# Patient Record
Sex: FEMALE | Race: WHITE | NOT HISPANIC OR LATINO | Employment: OTHER | URBAN - METROPOLITAN AREA
[De-identification: names, ages, dates, MRNs, and addresses within clinical notes are randomized per-mention and may not be internally consistent; named-entity substitution may affect disease eponyms.]

---

## 2017-01-23 DIAGNOSIS — F33.42 MAJOR DEPRESSIVE DISORDER, RECURRENT EPISODE, IN FULL REMISSION (H): Primary | ICD-10-CM

## 2017-01-23 DIAGNOSIS — F41.1 GAD (GENERALIZED ANXIETY DISORDER): ICD-10-CM

## 2017-01-23 RX ORDER — TRAZODONE HYDROCHLORIDE 50 MG/1
50 TABLET, FILM COATED ORAL AT BEDTIME
Qty: 90 TABLET | Refills: 1 | Status: SHIPPED | OUTPATIENT
Start: 2017-01-23 | End: 2017-08-08

## 2017-01-23 NOTE — TELEPHONE ENCOUNTER
Kemi Casper is requesting a refill of:    Pending Prescriptions:                       Disp   Refills    traZODone (DESYREL) 50 MG tablet          30 tab*0            Sig: Take 1 tablet (50 mg) by mouth At Bedtime    Last seen on 11/21/16 for Pre-op. Refilled again on 12/21/16. When is Pt due for OV? Can she receive more then 1 month at a time?  Please advise

## 2017-02-09 ENCOUNTER — TELEPHONE (OUTPATIENT)
Dept: FAMILY MEDICINE | Facility: CLINIC | Age: 62
End: 2017-02-09

## 2017-02-09 NOTE — Clinical Note
[facility]  [place] [facility] [address]  [place]  [address]            February 9, 2017      Kemi Casper  [address] [facility] [address]            Dear Kemi Casper,          Your doctor has requested to have you schedule a MAMMOGRAM at this time.       If you have already received your manual breast exam please feel free to schedule your mammogram at your convenience at [facility] (numbers provided below), or ANY institute provided in your insurance plan.    [facility]:   [phone]                                      Please disregard this notice if you have had this procedure repeated or have already made an appointment.        [facility]

## 2017-02-24 ENCOUNTER — OFFICE VISIT (OUTPATIENT)
Dept: FAMILY MEDICINE | Facility: CLINIC | Age: 62
End: 2017-02-24

## 2017-02-24 VITALS
WEIGHT: 179.6 LBS | DIASTOLIC BLOOD PRESSURE: 70 MMHG | BODY MASS INDEX: 33.91 KG/M2 | SYSTOLIC BLOOD PRESSURE: 120 MMHG | HEIGHT: 61 IN | TEMPERATURE: 98.4 F

## 2017-02-24 DIAGNOSIS — M79.7 FIBROMYALGIA: ICD-10-CM

## 2017-02-24 DIAGNOSIS — E66.811 OBESITY (BMI 30.0-34.9): ICD-10-CM

## 2017-02-24 DIAGNOSIS — J06.9 UPPER RESPIRATORY TRACT INFECTION, UNSPECIFIED TYPE: ICD-10-CM

## 2017-02-24 DIAGNOSIS — G89.29 CHRONIC PAIN OF LEFT KNEE: ICD-10-CM

## 2017-02-24 DIAGNOSIS — R19.4 CHANGE IN BOWEL HABIT: ICD-10-CM

## 2017-02-24 DIAGNOSIS — I10 ESSENTIAL HYPERTENSION, BENIGN: ICD-10-CM

## 2017-02-24 DIAGNOSIS — F33.42 MAJOR DEPRESSIVE DISORDER, RECURRENT EPISODE, IN FULL REMISSION (H): Primary | ICD-10-CM

## 2017-02-24 DIAGNOSIS — M25.562 CHRONIC PAIN OF LEFT KNEE: ICD-10-CM

## 2017-02-24 DIAGNOSIS — E11.49 TYPE 2 DIABETES MELLITUS WITH NEUROLOGICAL MANIFESTATIONS (H): ICD-10-CM

## 2017-02-24 PROCEDURE — 99214 OFFICE O/P EST MOD 30 MIN: CPT | Performed by: PHYSICIAN ASSISTANT

## 2017-02-24 PROCEDURE — 36415 COLL VENOUS BLD VENIPUNCTURE: CPT | Performed by: PHYSICIAN ASSISTANT

## 2017-02-24 PROCEDURE — 80048 BASIC METABOLIC PNL TOTAL CA: CPT | Mod: 90 | Performed by: PHYSICIAN ASSISTANT

## 2017-02-24 RX ORDER — IRBESARTAN AND HYDROCHLOROTHIAZIDE 150; 12.5 MG/1; MG/1
1 TABLET, FILM COATED ORAL DAILY
Qty: 90 TABLET | Refills: 1 | Status: SHIPPED | OUTPATIENT
Start: 2017-02-24 | End: 2017-08-08

## 2017-02-24 NOTE — NURSING NOTE
Kemi is here for depression screening    Pre-Visit Screening :  Immunizations : up to date  Colonoscopy : is up to date  Mammogram : is up to date  Asthma Action Test/Plan : NA  PHQ9/GAD7 :  NA  Pulse - regular  My Chart - declines    CLASSIFICATION OF OVERWEIGHT AND OBESITY BY BMI                         Obesity Class           BMI(kg/m2)  Underweight                                    < 18.5  Normal                                         18.5-24.9  Overweight                                     25.0-29.9  OBESITY                     I                  30.0-34.9                              II                 35.0-39.9  EXTREME OBESITY             III                >40                             Patient's  BMI There is no height or weight on file to calculate BMI.  http://hin.nhlbi.nih.gov/menuplanner/menu.cgi  Questioned patient about current smoking habits.  Pt. has never smoked.

## 2017-02-24 NOTE — MR AVS SNAPSHOT
After Visit Summary   2/24/2017    Kemi Casper    MRN: [id number]           Patient Information     Date Of Birth          1955        Visit Information        Provider Department      2/24/2017 1:00 PM Ann Finch PA [facility]        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)        Obesity (BMI 30.0-34.9)        Fibromyalgia        Upper respiratory tract infection, unspecified type        Chronic pain of left knee        Change in bowel habit           Follow-ups after your visit        Additional Services     MENTAL HEALTH REFERRAL       Your provider has referred you to:     [facility]  [address]  [facility] [id number] [phone] - appt line    [facility]  [address]  [facility] [id number] [phone] - appt line    [facility] [place]  [address] [place] [address]  Phone: [phone]  Fax: [phone]          All scheduling is subject to the client's specific insurance plan & benefits, provider/location availability, and provider clinical specialities.  Please arrive 15 minutes early for your first appointment and bring your completed paperwork.    Please be aware that coverage of these services is subject to the terms and limitations of your health insurance plan.  Call member services at your health plan with any benefit or coverage questions.                  Who to contact     If you have questions or need follow up information about today's clinic visit or your schedule please contact [facility] directly at [phone].  Normal or non-critical lab and imaging results will be communicated to you by MyChart, letter or phone within 4 business days after the clinic has received the results.  "If you do not hear from us within 7 days, please contact the clinic through [employer] or phone. If you have a critical or abnormal lab result, we will notify you by phone as soon as possible.  Submit refill requests through [employer] or call your pharmacy and they will forward the refill request to us. Please allow 3 business days for your refill to be completed.          Additional Information About Your Visit        Care EveryWhere ID     This is your Care EveryWhere ID. This could be used by other organizations to access your [place] medical records  [id number]        Your Vitals Were     Temperature Height Last Period BMI (Body Mass Index)          98.4  F (36.9  C) (Oral) 1.537 m (5' 0.5\") 12/04/2003 34.5 kg/m2         Blood Pressure from Last 3 Encounters:   02/24/17 120/70   11/21/16 (!) 152/92   09/26/16 132/70    Weight from Last 3 Encounters:   02/24/17 81.5 kg (179 lb 9.6 oz)   11/21/16 79.5 kg (175 lb 3.2 oz)   09/26/16 77.6 kg (171 lb)              We Performed the Following     BASIC METABOLIC PANEL (QUEST)     MENTAL HEALTH REFERRAL     VENOUS COLLECTION          Where to get your medicines      These medications were sent to [facility] [address] [place] [address] AT [facility] [address] [address]    Hours:  24-hours Phone:  [phone]     FLUoxetine 40 MG capsule    irbesartan-hydrochlorothiazide 150-12.5 MG per tablet          Primary Care Provider Office Phone # Fax #    Estefania Emery [phone]       [facility] [address] [facility]  [address]  [facility] [address]        Thank you!     Thank you for choosing [facility]  for your care. Our goal is always to provide you with excellent care. Hearing back from our patients is one way we can continue to improve our services. Please take a few minutes to complete the written survey that you may receive in the mail after your visit " with us. Thank you!             Your Updated Medication List - Protect others around you: Learn how to safely use, store and throw away your medicines at www.disposemymeds.org.          This list is accurate as of: 2/24/17 11:59 PM.  Always use your most recent med list.                   Brand Name Dispense Instructions for use    ACCU-CHEK INSTANT CONTROL Liqd     1 Bottle    Control for accu-check Pily       aluminum acetate Soln solution    BUROW'S    1 Bottle    Apply 3 mLs topically daily as needed       atorvastatin 20 MG tablet    LIPITOR         B-D U/F 31G X 8 MM   Generic drug:  insulin pen needle          blood glucose monitoring test strip    ACCU-CHEK PILY    1 Box    Use to test blood sugars 3 times daily or as directed.       FLUoxetine 40 MG capsule    PROzac    90 capsule    Take 1 capsule (40 mg) by mouth daily       irbesartan-hydrochlorothiazide 150-12.5 MG per tablet    AVALIDE    90 tablet    Take 1 tablet by mouth daily       LANTUS SOLOSTAR 100 UNIT/ML injection   Generic drug:  insulin glargine          latanoprost 0.005 % ophthalmic solution    XALATAN     INSTILL 1 DROP INTO THE LEFT EYE QHS       omeprazole 20 MG CR capsule    priLOSEC    90 capsule    TAKE 1 CAPSULE BY MOUTH DAILY WITH BREAKFAST.       ondansetron 4 MG ODT tab    ZOFRAN ODT    20 tablet    Take 1-2 tablets (4-8 mg) by mouth every 8 hours as needed for nausea       ONETOUCH DELICA LANCETS 33G Misc          PROVIGIL PO          traMADol 50 MG tablet    ULTRAM     TK 1 T PO Q 6 H PRN P       traZODone 50 MG tablet    DESYREL    90 tablet    Take 1 tablet (50 mg) by mouth At Bedtime       VICTOZA SC      Inject 1.2 mg Subcutaneous

## 2017-02-24 NOTE — PROGRESS NOTES
"Chief Complaint   Patient presents with     Depression     Hypertension     Knee Pain       SUBJECTIVE:  Kemi Casper is here for multiple concerns:    1.  Depression - worsening in last 6 months.  Was let go from her job, she thinks she was doing her job well, was let go unfairly according to pt.  Started another job, feels like she was not trained properly and was not set up to succeed.  Left that job shortly after starting.  Is in the process of looking for new jobs.  Denies self harm.  Refuses to see therapist - as she doesn't have enough \"time\", too many things going on (daughter is very medically fragile)  Was previously on Cymbalta.     2. Knee pain:   Had Left- meniscus repair in 7/26/16  Continues to have pain.  Got 2nd Opinion with Dr. Bravo at [facility]  Sent to PT - Cole Reid  Doing stretching exercises.   Still in therapy.       3. Mentions that her stools have been more small pieces rather than one long stool.  She rarely drinks water.   Is UTD on Colonscopy.    4. Wants ESR because she has joint pain all over.   Pt has Fibromyalgia    5. Has recently had URI  Wondering what to take.    6. Has DM, Over due for meeting with endocrinologist.  Sugars have been up to 300    7. HTN  Specifically denies chest pain, palpitations, dyspnea, or peripheral edema.     Not checking her BP     Is not exercising.           BP Readings from Last 6 Encounters:   02/24/17 120/70   11/21/16 (!) 152/92   09/26/16 132/70   09/17/16 135/71   07/22/16 124/88   04/06/16 120/80           Social History     Social History     Marital status:      Spouse name: Cole     Number of children: 3     Years of education: 15     Occupational History            [employer]     Social History Main Topics     Smoking status: Never Smoker     Smokeless tobacco: Never Used     Alcohol use No     Drug use: No     Sexual activity: Yes     Partners: Male     Birth control/ protection: Surgical      Comment: vas " "    Other Topics Concern      Service No     Blood Transfusions No     Caffeine Concern No     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  Eyes: Conjunctiva clear, no discharge  Ears: External ears and TMs normal BL.  Nose: Nasal mucosa pink and moist. No discharge.  Mouth / Throat: Mucous membranes moist. Normal dentition.  Pharynx non-erythematous, no exudates.   Neck: Supple, No thyromegaly or nodules. No lymphadenopathy.  Cv: regular rate and rhythm, normal s1 and s2 without murmur or click  Lungs: clear to auscultation, no wheezing or crackles  Abd: normal bowel sounds, soft, non-tender, no masses, no hepatomegaly or splenomegaly.        Mental Status Exam:   Appearance: calm  Grooming: adequately groomed  Demeanor: engaged, cooperative  Affect: normal  Speech: Normal.  Gait:Normal.  Movements: Normal  Form of thought: Logical, Linear and Goal directed  Thought content:  Normal  Insight:Good   Judgment: Normal  Cognition: Normal      ASSESSMENT/PLAN:    (F33.42) Major depressive disorder, recurrent episode, in full remission (H)  (primary encounter diagnosis)  I spoke with Dr. Emery who recommended evaluation by mental health specialist.  Cont. Prozac for now.      (I10) BENIGN HYPERTENSION  Plan: irbesartan-hydrochlorothiazide (AVALIDE)         150-12.5 MG per tablet, BASIC METABOLIC PANEL         (QUEST), VENOUS COLLECTION  FU 6 months    (E11.49) Type 2 diabetes mellitus with neurological manifestations " (HCC)  See Endo    (E66.9) Obesity (BMI 30.0-34.9)      (M79.7) Fibromyalgia  I do not advocate for an ESR.   Pt already has fibromyalgia dg  Should continue PT as prescribed.  See PCP if this continues    (J06.9) Upper respiratory tract infection, unspecified type  Symptomatic cares advised including Increase fluids, rest, acetominophen or ibuprofen prn if not contraindicated.     The patient is to RTC prn if these symptoms worsen or fail to improve as anticipated.       (M25.562,  G89.29) Chronic pain of left knee  Continue PT    (R19.4) Change in bowel habit  Increase water 6-8 glasses daily          Ann Finch

## 2017-02-24 NOTE — LETTER
[facility]  [place] [facility] [address]  [place]  [address]    February 28, 2017        Kemi Casper  [address]  [facility] [address]              Dear Kemi Casper      LAB RESULTS:   Labs Resulted Today:   Results for orders placed or performed in visit on 02/24/17   BASIC METABOLIC PANEL (QUEST)   Result Value Ref Range    Glucose 187 (H) 65 - 99 mg/dL    Urea Nitrogen 22 7 - 25 mg/dL    Creatinine 0.83 0.50 - 0.99 mg/dL    GFR Estimate 76 > OR = 60 mL/min/1.73m2    EGFR African American 88 > OR = 60 mL/min/1.73m2    BUN/Creatinine Ratio NOT APPLICABLE 6 - 22 (calc)    Sodium 140 135 - 146 mmol/L    Potassium 3.9 3.5 - 5.3 mmol/L    Chloride 99 98 - 110 mmol/L    Carbon Dioxide 31 20 - 31 mmol/L    Calcium 9.8 8.6 - 10.4 mg/dL     Your glucose was elevated - other labs were normal.  I spoke with Dr. Emery and we both agree that you should be evaluated by a mental health specialist and psychiatrist for further evaluation. For now continue your current medications. I have enclosed a list of local therapy clinics.       Ann Finch PA-C

## 2017-02-25 LAB
BUN SERPL-MCNC: 22 MG/DL (ref 7–25)
BUN/CREATININE RATIO: ABNORMAL (CALC) (ref 6–22)
CALCIUM SERPL-MCNC: 9.8 MG/DL (ref 8.6–10.4)
CHLORIDE SERPLBLD-SCNC: 99 MMOL/L (ref 98–110)
CO2 SERPL-SCNC: 31 MMOL/L (ref 20–31)
CREAT SERPL-MCNC: 0.83 MG/DL (ref 0.5–0.99)
EGFR AFRICAN AMERICAN - QUEST: 88 ML/MIN/1.73M2
GFR SERPL CREATININE-BSD FRML MDRD: 76 ML/MIN/1.73M2
GLUCOSE - QUEST: 187 MG/DL (ref 65–99)
POTASSIUM SERPL-SCNC: 3.9 MMOL/L (ref 3.5–5.3)
SODIUM SERPL-SCNC: 140 MMOL/L (ref 135–146)

## 2017-02-27 ENCOUNTER — TELEPHONE (OUTPATIENT)
Dept: FAMILY MEDICINE | Facility: CLINIC | Age: 62
End: 2017-02-27

## 2017-02-27 RX ORDER — FLUOXETINE 40 MG/1
40 CAPSULE ORAL DAILY
Qty: 90 CAPSULE | Refills: 1 | Status: SHIPPED | OUTPATIENT
Start: 2017-02-27 | End: 2017-08-08

## 2017-02-27 NOTE — TELEPHONE ENCOUNTER
Spoke to Aminata to let her know CER's message below.  She said that she doesn't think that's going to happen and that she will have to check with her ins first.

## 2017-02-27 NOTE — TELEPHONE ENCOUNTER
Please let pt know I spoke with Dr. Emery and we would like her to see a therapist and psychiatrist for evaluation.  For now continue Prozac.       I have referred her to:    [facility]  [address]  [facility] [address] [phone] - appt [facility]  [address] [place] [address]  Phone: [phone]  Fax: [phone]

## 2017-04-25 ENCOUNTER — TRANSFERRED RECORDS (OUTPATIENT)
Dept: FAMILY MEDICINE | Facility: CLINIC | Age: 62
End: 2017-04-25

## 2017-04-25 ENCOUNTER — OFFICE VISIT (OUTPATIENT)
Dept: FAMILY MEDICINE | Facility: CLINIC | Age: 62
End: 2017-04-25

## 2017-04-25 VITALS
TEMPERATURE: 98.2 F | HEIGHT: 61 IN | SYSTOLIC BLOOD PRESSURE: 138 MMHG | BODY MASS INDEX: 34.74 KG/M2 | HEART RATE: 76 BPM | OXYGEN SATURATION: 97 % | DIASTOLIC BLOOD PRESSURE: 88 MMHG | WEIGHT: 184 LBS | RESPIRATION RATE: 16 BRPM

## 2017-04-25 DIAGNOSIS — E11.49 TYPE 2 DIABETES MELLITUS WITH NEUROLOGICAL MANIFESTATIONS (H): ICD-10-CM

## 2017-04-25 DIAGNOSIS — B35.4 TINEA CORPORIS: ICD-10-CM

## 2017-04-25 DIAGNOSIS — N39.41 URGE INCONTINENCE OF URINE: Primary | ICD-10-CM

## 2017-04-25 LAB
ALBUMIN (URINE): ABNORMAL MG/DL
APPEARANCE UR: CLEAR
BACTERIA, UR: ABNORMAL
BILIRUB UR QL: ABNORMAL
CASTS/LPF: ABNORMAL
COLOR UR: YELLOW
EP/HPF: ABNORMAL
GLUCOSE URINE: 100 MG/DL
HGB UR QL: ABNORMAL
KETONES UR QL: ABNORMAL MG/DL
LEUKOCYTE ESTERASE - QUEST: ABNORMAL
MISC.: ABNORMAL
NITRITE UR QL STRIP: ABNORMAL
PH UR STRIP: 5.5 PH (ref 5–7)
RBC, UR MICRO: ABNORMAL (ref ?–2)
SP. GRAVITY: 1.02
UROBILINOGEN UR QL STRIP: 0.2 EU/DL (ref 0.2–1)
WBC, UR MICRO: ABNORMAL (ref ?–2)

## 2017-04-25 PROCEDURE — 87088 URINE BACTERIA CULTURE: CPT | Mod: 90 | Performed by: FAMILY MEDICINE

## 2017-04-25 PROCEDURE — 81001 URINALYSIS AUTO W/SCOPE: CPT | Performed by: FAMILY MEDICINE

## 2017-04-25 PROCEDURE — 99213 OFFICE O/P EST LOW 20 MIN: CPT | Performed by: FAMILY MEDICINE

## 2017-04-25 PROCEDURE — 87086 URINE CULTURE/COLONY COUNT: CPT | Mod: 90 | Performed by: FAMILY MEDICINE

## 2017-04-25 RX ORDER — FLUCONAZOLE 150 MG/1
150 TABLET ORAL ONCE
Qty: 1 TABLET | Refills: 0 | Status: SHIPPED | OUTPATIENT
Start: 2017-04-25 | End: 2017-04-25

## 2017-04-25 NOTE — PROGRESS NOTES
"SUBJECTIVE:   Kemi Casper is an 62 year old year old who presents with suspected urinary tract   infection. Her symptoms began last week and   include incontinence urge with urinary frequency/ diabetes out of control. Diabetes MD asked hr to come by for a urine test/culture.     Predisposing factors: diabetes blood sugars above 300  Hx of previous UTI s: rare   Sexually active: yes, single partner    Polyuria and polydipsia on ROS    Current Outpatient Prescriptions   Medication     insulin aspart (NOVOLOG) 100 UNITS/ML injection     FLUoxetine (PROZAC) 40 MG capsule     irbesartan-hydrochlorothiazide (AVALIDE) 150-12.5 MG per tablet     traZODone (DESYREL) 50 MG tablet     blood glucose monitoring (ACCU-CHEK KWASI) test strip     ondansetron (ZOFRAN ODT) 4 MG disintegrating tablet     omeprazole (PRILOSEC) 20 MG capsule     traMADol (ULTRAM) 50 MG tablet     latanoprost (XALATAN) 0.005 % ophthalmic solution     Modafinil (PROVIGIL PO)     LANTUS SOLOSTAR 100 UNIT/ML soln     atorvastatin (LIPITOR) 20 MG tablet     B-D U/F 31G X 8 MM insulin pen needle     ONETOUCH DELICA LANCETS 33G MISC     aluminum acetate (BUROW'S) SOLN     Blood Glucose Calibration (ACCU-CHEK INSTANT CONTROL) LIQD     No current facility-administered medications for this visit.           Allergies   Allergen Reactions     Lisinopril Cough       OBJECTIVE:   Vitals:    04/25/17 1455   BP: 138/88   BP Location: Right arm   Patient Position: Chair   Cuff Size: Adult Large   Pulse: 76   Resp: 16   Temp: 98.2  F (36.8  C)   TempSrc: Oral   SpO2: 97%   Weight: 83.5 kg (184 lb)   Height: 1.537 m (5' 0.5\")       General appearance: /Alert, oriented, well hydrated. Skin turgor normal.  Skin: Tinea lesions on the torso  Hydration: well hydrated   CVA tenderness to percussion: none  Abdomen: Obese. The abdomen is soft without tenderness, guarding, mass or organomegaly. Bowel sounds are normal. No CVA tenderness or inguinal adenopathy " noted.    Tenderness: none   Masses: none  Organomegaly: none    UA:Urine dipstick shows positive for glucose and positive for leukocytes.  Micro exam: 2-5 WBC's per HPF and rare bacteria.     ASSESSMENT/PLAN: (N39.41) Urge incontinence of urine  (primary encounter diagnosis)  Comment: await culture.  Plan: Urine Culture Aerobic Bacterial        Patient was instructed to drink plenty of fluids, urinate frequently and to contact the office promptly should she notice fever greater than 102, increase in discomfort, skin rash, lack of improvement after 2 days of treatment, or the appearance of new symptoms.      (E11.49) Type 2 diabetes mellitus with neurological manifestations (HCC)  Plan: Follow plan per endocrinology specialists    (B35.4) Tinea corporis  Comment: gentle soap  Plan: fluconazole (DIFLUCAN) 150 MG tablet,         DERMATOLOGY REFERRAL        Consult with derm requested.

## 2017-04-25 NOTE — MR AVS SNAPSHOT
After Visit Summary   4/25/2017    Kemi Casper    MRN: [id number]           Patient Information     Date Of Birth          1955        Visit Information        Provider Department      4/25/2017 2:45 PM Estefania Emery MD [facility]        Today's Diagnoses     Urge incontinence of urine    -  1    Type 2 diabetes mellitus with neurological manifestations (HCC)        Tinea corporis          Care Instructions    Await culture. Patient was instructed to drink plenty of fluids, urinate frequently and to contact the office promptly should she notice fever greater than 102, increase in discomfort, skin rash, lack of improvement after 2 days of treatment, or the appearance of new symptoms.    Back to Endocrinology  Schedule dermatology consult.        Follow-ups after your visit        Additional Services     DERMATOLOGY REFERRAL       Your provider has referred you to: N: [facility] Mirna Hutchison [phone]   [email or web address]    Please be aware that coverage of these services is subject to the terms and limitations of your health insurance plan.  Call member services at your health plan with any benefit or coverage questions.      Please bring the following with you to your appointment:    (1) Any X-Rays, CTs or MRIs which have been performed.  Contact the facility where they were done to arrange for  prior to your scheduled appointment.    (2) List of current medications  (3) This referral request   (4) Any documents/labs given to you for this referral                  Who to contact     If you have questions or need follow up information about today's clinic visit or your schedule please contact [facility] directly at [phone].  Normal or non-critical lab and imaging results will be communicated to you by MyChart, letter or phone within 4 business days after the clinic has received the results. If you  "do not hear from us within 7 days, please contact the clinic through [employer] or phone. If you have a critical or abnormal lab result, we will notify you by phone as soon as possible.  Submit refill requests through [employer] or call your pharmacy and they will forward the refill request to us. Please allow 3 business days for your refill to be completed.          Additional Information About Your Visit        Care EveryWhere ID     This is your Care EveryWhere ID. This could be used by other organizations to access your [place] medical records  [id number]        Your Vitals Were     Pulse Temperature Respirations Height Last Period Pulse Oximetry    76 98.2  F (36.8  C) (Oral) 16 1.537 m (5' 0.5\") 12/04/2003 97%    BMI (Body Mass Index)                   35.34 kg/m2            Blood Pressure from Last 3 Encounters:   04/25/17 138/88   02/24/17 120/70   11/21/16 (!) 152/92    Weight from Last 3 Encounters:   04/25/17 83.5 kg (184 lb)   02/24/17 81.5 kg (179 lb 9.6 oz)   11/21/16 79.5 kg (175 lb 3.2 oz)              We Performed the Following     DERMATOLOGY REFERRAL     HCL  Urinalysis, Routine (BFP)     Urine Culture Aerobic Bacterial          Today's Medication Changes          These changes are accurate as of: 4/25/17  3:54 PM.  If you have any questions, ask your nurse or doctor.               Start taking these medicines.        Dose/Directions    fluconazole 150 MG tablet   Commonly known as:  DIFLUCAN   Used for:  Tinea corporis   Started by:  Estefania Emery MD        Dose:  150 mg   Take 1 tablet (150 mg) by mouth once for 1 dose   Quantity:  1 tablet   Refills:  0            Where to get your medicines      These medications were sent to [facility] [address]  AT [facility] [address] [place] [address], [facility] [address]    Hours:  24-hours Phone:  [phone]     fluconazole 150 MG tablet                Primary Care Provider Office Phone # Fax #    " Estefania Emery -[phone]       [facility] [address]  [address]  [facility] [address]        Thank you!     Thank you for choosing [facility]  for your care. Our goal is always to provide you with excellent care. Hearing back from our patients is one way we can continue to improve our services. Please take a few minutes to complete the written survey that you may receive in the mail after your visit with us. Thank you!             Your Updated Medication List - Protect others around you: Learn how to safely use, store and throw away your medicines at www.disposemymeds.org.          This list is accurate as of: 4/25/17  3:54 PM.  Always use your most recent med list.                   Brand Name Dispense Instructions for use    ACCU-CHEK INSTANT CONTROL Liqd     1 Bottle    Control for accu-check Pily       aluminum acetate Soln solution    BUROW'S    1 Bottle    Apply 3 mLs topically daily as needed       atorvastatin 20 MG tablet    LIPITOR         B-D U/F 31G X 8 MM   Generic drug:  insulin pen needle          blood glucose monitoring test strip    ACCU-CHEK PILY    1 Box    Use to test blood sugars 3 times daily or as directed.       fluconazole 150 MG tablet    DIFLUCAN    1 tablet    Take 1 tablet (150 mg) by mouth once for 1 dose       FLUoxetine 40 MG capsule    PROzac    90 capsule    Take 1 capsule (40 mg) by mouth daily       insulin aspart 100 UNITS/ML injection    NovoLOG     Inject 5 Units Subcutaneous       irbesartan-hydrochlorothiazide 150-12.5 MG per tablet    AVALIDE    90 tablet    Take 1 tablet by mouth daily       LANTUS SOLOSTAR 100 UNIT/ML injection   Generic drug:  insulin glargine          latanoprost 0.005 % ophthalmic solution    XALATAN     INSTILL 1 DROP INTO THE LEFT EYE QHS       omeprazole 20 MG CR capsule    priLOSEC    90 capsule    TAKE 1 CAPSULE BY MOUTH DAILY WITH BREAKFAST.       ondansetron 4 MG ODT tab    ZOFRAN  ODT    20 tablet    Take 1-2 tablets (4-8 mg) by mouth every 8 hours as needed for nausea       ONETOUCH DELICA LANCETS 33G Misc          PROVIGIL PO          traMADol 50 MG tablet    ULTRAM     TK 1 T PO Q 6 H PRN P       traZODone 50 MG tablet    DESYREL    90 tablet    Take 1 tablet (50 mg) by mouth At Bedtime

## 2017-04-25 NOTE — NURSING NOTE
Patient is here for some bladder issues - saw the endocrinologist because her blood sugars have been high and she asked that she get her urine checked - having some incontinence issues. This has been an on going issue.     She is also having some bilateral knee issues and pain.  Pre-Visit Screening not done today.    Pulse - regular  My Chart - accepts    CLASSIFICATION OF OVERWEIGHT AND   BY BMI                         Obesity Class           BMI(kg/m2)  Underweight                                    < 18.5  Normal                                         18.5-24.9  Overweight                                     25.0-29.9  OBESITY                     I                  30.0-34.9                              II                 35.0-39.9  EXTREME OBESITY             III                >40                             Patient's  BMI Body mass index is 35.34 kg/(m^2).  http://hin.nhlbi.nih.gov/menuplanner/menu.cgi  Questioned patient about current smoking habits.  Pt. has never smoked.

## 2017-04-25 NOTE — PATIENT INSTRUCTIONS
Await culture. Patient was instructed to drink plenty of fluids, urinate frequently and to contact the office promptly should she notice fever greater than 102, increase in discomfort, skin rash, lack of improvement after 2 days of treatment, or the appearance of new symptoms.    Back to Endocrinology  Schedule dermatology consult.

## 2017-04-25 NOTE — LETTER
[facility]  [facility] [address]  [place]  [address]    April 27, 2017        Kemi Casper  [address] [facility] [id number]              Dear Kemi Casper      LAB RESULTS:     The results of your recent Urine Culture were negative for an infection.  If you have any further questions or problems, please contact our office at [phone].          Estefania Emery MD

## 2017-04-27 LAB — URINE VOIDED CULTURE: NORMAL

## 2017-05-04 ENCOUNTER — TRANSFERRED RECORDS (OUTPATIENT)
Dept: FAMILY MEDICINE | Facility: CLINIC | Age: 62
End: 2017-05-04

## 2017-05-29 ENCOUNTER — HOSPITAL ENCOUNTER (EMERGENCY)
Facility: CLINIC | Age: 62
Discharge: HOME | End: 2017-05-29
Attending: EMERGENCY MEDICINE | Admitting: EMERGENCY MEDICINE
Payer: COMMERCIAL

## 2017-05-29 ENCOUNTER — APPOINTMENT (OUTPATIENT)
Dept: MRI IMAGING | Facility: CLINIC | Age: 62
End: 2017-05-29
Attending: EMERGENCY MEDICINE
Payer: COMMERCIAL

## 2017-05-29 VITALS
TEMPERATURE: 98.4 F | RESPIRATION RATE: 24 BRPM | HEART RATE: 83 BPM | SYSTOLIC BLOOD PRESSURE: 172 MMHG | DIASTOLIC BLOOD PRESSURE: 88 MMHG | OXYGEN SATURATION: 95 %

## 2017-05-29 DIAGNOSIS — R27.0 ATAXIA: ICD-10-CM

## 2017-05-29 DIAGNOSIS — R20.2 PARESTHESIAS: ICD-10-CM

## 2017-05-29 DIAGNOSIS — R47.01 APHASIA: ICD-10-CM

## 2017-05-29 DIAGNOSIS — R53.1 WEAKNESS GENERALIZED: ICD-10-CM

## 2017-05-29 DIAGNOSIS — M54.5 ACUTE LOW BACK PAIN, UNSPECIFIED BACK PAIN LATERALITY, WITH SCIATICA PRESENCE UNSPECIFIED: ICD-10-CM

## 2017-05-29 LAB
ALBUMIN SERPL-MCNC: 3.4 G/DL (ref 3.4–5)
ALBUMIN UR-MCNC: ABNORMAL MG/DL
ALBUMIN UR-MCNC: NEGATIVE MG/DL
ALP SERPL-CCNC: 118 U/L (ref 40–150)
ALT SERPL W P-5'-P-CCNC: 28 U/L (ref 0–50)
ANION GAP SERPL CALCULATED.3IONS-SCNC: 4 MMOL/L (ref 3–14)
APPEARANCE UR: ABNORMAL
APPEARANCE UR: CLEAR
AST SERPL W P-5'-P-CCNC: 18 U/L (ref 0–45)
BASOPHILS # BLD AUTO: 0 10E9/L (ref 0–0.2)
BASOPHILS NFR BLD AUTO: 0.9 %
BILIRUB SERPL-MCNC: 0.2 MG/DL (ref 0.2–1.3)
BILIRUB UR QL STRIP: ABNORMAL
BILIRUB UR QL STRIP: NEGATIVE
BUN SERPL-MCNC: 13 MG/DL (ref 7–30)
CALCIUM SERPL-MCNC: 8.6 MG/DL (ref 8.5–10.1)
CHLORIDE SERPL-SCNC: 101 MMOL/L (ref 94–109)
CK SERPL-CCNC: 131 U/L (ref 30–225)
CO2 SERPL-SCNC: 32 MMOL/L (ref 20–32)
COLOR UR AUTO: ABNORMAL
COLOR UR AUTO: ABNORMAL
CREAT SERPL-MCNC: 0.6 MG/DL (ref 0.52–1.04)
D DIMER PPP FEU-MCNC: 0.3 UG/ML FEU (ref 0–0.5)
DIFFERENTIAL METHOD BLD: ABNORMAL
EOSINOPHIL # BLD AUTO: 0.2 10E9/L (ref 0–0.7)
EOSINOPHIL NFR BLD AUTO: 3.7 %
ERYTHROCYTE [DISTWIDTH] IN BLOOD BY AUTOMATED COUNT: 12.8 % (ref 10–15)
GFR SERPL CREATININE-BSD FRML MDRD: ABNORMAL ML/MIN/1.7M2
GLUCOSE SERPL-MCNC: 203 MG/DL (ref 70–99)
GLUCOSE UR STRIP-MCNC: 150 MG/DL
GLUCOSE UR STRIP-MCNC: ABNORMAL MG/DL
HCT VFR BLD AUTO: 32.8 % (ref 35–47)
HGB BLD-MCNC: 10.8 G/DL (ref 11.7–15.7)
HGB UR QL STRIP: ABNORMAL
HGB UR QL STRIP: NEGATIVE
IMM GRANULOCYTES # BLD: 0 10E9/L (ref 0–0.4)
IMM GRANULOCYTES NFR BLD: 0.4 %
KETONES UR STRIP-MCNC: ABNORMAL MG/DL
KETONES UR STRIP-MCNC: NEGATIVE MG/DL
LEUKOCYTE ESTERASE UR QL STRIP: ABNORMAL
LEUKOCYTE ESTERASE UR QL STRIP: NEGATIVE
LYMPHOCYTES # BLD AUTO: 1.6 10E9/L (ref 0.8–5.3)
LYMPHOCYTES NFR BLD AUTO: 34.8 %
MCH RBC QN AUTO: 29.9 PG (ref 26.5–33)
MCHC RBC AUTO-ENTMCNC: 32.9 G/DL (ref 31.5–36.5)
MCV RBC AUTO: 91 FL (ref 78–100)
MONOCYTES # BLD AUTO: 0.4 10E9/L (ref 0–1.3)
MONOCYTES NFR BLD AUTO: 8.6 %
MUCOUS THREADS #/AREA URNS LPF: PRESENT /LPF
NEUTROPHILS # BLD AUTO: 2.4 10E9/L (ref 1.6–8.3)
NEUTROPHILS NFR BLD AUTO: 51.6 %
NITRATE UR QL: ABNORMAL
NITRATE UR QL: NEGATIVE
NRBC # BLD AUTO: 0 10*3/UL
NRBC BLD AUTO-RTO: 0 /100
NT-PROBNP SERPL-MCNC: 166 PG/ML (ref 0–900)
PH UR STRIP: 8 PH (ref 5–7)
PH UR STRIP: ABNORMAL PH (ref 5–7)
PLATELET # BLD AUTO: 334 10E9/L (ref 150–450)
POTASSIUM SERPL-SCNC: 3.2 MMOL/L (ref 3.4–5.3)
PROT SERPL-MCNC: 6.7 G/DL (ref 6.8–8.8)
RBC # BLD AUTO: 3.61 10E12/L (ref 3.8–5.2)
RBC #/AREA URNS AUTO: 1 /HPF (ref 0–2)
RBC #/AREA URNS AUTO: ABNORMAL /HPF (ref 0–2)
SODIUM SERPL-SCNC: 137 MMOL/L (ref 133–144)
SP GR UR STRIP: 1.01 (ref 1–1.03)
SP GR UR STRIP: ABNORMAL (ref 1–1.03)
SQUAMOUS #/AREA URNS AUTO: <1 /HPF (ref 0–1)
TROPONIN I SERPL-MCNC: NORMAL UG/L (ref 0–0.04)
URN SPEC COLLECT METH UR: ABNORMAL
URN SPEC COLLECT METH UR: ABNORMAL
UROBILINOGEN UR STRIP-MCNC: 0 MG/DL (ref 0–2)
UROBILINOGEN UR STRIP-MCNC: ABNORMAL MG/DL (ref 0–2)
WBC # BLD AUTO: 4.7 10E9/L (ref 4–11)
WBC #/AREA URNS AUTO: 1 /HPF (ref 0–2)
WBC #/AREA URNS AUTO: ABNORMAL /HPF (ref 0–2)

## 2017-05-29 PROCEDURE — 99285 EMERGENCY DEPT VISIT HI MDM: CPT | Mod: 25

## 2017-05-29 PROCEDURE — 72148 MRI LUMBAR SPINE W/O DYE: CPT

## 2017-05-29 PROCEDURE — 80053 COMPREHEN METABOLIC PANEL: CPT | Performed by: EMERGENCY MEDICINE

## 2017-05-29 PROCEDURE — A9585 GADOBUTROL INJECTION: HCPCS | Performed by: EMERGENCY MEDICINE

## 2017-05-29 PROCEDURE — 85025 COMPLETE CBC W/AUTO DIFF WBC: CPT | Performed by: EMERGENCY MEDICINE

## 2017-05-29 PROCEDURE — 96360 HYDRATION IV INFUSION INIT: CPT | Mod: 59

## 2017-05-29 PROCEDURE — 93005 ELECTROCARDIOGRAM TRACING: CPT

## 2017-05-29 PROCEDURE — 70553 MRI BRAIN STEM W/O & W/DYE: CPT

## 2017-05-29 PROCEDURE — 25000128 H RX IP 250 OP 636: Performed by: EMERGENCY MEDICINE

## 2017-05-29 PROCEDURE — 83880 ASSAY OF NATRIURETIC PEPTIDE: CPT | Performed by: EMERGENCY MEDICINE

## 2017-05-29 PROCEDURE — 82550 ASSAY OF CK (CPK): CPT | Performed by: EMERGENCY MEDICINE

## 2017-05-29 PROCEDURE — 70544 MR ANGIOGRAPHY HEAD W/O DYE: CPT | Mod: XS

## 2017-05-29 PROCEDURE — 81001 URINALYSIS AUTO W/SCOPE: CPT | Performed by: EMERGENCY MEDICINE

## 2017-05-29 PROCEDURE — 70549 MR ANGIOGRAPH NECK W/O&W/DYE: CPT

## 2017-05-29 PROCEDURE — 85379 FIBRIN DEGRADATION QUANT: CPT | Performed by: EMERGENCY MEDICINE

## 2017-05-29 PROCEDURE — 84484 ASSAY OF TROPONIN QUANT: CPT | Performed by: EMERGENCY MEDICINE

## 2017-05-29 PROCEDURE — 96361 HYDRATE IV INFUSION ADD-ON: CPT

## 2017-05-29 RX ORDER — GADOBUTROL 604.72 MG/ML
10 INJECTION INTRAVENOUS ONCE
Status: COMPLETED | OUTPATIENT
Start: 2017-05-29 | End: 2017-05-29

## 2017-05-29 RX ORDER — CYCLOBENZAPRINE HCL 10 MG
10 TABLET ORAL 3 TIMES DAILY PRN
Qty: 20 TABLET | Refills: 0 | Status: SHIPPED | OUTPATIENT
Start: 2017-05-29 | End: 2017-05-29

## 2017-05-29 RX ORDER — METHOCARBAMOL 500 MG/1
1000 TABLET, FILM COATED ORAL 3 TIMES DAILY PRN
Qty: 30 TABLET | Refills: 0 | Status: SHIPPED | OUTPATIENT
Start: 2017-05-29 | End: 2017-08-08

## 2017-05-29 RX ADMIN — GADOBUTROL 10 ML: 604.72 INJECTION INTRAVENOUS at 18:25

## 2017-05-29 RX ADMIN — SODIUM CHLORIDE 500 ML: 9 INJECTION, SOLUTION INTRAVENOUS at 14:49

## 2017-05-29 ASSESSMENT — ENCOUNTER SYMPTOMS
MYALGIAS: 1
FLANK PAIN: 0
FEVER: 0
VOMITING: 0
BACK PAIN: 1
SPEECH DIFFICULTY: 1
HEADACHES: 1
DIARRHEA: 0
EYE REDNESS: 1
NAUSEA: 0
ABDOMINAL PAIN: 0

## 2017-05-29 NOTE — ED NOTES
Ambulated to bathroom. Pt states she needs to give a UA. Instructed regarding clean catch, midstream urine collection technique.

## 2017-05-29 NOTE — ED AVS SNAPSHOT
[facility]    [address]    [facility] [address]    Phone:  [phone]    Fax:  [phone]                                       Kemi Casper   MRN: [id number]    Department:  [facility]   Date of Visit:  5/29/2017           Patient Information     Date Of Birth          1955        Your diagnoses for this visit were:     Aphasia     Weakness generalized     Acute low back pain, unspecified back pain laterality, with sciatica presence unspecified     Paresthesias     Ataxia        You were seen by Coleman Larson MD.      Follow-up Information     Follow up with Estefania Emery MD. Call in 3 days.    Specialty:  Family Practice    Contact information:    [facility]  [address]  [facility] [id number] [phone]          Discharge Instructions         Discharge Instructions  Back Pain  You were seen today for back pain. Back pain can have many causes, but most will get better without surgery or other specific treatment. Sometimes there is a herniated ( slipped ) disc. We don t usually do MRI scans to look for these right away, since most herniated discs will get better on their own with time.  Today, we did not find any evidence that your back pain was caused by a serious condition, such as an infection, fracture, or tumor. However, sometimes symptoms develop over time and cannot be found during an emergency visit, so it is very important that you follow up with your primary doctor.  Return to the Emergency Department if:    You develop a fever with your back pain.     You have weakness or change in sensation in one or both legs.    You lose control of your bowels or bladder, or can t empty your bladder.    Your pain gets much worse.     Follow-up with your doctor:    Unless your pain has completely gone away, please make an appointment with your doctor within one week.  You may need  further management of your back pain, such as more pain medication, imaging such as an X-ray or MRI, or physical therapy.    What can I do to help myself?    Remain Active -- People are often afraid that they will hurt their back further or delay recovery by remaining active, but this is one of the best things you can do for your back. In fact, prolonged bed rest is not recommended. Studies have shown that people with low back pain recover faster when they remain active. Movement helps to bring blood flow to the muscles and relieve muscle spasms as well as preventing loss of muscle strength.    Heat -- Using a heating pad can help with low back pain during the first few weeks. Do not sleep with a heating pad, as you can be burned.     Pain medications - You may take a pain medication such as Tylenol  (acetaminophen), Advil , Nuprin  (ibuprofen) or Aleve  (naproxen).  If you have been given a narcotic such as Vicodin  (hydrocodone with acetaminophen), Percocet  (oxycodone with acetaminophen), codeine, or a muscle relaxant such as Flexeril  (cyclobenzaprine) or Soma  (carisoprodol), do not drive for four hours after you have taken it. If the narcotic contains Tylenol  (acetaminophen), do not take Tylenol  with it. All narcotics will cause constipation, so eat a high fiber diet.   If you were given a prescription for medicine here today, be sure to read all of the information (including the package insert) that comes with your prescription.  This will include important information about the medicine, its side effects, and any warnings that you need to know about.  The pharmacist who fills the prescription can provide more information and answer questions you may have about the medicine.  If you have questions or concerns that the pharmacist cannot address, please call or return to the Emergency Department.   Opioid Medication Information    Pain medications are among the most commonly prescribed medicines, so we are  including this information for all our patients. If you did not receive pain medication or get a prescription for pain medicine, you can ignore it.     You may have been given a prescription for an opioid (narcotic) pain medicine and/or have received a pain medicine while here in the Emergency Department. These medicines can make you drowsy or impaired. You must not drive, operate dangerous equipment, or engage in any other dangerous activities while taking these medications. If you drive while taking these medications, you could be arrested for DUI, or driving under the influence. Do not drink any alcohol while you are taking these medications.     Opioid pain medications can cause addiction. If you have a history of chemical dependency of any type, you are at a higher risk of becoming addicted to pain medications.  Only take these prescribed medications to treat your pain when all other options have been tried. Take it for as short a time and as few doses as possible. Store your pain pills in a secure place, as they are frequently stolen and provide a dangerous opportunity for children or visitors in your house to start abusing these powerful medications. We will not replace any lost or stolen medicine.  As soon as your pain is better, you should flush all your remaining medication.     Many prescription pain medications contain Tylenol  (acetaminophen), including Vicodin , Tylenol #3 , Norco , Lortab , and Percocet .  You should not take any extra pills of Tylenol  if you are using these prescription medications or you can get very sick.  Do not ever take more than 3000 mg of acetaminophen in any 24 hour period.    All opioids tend to cause constipation. Drink plenty of water and eat foods that have a lot of fiber, such as fruits, vegetables, prune juice, apple juice and high fiber cereal.  Take a laxative if you don t move your bowels at least every other day. Miralax , Milk of Magnesia, Colace , or Senna  can  be used to keep you regular.      Remember that you can always come back to the Emergency Department if you are not able to see your regular doctor in the amount of time listed above, if you get any new symptoms, or if there is anything that worries you.        What is Aphasia?  Aphasia is a loss of language skills. It may occur if the brain is damaged. This usually happens after a stroke. People with aphasia may not be able to express their thoughts (expressive aphasia) or understand others (receptive aphasia).     A speech therapist may help a person with aphasia regain his or her language skills.   Signs of aphasia  Signs of aphasia vary with each person. A person with aphasia may show some or all of the signs listed below.  A person with aphasia may not be able to do the following:    Understand words when others speak    Speak in complete sentences    Read or write    Understand that numbers have meaning  A person with aphasia may do the following:    Speak using only nouns and verbs    Mix up the order of words in a sentence    Use the wrong words or made-up words    Have trouble working with numbers, as when balancing a checkbook  Practical tips for aphasia  A person with aphasia can still think, even if responding is hard. Try to:    Ask questions that can be answered with a  yes  or a  no.     Speak slowly and clearly in simple sentences. Use simple words, but don t  talk down.     Give the person time to understand and to respond. Try not to speak for the person unless you have to.    6493-1495 [employer]. [address] [place] [address]2018. All rights reserved. This information is not intended as a substitute for professional medical care. Always follow your healthcare professional's instructions.          Discharge References/Attachments     WEAKNESS (UNCERTAIN CAUSE) (ENGLISH)      24 Hour Appointment Hotline       To make an appointment at any [facility], call [phone]  ([phone]). If you don't have a family doctor or clinic, we will help you find one. [facility] are conveniently located to serve the needs of you and your family.             Review of your medicines      START taking        Dose / Directions Last dose taken    cyclobenzaprine 10 MG tablet   Commonly known as:  FLEXERIL   Dose:  10 mg   Quantity:  20 tablet        Take 1 tablet (10 mg) by mouth 3 times daily as needed for muscle spasms   Refills:  0          Our records show that you are taking the medicines listed below. If these are incorrect, please call your family doctor or clinic.        Dose / Directions Last dose taken    ACCU-CHEK INSTANT CONTROL Liqd   Quantity:  1 Bottle        Control for accu-check Pily   Refills:  1        aluminum acetate Soln solution   Commonly known as:  BUROW'S   Dose:  3 mL   Quantity:  1 Bottle        Apply 3 mLs topically daily as needed   Refills:  1        atorvastatin 20 MG tablet   Commonly known as:  LIPITOR        Refills:  1        B-D U/F 31G X 8 MM   Generic drug:  insulin pen needle        Refills:  1        blood glucose monitoring test strip   Commonly known as:  ACCU-CHEK PILY   Quantity:  1 Box        Use to test blood sugars 3 times daily or as directed.   Refills:  3        FLUoxetine 40 MG capsule   Commonly known as:  PROzac   Dose:  40 mg   Quantity:  90 capsule        Take 1 capsule (40 mg) by mouth daily   Refills:  1        insulin aspart 100 UNITS/ML injection   Commonly known as:  NovoLOG   Dose:  5 Units        Inject 5 Units Subcutaneous   Refills:  0        irbesartan-hydrochlorothiazide 150-12.5 MG per tablet   Commonly known as:  AVALIDE   Dose:  1 tablet   Quantity:  90 tablet        Take 1 tablet by mouth daily   Refills:  1        LANTUS SOLOSTAR 100 UNIT/ML injection   Generic drug:  insulin glargine        Refills:  1        latanoprost 0.005 % ophthalmic solution   Commonly known as:  XALATAN        INSTILL 1 DROP INTO THE LEFT  EYE QHS   Refills:  3        omeprazole 20 MG CR capsule   Commonly known as:  priLOSEC   Quantity:  90 capsule        TAKE 1 CAPSULE BY MOUTH DAILY WITH BREAKFAST.   Refills:  3        ondansetron 4 MG ODT tab   Commonly known as:  ZOFRAN ODT   Dose:  4-8 mg   Quantity:  20 tablet        Take 1-2 tablets (4-8 mg) by mouth every 8 hours as needed for nausea   Refills:  0        ONETOUCH DELICA LANCETS 33G Misc        Refills:  0        PROVIGIL PO        Refills:  0        traMADol 50 MG tablet   Commonly known as:  ULTRAM        TK 1 T PO Q 6 H PRN P   Refills:  0        traZODone 50 MG tablet   Commonly known as:  DESYREL   Dose:  50 mg   Quantity:  90 tablet        Take 1 tablet (50 mg) by mouth At Bedtime   Refills:  1                Prescriptions were sent or printed at these locations (1 Prescription)                   Other Prescriptions                Printed at Department/Unit printer (1 of 1)         cyclobenzaprine (FLEXERIL) 10 MG tablet                Procedures and tests performed during your visit     BNP    CBC with platelets differential    CK total    Comprehensive metabolic panel    D dimer quantitative    EKG 12-lead, tracing only    Lumbar spine MRI w/o contrast    MR Brain w/o & w Contrast    MR Head w/o Contrast Angiogram    MR Neck w/o & w Contrast Angiogram    Troponin I    UA with Microscopic    UA with Microscopic reflex to Culture      Orders Needing Specimen Collection     None      Pending Results     No orders found from 5/27/2017 to 5/30/2017.            Pending Culture Results     No orders found from 5/27/2017 to 5/30/2017.            Pending Results Instructions     If you had any lab results that were not finalized at the time of your Discharge, you can call the ED Lab Result RN at [phone]. You will be contacted by this team for any positive Lab results or changes in treatment. The nurses are available 7 days a week from 10A to 6:30P.  You can leave a message 24 hours per day  and they will return your call.        Test Results From Your Hospital Stay        5/29/2017  3:19 PM      Component Results     Component Value Ref Range & Units Status    WBC 4.7 4.0 - 11.0 10e9/L Final    RBC Count 3.61 (L) 3.8 - 5.2 10e12/L Final    Hemoglobin 10.8 (L) 11.7 - 15.7 g/dL Final    Hematocrit 32.8 (L) 35.0 - 47.0 % Final    MCV 91 78 - 100 fl Final    MCH 29.9 26.5 - 33.0 pg Final    MCHC 32.9 31.5 - 36.5 g/dL Final    RDW 12.8 10.0 - 15.0 % Final    Platelet Count 334 150 - 450 10e9/L Final    Diff Method Automated Method  Final    % Neutrophils 51.6 % Final    % Lymphocytes 34.8 % Final    % Monocytes 8.6 % Final    % Eosinophils 3.7 % Final    % Basophils 0.9 % Final    % Immature Granulocytes 0.4 % Final    Nucleated RBCs 0 0 /100 Final    Absolute Neutrophil 2.4 1.6 - 8.3 10e9/L Final    Absolute Lymphocytes 1.6 0.8 - 5.3 10e9/L Final    Absolute Monocytes 0.4 0.0 - 1.3 10e9/L Final    Absolute Eosinophils 0.2 0.0 - 0.7 10e9/L Final    Absolute Basophils 0.0 0.0 - 0.2 10e9/L Final    Abs Immature Granulocytes 0.0 0 - 0.4 10e9/L Final    Absolute Nucleated RBC 0.0  Final         5/29/2017  3:42 PM      Component Results     Component Value Ref Range & Units Status    Sodium 137 133 - 144 mmol/L Final    Potassium 3.2 (L) 3.4 - 5.3 mmol/L Final    Chloride 101 94 - 109 mmol/L Final    Carbon Dioxide 32 20 - 32 mmol/L Final    Anion Gap 4 3 - 14 mmol/L Final    Glucose 203 (H) 70 - 99 mg/dL Final    Urea Nitrogen 13 7 - 30 mg/dL Final    Creatinine 0.60 0.52 - 1.04 mg/dL Final    GFR Estimate >90  Non  GFR Calc   >60 mL/min/1.7m2 Final    GFR Estimate If Black >90   GFR Calc   >60 mL/min/1.7m2 Final    Calcium 8.6 8.5 - 10.1 mg/dL Final    Bilirubin Total 0.2 0.2 - 1.3 mg/dL Final    Albumin 3.4 3.4 - 5.0 g/dL Final    Protein Total 6.7 (L) 6.8 - 8.8 g/dL Final    Alkaline Phosphatase 118 40 - 150 U/L Final    ALT 28 0 - 50 U/L Final    AST 18 0 - 45 U/L Final          5/29/2017  3:42 PM      Component Results     Component Value Ref Range & Units Status    Troponin I ES  0.000 - 0.045 ug/L Final    <0.015  The 99th percentile for upper reference range is 0.045 ug/L.  Troponin values in   the range of 0.045 - 0.120 ug/L may be associated with risks of adverse   clinical events.           5/29/2017  7:29 PM      Narrative     MR BRAIN W/O & W CONTRAST  5/29/2017 6:26 PM     HISTORY:  aphasia and gait trouble    TECHNIQUE: Multiplanar, multisequence MRI of the brain without and  with 10 mL Gadavist IV contrast material.    COMPARISON: None.    FINDINGS:  The brain parenchyma, ventricles and subarachnoid spaces  appear normal for age.  There is no evidence of hemorrhage, mass,  acute infarct, or anomaly. There are no gadolinium enhancing lesions.   The facial structures appear normal.  The arteries at the base of the  brain and the dural venous sinuses appear patent.        Impression     IMPRESSION: No acute pathology. No bleed, mass, or acute infarcts are  seen.     AMBERLY AVALOS MD         5/29/2017  7:31 PM      Narrative     MRA ANGIOGRAM HEAD W/O CONTRAST   5/29/2017 6:03 PM     HISTORY:  aphasia and gait trouble    TECHNIQUE:  3D time-of-flight MR angiogram of the head without  contrast.    COMPARISON: None.    FINDINGS: The visualized portions of the distal internal carotid and  vertebral arteries, the basilar artery, [place], and the  proximal anterior, middle and posterior cerebral arteries all appear  normal. There is no evidence of aneurysm or vascular stenosis or  occlusion.        Impression     IMPRESSION:  Negative MR angiogram of the head. No occluded  intracranial vessels are seen.    AMBERLY AVALOS MD         5/29/2017  7:31 PM      Narrative     MRA ANGIOGRAM NECK W/O & W CONTRAST 5/29/2017 6:26 PM     HISTORY:  Evaluate for dissection, vertebrobasilar flow, carotid  stenosis    TECHNIQUE:  Sequential axial images of the neck were obtained using  2-dimensional  time-of-flight before contrast and 3-dimensional  time-of-flight after the uneventful administration of 10 mL Gadavist  iv contrast.    COMPARISON:  None.    FINDINGS: Stenosis is relative to the distal internal carotid  diameter.    Right Carotid:  No significant stenosis is seen at the bifurcation  relative to the distal internal carotid diameter.    Left Carotid:  No significant stenosis is seen at the bifurcation  relative to the distal internal carotid diameter.    Vertebrals: Antegrade flow is seen in both vertebral arteries. The  origins of the vertebral arteries are not well seen on this study.    No arterial dissection is identified.        Impression     IMPRESSION:   1. No stenosis is seen at either carotid bifurcation.  2. No arterial dissection is seen.    AMBERLY AVALOS MD         5/29/2017  3:42 PM      Component Results     Component Value Ref Range & Units Status    N-Terminal Pro BNP Inpatient 166 0 - 900 pg/mL Final    Reference range shown and results flagged as abnormal are suggested inpatient   cut points for confirming diagnosis if CHF in an acute setting. Establishing   a   baseline value for each individual patient is useful for follow-up. An   inpatient or emergency department NT-proPBNP <300 pg/mL effectively rules out   acute CHF, with 99% negative predictive value.  The outpatient non-acute reference range for ruling out CHF is:   0-125 pg/mL (age 18 to less than 75)   0-450 pg/mL (age 75 yrs and older)           5/29/2017  3:43 PM      Component Results     Component Value Ref Range & Units Status    CK Total 131 30 - 225 U/L Final         5/29/2017  3:27 PM      Component Results     Component Value Ref Range & Units Status    D Dimer 0.3 0.0 - 0.50 ug/ml FEU Final    This D-dimer assay is intended for use in conjuntion with a clinical pretest   probability assessment model to exclude pulmonary embolism (PE) and as an aid   in the diagnosis of deep venous thrombosis (DVT) in outpatients  suspected of   PE   or DVT. The cut-off value is 0.5 g/mL FEU.           5/29/2017  7:31 PM      Narrative     MR LUMBAR SPINE W/O CONTRAST5/29/2017 5:47 PM      HISTORY: back pain, weakness and bladder incontinece    TECHNIQUE:  Multiplanar, multisequence MRI of the lumbar spine without  contrast.     COMPARISON: None.    FINDINGS:This report assumes five lumbar type vertebrae.     The conus and visualized portions of the thoracic spinal cord appear  normal. The paraspinal soft tissues appear normal as visualized. The  bone marrow has normal signal intensity.    L1-L2:   Normal disc, facet joints, spinal canal and neural foramina.    L2-L3:  Normal disc, facet joints, spinal canal and neural foramina.    L3-L4:  Normal disc, facet joints, spinal canal and neural foramina.    L4-L5:  Degeneration of the disc. Minimal symmetric annular disc  bulge. Central canal and neural foramen are normal. Mild degenerative  change in the facet joints.    L5-S1:  Degeneration of the disc. Loss of disc space height. Mild  annular disc bulge. Moderate degenerative change in the facet joints.  Central canal normal. Moderate-severe right and moderate left  foraminal stenosis due to loss of disc space height and bulging disc.        Impression     IMPRESSION:   1. Multilevel degenerative change.  2. The most significant degenerative changes at the L5-S1 level where  there is moderate-severe right and moderate left foraminal stenosis  due to loss of disc space height and degenerative change off the facet  joints.  3. The conus and cauda equina appear normal.    AMBERLY AVALOS MD         5/29/2017  7:21 PM      Component Results     Component Value Ref Range & Units Status    Color Urine   Final    Unsatisfactory specimen - too old for testing    Appearance Urine   Final    Unsatisfactory specimen - too old for testing    Glucose Urine  NEG mg/dL Final    Unsatisfactory specimen - too old for testing   Called to  RUTH (HANSA) ON 5.29.17 AT  1836 BY RS   (A)    Bilirubin Urine  NEG Final    Unsatisfactory specimen - too old for testing (A)    Ketones Urine  NEG mg/dL Final    Unsatisfactory specimen - too old for testing (A)    Specific Gravity Urine  1.003 - 1.035 Final    Unsatisfactory specimen - too old for testing    Blood Urine  NEG Final    Unsatisfactory specimen - too old for testing (A)    pH Urine  5.0 - 7.0 pH Final    Unsatisfactory specimen - too old for testing    Protein Albumin Urine  NEG mg/dL Final    Unsatisfactory specimen - too old for testing (A)    Urobilinogen mg/dL  0.0 - 2.0 mg/dL Final    Unsatisfactory specimen - too old for testing    Nitrite Urine  NEG Final    Unsatisfactory specimen - too old for testing (A)    Leukocyte Esterase Urine  NEG Final    Unsatisfactory specimen - too old for testing (A)    Source   Final    Midstream Urine   Unsatisfactory specimen - too old for testing      WBC Urine  0 - 2 /HPF Final    Unsatisfactory specimen - too old for testing    RBC Urine  0 - 2 /HPF Final    Unsatisfactory specimen - too old for testing         5/29/2017  8:06 PM      Component Results     Component Value Ref Range & Units Status    Color Urine Straw  Final    Appearance Urine Clear  Final    Glucose Urine 150 (A) NEG mg/dL Final    Bilirubin Urine Negative NEG Final    Ketones Urine Negative NEG mg/dL Final    Specific Gravity Urine 1.013 1.003 - 1.035 Final    Blood Urine Negative NEG Final    pH Urine 8.0 (H) 5.0 - 7.0 pH Final    Protein Albumin Urine Negative NEG mg/dL Final    Urobilinogen mg/dL 0.0 0.0 - 2.0 mg/dL Final    Nitrite Urine Negative NEG Final    Leukocyte Esterase Urine Negative NEG Final    Source Midstream Urine  Final    WBC Urine 1 0 - 2 /HPF Final    RBC Urine 1 0 - 2 /HPF Final    Squamous Epithelial /HPF Urine <1 0 - 1 /HPF Final    Mucous Urine Present (A) NEG /LPF Final                Clinical Quality Measure: Blood Pressure Screening     Your blood pressure was checked while you were in  "the emergency department today. The last reading we obtained was  BP: 172/88 . Please read the guidelines below about what these numbers mean and what you should do about them.  If your systolic blood pressure (the top number) is less than 120 and your diastolic blood pressure (the bottom number) is less than 80, then your blood pressure is normal. There is nothing more that you need to do about it.  If your systolic blood pressure (the top number) is 120-139 or your diastolic blood pressure (the bottom number) is 80-89, your blood pressure may be higher than it should be. You should have your blood pressure rechecked within a year by a primary care provider.  If your systolic blood pressure (the top number) is 140 or greater or your diastolic blood pressure (the bottom number) is 90 or greater, you may have high blood pressure. High blood pressure is treatable, but if left untreated over time it can put you at risk for heart attack, stroke, or kidney failure. You should have your blood pressure rechecked by a primary care provider within the next 4 weeks.  If your provider in the emergency department today gave you specific instructions to follow-up with your doctor or provider even sooner than that, you should follow that instruction and not wait for up to 4 weeks for your follow-up visit.        Thank you for choosing [facility]       Thank you for choosing [facility] for your care. Our goal is always to provide you with excellent care. Hearing back from our patients is one way we can continue to improve our services. Please take a few minutes to complete the written survey that you may receive in the mail after you visit with us. Thank you!        [employer] Information     [employer] lets you send messages to your doctor, view your test results, renew your prescriptions, schedule appointments and more. To sign up, go to [email or web address] . Click on \"Log in\" on the left side of the screen, which will take you to " "the Welcome page. Then click on \"Sign up Now\" on the right side of the page.     You will be asked to enter the access code listed below, as well as some personal information. Please follow the directions to create your username and password.     Your access code is: [email or web address]  Expires: 2017  8:25 PM     Your access code will  in 90 days. If you need help or a new code, please call your [facility] or [phone].        Care EveryWhere ID     This is your Care EveryWhere ID. This could be used by other organizations to access your [facility] medical records  [id number]        After Visit Summary       This is your record. Keep this with you and show to your community pharmacist(s) and doctor(s) at your next visit.                  "

## 2017-05-29 NOTE — ED AVS SNAPSHOT
[facility] Emergency Department    [address]    [facility] [address]    Phone:  [phone]    Fax:  [phone]                                       Kemi Casper   MRN: [id number]    Department:  [facility] Emergency Department   Date of Visit:  5/29/2017           After Visit Summary Signature Page     I have received my discharge instructions, and my questions have been answered. I have discussed any challenges I see with this plan with the nurse or doctor.    ..........................................................................................................................................  Patient/Patient Representative Signature      ..........................................................................................................................................  Patient Representative Print Name and Relationship to Patient    ..................................................               ................................................  Date                                            Time    ..........................................................................................................................................  Reviewed by Signature/Title    ...................................................              ..............................................  Date                                                            Time

## 2017-05-29 NOTE — ED NOTES
Patient complaining of bilateral leg pain for one year that is worsening and now spreading into hips and low back.  Has had urinary incontinence for 2-3 months, low back pain for >3 months, Shortness of breath for one month, leg swelling for 2 months, states she is losing her hair and breaking out on face, also states she is speaking slower now and has a head fullness.  Has seen two orthopedists who state there is nothing wrong and that she should come here if things continue.  None of the symptoms are new but worsening and she states she cannot live like this any longer.      ABCs intact.  Alert and oriented x 3.

## 2017-05-29 NOTE — ED PROVIDER NOTES
"  History     Chief Complaint:  Back Pain    HPI   Kemi Casper is a 62 year old female with a history of hypertension, fibromyalgia and type two diabetes who presents to the Emergency Department for evaluation of back pain.The patient complains of pain starting in her left knee which spread up to her left groin, over to her right leg. She states her pain is so bad at times she is unable to walk up the stairs or sit on the toilet, rating her pain \"over a 10/10\". The patient also notes 2-3 months of urinary incontinence, bilateral leg swelling and \"pressure\" in her head with difficulty finding words as well as ambulating. Patient notes being seen by two orthopedists who state there is nothing wrong and that she should come here if things continue.  None of the symptoms are new but worsening and she states she cannot live like this any longer.  Seen by orthopedic surgeon twice who recommended neurologist appointment and is scheduled to see one on 6/28/17. The patient denies any fever, nausea, vomiting, abdominal pain, flank pain or diarrhea.     Allergies:  Lisinopril     Medications:    insulin aspart (NOVOLOG) 100 UNITS/ML injection  Fluoxetine (PROZAC) 40 MG capsule  irbesartan-hydrochlorothiazide (AVALIDE) 150-12.5 MG per tablet  trazodone (DESYREL) 50 MG tablet  blood glucose monitoring (ACCU-CHEK KWASI) test strip  ondansetron (ZOFRAN ODT) 4 MG disintegrating tablet  omeprazole (PRILOSEC) 20 MG capsule  tramadol (ULTRAM) 50 MG tablet  latanoprost (XALATAN) 0.005 % ophthalmic solution  Modafinil (PROVIGIL PO)  LANTUS SOLOSTAR 100 UNIT/ML soln  atorvastatin (LIPITOR) 20 MG tablet  B-D U/F 31G X 8 MM insulin pen needle  ONETOUCH DELICA LANCETS 33G MISC  aluminum acetate (BUROW'S) SOLN  Blood Glucose Calibration (ACCU-CHEK INSTANT CONTROL) LIQD    Past Medical History:    Fibromyalgia   GERD (gastroesophageal reflux disease)   Hyperlipidemia   Major depressive disorder  Obesity  Type 2 " diabetes  Asthma   Sleep apnea   Periodic limb movement disorder   Essential hypertension, benign    Past Surgical History:    Tonsillectomy  Adenoidectomy  Hysterectomy total abdominal   Cholecystectomy  Appendectomy  Dilation/curettage  Orthopedic surgery    Family History:    Cancer: mother  Hypertension: mother  Diabetes: father  Heart disease: father  Genitourinary problems: father  Thyroid disease: sister    Social History:  Smoking Status: never smoker  Smokeless Tobacco: never used  Alcohol Use: no  Marital Status:   [2]     Review of Systems   Constitutional: Negative for fever.   Eyes: Positive for redness.   Cardiovascular: Positive for leg swelling.   Gastrointestinal: Negative for abdominal pain, diarrhea, nausea and vomiting.   Genitourinary: Negative for flank pain.        Positive for urinary incontinence    Musculoskeletal: Positive for back pain, gait problem and myalgias.        Positive for bilateral leg pain   Neurological: Positive for speech difficulty and headaches.   All other systems reviewed and are negative.    Physical Exam   First Vitals:  BP: (!) 207/111  Pulse: 83  Temp: 98.4  F (36.9  C)  Resp: 24  SpO2: 98 %      Physical Exam  Constitutional:  Oriented to person, place, and time. Anxious and well appearing.  HENT:   Head:    Normocephalic.   Mouth/Throat:   Oropharynx is clear and moist.   Eyes:    EOM are normal. Pupils are equal, round, and reactive to light.   Neck:    Neck supple.   Cardiovascular:  Normal rate, regular rhythm and normal heart sounds.      Exam reveals no gallop and no friction rub.       No murmur heard.  Pulmonary/Chest:  Effort normal and breath sounds normal.      No respiratory distress. No wheezes. No rales.      No reproducible chest wall pain.  Abdominal:   Soft. No distension. No tenderness. No rebound and no guarding.   Musculoskeletal:  Normal range of motion. No midline spinal tenderness, negative straight leg raise. 2+ distal equal  pulses  Neurological:   Alert and oriented to person, place, and time.           Moves all 4 extremities spontaneously  NIH score 0. Cranial nerves 2-12 grossly normal. No meningeal signs. No ataxia. 5/5 strength and sensation intact to light touch in all 4 extremities.     Skin:    No rash noted. No pallor.     Emergency Department Course     Imaging:  Radiographic findings were communicated with the patient who voiced understanding of the findings.    MR Head without contrast angiogram:    Negative MR angiogram of the head. No occluded  intracranial vessels are seen. As per radiology.     MR Neck with and without contrast angiogram:  1. No stenosis is seen at either carotid bifurcation.  2. No arterial dissection is seen. As per radiology.     Lumbar spine MRI without contrast:  1. Multilevel degenerative change.  2. The most significant degenerative changes at the L5-S1 level where  there is moderate-severe right and moderate left foraminal stenosis  due to loss of disc space height and degenerative change off the facet  joints.  3. The conus and cauda equina appear normal. As per radiology.     MRI karly with and without contrast:  No acute pathology. No bleed, mass, or acute infarcts are  seen.  As per radiology.     Laboratory:  CBC: WBC: 4.7, HGB: 10.8(L), PLT: 334  CMP: Glucose 203(H), Protein total 6.7(L), o/w WNL (Creat 0.60)  Troponin: <0.015  D dimer: 0.3  CK total: 131  BNP: 0.3    UA with microscopic: urine glc 150, pH 8.0(H), mucous present, o/w WNL.    Interventions:  1449 NS 1 L IV    Emergency Department Course:  Nursing notes and vitals reviewed. I performed an exam of the patient as documented above.     Blood drawn. This was sent to the lab for further testing, results above.    The patient was sent for a MRI head without contrast while here in the emergency department, findings above.    The patient was sent for a MRA neck with and without contrast while here in the emergency department,  findings above.    The patient was sent for a lumbar spine MRI without contrast while here in the emergency department, findings above.    The patient was sent for a MRI brain with and without contrast while here in the emergency department, findings above.    I reassessed the patient.     The patient provided a urine sample here in the emergency department. This was sent for laboratory testing, findings above.    I reassessed the patient.     Findings and plan explained to the Patient. Patient discharged home with instructions regarding supportive care, medications, and reasons to return. The importance of close follow-up was reviewed.     Impression & Plan      Medical Decision Making:  Kemi Casper is a 62 year old female who came in complaining of back pain, leg pain, difficulty walking, difficulty expressing her thoughts and words with numbness and urinary incontinence that has been ongoing for greater than a month now. Differential diagnosis include cauda equina, herniated disc, electrolyte abnormality, more chronic issues such as fibromyalgia, chronic limes disease, chronic fatigue syndrome or other causes. She did end up having work up as seen. Work up thus far was quite thorough. MRI/MRA of head and neck was otherwise negative. MRI of her lumbar spine did show foraminal stenosis and this may be attributed to her back pain and leg pain it would not be an acute surgical issue. Lab work was otherwise reassuring. No signs of urinary tract infection. At this time the patient is safe for discharge. I have recommended close outpatient follow up. I did discuss that she should return with any worsening symptoms or concerns.     Diagnosis:    ICD-10-CM    1. Aphasia R47.01    2. Weakness generalized R53.1    3. Acute low back pain, unspecified back pain laterality, with sciatica presence unspecified M54.5    4. Paresthesias R20.2    5. Ataxia R27.0      Disposition:  discharged to home  Discharge  Medications:  Discharge Medication List as of 5/29/2017  8:26 PM      START taking these medications    Details   cyclobenzaprine (FLEXERIL) 10 MG tablet Take 1 tablet (10 mg) by mouth 3 times daily as needed for muscle spasms, Disp-20 tablet, R-0, Local Print             I, Caro Song, am serving as a scribe on 5/29/2017 at 2:18 PM to personally document services performed by Coleman Larson MD based on my observations and the provider's statements to me.   Caro Song  5/29/2017   [facility] EMERGENCY DEPARTMENT       Coleman Larson MD  05/29/17 [id number]

## 2017-05-30 LAB — INTERPRETATION ECG - MUSE: NORMAL

## 2017-05-30 NOTE — ED NOTES
Pt expresses confusion as to her symptoms. She is puzzled that her MRIs are negative and states she was hoping she would find answers today.

## 2017-05-30 NOTE — DISCHARGE INSTRUCTIONS
Discharge Instructions  Back Pain  You were seen today for back pain. Back pain can have many causes, but most will get better without surgery or other specific treatment. Sometimes there is a herniated ( slipped ) disc. We don t usually do MRI scans to look for these right away, since most herniated discs will get better on their own with time.  Today, we did not find any evidence that your back pain was caused by a serious condition, such as an infection, fracture, or tumor. However, sometimes symptoms develop over time and cannot be found during an emergency visit, so it is very important that you follow up with your primary doctor.  Return to the Emergency Department if:    You develop a fever with your back pain.     You have weakness or change in sensation in one or both legs.    You lose control of your bowels or bladder, or can t empty your bladder.    Your pain gets much worse.     Follow-up with your doctor:    Unless your pain has completely gone away, please make an appointment with your doctor within one week.  You may need further management of your back pain, such as more pain medication, imaging such as an X-ray or MRI, or physical therapy.    What can I do to help myself?    Remain Active -- People are often afraid that they will hurt their back further or delay recovery by remaining active, but this is one of the best things you can do for your back. In fact, prolonged bed rest is not recommended. Studies have shown that people with low back pain recover faster when they remain active. Movement helps to bring blood flow to the muscles and relieve muscle spasms as well as preventing loss of muscle strength.    Heat -- Using a heating pad can help with low back pain during the first few weeks. Do not sleep with a heating pad, as you can be burned.     Pain medications - You may take a pain medication such as Tylenol  (acetaminophen), Advil , Nuprin  (ibuprofen) or Aleve  (naproxen).  If you have  been given a narcotic such as Vicodin  (hydrocodone with acetaminophen), Percocet  (oxycodone with acetaminophen), codeine, or a muscle relaxant such as Flexeril  (cyclobenzaprine) or Soma  (carisoprodol), do not drive for four hours after you have taken it. If the narcotic contains Tylenol  (acetaminophen), do not take Tylenol  with it. All narcotics will cause constipation, so eat a high fiber diet.   If you were given a prescription for medicine here today, be sure to read all of the information (including the package insert) that comes with your prescription.  This will include important information about the medicine, its side effects, and any warnings that you need to know about.  The pharmacist who fills the prescription can provide more information and answer questions you may have about the medicine.  If you have questions or concerns that the pharmacist cannot address, please call or return to the Emergency Department.   Opioid Medication Information    Pain medications are among the most commonly prescribed medicines, so we are including this information for all our patients. If you did not receive pain medication or get a prescription for pain medicine, you can ignore it.     You may have been given a prescription for an opioid (narcotic) pain medicine and/or have received a pain medicine while here in the Emergency Department. These medicines can make you drowsy or impaired. You must not drive, operate dangerous equipment, or engage in any other dangerous activities while taking these medications. If you drive while taking these medications, you could be arrested for DUI, or driving under the influence. Do not drink any alcohol while you are taking these medications.     Opioid pain medications can cause addiction. If you have a history of chemical dependency of any type, you are at a higher risk of becoming addicted to pain medications.  Only take these prescribed medications to treat your pain when  all other options have been tried. Take it for as short a time and as few doses as possible. Store your pain pills in a secure place, as they are frequently stolen and provide a dangerous opportunity for children or visitors in your house to start abusing these powerful medications. We will not replace any lost or stolen medicine.  As soon as your pain is better, you should flush all your remaining medication.     Many prescription pain medications contain Tylenol  (acetaminophen), including Vicodin , Tylenol #3 , Norco , Lortab , and Percocet .  You should not take any extra pills of Tylenol  if you are using these prescription medications or you can get very sick.  Do not ever take more than 3000 mg of acetaminophen in any 24 hour period.    All opioids tend to cause constipation. Drink plenty of water and eat foods that have a lot of fiber, such as fruits, vegetables, prune juice, apple juice and high fiber cereal.  Take a laxative if you don t move your bowels at least every other day. Miralax , Milk of Magnesia, Colace , or Senna  can be used to keep you regular.      Remember that you can always come back to the Emergency Department if you are not able to see your regular doctor in the amount of time listed above, if you get any new symptoms, or if there is anything that worries you.        What is Aphasia?  Aphasia is a loss of language skills. It may occur if the brain is damaged. This usually happens after a stroke. People with aphasia may not be able to express their thoughts (expressive aphasia) or understand others (receptive aphasia).     A speech therapist may help a person with aphasia regain his or her language skills.   Signs of aphasia  Signs of aphasia vary with each person. A person with aphasia may show some or all of the signs listed below.  A person with aphasia may not be able to do the following:    Understand words when others speak    Speak in complete sentences    Read or  write    Understand that numbers have meaning  A person with aphasia may do the following:    Speak using only nouns and verbs    Mix up the order of words in a sentence    Use the wrong words or made-up words    Have trouble working with numbers, as when balancing a checkbook  Practical tips for aphasia  A person with aphasia can still think, even if responding is hard. Try to:    Ask questions that can be answered with a  yes  or a  no.     Speak slowly and clearly in simple sentences. Use simple words, but don t  talk down.     Give the person time to understand and to respond. Try not to speak for the person unless you have to.    7132-3732 [employer]. [address] [place] [address], [place] [address]. All rights reserved. This information is not intended as a substitute for professional medical care. Always follow your healthcare professional's instructions.

## 2017-06-01 ENCOUNTER — OFFICE VISIT (OUTPATIENT)
Dept: FAMILY MEDICINE | Facility: CLINIC | Age: 62
End: 2017-06-01

## 2017-06-01 VITALS
RESPIRATION RATE: 12 BRPM | SYSTOLIC BLOOD PRESSURE: 138 MMHG | BODY MASS INDEX: 36.42 KG/M2 | DIASTOLIC BLOOD PRESSURE: 82 MMHG | OXYGEN SATURATION: 97 % | HEART RATE: 77 BPM | TEMPERATURE: 98 F | WEIGHT: 189.6 LBS

## 2017-06-01 DIAGNOSIS — E11.49 TYPE 2 DIABETES MELLITUS WITH NEUROLOGICAL MANIFESTATIONS (H): ICD-10-CM

## 2017-06-01 DIAGNOSIS — E66.811 OBESITY (BMI 30.0-34.9): ICD-10-CM

## 2017-06-01 DIAGNOSIS — G57.13 MERALGIA PARESTHETICA OF BOTH LOWER EXTREMITIES: Primary | ICD-10-CM

## 2017-06-01 LAB
% GRANULOCYTES: 53.8 %
ERYTHROCYTE [SEDIMENTATION RATE] IN BLOOD: 45 MM/HR (ref 0–20)
HCT VFR BLD AUTO: 36.5 % (ref 35–47)
HEMOGLOBIN: 11.9 G/DL (ref 11.7–15.7)
LYMPHOCYTES NFR BLD AUTO: 36.3 %
MCH RBC QN AUTO: 29.6 PG (ref 26–33)
MCHC RBC AUTO-ENTMCNC: 32.6 G/DL (ref 31–36)
MCV RBC AUTO: 90.7 FL (ref 78–100)
MONOCYTES NFR BLD AUTO: 9.9 %
PLATELET COUNT - QUEST: 353 10^9/L (ref 150–375)
RBC # BLD AUTO: 4.02 10*12/L (ref 3.8–5.2)
WBC # BLD AUTO: 5 10*9/L (ref 4–11)

## 2017-06-01 PROCEDURE — 84443 ASSAY THYROID STIM HORMONE: CPT | Mod: 90 | Performed by: FAMILY MEDICINE

## 2017-06-01 PROCEDURE — 99214 OFFICE O/P EST MOD 30 MIN: CPT | Performed by: FAMILY MEDICINE

## 2017-06-01 PROCEDURE — 85651 RBC SED RATE NONAUTOMATED: CPT | Performed by: FAMILY MEDICINE

## 2017-06-01 PROCEDURE — 36415 COLL VENOUS BLD VENIPUNCTURE: CPT | Performed by: FAMILY MEDICINE

## 2017-06-01 PROCEDURE — 85025 COMPLETE CBC W/AUTO DIFF WBC: CPT | Performed by: FAMILY MEDICINE

## 2017-06-01 RX ORDER — CARBAMAZEPINE 200 MG/1
200 TABLET ORAL 2 TIMES DAILY
Qty: 60 TABLET | Refills: 0 | Status: SHIPPED | OUTPATIENT
Start: 2017-06-01 | End: 2017-08-08

## 2017-06-01 NOTE — PATIENT INSTRUCTIONS
Meralgia paresthetica of both lower extremities  (primary encounter diagnosis)  Comment: anatomy and handout reviewed  Plan: ESR, WESTERGREN (P), CL AFF         HEMOGRAM/PLATE/DIFF ([facility]), TSH ([facility]), VENOUS         COLLECTION, NEUROLOGY ADULT REFERRAL,         carBAMazepine (TEGRETOL) 200 MG tablet        Trial of carbamazepine/ back to neurology

## 2017-06-01 NOTE — MR AVS SNAPSHOT
After Visit Summary   6/1/2017    Kemi Casper    MRN: [id number]           Patient Information     Date Of Birth          1955        Visit Information        Provider Department      6/1/2017 12:00 PM Estefania Emery MD [facility]        Today's Diagnoses     Meralgia paresthetica of both lower extremities    -  1    Obesity (BMI 30.0-34.9)        Type 2 diabetes mellitus with neurological manifestations ([facility])          Care Instructions    Meralgia paresthetica of both lower extremities  (primary encounter diagnosis)  Comment: anatomy and handout reviewed  Plan: ESR, WESTERGREN ([facility]), CL AFF         HEMOGRAM/PLATE/DIFF ([facility]), TSH ([facility]), VENOUS         COLLECTION, NEUROLOGY ADULT REFERRAL,         carBAMazepine (TEGRETOL) 200 MG tablet        Trial of carbamazepine/ back to neurology            Follow-ups after your visit        Additional Services     NEUROLOGY ADULT REFERRAL       Your provider has referred you to: [facility]: [facility] Syed [phone]   [email or web address]    Reason for Referral: Consult    Please be aware that coverage of these services is subject to the terms and limitations of your health insurance plan.  Call member services at your health plan with any benefit or coverage questions.      Please bring the following with you to your appointment:    (1) Any X-Rays, CTs or MRIs which have been performed.  Contact the facility where they were done to arrange for  prior to your scheduled appointment.    (2) List of current medications  (3) This referral request   (4) Any documents/labs given to you for this referral                  Follow-up notes from your care team     Return if symptoms worsen or fail to improve.      Who to contact     If you have questions or need follow up information about today's clinic visit or your schedule please contact [facility] directly at  "[phone].  Normal or non-critical lab and imaging results will be communicated to you by MyChart, letter or phone within 4 business days after the clinic has received the results. If you do not hear from us within 7 days, please contact the clinic through [employer] or phone. If you have a critical or abnormal lab result, we will notify you by phone as soon as possible.  Submit refill requests through [employer] or call your pharmacy and they will forward the refill request to us. Please allow 3 business days for your refill to be completed.          Additional Information About Your Visit        [employer] Information     [employer] lets you send messages to your doctor, view your test results, renew your prescriptions, schedule appointments and more. To sign up, go to [email or web address] . Click on \"Log in\" on the left side of the screen, which will take you to the Welcome page. Then click on \"Sign up Now\" on the right side of the page.     You will be asked to enter the access code listed below, as well as some personal information. Please follow the directions to create your username and password.     Your access code is: [email or web address]  Expires: 2017  8:25 PM     Your access code will  in 90 days. If you need help or a new code, please call your [facility] clinic or [phone].        Care EveryWhere ID     This is your Care EveryWhere ID. This could be used by other organizations to access your [facility] medical records  [id number]        Your Vitals Were     Pulse Temperature Respirations Last Period Pulse Oximetry Breastfeeding?    77 98  F (36.7  C) (Oral) 12 2003 97% No    BMI (Body Mass Index)                   36.42 kg/m2            Blood Pressure from Last 3 Encounters:   17 138/82   17 172/88   17 138/88    Weight from Last 3 Encounters:   17 86 kg (189 lb 9.6 oz)   17 83.5 kg (184 lb)   17 81.5 kg (179 lb 9.6 oz)              We Performed the Following  "    CL AFF HEMOGRAM/PLATE/DIFF (B[facility])     ESR, WESTERGREN (B[facility])     NEUROLOGY ADULT REFERRAL     TSH ([facility])     VENOUS COLLECTION          Today's Medication Changes          These changes are accurate as of: 6/1/17  3:15 PM.  If you have any questions, ask your nurse or doctor.               Start taking these medicines.        Dose/Directions    carBAMazepine 200 MG tablet   Commonly known as:  TEGRETOL   Used for:  Meralgia paresthetica of both lower extremities   Started by:  Estefania Emery MD        Dose:  200 mg   Take 1 tablet (200 mg) by mouth 2 times daily   Quantity:  60 tablet   Refills:  0            Where to get your medicines      These medications were sent to [facility] [id number] [place] [address] AT [facility] [address] [place] [address], [facility] [address]    Hours:  24-hours Phone:  [phone]     carBAMazepine 200 MG tablet                Primary Care Provider Office Phone # Fax #    Estefania Emery [phone]       [place] [address] [address] [address]        Thank you!     Thank you for choosing [facility]  for your care. Our goal is always to provide you with excellent care. Hearing back from our patients is one way we can continue to improve our services. Please take a few minutes to complete the written survey that you may receive in the mail after your visit with us. Thank you!             Your Updated Medication List - Protect others around you: Learn how to safely use, store and throw away your medicines at www.disposemymeds.org.          This list is accurate as of: 6/1/17  3:15 PM.  Always use your most recent med list.                   Brand Name Dispense Instructions for use    ACCU-CHEK INSTANT CONTROL Liqd     1 Bottle    Control for accu-check Pily       aluminum acetate Soln solution    BUROW'S    1 Bottle    Apply 3 mLs topically daily as needed        atorvastatin 20 MG tablet    LIPITOR         B-D U/F 31G X 8 MM   Generic drug:  insulin pen needle          blood glucose monitoring test strip    ACCU-CHEK KWASI    1 Box    Use to test blood sugars 3 times daily or as directed.       carBAMazepine 200 MG tablet    TEGRETOL    60 tablet    Take 1 tablet (200 mg) by mouth 2 times daily       FLUoxetine 40 MG capsule    PROzac    90 capsule    Take 1 capsule (40 mg) by mouth daily       insulin aspart 100 UNITS/ML injection    NovoLOG     Inject 5 Units Subcutaneous       irbesartan-hydrochlorothiazide 150-12.5 MG per tablet    AVALIDE    90 tablet    Take 1 tablet by mouth daily       LANTUS SOLOSTAR 100 UNIT/ML injection   Generic drug:  insulin glargine          latanoprost 0.005 % ophthalmic solution    XALATAN     INSTILL 1 DROP INTO THE LEFT EYE QHS       methocarbamol 500 MG tablet    ROBAXIN    30 tablet    Take 2 tablets (1,000 mg) by mouth 3 times daily as needed for muscle spasms       omeprazole 20 MG CR capsule    priLOSEC    90 capsule    TAKE 1 CAPSULE BY MOUTH DAILY WITH BREAKFAST.       ONETOUCH DELICA LANCETS 33G Misc          PROVIGIL PO          traMADol 50 MG tablet    ULTRAM     TK 1 T PO Q 6 H PRN P       traZODone 50 MG tablet    DESYREL    90 tablet    Take 1 tablet (50 mg) by mouth At Bedtime

## 2017-06-01 NOTE — NURSING NOTE
Kemi is here for leg pain and SOB concerned there is something internal    Pre-Visit Screening :  Immunizations : up to date  Colonoscopy : is up to date 2011  Mammogram : is up to date  Asthma Action Test/Plan : XIOMARA  PHQ9/GAD7 :  NA  Pulse - regular  My Chart - accepts    CLASSIFICATION OF OVERWEIGHT AND OBESITY BY BMI                         Obesity Class           BMI(kg/m2)  Underweight                                    < 18.5  Normal                                         18.5-24.9  Overweight                                     25.0-29.9  OBESITY                     I                  30.0-34.9                              II                 35.0-39.9  EXTREME OBESITY             III                >40                             Patient's  BMI Body mass index is 36.42 kg/(m^2).  http://hin.nhlbi.nih.gov/menuplanner/menu.cgi  Questioned patient about current smoking habits.  Pt. has never smoked.

## 2017-06-01 NOTE — LETTER
[facility]  [facility] [address]  [place]  [address]    June 5, 2017        Kemi Casper  [address] [facility] [address]              Dear Kemi Casper      LAB RESULTS:     The results of your recent Thyroid Function were NORMAL.  Your sed rate was elevated indicating an inflammatory or infection process. Let's monitor your symptoms as we discussed at your visit.     If you have any further questions or problems, please contact our office at [phone].          Estefania Emery MD

## 2017-06-01 NOTE — PROGRESS NOTES
"SUBJECTIVE: 62 year old female complaining of left greater than right anterior thigh and knee pain for 6 month(s).  Many other aches and pains including ankle swelling not present today and SOB gone for 2-3 days/ see last ER visit.  \" I think I may be on disability soon!\"  The patient describes orthopedic and ER visit with imaging all WNL. Her diabetes is slowly getting better in control.   The patient denies a history of leg skin changes or weakness.   Smoking history: No.   Relevant past medical history: positive for depression, type 2 diabetes on insulin,hypertension and elevated cholesterol.    OBJECTIVE: The patient appears healthy, alert, no distress, cooperative, over weight and fatigued.   EARS: negative  NOSE/SINUS: Nares normal. Septum midline. Mucosa normal. No drainage or sinus tenderness.   THROAT: normal   NECK:Neck supple. No adenopathy. Thyroid symmetric, normal size,, Carotids without bruits.   CHEST: Clear  ABD: The abdomen is soft without tenderness, guarding, mass or organomegaly. Bowel sounds are normal. No CVA tenderness or inguinal adenopathy noted.  EXT: The lower extremities are normal and reveal no sign of DVT. Calves and thighs are soft and non tender, color is normal, no swelling or redness. Mirella's sign is negative.  exquisitely tender to the touch on the anterior and lateral aspect of both legs. Good hip ROM and Knee is normal to inspection and palpation without evidence of erythema, warmth, or discoloration. ROM is full. No ligamentous instability. Patellar apprehension negative.  5/5 motor strength in dorsiflexion, plantar flexion, inversion and eversion. Normal vascular exam.  BMI : Body mass index is 36.42 kg/(m^2).    ASSESSMENT: (G57.13) Meralgia paresthetica of both lower extremities  (primary encounter diagnosis)  Comment: anatomy and handout reviewed  Plan: ESR, WESTERGREN (BFP), CL AFF         HEMOGRAM/PLATE/DIFF ([facility]), TSH ([facility]), VENOUS         COLLECTION, NEUROLOGY ADULT " REFERRAL,         carBAMazepine (TEGRETOL) 200 MG tablet        Trial of carbamazepine/ back to neurology    (E66.9) Obesity (BMI 30.0-34.9)  Plan: TSH ([facility]), VENOUS COLLECTION        A low fat diet, regular aerobic exercise like walking 30 minutes daily and weight control is the treatment recommendations at this time      (E11.49) Type 2 diabetes mellitus with neurological manifestations (HCC)  Comment: Encouraged careful follow up with Dr Ayala  Plan: NEUROLOGY ADULT REFERRAL        A low fat diet, regular aerobic exercise like walking 30 minutes daily and weight control is the treatment recommendations at this time

## 2017-06-02 LAB — TSH SERPL-ACNC: 1.01 MIU/L (ref 0.4–4.5)

## 2017-06-20 LAB — HBA1C MFR BLD: 9.1 % (ref 4–7)

## 2017-06-23 ENCOUNTER — TRANSFERRED RECORDS (OUTPATIENT)
Dept: FAMILY MEDICINE | Facility: CLINIC | Age: 62
End: 2017-06-23

## 2017-07-05 ENCOUNTER — TELEPHONE (OUTPATIENT)
Dept: FAMILY MEDICINE | Facility: CLINIC | Age: 62
End: 2017-07-05

## 2017-07-05 DIAGNOSIS — G57.13 MERALGIA PARESTHETICA, BILATERAL LOWER LIMBS: Primary | ICD-10-CM

## 2017-07-05 NOTE — TELEPHONE ENCOUNTER
CER can you refer to PT for LES - Patient went to Neurologist and they want her referred to PT.  She doesn't want to wait for LES to return as she is unable to walk and would like the referral ASAP.  The neurologist is sending a letter with the recommendation.    Patient's phone #[phone] - ok to MAURICE

## 2017-07-06 NOTE — TELEPHONE ENCOUNTER
Referral to RADHA placed.   Have her call to schedule appt    For an appointment, call [phone].

## 2017-07-08 ENCOUNTER — TRANSFERRED RECORDS (OUTPATIENT)
Dept: FAMILY MEDICINE | Facility: CLINIC | Age: 62
End: 2017-07-08

## 2017-07-11 ENCOUNTER — THERAPY VISIT (OUTPATIENT)
Dept: PHYSICAL THERAPY | Facility: CLINIC | Age: 62
End: 2017-07-11
Payer: COMMERCIAL

## 2017-07-11 DIAGNOSIS — M79.605 BILATERAL LOWER EXTREMITY PAIN: Primary | ICD-10-CM

## 2017-07-11 DIAGNOSIS — M79.604 BILATERAL LOWER EXTREMITY PAIN: Primary | ICD-10-CM

## 2017-07-11 DIAGNOSIS — M54.50 BILATERAL LOW BACK PAIN WITHOUT SCIATICA: ICD-10-CM

## 2017-07-11 PROCEDURE — 97163 PT EVAL HIGH COMPLEX 45 MIN: CPT | Mod: GP | Performed by: PHYSICAL THERAPIST

## 2017-07-11 PROCEDURE — 97110 THERAPEUTIC EXERCISES: CPT | Mod: GP | Performed by: PHYSICAL THERAPIST

## 2017-07-11 NOTE — PROGRESS NOTES
Subjective:    HPI                    Objective:    System    Physical Exam    General     [facility]     Physical Therapy Initial Evaluation:   Jul 11, 2017  Subjective:   Chief Complaint:    Pain: bilateral knee and quad. Can go down to the toes.    Numbness/Tingling: None   Weakness: Back and legs   Stiffness: Back and Knees   Other: clicking in the knees, balance difficulties. Often feels very fatigued.   New/Recurrent/Chronic: Chronic  Patient's Goal(s): Be able to do ADL, walk her dog, get through work, be able to bike/treadmill  DOI/onset: April 2016   Referral Date: 7/6/2017  Mechanism of onset: Started suddenly with incidient  PMH/surgical history/trauma: Diabetes (not well controlled), overweight, HTN, depression, fibromyalgia, sleep apnea, meniscal repair (July 2016), microdiscectomy (L5/S1 in 2000)  General health as reported by patient: Poor    Medications: sleep, anti-inflammatory, pain, anti-depressants, HTN, insulin, fish oil  Previous Treatment (Effect): Cortizone injections (not helpful),   Imaging: Lumbar MRI: IMPRESSION:   1. Multilevel degenerative change.  2. The most significant degenerative changes at the L5-S1 level where there is moderate-severe right and moderate left foraminal stenosis due to loss of disc space height and degenerative change off the facet joints.  3. The conus and cauda equina appear normal.  AM/PM: Worse in the morning  Quality of Pain: burning, pulling, dull, jabs if she moves wrong,   Pain: 0/10 at present, 0/10 at best, 10/10 at worst  Better: sitting, laying down,   Worse: walking, stairs, standing, turning (as getting in to a car)  Progression of Symptoms since onset: getting worse   Hx of Falls: yes, had a couple of bad falls  Occupation: RN Job duties: prolonged standing, lifting/carrying, repetitive tasks, computer work  (work shifts are 4 x10 hours)  Sleeping: wakes her up a couple times per week   Other current functional challenges: walking, stairs, standing  up  Current Functional Status: walking - 5-10 minutes before needing to stop, HR/RR goes up ; stairs - has to walk sideways when going down, can go up with a normal step pattern and heavy use of handrail ; standing up - pain with standing up from a chair up to 9/10, better with use of arms   Previous Functional Status: no restrictions with walking, stairs, standing up  Current HEP/exercise regimen: none presently, wants to get on a program  Red Flags:   - Patient denies the following: Locking, Catching (knee); Night Pain ; Fever ; Numbness/Tingling ; Saddle Anesthesia ; Chest Pain ; Unexplained Weight Loss ;   - Patient reports the following: Pain with Cough / Sneeze / Laughing (recalls feeling apprehensive) ; Weakness ; Change in Bowel or Bladder (some urinary incontinence) ; Edema of the Feet (worse at the end of a work day) ;       Objective:    Posture: Increased lumbar lordosis    Gait: Antalgic gait with decreased stance time on the left lower extremity.     AROM: (Major, Moderate, Minimal or Nil loss)  Movement Loss Luis Mod Min Nil Pain   Flexion  x      Extension  x      Side Gliding L   x     Side Gliding R  x          Neurological:    Motor Deficit:  Myotomes R L   L1-2 (hip flexion) 5 4*   L3 (knee extension) 5 3+* (pain in back of knee)   L4 (ankle DF) 5 3+* (pain in back of knee)   L5 (g. toe ext) 5 5   S1 (ankle PF or knee flex) 5 5* (pain in front of knee)     Reflexes:    R L   Patellar 0+ 0+   Achilles 0+ 0+   Babinski (-) (-)       Dural Signs:   R L   Slump (-) (-)   SLR (-) (-)     Palpation: No tenderness to palpation    Other Tests:   - Repeated prone press-ups improved ability to stand up from the table afterwards.       Assessment/Plan:      Patient is a 62 year old female with lumbar, both sides knee and lower extremity complaints.    Patient has the following significant findings with corresponding treatment plan.                Referring Diagnosis 1:  Meralgia Paresthetica, bilateral lower  limbs  Pain -  hot/cold therapy, US, electric stimulation, manual therapy, splint/taping/bracing/orthotics, self management, education, directional preference exercise and home program  Decreased ROM/flexibility - manual therapy, therapeutic exercise, therapeutic activity and home program  Decreased strength - therapeutic exercise, therapeutic activities and home program  Impaired gait - gait training and home program  Decreased function - therapeutic activities and home program  Impaired posture - neuro re-education, therapeutic activities and home program    Therapy Evaluation Codes:   1) History comprised of:   Personal factors that impact the plan of care:      Past/current experiences and Profession.    Comorbidity factors that impact the plan of care are:      Diabetes, Depression, Fibromyalgia, High blood pressure, Overweight and Sleep disorder/apnea.     Medications impacting care: Anti-depressant, Anti-inflammatory, High blood pressure, Pain, Sleep and Diabetes.  2) Examination of Body Systems comprised of:   Body structures and functions that impact the plan of care:      Knee, Lumbar spine and Lower Extremity.   Activity limitations that impact the plan of care are:      Stairs, Standing and Walking.  3) Clinical presentation characteristics are:   Unstable/Unpredictable.  4) Decision-Making    High complexity using standardized patient assessment instrument and/or measureable assessment of functional outcome.  Cumulative Therapy Evaluation is: High complexity.    Previous and current functional limitations:  (See Goal Flow Sheet for this information)    Short term and Long term goals: (See Goal Flow Sheet for this information)     Communication ability:  Patient appears to be able to clearly communicate and understand verbal and written communication and follow directions correctly.  Treatment Explanation - The following has been discussed with the patient:   RX ordered/plan of care  Anticipated  outcomes  Possible risks and side effects  This patient would benefit from PT intervention to resume normal activities.   Rehab potential is fair.    Frequency:  2 X week, once daily  Duration:  for 3 weeks tapering to 2 X a month over 9 weeks   (12 visits total)  Discharge Plan:  Achieve all LTG.  Independent in home treatment program.  Reach maximal therapeutic benefit.    Please refer to the daily flowsheet for treatment today, total treatment time and time spent performing 1:1 timed codes.

## 2017-07-13 ENCOUNTER — THERAPY VISIT (OUTPATIENT)
Dept: PHYSICAL THERAPY | Facility: CLINIC | Age: 62
End: 2017-07-13
Payer: COMMERCIAL

## 2017-07-13 DIAGNOSIS — M79.605 BILATERAL LOWER EXTREMITY PAIN: ICD-10-CM

## 2017-07-13 DIAGNOSIS — M79.604 BILATERAL LOWER EXTREMITY PAIN: ICD-10-CM

## 2017-07-13 DIAGNOSIS — M54.50 BILATERAL LOW BACK PAIN WITHOUT SCIATICA: ICD-10-CM

## 2017-07-13 PROCEDURE — 97110 THERAPEUTIC EXERCISES: CPT | Mod: GP | Performed by: PHYSICAL THERAPIST

## 2017-07-13 PROCEDURE — 97530 THERAPEUTIC ACTIVITIES: CPT | Mod: GP | Performed by: PHYSICAL THERAPIST

## 2017-07-13 PROCEDURE — 97112 NEUROMUSCULAR REEDUCATION: CPT | Mod: GP | Performed by: PHYSICAL THERAPIST

## 2017-07-18 ENCOUNTER — THERAPY VISIT (OUTPATIENT)
Dept: PHYSICAL THERAPY | Facility: CLINIC | Age: 62
End: 2017-07-18
Payer: COMMERCIAL

## 2017-07-18 DIAGNOSIS — M79.605 BILATERAL LOWER EXTREMITY PAIN: ICD-10-CM

## 2017-07-18 DIAGNOSIS — M54.50 BILATERAL LOW BACK PAIN WITHOUT SCIATICA: ICD-10-CM

## 2017-07-18 DIAGNOSIS — M79.604 BILATERAL LOWER EXTREMITY PAIN: ICD-10-CM

## 2017-07-18 PROCEDURE — 97110 THERAPEUTIC EXERCISES: CPT | Mod: GP | Performed by: PHYSICAL THERAPIST

## 2017-07-18 PROCEDURE — 97140 MANUAL THERAPY 1/> REGIONS: CPT | Mod: GP | Performed by: PHYSICAL THERAPIST

## 2017-07-18 NOTE — PROGRESS NOTES
MMT: (* indicates patient's pain)  HIP MMT R MMT L   Flexion 5 4   IR 5 4+   ER 4- 4-   abduction 3- 3-   Extension 3- 3-   KNEE     Ext 5 3*   Flx 4+ 4*

## 2017-07-20 ENCOUNTER — THERAPY VISIT (OUTPATIENT)
Dept: PHYSICAL THERAPY | Facility: CLINIC | Age: 62
End: 2017-07-20
Payer: COMMERCIAL

## 2017-07-20 DIAGNOSIS — M54.50 BILATERAL LOW BACK PAIN WITHOUT SCIATICA: ICD-10-CM

## 2017-07-20 DIAGNOSIS — M79.605 BILATERAL LOWER EXTREMITY PAIN: ICD-10-CM

## 2017-07-20 DIAGNOSIS — M79.604 BILATERAL LOWER EXTREMITY PAIN: ICD-10-CM

## 2017-07-20 PROCEDURE — 97112 NEUROMUSCULAR REEDUCATION: CPT | Mod: GP | Performed by: PHYSICAL THERAPIST

## 2017-07-20 PROCEDURE — 97110 THERAPEUTIC EXERCISES: CPT | Mod: GP | Performed by: PHYSICAL THERAPIST

## 2017-07-25 ENCOUNTER — THERAPY VISIT (OUTPATIENT)
Dept: PHYSICAL THERAPY | Facility: CLINIC | Age: 62
End: 2017-07-25
Payer: COMMERCIAL

## 2017-07-25 DIAGNOSIS — M54.50 BILATERAL LOW BACK PAIN WITHOUT SCIATICA: ICD-10-CM

## 2017-07-25 DIAGNOSIS — M79.604 BILATERAL LOWER EXTREMITY PAIN: ICD-10-CM

## 2017-07-25 DIAGNOSIS — M79.605 BILATERAL LOWER EXTREMITY PAIN: ICD-10-CM

## 2017-07-25 PROCEDURE — 97530 THERAPEUTIC ACTIVITIES: CPT | Mod: GP | Performed by: PHYSICAL THERAPIST

## 2017-07-25 PROCEDURE — 97110 THERAPEUTIC EXERCISES: CPT | Mod: GP | Performed by: PHYSICAL THERAPIST

## 2017-07-27 DIAGNOSIS — F41.1 GAD (GENERALIZED ANXIETY DISORDER): ICD-10-CM

## 2017-07-27 DIAGNOSIS — F33.42 MAJOR DEPRESSIVE DISORDER, RECURRENT EPISODE, IN FULL REMISSION (H): ICD-10-CM

## 2017-07-27 RX ORDER — TRAZODONE HYDROCHLORIDE 50 MG/1
50 TABLET, FILM COATED ORAL AT BEDTIME
Qty: 90 TABLET | Refills: 1 | OUTPATIENT
Start: 2017-07-27

## 2017-07-27 RX ORDER — TIMOLOL MALEATE 2.5 MG/ML
SOLUTION/ DROPS OPHTHALMIC
Refills: 1 | COMMUNITY
Start: 2017-06-13 | End: 2017-12-16

## 2017-07-27 RX ORDER — INSULIN LISPRO 100 [IU]/ML
INJECTION, SOLUTION INTRAVENOUS; SUBCUTANEOUS
Refills: 3 | COMMUNITY
Start: 2017-06-27 | End: 2017-12-11

## 2017-07-27 NOTE — TELEPHONE ENCOUNTER
Kemi Casper is requesting a refill of:    Pending Prescriptions:                       Disp   Refills    traZODone (DESYREL) 50 MG tablet          90 tab*1            Sig: Take 1 tablet (50 mg) by mouth At Bedtime    Please close encounter if RX was sent. Thanks, Kell

## 2017-07-27 NOTE — TELEPHONE ENCOUNTER
Due for depression medication review. Last visit Ann gave a psychiatry referral? Review with patient.

## 2017-08-01 ENCOUNTER — THERAPY VISIT (OUTPATIENT)
Dept: PHYSICAL THERAPY | Facility: CLINIC | Age: 62
End: 2017-08-01
Payer: COMMERCIAL

## 2017-08-01 DIAGNOSIS — M79.604 BILATERAL LOWER EXTREMITY PAIN: ICD-10-CM

## 2017-08-01 DIAGNOSIS — M54.50 BILATERAL LOW BACK PAIN WITHOUT SCIATICA: ICD-10-CM

## 2017-08-01 DIAGNOSIS — M79.605 BILATERAL LOWER EXTREMITY PAIN: ICD-10-CM

## 2017-08-01 PROCEDURE — 97110 THERAPEUTIC EXERCISES: CPT | Mod: GP | Performed by: PHYSICAL THERAPIST

## 2017-08-01 PROCEDURE — 97116 GAIT TRAINING THERAPY: CPT | Mod: GP | Performed by: PHYSICAL THERAPIST

## 2017-08-01 PROCEDURE — 97112 NEUROMUSCULAR REEDUCATION: CPT | Mod: GP | Performed by: PHYSICAL THERAPIST

## 2017-08-01 NOTE — TELEPHONE ENCOUNTER
I have attempted to contact this patient by phone with the following results: left message to return my call on answering machine.

## 2017-08-07 NOTE — TELEPHONE ENCOUNTER
Kemi called the CS line today stating she was returning Kell's phone call.   She says this was about results and was hoping that the refill for the Trazodone was filled.   I have attempted to reach the Pt and I got her VM.  Left a message to have her call back the CS line and to leave a time that she would be available for us to contact her.     Fani.ALF Hassan ([facility])

## 2017-08-08 ENCOUNTER — OFFICE VISIT (OUTPATIENT)
Dept: FAMILY MEDICINE | Facility: CLINIC | Age: 62
End: 2017-08-08

## 2017-08-08 ENCOUNTER — THERAPY VISIT (OUTPATIENT)
Dept: PHYSICAL THERAPY | Facility: CLINIC | Age: 62
End: 2017-08-08
Payer: COMMERCIAL

## 2017-08-08 VITALS
DIASTOLIC BLOOD PRESSURE: 90 MMHG | BODY MASS INDEX: 37.15 KG/M2 | HEART RATE: 92 BPM | OXYGEN SATURATION: 97 % | RESPIRATION RATE: 16 BRPM | HEIGHT: 60 IN | WEIGHT: 189.2 LBS | SYSTOLIC BLOOD PRESSURE: 144 MMHG | TEMPERATURE: 98.5 F

## 2017-08-08 DIAGNOSIS — M79.604 BILATERAL LOWER EXTREMITY PAIN: ICD-10-CM

## 2017-08-08 DIAGNOSIS — F33.42 MAJOR DEPRESSIVE DISORDER, RECURRENT EPISODE, IN FULL REMISSION (H): Primary | ICD-10-CM

## 2017-08-08 DIAGNOSIS — F41.1 GAD (GENERALIZED ANXIETY DISORDER): ICD-10-CM

## 2017-08-08 DIAGNOSIS — M79.605 BILATERAL LOWER EXTREMITY PAIN: ICD-10-CM

## 2017-08-08 DIAGNOSIS — M54.50 BILATERAL LOW BACK PAIN WITHOUT SCIATICA: ICD-10-CM

## 2017-08-08 DIAGNOSIS — K21.9 GASTROESOPHAGEAL REFLUX DISEASE WITHOUT ESOPHAGITIS: ICD-10-CM

## 2017-08-08 DIAGNOSIS — I10 ESSENTIAL HYPERTENSION, BENIGN: ICD-10-CM

## 2017-08-08 DIAGNOSIS — Z11.59 NEED FOR HEPATITIS C SCREENING TEST: ICD-10-CM

## 2017-08-08 DIAGNOSIS — E66.811 OBESITY (BMI 30.0-34.9): ICD-10-CM

## 2017-08-08 LAB
% GRANULOCYTES: 60.7 %
HCT VFR BLD AUTO: 40 % (ref 35–47)
HEMOGLOBIN: 12.7 G/DL (ref 11.7–15.7)
LYMPHOCYTES NFR BLD AUTO: 30.7 %
MCH RBC QN AUTO: 29.8 PG (ref 26–33)
MCHC RBC AUTO-ENTMCNC: 31.8 G/DL (ref 31–36)
MCV RBC AUTO: 94 FL (ref 78–100)
MONOCYTES NFR BLD AUTO: 8.6 %
PLATELET COUNT - QUEST: 425 10^9/L (ref 150–375)
RBC # BLD AUTO: 4.26 10*12/L (ref 3.8–5.2)
WBC # BLD AUTO: 7.6 10*9/L (ref 4–11)

## 2017-08-08 PROCEDURE — 86803 HEPATITIS C AB TEST: CPT | Mod: 90 | Performed by: FAMILY MEDICINE

## 2017-08-08 PROCEDURE — 80053 COMPREHEN METABOLIC PANEL: CPT | Mod: 90 | Performed by: FAMILY MEDICINE

## 2017-08-08 PROCEDURE — 97110 THERAPEUTIC EXERCISES: CPT | Mod: GP | Performed by: PHYSICAL THERAPIST

## 2017-08-08 PROCEDURE — 99214 OFFICE O/P EST MOD 30 MIN: CPT | Performed by: FAMILY MEDICINE

## 2017-08-08 PROCEDURE — 82728 ASSAY OF FERRITIN: CPT | Mod: 90 | Performed by: FAMILY MEDICINE

## 2017-08-08 PROCEDURE — 85025 COMPLETE CBC W/AUTO DIFF WBC: CPT | Performed by: FAMILY MEDICINE

## 2017-08-08 PROCEDURE — 36415 COLL VENOUS BLD VENIPUNCTURE: CPT | Performed by: FAMILY MEDICINE

## 2017-08-08 PROCEDURE — 97530 THERAPEUTIC ACTIVITIES: CPT | Mod: GP | Performed by: PHYSICAL THERAPIST

## 2017-08-08 RX ORDER — FLUOXETINE 40 MG/1
40 CAPSULE ORAL DAILY
Qty: 90 CAPSULE | Refills: 3 | Status: SHIPPED | OUTPATIENT
Start: 2017-08-08 | End: 2020-09-23 | Stop reason: ALTCHOICE

## 2017-08-08 RX ORDER — TRAZODONE HYDROCHLORIDE 50 MG/1
50 TABLET, FILM COATED ORAL AT BEDTIME
Qty: 90 TABLET | Refills: 3 | Status: SHIPPED | OUTPATIENT
Start: 2017-08-08 | End: 2021-10-21

## 2017-08-08 RX ORDER — IRBESARTAN AND HYDROCHLOROTHIAZIDE 150; 12.5 MG/1; MG/1
1 TABLET, FILM COATED ORAL DAILY
Qty: 90 TABLET | Refills: 1 | Status: SHIPPED | OUTPATIENT
Start: 2017-08-08 | End: 2018-03-26

## 2017-08-08 RX ORDER — PRENATAL VIT/IRON FUM/FOLIC AC 27MG-0.8MG
1 TABLET ORAL DAILY
Qty: 100 TABLET | Refills: 1 | Status: SHIPPED | OUTPATIENT
Start: 2017-08-08 | End: 2018-03-20

## 2017-08-08 ASSESSMENT — PATIENT HEALTH QUESTIONNAIRE - PHQ9
SUM OF ALL RESPONSES TO PHQ QUESTIONS 1-9: 7
5. POOR APPETITE OR OVEREATING: NOT AT ALL

## 2017-08-08 ASSESSMENT — ANXIETY QUESTIONNAIRES
7. FEELING AFRAID AS IF SOMETHING AWFUL MIGHT HAPPEN: NOT AT ALL
6. BECOMING EASILY ANNOYED OR IRRITABLE: SEVERAL DAYS
1. FEELING NERVOUS, ANXIOUS, OR ON EDGE: NOT AT ALL
GAD7 TOTAL SCORE: 1
3. WORRYING TOO MUCH ABOUT DIFFERENT THINGS: NOT AT ALL
5. BEING SO RESTLESS THAT IT IS HARD TO SIT STILL: NOT AT ALL
IF YOU CHECKED OFF ANY PROBLEMS ON THIS QUESTIONNAIRE, HOW DIFFICULT HAVE THESE PROBLEMS MADE IT FOR YOU TO DO YOUR WORK, TAKE CARE OF THINGS AT HOME, OR GET ALONG WITH OTHER PEOPLE: NOT DIFFICULT AT ALL
2. NOT BEING ABLE TO STOP OR CONTROL WORRYING: NOT AT ALL

## 2017-08-08 NOTE — TELEPHONE ENCOUNTER
Patient called back and was not happy that her Trazodone was not refilled. Said that she has been seen and that this is a waste of money to come back in. She asked that we call back and leave a vm at [phone].    I tried to call that # but was not able to leave a msg.    Pt called back and we now have an appointment set for today.

## 2017-08-08 NOTE — LETTER
Kemi Bobdawson Pennie  [address] [place] [id number]        August 9, 2017          Dear Ms.Kulak Casper,    We are writing to inform you of your test results.    Normal hepatitis C screen  Low normal range ferritin(iron). I encourage you to see the results of a daily iron vitamin.        Resulted Orders   CL AFF HEMOGRAM/PLATE/DIFF ([facility])   Result Value Ref Range    WBC 7.6 4.0 - 11 10*9/L    RBC Count 4.26 3.8 - 5.2 10*12/L    Hemoglobin 12.7 11.7 - 15.7 g/dL    Hematocrit 40.0 35.0 - 47.0 %    MCV 94.0 78 - 100 fL    MCH 29.8 26 - 33 pg    MCHC 31.8 31 - 36 g/dL    Platelet Count 425 (A) 150 - 375 10^9/L      Comment:      ran twice ea    % Granulocytes 60.7 %    % Lymphocytes 30.7 %    % Monocytes 8.6 %   COMPREHENSIVE METABOLIC PANEL ([facility]) XCMP   Result Value Ref Range    Glucose 85 65 - 99 mg/dL      Comment:                    Fasting reference interval         Urea Nitrogen 25 7 - 25 mg/dL    Creatinine 0.73 0.50 - 0.99 mg/dL      Comment:      For patients >49 years of age, the reference limit  for Creatinine is approximately 13% higher for people  identified as -American.         GFR Estimate 88 > OR = 60 mL/min/1.73m2    EGFR African American 102 > OR = 60 mL/min/1.73m2    BUN/Creatinine Ratio NOT APPLICABLE 6 - 22 (calc)    Sodium 139 135 - 146 mmol/L    Potassium 4.1 3.5 - 5.3 mmol/L    Chloride 99 98 - 110 mmol/L    Carbon Dioxide 29 20 - 31 mmol/L    Calcium 9.8 8.6 - 10.4 mg/dL    Protein Total 7.3 6.1 - 8.1 g/dL    Albumin 4.4 3.6 - 5.1 g/dL    Globulin Calculated 2.9 1.9 - 3.7 g/dL (calc)    A/G Ratio 1.5 1.0 - 2.5 (calc)    Bilirubin Total 0.3 0.2 - 1.2 mg/dL    Alkaline Phosphatase 134 (H) 33 - 130 U/L    AST 15 10 - 35 U/L    ALT 14 6 - 29 U/L   Hepatits C antibody ([facility])   Result Value Ref Range    HCV Antibody NON-REACTIVE NON-REACTIVE    SIGNAL TO CUT OFF - [facility] 0.02 <1.00   FERRITIN ([facility])   Result Value Ref Range    Ferritin 31 20 - 288 ng/mL       If you  have any questions or concerns, please call the clinic at the number listed above.       Sincerely,        Estefania Emery MD

## 2017-08-08 NOTE — MR AVS SNAPSHOT
After Visit Summary   8/8/2017    Kemi Casper    MRN: [id number]           Patient Information     Date Of Birth          1955        Visit Information        Provider Department      8/8/2017 1:50 PM Estefania Emery MD [facility]        Today's Diagnoses     Major depressive disorder, recurrent episode, in full remission (H)    -  1    LUIS (generalized anxiety disorder)        Obesity (BMI 30.0-34.9)        Essential hypertension, benign        Gastroesophageal reflux disease without esophagitis        Need for hepatitis C screening test          Care Instructions    Start multivitamin with iron    Await labs    1)  Medication: continue current medication regimen unchanged  2)  Dietary sodium restriction  3)  Regular aerobic exercise and weight loss  4)  Recheck in 6 months, sooner should new symptoms or   problems arise.    Patient Education: Reviewed risks of hypertension and principles of   treatment.              Follow-ups after your visit        Your next 10 appointments already scheduled     Aug 15, 2017 10:20 AM CDT   RADHA Extremity with Eitan SCHMITTM [facility] [place] [facility] ([facility]  )    [id number] [place] [address]  [address] [place] [address]   [phone]            Aug 22, 2017 10:20 AM CDT   RADHA Extremity with Eitan HUGO Ho   [facility] [place] [facility] ([facility]  )    [address] [place] [address]  [address]  [place] [address]   [phone]            Aug 29, 2017  9:00 AM CDT   RADHA Extremity with Eitan Perales   [facility] [place] [facility] ([facility]  )    [address] [place] [address]  [address] [place] [address]   [phone]              Who to contact     If you have questions or need follow up information about today's clinic visit or your schedule please contact [facility] directly at [phone].  Normal or non-critical lab and imaging results will be communicated to you by MyChart, letter or phone within 4 business days after the  "clinic has received the results. If you do not hear from us within 7 days, please contact the clinic through [employer] or phone. If you have a critical or abnormal lab result, we will notify you by phone as soon as possible.  Submit refill requests through [employer] or call your pharmacy and they will forward the refill request to us. Please allow 3 business days for your refill to be completed.          Additional Information About Your Visit        [employer] Information     [employer] lets you send messages to your doctor, view your test results, renew your prescriptions, schedule appointments and more. To sign up, go to [email or web address] . Click on \"Log in\" on the left side of the screen, which will take you to the Welcome page. Then click on \"Sign up Now\" on the right side of the page.     You will be asked to enter the access code listed below, as well as some personal information. Please follow the directions to create your username and password.     Your access code is: [email or web address]  Expires: 2017  8:25 PM     Your access code will  in 90 days. If you need help or a new code, please call your [facility] clinic or [phone].        Care EveryWhere ID     This is your Care EveryWhere ID. This could be used by other organizations to access your [facility] medical records  [id number]        Your Vitals Were     Pulse Temperature Height Last Period Pulse Oximetry Breastfeeding?    92 98.5  F (36.9  C) (Oral) 1.524 m (5') 2003 97% No    BMI (Body Mass Index)                   36.95 kg/m2            Blood Pressure from Last 3 Encounters:   17 144/90   17 138/82   17 172/88    Weight from Last 3 Encounters:   17 85.8 kg (189 lb 3.2 oz)   17 86 kg (189 lb 9.6 oz)   17 83.5 kg (184 lb)              We Performed the Following     CL AFF HEMOGRAM/PLATE/DIFF (BFP)     COMPREHENSIVE METABOLIC PANEL (QUEST) XCMP     FERRITIN (QUEST)     Hepatits C antibody (QUEST)     " VENOUS COLLECTION          Today's Medication Changes          These changes are accurate as of: 8/8/17  3:02 PM.  If you have any questions, ask your nurse or doctor.               Start taking these medicines.        Dose/Directions    prenatal multivitamin plus iron 27-0.8 MG Tabs per tablet   Used for:  Gastroesophageal reflux disease without esophagitis   Started by:  Estefania Emery MD        Dose:  1 tablet   Take 1 tablet by mouth daily   Quantity:  100 tablet   Refills:  1         These medicines have changed or have updated prescriptions.        Dose/Directions    omeprazole 20 MG CR capsule   Commonly known as:  priLOSEC   This may have changed:  See the new instructions.   Used for:  Gastroesophageal reflux disease without esophagitis   Changed by:  Estefania Emery MD        Dose:  20 mg   Take 1 capsule (20 mg) by mouth daily   Quantity:  90 capsule   Refills:  3            Where to get your medicines      These medications were sent to [email or web address] [address] [place] [address] AT [facility] [address] [place] [address], [facility] [address]    Hours:  24-hours Phone:  [phone]     FLUoxetine 40 MG capsule    irbesartan-hydrochlorothiazide 150-12.5 MG per tablet    omeprazole 20 MG CR capsule    prenatal multivitamin plus iron 27-0.8 MG Tabs per tablet    traZODone 50 MG tablet                Primary Care Provider Office Phone # Fax #    Estefania Emery [phone]       [facility] [address]  [facility] [address]        Equal Access to Services     NIRMAL HAOR AH: Tom olmoso Sobhaskar, waaxda luqadaha, qaybta kaalmada eleni, june hernandez. So [facility] [phone].    ATENCIÓN: Si habla español, tiene a head disposición servicios gratuitos de asistencia lingüística. Llgia al [phone].    We comply with applicable federal civil rights laws and [place] laws. We do not  discriminate on the basis of race, color, national origin, age, disability sex, sexual orientation or gender identity.            Thank you!     Thank you for choosing [facility]  for your care. Our goal is always to provide you with excellent care. Hearing back from our patients is one way we can continue to improve our services. Please take a few minutes to complete the written survey that you may receive in the mail after your visit with us. Thank you!             Your Updated Medication List - Protect others around you: Learn how to safely use, store and throw away your medicines at www.disposemymeds.org.          This list is accurate as of: 8/8/17  3:02 PM.  Always use your most recent med list.                   Brand Name Dispense Instructions for use Diagnosis    ACCU-CHEK INSTANT CONTROL Liqd     1 Bottle    Control for accu-check Kwasi    Type II or unspecified type diabetes mellitus without mention of complication, not stated as uncontrolled       aluminum acetate Soln solution    BUROW'S    1 Bottle    Apply 3 mLs topically daily as needed    Perianal dermatitis       atorvastatin 20 MG tablet    LIPITOR          B-D U/F 31G X 8 MM   Generic drug:  insulin pen needle           blood glucose monitoring test strip    ACCU-CHEK KWASI    1 Box    Use to test blood sugars 3 times daily or as directed.    Type 2 diabetes mellitus with diabetic neuropathy, with long-term current use of insulin (H)       FLUoxetine 40 MG capsule    PROzac    90 capsule    Take 1 capsule (40 mg) by mouth daily    Major depressive disorder, recurrent episode, in full remission (H), LUIS (generalized anxiety disorder)       HumaLOG KWIKpen 100 UNIT/ML injection   Generic drug:  insulin lispro           insulin aspart 100 UNITS/ML injection    NovoLOG     Inject 5 Units Subcutaneous    Type 2 diabetes mellitus with neurological manifestations (H)       irbesartan-hydrochlorothiazide 150-12.5 MG per tablet     AVALIDE    90 tablet    Take 1 tablet by mouth daily    Essential hypertension, benign       LANTUS SOLOSTAR 100 UNIT/ML injection   Generic drug:  insulin glargine           latanoprost 0.005 % ophthalmic solution    XALATAN     INSTILL 1 DROP INTO THE LEFT EYE QHS        omeprazole 20 MG CR capsule    priLOSEC    90 capsule    Take 1 capsule (20 mg) by mouth daily    Gastroesophageal reflux disease without esophagitis       ONETOUCH DELICA LANCETS 33G Misc           prenatal multivitamin plus iron 27-0.8 MG Tabs per tablet     100 tablet    Take 1 tablet by mouth daily    Gastroesophageal reflux disease without esophagitis       PROVIGIL PO       Organic hypersomnia       timolol 0.25 % ophthalmic solution    TIMOPTIC     INT 1 GTT INTO THE OS QAM        traZODone 50 MG tablet    DESYREL    90 tablet    Take 1 tablet (50 mg) by mouth At Bedtime    Major depressive disorder, recurrent episode, in full remission (H), LUIS (generalized anxiety disorder)

## 2017-08-08 NOTE — PATIENT INSTRUCTIONS
Start multivitamin with iron    Await labs    1)  Medication: continue current medication regimen unchanged  2)  Dietary sodium restriction  3)  Regular aerobic exercise and weight loss  4)  Recheck in 6 months, sooner should new symptoms or   problems arise.    Patient Education: Reviewed risks of hypertension and principles of   treatment.

## 2017-08-09 LAB
ALBUMIN SERPL-MCNC: 4.4 G/DL (ref 3.6–5.1)
ALBUMIN/GLOB SERPL: 1.5 (CALC) (ref 1–2.5)
ALP SERPL-CCNC: 134 U/L (ref 33–130)
ALT SERPL-CCNC: 14 U/L (ref 6–29)
AST SERPL-CCNC: 15 U/L (ref 10–35)
BILIRUB SERPL-MCNC: 0.3 MG/DL (ref 0.2–1.2)
BUN SERPL-MCNC: 25 MG/DL (ref 7–25)
BUN/CREATININE RATIO: ABNORMAL (CALC) (ref 6–22)
CALCIUM SERPL-MCNC: 9.8 MG/DL (ref 8.6–10.4)
CHLORIDE SERPLBLD-SCNC: 99 MMOL/L (ref 98–110)
CO2 SERPL-SCNC: 29 MMOL/L (ref 20–31)
CREAT SERPL-MCNC: 0.73 MG/DL (ref 0.5–0.99)
EGFR AFRICAN AMERICAN - QUEST: 102 ML/MIN/1.73M2
FERRITIN SERPL-MCNC: 31 NG/ML (ref 20–288)
GFR SERPL CREATININE-BSD FRML MDRD: 88 ML/MIN/1.73M2
GLOBULIN, CALCULATED - QUEST: 2.9 G/DL (CALC) (ref 1.9–3.7)
GLUCOSE - QUEST: 85 MG/DL (ref 65–99)
HCV AB - QUEST: NORMAL
POTASSIUM SERPL-SCNC: 4.1 MMOL/L (ref 3.5–5.3)
PROT SERPL-MCNC: 7.3 G/DL (ref 6.1–8.1)
SIGNAL TO CUT OFF - QUEST: 0.02
SODIUM SERPL-SCNC: 139 MMOL/L (ref 135–146)

## 2017-08-09 ASSESSMENT — ANXIETY QUESTIONNAIRES: GAD7 TOTAL SCORE: 1

## 2017-08-15 ENCOUNTER — THERAPY VISIT (OUTPATIENT)
Dept: PHYSICAL THERAPY | Facility: CLINIC | Age: 62
End: 2017-08-15
Payer: COMMERCIAL

## 2017-08-15 DIAGNOSIS — M54.50 BILATERAL LOW BACK PAIN WITHOUT SCIATICA: ICD-10-CM

## 2017-08-15 DIAGNOSIS — M79.604 BILATERAL LOWER EXTREMITY PAIN: ICD-10-CM

## 2017-08-15 DIAGNOSIS — M79.605 BILATERAL LOWER EXTREMITY PAIN: ICD-10-CM

## 2017-08-15 PROCEDURE — 97140 MANUAL THERAPY 1/> REGIONS: CPT | Mod: GP | Performed by: PHYSICAL THERAPIST

## 2017-08-15 PROCEDURE — 97110 THERAPEUTIC EXERCISES: CPT | Mod: GP | Performed by: PHYSICAL THERAPIST

## 2017-08-22 ENCOUNTER — THERAPY VISIT (OUTPATIENT)
Dept: PHYSICAL THERAPY | Facility: CLINIC | Age: 62
End: 2017-08-22
Payer: COMMERCIAL

## 2017-08-22 DIAGNOSIS — M79.605 BILATERAL LOWER EXTREMITY PAIN: ICD-10-CM

## 2017-08-22 DIAGNOSIS — M54.50 BILATERAL LOW BACK PAIN WITHOUT SCIATICA: ICD-10-CM

## 2017-08-22 DIAGNOSIS — M79.604 BILATERAL LOWER EXTREMITY PAIN: ICD-10-CM

## 2017-08-22 PROCEDURE — 97110 THERAPEUTIC EXERCISES: CPT | Mod: GP | Performed by: PHYSICAL THERAPIST

## 2017-08-22 PROCEDURE — 97112 NEUROMUSCULAR REEDUCATION: CPT | Mod: GP | Performed by: PHYSICAL THERAPIST

## 2017-09-12 ENCOUNTER — THERAPY VISIT (OUTPATIENT)
Dept: PHYSICAL THERAPY | Facility: CLINIC | Age: 62
End: 2017-09-12
Payer: COMMERCIAL

## 2017-09-12 DIAGNOSIS — M79.604 BILATERAL LOWER EXTREMITY PAIN: ICD-10-CM

## 2017-09-12 DIAGNOSIS — M54.50 BILATERAL LOW BACK PAIN WITHOUT SCIATICA: ICD-10-CM

## 2017-09-12 DIAGNOSIS — M79.605 BILATERAL LOWER EXTREMITY PAIN: ICD-10-CM

## 2017-09-12 PROCEDURE — 97530 THERAPEUTIC ACTIVITIES: CPT | Mod: GP | Performed by: PHYSICAL THERAPIST

## 2017-09-12 PROCEDURE — 97110 THERAPEUTIC EXERCISES: CPT | Mod: GP | Performed by: PHYSICAL THERAPIST

## 2017-09-12 PROCEDURE — 97112 NEUROMUSCULAR REEDUCATION: CPT | Mod: GP | Performed by: PHYSICAL THERAPIST

## 2017-10-12 ENCOUNTER — THERAPY VISIT (OUTPATIENT)
Dept: PHYSICAL THERAPY | Facility: CLINIC | Age: 62
End: 2017-10-12
Payer: COMMERCIAL

## 2017-10-12 DIAGNOSIS — M79.605 BILATERAL LOWER EXTREMITY PAIN: ICD-10-CM

## 2017-10-12 DIAGNOSIS — M79.604 BILATERAL LOWER EXTREMITY PAIN: ICD-10-CM

## 2017-10-12 DIAGNOSIS — M54.50 BILATERAL LOW BACK PAIN WITHOUT SCIATICA: ICD-10-CM

## 2017-10-12 PROCEDURE — 97110 THERAPEUTIC EXERCISES: CPT | Mod: GP | Performed by: PHYSICAL THERAPIST

## 2017-10-12 PROCEDURE — 97530 THERAPEUTIC ACTIVITIES: CPT | Mod: GP | Performed by: PHYSICAL THERAPIST

## 2017-10-12 ASSESSMENT — ACTIVITIES OF DAILY LIVING (ADL)
HOW_WOULD_YOU_RATE_THE_OVERALL_FUNCTION_OF_YOUR_KNEE_DURING_YOUR_USUAL_DAILY_ACTIVITIES?: SEVERELY ABNORMAL
SIT WITH YOUR KNEE BENT: ACTIVITY IS NOT DIFFICULT
LIMPING: THE SYMPTOM AFFECTS MY ACTIVITY SEVERELY
WALK: ACTIVITY IS VERY DIFFICULT
GO DOWN STAIRS: ACTIVITY IS VERY DIFFICULT
WEAKNESS: THE SYMPTOM AFFECTS MY ACTIVITY SEVERELY
GIVING WAY, BUCKLING OR SHIFTING OF KNEE: THE SYMPTOM AFFECTS MY ACTIVITY SLIGHTLY
KNEE_ACTIVITY_OF_DAILY_LIVING_SUM: 32
KNEEL ON THE FRONT OF YOUR KNEE: ACTIVITY IS VERY DIFFICULT
RAW_SCORE: 32
SWELLING: I DO NOT HAVE THE SYMPTOM
GO UP STAIRS: ACTIVITY IS FAIRLY DIFFICULT
STIFFNESS: THE SYMPTOM AFFECTS MY ACTIVITY MODERATELY
SQUAT: I AM UNABLE TO DO THE ACTIVITY
AS_A_RESULT_OF_YOUR_KNEE_INJURY,_HOW_WOULD_YOU_RATE_YOUR_CURRENT_LEVEL_OF_DAILY_ACTIVITY?: SEVERELY ABNORMAL
PAIN: THE SYMPTOM AFFECTS MY ACTIVITY SEVERELY
KNEE_ACTIVITY_OF_DAILY_LIVING_SCORE: 45.71
RISE FROM A CHAIR: ACTIVITY IS NOT DIFFICULT
STAND: ACTIVITY IS MINIMALLY DIFFICULT
HOW_WOULD_YOU_RATE_THE_CURRENT_FUNCTION_OF_YOUR_KNEE_DURING_YOUR_USUAL_DAILY_ACTIVITIES_ON_A_SCALE_FROM_0_TO_100_WITH_100_BEING_YOUR_LEVEL_OF_KNEE_FUNCTION_PRIOR_TO_YOUR_INJURY_AND_0_BEING_THE_INABILITY_TO_PERFORM_ANY_OF_YOUR_USUAL_DAILY_ACTIVITIES?: 25

## 2017-10-12 NOTE — PROGRESS NOTES
"Subjective:    HPI       Knee Activity of Daily Living Score: 45.71            Objective:    System    Physical Exam    General     ROS    Assessment/Plan:      DISCHARGE REPORT    Progress reporting period is from 7/11/2017 to 10/12/2017.       SUBJECTIVE  Subjective changes noted by patient: Patient reports that she is not seeing improvement and that she \"cannot go on like this\".     Initial Pain level: 10/10.   Changes in function:  Yes (See Goal flowsheet attached for changes in current functional level)  Adverse reaction to treatment or activity: None    OBJECTIVE  Changes noted in objective findings:  Yes, patient demonstrates inconsistent improvements with function. Overall, she shows improvements in strength.     HIP: (* indicates patient's pain)   MMT R MMT L   Flexion 4+ 4   abduction 4- 3   Extension 4- 4-   KNEE     Ext 5 4-*   Flx 4+ 4-       ASSESSMENT/PLAN  Updated problem list and treatment plan:   Referring Diagnosis 1:  Meralgia Paresthetica, bilateral lower limbs.     Primary Complaint: Bilateral knee pain, left worse than right  STG/LTGs have been met or progress has been made towards goals:  Yes (See Goal flow sheet completed today.)  Assessment of Progress: Patient has improved objectively. Subjectively, the patient reports no improvement.   Self Management Plans:  Patient has been instructed in a home treatment program.    Recommendations:  Patient is electing to self-discharge from therapy at this time to pursue other treatment options. She does not feel that she is progressing with symptoms.     Please refer to the daily flowsheet for treatment today, total treatment time and time spent performing 1:1 timed codes.                "

## 2017-11-15 ENCOUNTER — HOSPITAL LABORATORY (OUTPATIENT)
Dept: OTHER | Facility: CLINIC | Age: 62
End: 2017-11-15

## 2017-11-15 ENCOUNTER — HOSPITAL ENCOUNTER (OUTPATIENT)
Dept: LAB | Facility: CLINIC | Age: 62
End: 2017-11-15
Attending: PATHOLOGY
Payer: COMMERCIAL

## 2017-11-15 LAB
CREAT SERPL-MCNC: 0.72 MG/DL (ref 0.52–1.04)
ERYTHROCYTE [DISTWIDTH] IN BLOOD BY AUTOMATED COUNT: 12.7 % (ref 10–15)
FERRITIN SERPL-MCNC: 35 NG/ML (ref 8–252)
GFR SERPL CREATININE-BSD FRML MDRD: 81 ML/MIN/1.7M2
HCT VFR BLD AUTO: 35.4 % (ref 35–47)
HGB BLD-MCNC: 11.8 G/DL (ref 11.7–15.7)
HGB BLD-MCNC: NORMAL G/DL (ref 11.7–15.7)
IRON SATN MFR SERPL: 16 % (ref 15–46)
IRON SERPL-MCNC: 52 UG/DL (ref 35–180)
MCH RBC QN AUTO: 31.1 PG (ref 26.5–33)
MCHC RBC AUTO-ENTMCNC: 33.1 G/DL (ref 31.5–36.5)
MCV RBC AUTO: 94 FL (ref 78–100)
PLATELET # BLD AUTO: 319 10E9/L (ref 150–450)
RBC # BLD AUTO: 3.76 10E12/L (ref 3.8–5.2)
RETICS # AUTO: 66.6 10E9/L (ref 25–95)
RETICS/RBC NFR AUTO: 1.8 % (ref 0.5–2)
TIBC SERPL-MCNC: 332 UG/DL (ref 240–430)
WBC # BLD AUTO: 5.1 10E9/L (ref 4–11)

## 2017-11-22 ENCOUNTER — TRANSFERRED RECORDS (OUTPATIENT)
Dept: FAMILY MEDICINE | Facility: CLINIC | Age: 62
End: 2017-11-22

## 2017-11-27 ENCOUNTER — TRANSFERRED RECORDS (OUTPATIENT)
Dept: HEALTH INFORMATION MANAGEMENT | Facility: CLINIC | Age: 62
End: 2017-11-27

## 2017-11-30 DIAGNOSIS — D64.9 ANEMIA: Primary | ICD-10-CM

## 2017-11-30 PROBLEM — K90.9 INTESTINAL MALABSORPTION, UNSPECIFIED TYPE: Status: ACTIVE | Noted: 2017-11-30

## 2017-11-30 PROBLEM — T45.4X5A IRON ADVERSE REACTION: Status: ACTIVE | Noted: 2017-11-30

## 2017-11-30 PROBLEM — Z41.8 ENCOUNTER FOR OTHER PROCEDURES FOR PURPOSES OTHER THAN REMEDYING HEALTH STATE (CODE): Status: ACTIVE | Noted: 2017-11-30

## 2017-12-11 ENCOUNTER — HOSPITAL ENCOUNTER (OUTPATIENT)
Dept: LAB | Facility: CLINIC | Age: 62
Discharge: HOME | End: 2017-12-11
Attending: ORTHOPAEDIC SURGERY | Admitting: ORTHOPAEDIC SURGERY

## 2017-12-11 ENCOUNTER — OFFICE VISIT (OUTPATIENT)
Dept: FAMILY MEDICINE | Facility: CLINIC | Age: 62
End: 2017-12-11

## 2017-12-11 VITALS
HEIGHT: 61 IN | HEART RATE: 76 BPM | WEIGHT: 184 LBS | DIASTOLIC BLOOD PRESSURE: 82 MMHG | TEMPERATURE: 98.3 F | BODY MASS INDEX: 34.74 KG/M2 | OXYGEN SATURATION: 98 % | SYSTOLIC BLOOD PRESSURE: 132 MMHG | RESPIRATION RATE: 14 BRPM

## 2017-12-11 DIAGNOSIS — D50.8 OTHER IRON DEFICIENCY ANEMIA: ICD-10-CM

## 2017-12-11 DIAGNOSIS — Z01.812 PRE-OPERATIVE LABORATORY EXAMINATION: ICD-10-CM

## 2017-12-11 DIAGNOSIS — G47.33 OBSTRUCTIVE SLEEP APNEA SYNDROME: ICD-10-CM

## 2017-12-11 DIAGNOSIS — Z01.818 PRE-OP EXAM: ICD-10-CM

## 2017-12-11 DIAGNOSIS — M17.12 PRIMARY OSTEOARTHRITIS OF LEFT KNEE: Primary | ICD-10-CM

## 2017-12-11 DIAGNOSIS — I10 ESSENTIAL HYPERTENSION, BENIGN: ICD-10-CM

## 2017-12-11 DIAGNOSIS — E11.49 TYPE 2 DIABETES MELLITUS WITH NEUROLOGICAL MANIFESTATIONS (H): ICD-10-CM

## 2017-12-11 LAB
HBA1C MFR BLD: 7.8 % (ref 4–7)
MRSA DNA SPEC QL NAA+PROBE: NEGATIVE
SPECIMEN SOURCE: NORMAL

## 2017-12-11 PROCEDURE — 84132 ASSAY OF SERUM POTASSIUM: CPT | Mod: 90 | Performed by: FAMILY MEDICINE

## 2017-12-11 PROCEDURE — 36415 COLL VENOUS BLD VENIPUNCTURE: CPT | Performed by: FAMILY MEDICINE

## 2017-12-11 PROCEDURE — 40000829 ZZHCL STATISTIC STAPH AUREUS SUSCEPT SCREEN PCR: Performed by: ORTHOPAEDIC SURGERY

## 2017-12-11 PROCEDURE — 83036 HEMOGLOBIN GLYCOSYLATED A1C: CPT | Performed by: FAMILY MEDICINE

## 2017-12-11 PROCEDURE — 87640 STAPH A DNA AMP PROBE: CPT | Performed by: ORTHOPAEDIC SURGERY

## 2017-12-11 PROCEDURE — 87641 MR-STAPH DNA AMP PROBE: CPT | Performed by: ORTHOPAEDIC SURGERY

## 2017-12-11 PROCEDURE — 40000830 ZZHCL STATISTIC STAPH AUREUS METH RESIST SCREEN PCR: Performed by: ORTHOPAEDIC SURGERY

## 2017-12-11 PROCEDURE — 99214 OFFICE O/P EST MOD 30 MIN: CPT | Performed by: FAMILY MEDICINE

## 2017-12-11 NOTE — LETTER
[facility]              [facility]  [facility] [address]  [place]  [address]        For Emergencies:  Call 911      For Clinic Appointments:   [phone]                     [facility] RUSSELL WILEY  [address]  [address]  [facility] [address]  [phone]  Dept: [phone]    PRE-OP EVALUATION:  Today's date: 2017    Kemi Casper (: 1955) presents for pre-operative evaluation assessment as requested by Dr. Shetty.  She requires evaluation and anesthesia risk assessment prior to undergoing surgery/procedure for treatment of L knee .  Proposed procedure: L knee replacement     Date of Surgery/ Procedure: 17  Time of Surgery/ Procedure:   Hospital/Surgical Facility:   Fax number for surgical facility: [facility]  Primary Physician: Estefania Emery  Type of Anesthesia Anticipated: General    Patient has a Health Care Directive or Living Will:  NO    1. NO - Do you have a history of heart attack, stroke, stent, bypass or surgery on an artery in the head, neck, heart or legs?  2. NO - Do you ever have any pain or discomfort in your chest?  3. NO - Do you have a history of  Heart Failure?  4. NO - Are you troubled by shortness of breath when: walking on the level, up a slight hill or at night?  5. YES - DO YOU CURRENTLY HAVE A COLD, BRONCHITIS OR OTHER RESPIRATORY INFECTION? Cold symptoms  6. NO - Do you have a cough, shortness of breath or wheezing?  7. NO - Do you sometimes get pains in the calves of your legs when you walk?  8. NO - Do you or anyone in your family have previous history of blood clots?  9. NO - Do you or does anyone in your family have a serious bleeding problem such as prolonged bleeding following surgeries or cuts?  10. YES - HAVE YOU EVER HAD PROBLEMS WITH ANEMIA OR BEEN TOLD TO TAKE IRON PILLS? Having an iron  transfusion before surgery  11. NO - Have you had any abnormal blood loss such as black, tarry or bloody stools, or abnormal vaginal bleeding?  12. NO - Have you ever had a blood transfusion?  13. NO - Have you or any of your relatives ever had problems with anesthesia?  14. YES - DO YOU HAVE SLEEP APNEA, EXCESSIVE SNORING OR DAYTIME DROWSINESS? Uses CPAP  15. NO - Do you have any prosthetic heart valves?  16. NO - Do you have prosthetic joints?  17. NO - Is there any chance that you may be pregnant?    Poorly controlled diabetes/ DR Ayala regulates    HPI:                                                      Brief HPI related to upcoming procedure: left knee replacement      See problem list for active medical problems.  Problems all longstanding and stable, except as noted/documented.  See ROS for pertinent symptoms related to these conditions.                                                                                                  .    MEDICAL HISTORY:                                                    Patient Active Problem List    Diagnosis Date Noted     Anemia 11/30/2017     Priority: Medium     Intestinal malabsorption, unspecified type 11/30/2017     Priority: Medium     Iron adverse reaction 11/30/2017     Priority: Medium     Encounter for other procedures for purposes other than remedying health state (CODE) 11/30/2017     Priority: Medium     Mixed hyperlipidemia 07/22/2016     Priority: Medium     Obesity (BMI 30.0-34.9) 07/22/2016     Priority: Medium     Fibromyalgia 07/22/2016     Priority: Medium     Type 2 diabetes mellitus with neurological manifestations (HCC) 05/06/2015     Priority: Medium     Major depressive disorder, recurrent episode, in full remission (H) 04/20/2011     Priority: Medium     Sleep apnea 02/09/2011     Priority: Medium     Periodic limb movement disorder 02/09/2011     Priority: Medium     Essential hypertension, benign 07/02/2002     Priority: Medium     Health Care  Home 02/20/2013     Priority: Low     State Tier Level:  Tier 2  Status:  na  Care Coordinator:      See Letters for [facility] Care Plan           ACP (advance care planning) 05/08/2012     Priority: Low     Advance Care Planning 9/1/2015: ACP Review and Resources Provided:  Reviewed chart for advance care plan.  Kemi Casper has no plan or code status on file. Discussed available resources and provided with information. Confirmed code status reflects current choices pending further ACP discussions.  Confirmed/documented legally designated decision maker(s). Added by Jeniffer Nelson                Past Medical History:   Diagnosis Date     Depression      Diabetes mellitus      Essential hypertension, benign 7/2/2002     Fibromyalgia      GERD (gastroesophageal reflux disease)      Hyperlipidemia      Major depressive disorder, recurrent episode, in full remission (H) 4/20/2011     Mixed hyperlipidemia 7/22/2016     Need for prophylactic hormone replacement therapy (postmenopausal) 12/14/2005     Obesity (BMI 30.0-34.9) 7/22/2016     Other abnormal glucose 2007     Sleep apnea 2/9/2011     Type 2 diabetes mellitus with neurological manifestations (HCC) 5/6/2015     Past Surgical History:   Procedure Laterality Date     C APPENDECTOMY  2002    done with hysterectomy     C EYE EXAM ESTABLISHED PT  2003     C GOETZ W/O FACETEC FORAMOT/DSKC 1/2 VRT SEG, LUMBAR  10/02/2000    Laminectomy, Lumbar     CHOLECYSTECTOMY  2002     HC DILATION/CURETTAGE DIAG/THER NON OB  1977    D & C     HYSTERECTOMY TOTAL ABDOMINAL, BILATERAL SALPINGO-OOPHORECTOMY, COMBINED  2002     REMV PILONIDAL LESION SIMPLE  age 17     TONSILLECTOMY, ADENOIDECTOMY, COMBINED  age 5     Current Outpatient Prescriptions   Medication Sig Dispense Refill     Prenatal Vit-Fe Fumarate-FA (PRENATAL MULTIVITAMIN PLUS IRON) 27-0.8 MG TABS per tablet Take 1 tablet by mouth daily 100 tablet 1     traZODone (DESYREL) 50 MG tablet Take 1 tablet (50 mg) by mouth At  "Bedtime 90 tablet 3     irbesartan-hydrochlorothiazide (AVALIDE) 150-12.5 MG per tablet Take 1 tablet by mouth daily 90 tablet 1     FLUoxetine (PROZAC) 40 MG capsule Take 1 capsule (40 mg) by mouth daily 90 capsule 3     omeprazole (PRILOSEC) 20 MG CR capsule Take 1 capsule (20 mg) by mouth daily 90 capsule 3     insulin aspart (NOVOLOG) 100 UNITS/ML injection Inject 5 Units Subcutaneous       blood glucose monitoring (ACCU-CHEK KWASI) test strip Use to test blood sugars 3 times daily or as directed. 1 Box 3     latanoprost (XALATAN) 0.005 % ophthalmic solution INSTILL 1 DROP INTO THE LEFT EYE QHS  3     Modafinil (PROVIGIL PO)        LANTUS SOLOSTAR 100 UNIT/ML soln   1     atorvastatin (LIPITOR) 20 MG tablet   1     B-D U/F 31G X 8 MM insulin pen needle   1     ONETOUCH DELICA LANCETS 33G MISC   0     aluminum acetate (BUROW'S) SOLN Apply 3 mLs topically daily as needed 1 Bottle 1     Blood Glucose Calibration (ACCU-CHEK INSTANT CONTROL) LIQD Control for accu-check Kwasi 1 Bottle 1     timolol (TIMOPTIC) 0.25 % ophthalmic solution INT 1 GTT INTO THE OS QAM  1     OTC products: None, except as noted above    Allergies   Allergen Reactions     Lisinopril Cough      Latex Allergy: NO    Social History   Substance Use Topics     Smoking status: Never Smoker     Smokeless tobacco: Never Used     Alcohol use No     History   Drug Use No       REVIEW OF SYSTEMS:                                                    Constitutional, neuro, ENT, endocrine, pulmonary, cardiac, gastrointestinal, genitourinary, musculoskeletal, integument and psychiatric systems are negative, except as otherwise noted.      EXAM:                                                    /82 (BP Location: Right arm, Cuff Size: Adult Large)  Pulse 76  Temp 98.3  F (36.8  C) (Oral)  Resp 14  Ht 1.537 m (5' 0.5\")  Wt 83.5 kg (184 lb)  LMP 12/04/2003  SpO2 98%  BMI 35.34 kg/m2    GENERAL APPEARANCE: healthy, alert and no distress     EYES: " EOMI, PERRL     HENT: ear canals and TM's normal and nose and mouth without ulcers or lesions     NECK: no adenopathy, no asymmetry, masses, or scars and thyroid normal to palpation     RESP: lungs clear to auscultation - no rales, rhonchi or wheezes     CV: regular rates and rhythm, normal S1 S2, no S3 or S4 and no murmur, click or rub     ABDOMEN:  soft, nontender, no HSM or masses and bowel sounds normal     MS: extremities normal- no gross deformities noted, no evidence of inflammation in joints, FROM in all extremities.     SKIN: no suspicious lesions or rashes     NEURO: Normal strength and tone, sensory exam grossly normal, mentation intact and speech normal     PSYCH: mentation appears normal. and affect normal/bright     LYMPHATICS: No axillary, cervical, or supraclavicular nodes     BMI : Body mass index is 35.34 kg/(m^2).      DIAGNOSTICS:                                                    Hemoglobin A1C: 7.8  Potassium pending    See labs from hospital pathology    Recent Labs   Lab Test  11/15/17   1303  08/08/17   1455  08/08/17   1443 06/20/17 05/29/17   1450  10/26/16   11/15/11   1125   HGB  11.8  Canceled, Test credited   --   12.7   --    < >  10.8*   < >   --    < >  11.4*   PLT  319   --   425*   --    < >  334   < >   --    < >  331   INR   --    --    --    --    --    --    --    --    --   0.96   NA   --   139   --    --    --   137   < >  138   < >  139   POTASSIUM   --   4.1   --    --    --   3.2*   < >  3.9   < >  3.2*   CR  0.72  0.73   --    --    --   0.60   < >  0.79   < >  0.68   A1C   --    --    --   9.1*   --    --    --   7.4*   < >   --     < > = values in this interval not displayed.        IMPRESSION:                                                    Diagnosis/reason for consult: (M17.12) Primary osteoarthritis of left knee  (primary encounter diagnosis)    (Z01.818) Pre-op exam      (D50.8) Other iron deficiency anemia    (I10) Essential hypertension,  benign      (E11.49) Type 2 diabetes mellitus with neurological manifestations (HCC)  Plan: Hemoglobin A1c (BFP), VENOUS COLLECTION            Sleep apnea on CPAP        The proposed surgical procedure is considered INTERMEDIATE risk.    REVISED CARDIAC RISK INDEX  The patient has the following serious cardiovascular risks for perioperative complications such as (MI, PE, VFib and 3  AV Block):  Diabetes Mellitus (on Insulin)  INTERPRETATION: 2 risks: Class III (moderate risk - 6.6% complication rate)    The patient has the following additional risks for perioperative complications:  No identified additional risks        RECOMMENDATIONS:                                                      --Consult hospital rounder / IM to assist post-op medical management    --Patient is to take all scheduled medications on the day of surgery EXCEPT for modifications listed below.    APPROVAL GIVEN to proceed with proposed procedure, without further diagnostic evaluation       Signed Electronically by: Estefania Emery MD    Copy of this evaluation report is provided to requesting physician.

## 2017-12-11 NOTE — NURSING NOTE
Patient is here for pre-op.  Pre-Visit Screening :  Immunizations : up to date    Colonoscopy : is up to date  Mammogram : is due and to be scheduled by patient for later completion  Asthma Action Plan/Test : na  PHQ9/GAD7 : na  Pulse - regular  Medication Reconciliation: complete    The patient has verbalized that it is ok to leave a detailed voice message on the patient's cell phone    Telephone Information:   Mobile [phone]      with results/recommendations from this visit.

## 2017-12-11 NOTE — PROGRESS NOTES
[facility]  [address]  [address]  [facility] [address]  [phone]  Dept: [phone]    PRE-OP EVALUATION:  Today's date: 2017    Kemi Casper (: 1955) presents for pre-operative evaluation assessment as requested by Dr. Shetty.  She requires evaluation and anesthesia risk assessment prior to undergoing surgery/procedure for treatment of L knee .  Proposed procedure: L knee replacement     Date of Surgery/ Procedure: 17  Time of Surgery/ Procedure: AM  Hospital/Surgical Facility:   Fax number for surgical facility: [facility]  Primary Physician: Estefania Emery  Type of Anesthesia Anticipated: General    Patient has a Health Care Directive or Living Will:  NO    1. NO - Do you have a history of heart attack, stroke, stent, bypass or surgery on an artery in the head, neck, heart or legs?  2. NO - Do you ever have any pain or discomfort in your chest?  3. NO - Do you have a history of  Heart Failure?  4. NO - Are you troubled by shortness of breath when: walking on the level, up a slight hill or at night?  5. YES - DO YOU CURRENTLY HAVE A COLD, BRONCHITIS OR OTHER RESPIRATORY INFECTION? Cold symptoms  6. NO - Do you have a cough, shortness of breath or wheezing?  7. NO - Do you sometimes get pains in the calves of your legs when you walk?  8. NO - Do you or anyone in your family have previous history of blood clots?  9. NO - Do you or does anyone in your family have a serious bleeding problem such as prolonged bleeding following surgeries or cuts?  10. YES - HAVE YOU EVER HAD PROBLEMS WITH ANEMIA OR BEEN TOLD TO TAKE IRON PILLS? Having an iron transfusion before surgery  11. NO - Have you had any abnormal blood loss such as black, tarry or bloody stools, or abnormal vaginal bleeding?  12. NO - Have you ever had a blood transfusion?  13. NO - Have you or any of your relatives ever had problems with anesthesia?  14. YES - DO YOU HAVE SLEEP APNEA,  EXCESSIVE SNORING OR DAYTIME DROWSINESS? Uses CPAP  15. NO - Do you have any prosthetic heart valves?  16. NO - Do you have prosthetic joints?  17. NO - Is there any chance that you may be pregnant?    Poorly controlled diabetes/ DR Ayala regulates    HPI:                                                      Brief HPI related to upcoming procedure: left knee replacement      See problem list for active medical problems.  Problems all longstanding and stable, except as noted/documented.  See ROS for pertinent symptoms related to these conditions.                                                                                                  .    MEDICAL HISTORY:                                                    Patient Active Problem List    Diagnosis Date Noted     Anemia 11/30/2017     Priority: Medium     Intestinal malabsorption, unspecified type 11/30/2017     Priority: Medium     Iron adverse reaction 11/30/2017     Priority: Medium     Encounter for other procedures for purposes other than remedying health state (CODE) 11/30/2017     Priority: Medium     Mixed hyperlipidemia 07/22/2016     Priority: Medium     Obesity (BMI 30.0-34.9) 07/22/2016     Priority: Medium     Fibromyalgia 07/22/2016     Priority: Medium     Type 2 diabetes mellitus with neurological manifestations (HCC) 05/06/2015     Priority: Medium     Major depressive disorder, recurrent episode, in full remission (H) 04/20/2011     Priority: Medium     Sleep apnea 02/09/2011     Priority: Medium     Periodic limb movement disorder 02/09/2011     Priority: Medium     Essential hypertension, benign 07/02/2002     Priority: Medium     Health Care Home 02/20/2013     Priority: Low     State Tier Level:  Tier 2  Status:  na  Care Coordinator:      See Letters for [facility] Care Plan           ACP (advance care planning) 05/08/2012     Priority: Low     Advance Care Planning 9/1/2015: ACP Review and Resources Provided:  Reviewed chart for advance care  plan.  Kemi Casper has no plan or code status on file. Discussed available resources and provided with information. Confirmed code status reflects current choices pending further ACP discussions.  Confirmed/documented legally designated decision maker(s). Added by Jeniffer Nelson                Past Medical History:   Diagnosis Date     Depression      Diabetes mellitus      Essential hypertension, benign 7/2/2002     Fibromyalgia      GERD (gastroesophageal reflux disease)      Hyperlipidemia      Major depressive disorder, recurrent episode, in full remission (H) 4/20/2011     Mixed hyperlipidemia 7/22/2016     Need for prophylactic hormone replacement therapy (postmenopausal) 12/14/2005     Obesity (BMI 30.0-34.9) 7/22/2016     Other abnormal glucose 2007     Sleep apnea 2/9/2011     Type 2 diabetes mellitus with neurological manifestations (HCC) 5/6/2015     Past Surgical History:   Procedure Laterality Date     C APPENDECTOMY  2002    done with hysterectomy     C EYE EXAM ESTABLISHED PT  2003     C GOETZ W/O FACETEC FORAMOT/DSKC 1/2 VRT SEG, LUMBAR  10/02/2000    Laminectomy, Lumbar     CHOLECYSTECTOMY  2002     HC DILATION/CURETTAGE DIAG/THER NON OB  1977    D & C     HYSTERECTOMY TOTAL ABDOMINAL, BILATERAL SALPINGO-OOPHORECTOMY, COMBINED  2002     REMV PILONIDAL LESION SIMPLE  age 17     TONSILLECTOMY, ADENOIDECTOMY, COMBINED  age 5     Current Outpatient Prescriptions   Medication Sig Dispense Refill     Prenatal Vit-Fe Fumarate-FA (PRENATAL MULTIVITAMIN PLUS IRON) 27-0.8 MG TABS per tablet Take 1 tablet by mouth daily 100 tablet 1     traZODone (DESYREL) 50 MG tablet Take 1 tablet (50 mg) by mouth At Bedtime 90 tablet 3     irbesartan-hydrochlorothiazide (AVALIDE) 150-12.5 MG per tablet Take 1 tablet by mouth daily 90 tablet 1     FLUoxetine (PROZAC) 40 MG capsule Take 1 capsule (40 mg) by mouth daily 90 capsule 3     omeprazole (PRILOSEC) 20 MG CR capsule Take 1 capsule (20 mg) by mouth daily 90  "capsule 3     insulin aspart (NOVOLOG) 100 UNITS/ML injection Inject 5 Units Subcutaneous       blood glucose monitoring (ACCU-CHEK KWASI) test strip Use to test blood sugars 3 times daily or as directed. 1 Box 3     latanoprost (XALATAN) 0.005 % ophthalmic solution INSTILL 1 DROP INTO THE LEFT EYE QHS  3     Modafinil (PROVIGIL PO)        LANTUS SOLOSTAR 100 UNIT/ML soln   1     atorvastatin (LIPITOR) 20 MG tablet   1     B-D U/F 31G X 8 MM insulin pen needle   1     ONETOUCH DELICA LANCETS 33G MISC   0     aluminum acetate (BUROW'S) SOLN Apply 3 mLs topically daily as needed 1 Bottle 1     Blood Glucose Calibration (ACCU-CHEK INSTANT CONTROL) LIQD Control for accu-check Kwasi 1 Bottle 1     timolol (TIMOPTIC) 0.25 % ophthalmic solution INT 1 GTT INTO THE OS QAM  1     OTC products: None, except as noted above    Allergies   Allergen Reactions     Lisinopril Cough      Latex Allergy: NO    Social History   Substance Use Topics     Smoking status: Never Smoker     Smokeless tobacco: Never Used     Alcohol use No     History   Drug Use No       REVIEW OF SYSTEMS:                                                    Constitutional, neuro, ENT, endocrine, pulmonary, cardiac, gastrointestinal, genitourinary, musculoskeletal, integument and psychiatric systems are negative, except as otherwise noted.      EXAM:                                                    /82 (BP Location: Right arm, Cuff Size: Adult Large)  Pulse 76  Temp 98.3  F (36.8  C) (Oral)  Resp 14  Ht 1.537 m (5' 0.5\")  Wt 83.5 kg (184 lb)  LMP 12/04/2003  SpO2 98%  BMI 35.34 kg/m2    GENERAL APPEARANCE: healthy, alert and no distress     EYES: EOMI, PERRL     HENT: ear canals and TM's normal and nose and mouth without ulcers or lesions     NECK: no adenopathy, no asymmetry, masses, or scars and thyroid normal to palpation     RESP: lungs clear to auscultation - no rales, rhonchi or wheezes     CV: regular rates and rhythm, normal S1 S2, no S3 " or S4 and no murmur, click or rub     ABDOMEN:  soft, nontender, no HSM or masses and bowel sounds normal     MS: extremities normal- no gross deformities noted, no evidence of inflammation in joints, FROM in all extremities.     SKIN: no suspicious lesions or rashes     NEURO: Normal strength and tone, sensory exam grossly normal, mentation intact and speech normal     PSYCH: mentation appears normal. and affect normal/bright     LYMPHATICS: No axillary, cervical, or supraclavicular nodes     BMI : Body mass index is 35.34 kg/(m^2).      DIAGNOSTICS:                                                    Hemoglobin A1C: 7.8  Potassium pending    See labs from [facility] pathology    Recent Labs   Lab Test  11/15/17   1303  08/08/17   1455  08/08/17   1443 06/20/17 05/29/17   1450  10/26/16   11/15/11   1125   HGB  11.8  Canceled, Test credited   --   12.7   --    < >  10.8*   < >   --    < >  11.4*   PLT  319   --   425*   --    < >  334   < >   --    < >  331   INR   --    --    --    --    --    --    --    --    --   0.96   NA   --   139   --    --    --   137   < >  138   < >  139   POTASSIUM   --   4.1   --    --    --   3.2*   < >  3.9   < >  3.2*   CR  0.72  0.73   --    --    --   0.60   < >  0.79   < >  0.68   A1C   --    --    --   9.1*   --    --    --   7.4*   < >   --     < > = values in this interval not displayed.        IMPRESSION:                                                    Diagnosis/reason for consult: (M17.12) Primary osteoarthritis of left knee  (primary encounter diagnosis)    (Z01.818) Pre-op exam      (D50.8) Other iron deficiency anemia    (I10) Essential hypertension, benign      (E11.49) Type 2 diabetes mellitus with neurological manifestations (HCC)  Plan: Hemoglobin A1c (BFP), VENOUS COLLECTION            Sleep apnea on CPAP        The proposed surgical procedure is considered INTERMEDIATE risk.    REVISED CARDIAC RISK INDEX  The patient has the following serious cardiovascular risks  for perioperative complications such as (MI, PE, VFib and 3  AV Block):  Diabetes Mellitus (on Insulin)  INTERPRETATION: 2 risks: Class III (moderate risk - 6.6% complication rate)    The patient has the following additional risks for perioperative complications:  No identified additional risks        RECOMMENDATIONS:                                                      --Consult hospital rounder / IM to assist post-op medical management    --Patient is to take all scheduled medications on the day of surgery EXCEPT for modifications listed below.    APPROVAL GIVEN to proceed with proposed procedure, without further diagnostic evaluation       Signed Electronically by: Estefania Emery MD    Copy of this evaluation report is provided to requesting physician.

## 2017-12-12 LAB — POTASSIUM SERPL-SCNC: 4.1 MMOL/L (ref 3.5–5.3)

## 2017-12-12 NOTE — PLAN OF CARE
Pt states her BS funs 300-400, in the afternoon. Pt will call her Endocrinologist to see if he will adjust her medication.

## 2017-12-13 ENCOUNTER — INFUSION THERAPY VISIT (OUTPATIENT)
Dept: INFUSION THERAPY | Facility: CLINIC | Age: 62
End: 2017-12-13
Attending: PATHOLOGY
Payer: COMMERCIAL

## 2017-12-13 VITALS
SYSTOLIC BLOOD PRESSURE: 151 MMHG | HEART RATE: 80 BPM | TEMPERATURE: 98.5 F | DIASTOLIC BLOOD PRESSURE: 76 MMHG | RESPIRATION RATE: 16 BRPM | OXYGEN SATURATION: 98 %

## 2017-12-13 DIAGNOSIS — D64.9 ANEMIA: Primary | ICD-10-CM

## 2017-12-13 DIAGNOSIS — T45.4X5A IRON ADVERSE REACTION: ICD-10-CM

## 2017-12-13 DIAGNOSIS — K90.9 INTESTINAL MALABSORPTION, UNSPECIFIED TYPE: ICD-10-CM

## 2017-12-13 PROCEDURE — 25000128 H RX IP 250 OP 636: Performed by: PATHOLOGY

## 2017-12-13 PROCEDURE — 96374 THER/PROPH/DIAG INJ IV PUSH: CPT

## 2017-12-13 RX ADMIN — FERRIC CARBOXYMALTOSE INJECTION 750 MG: 50 INJECTION, SOLUTION INTRAVENOUS at 13:00

## 2017-12-13 NOTE — MR AVS SNAPSHOT
"              After Visit Summary   12/13/2017    Kemi Casper    MRN: [id number]           Patient Information     Date Of Birth          1955        Visit Information        Provider Department      12/13/2017 12:30 PM RH INFUSION CHAIR 4 [facility]        Today's Diagnoses     Anemia    -  1    Intestinal malabsorption, unspecified type        Iron adverse reaction           Follow-ups after your visit        Your next 10 appointments already scheduled     Dec 15, 2017 12:30 PM CST   Level 1 with RH INFUSION CHAIR 3   [facility] ([facility])    [facility]  [address]  [address]  [facility] [id number] [phone]            Dec 18, 2017   Procedure with Tin Shetty MD   [facility] (--)    [address]  [facility] [id number] [phone]              Who to contact     If you have questions or need follow up information about today's clinic visit or your schedule please contact [facility] directly at [phone].  Normal or non-critical lab and imaging results will be communicated to you by Dextryshart, letter or phone within 4 business days after the clinic has received the results. If you do not hear from us within 7 days, please contact the clinic through [place] or phone. If you have a critical or abnormal lab result, we will notify you by phone as soon as possible.  Submit refill requests through [email or web address] or call your pharmacy and they will forward the refill request to us. Please allow 3 business days for your refill to be completed.          Additional Information About Your Visit        MyChart Information     [email or web address] lets you send messages to your doctor, view your test results, renew your prescriptions, schedule appointments and more. To sign up, go to [email or web address] . Click on \"Log in\" on " "the left side of the screen, which will take you to the Welcome page. Then click on \"Sign up Now\" on the right side of the page.     You will be asked to enter the access code listed below, as well as some personal information. Please follow the directions to create your username and password.     Your access code is: [email or web address]  Expires: 3/11/2018 11:37 AM     Your access code will  in 90 days. If you need help or a new code, please call your [facility] or [phone].        Care EveryWhere ID     This is your Care EveryWhere ID. This could be used by other organizations to access your [place] medical records  [id number]        Your Vitals Were     Pulse Temperature Respirations Last Period Pulse Oximetry       80 98.5  F (36.9  C) (Tympanic) 16 2003 98%        Blood Pressure from Last 3 Encounters:   17 151/76   17 132/82   17 144/90    Weight from Last 3 Encounters:   17 83.5 kg (184 lb)   17 85.8 kg (189 lb 3.2 oz)   17 86 kg (189 lb 9.6 oz)              Today, you had the following     No orders found for display       Primary Care Provider Office Phone # Fax #    Estefania Emery [phone] [address] [address]        Equal Access to Services     [facility] AH: Hadii jessica olmoso Sobhaskar, waaxda luvikaadaha, qaybta kaalmada eleni, june hernandez. So [facility] [phone].    ATENCIÓN: Si habla español, tiene a head disposición servicios gratuitos de asistencia lingüística. Llgia al [phone].    We comply with applicable federal civil rights laws and [place] laws. We do not discriminate on the basis of race, color, national origin, age, disability, sex, sexual orientation, or gender identity.            Thank you!     Thank you for choosing [facility]  for your care. Our goal is always to provide you with excellent care. Hearing back " from our patients is one way we can continue to improve our services. Please take a few minutes to complete the written survey that you may receive in the mail after your visit with us. Thank you!             Your Updated Medication List - Protect others around you: Learn how to safely use, store and throw away your medicines at www.disposemymeds.org.          This list is accurate as of: 12/13/17  2:31 PM.  Always use your most recent med list.                   Brand Name Dispense Instructions for use Diagnosis    ACCU-CHEK INSTANT CONTROL Liqd     1 Bottle    Control for accu-check Pily    Type II or unspecified type diabetes mellitus without mention of complication, not stated as uncontrolled       aluminum acetate Soln solution    BUROW'S    1 Bottle    Apply 3 mLs topically daily as needed    Perianal dermatitis       atorvastatin 20 MG tablet    LIPITOR          B-D U/F 31G X 8 MM   Generic drug:  insulin pen needle           blood glucose monitoring test strip    ACCU-CHEK PILY    1 Box    Use to test blood sugars 3 times daily or as directed.    Type 2 diabetes mellitus with diabetic neuropathy, with long-term current use of insulin (H)       FLUoxetine 40 MG capsule    PROzac    90 capsule    Take 1 capsule (40 mg) by mouth daily    Major depressive disorder, recurrent episode, in full remission (H), LUIS (generalized anxiety disorder)       insulin aspart 100 UNITS/ML injection    NovoLOG     Inject 5 Units Subcutaneous daily (with dinner)    Type 2 diabetes mellitus with neurological manifestations (H)       irbesartan-hydrochlorothiazide 150-12.5 MG per tablet    AVALIDE    90 tablet    Take 1 tablet by mouth daily    Essential hypertension, benign       LANTUS SOLOSTAR 100 UNIT/ML injection   Generic drug:  insulin glargine           latanoprost 0.005 % ophthalmic solution    XALATAN     INSTILL 1 DROP INTO THE LEFT EYE QHS        mupirocin 2 % nasal ointment    BACTROBAN     Apply 1 g into each nare  2 times daily Apply small amount to each nostril twice daily for seven days prior to surgery.        omeprazole 20 MG CR capsule    priLOSEC    90 capsule    Take 1 capsule (20 mg) by mouth daily    Gastroesophageal reflux disease without esophagitis       ONETOUCH DELICA LANCETS 33G Misc           prenatal multivitamin plus iron 27-0.8 MG Tabs per tablet     100 tablet    Take 1 tablet by mouth daily    Gastroesophageal reflux disease without esophagitis       PROVIGIL PO       Organic hypersomnia       timolol 0.25 % ophthalmic solution    TIMOPTIC     INT 1 GTT INTO THE OS QAM        traZODone 50 MG tablet    DESYREL    90 tablet    Take 1 tablet (50 mg) by mouth At Bedtime    Major depressive disorder, recurrent episode, in full remission (H), LUIS (generalized anxiety disorder)

## 2017-12-13 NOTE — PROGRESS NOTES
Infusion Nursing Note:  Kemi Casper presents today for Injectafer.    Patient seen by provider today: No   present during visit today: Not Applicable.    Note: Patient having knee surgery mid December.    Intravenous Access:  Peripheral IV placed.    Treatment Conditions:  Consent for Injectafer and consent for Procrit (patient receiving on Friday) both signed today and sent for scanning.      Post Infusion Assessment:  Patient tolerated infusion without incident.  Blood return noted pre and post infusion.  Site patent and intact, free from redness, edema or discomfort.  No evidence of extravasations.  Access discontinued per protocol.    Discharge Plan:   Discharge instructions reviewed with: Patient.  Patient and/or family verbalized understanding of discharge instructions and all questions answered.  Copy of AVS reviewed with patient and/or family.  Patient will return 12/15/17 for Procrit.  Patient discharged in stable condition accompanied by: self.  Departure Mode: Ambulatory.    Caro Randle RN

## 2017-12-14 NOTE — H&P (VIEW-ONLY)
[facility]  [address]  [address]  [facility] [address]  [phone]  Dept: [phone]    PRE-OP EVALUATION:  Today's date: 2017    Kemi Casper (: 1955) presents for pre-operative evaluation assessment as requested by Dr. Shetty.  She requires evaluation and anesthesia risk assessment prior to undergoing surgery/procedure for treatment of L knee .  Proposed procedure: L knee replacement     Date of Surgery/ Procedure: 17  Time of Surgery/ Procedure: AM  Hospital/Surgical Facility:   [facility]ax number for surgical [facility]  Primary Physician: Estefania Emery  Type of Anesthesia Anticipated: General    Patient has a Health Care Directive or Living Will:  NO    1. NO - Do you have a history of heart attack, stroke, stent, bypass or surgery on an artery in the head, neck, heart or legs?  2. NO - Do you ever have any pain or discomfort in your chest?  3. NO - Do you have a history of  Heart Failure?  4. NO - Are you troubled by shortness of breath when: walking on the level, up a slight hill or at night?  5. YES - DO YOU CURRENTLY HAVE A COLD, BRONCHITIS OR OTHER RESPIRATORY INFECTION? Cold symptoms  6. NO - Do you have a cough, shortness of breath or wheezing?  7. NO - Do you sometimes get pains in the calves of your legs when you walk?  8. NO - Do you or anyone in your family have previous history of blood clots?  9. NO - Do you or does anyone in your family have a serious bleeding problem such as prolonged bleeding following surgeries or cuts?  10. YES - HAVE YOU EVER HAD PROBLEMS WITH ANEMIA OR BEEN TOLD TO TAKE IRON PILLS? Having an iron transfusion before surgery  11. NO - Have you had any abnormal blood loss such as black, tarry or bloody stools, or abnormal vaginal bleeding?  12. NO - Have you ever had a blood transfusion?  13. NO - Have you or any of your relatives ever had problems with anesthesia?  14. YES - DO YOU HAVE SLEEP APNEA,  EXCESSIVE SNORING OR DAYTIME DROWSINESS? Uses CPAP  15. NO - Do you have any prosthetic heart valves?  16. NO - Do you have prosthetic joints?  17. NO - Is there any chance that you may be pregnant?    Poorly controlled diabetes/ DR Ayala regulates    HPI:                                                      Brief HPI related to upcoming procedure: left knee replacement      See problem list for active medical problems.  Problems all longstanding and stable, except as noted/documented.  See ROS for pertinent symptoms related to these conditions.                                                                                                  .    MEDICAL HISTORY:                                                    Patient Active Problem List    Diagnosis Date Noted     Anemia 11/30/2017     Priority: Medium     Intestinal malabsorption, unspecified type 11/30/2017     Priority: Medium     Iron adverse reaction 11/30/2017     Priority: Medium     Encounter for other procedures for purposes other than remedying health state (CODE) 11/30/2017     Priority: Medium     Mixed hyperlipidemia 07/22/2016     Priority: Medium     Obesity (BMI 30.0-34.9) 07/22/2016     Priority: Medium     Fibromyalgia 07/22/2016     Priority: Medium     Type 2 diabetes mellitus with neurological manifestations (HCC) 05/06/2015     Priority: Medium     Major depressive disorder, recurrent episode, in full remission (H) 04/20/2011     Priority: Medium     Sleep apnea 02/09/2011     Priority: Medium     Periodic limb movement disorder 02/09/2011     Priority: Medium     Essential hypertension, benign 07/02/2002     Priority: Medium     Health Care Home 02/20/2013     Priority: Low     State Tier Level:  Tier 2  Status:  na  Care Coordinator:      See Letters for [facility] Care Plan           ACP (advance care planning) 05/08/2012     Priority: Low     Advance Care Planning 9/1/2015: ACP Review and Resources Provided:  Reviewed chart for advance care  plan.  Kemi Casper has no plan or code status on file. Discussed available resources and provided with information. Confirmed code status reflects current choices pending further ACP discussions.  Confirmed/documented legally designated decision maker(s). Added by Jeniffer Nelson                Past Medical History:   Diagnosis Date     Depression      Diabetes mellitus      Essential hypertension, benign 7/2/2002     Fibromyalgia      GERD (gastroesophageal reflux disease)      Hyperlipidemia      Major depressive disorder, recurrent episode, in full remission (H) 4/20/2011     Mixed hyperlipidemia 7/22/2016     Need for prophylactic hormone replacement therapy (postmenopausal) 12/14/2005     Obesity (BMI 30.0-34.9) 7/22/2016     Other abnormal glucose 2007     Sleep apnea 2/9/2011     Type 2 diabetes mellitus with neurological manifestations (HCC) 5/6/2015     Past Surgical History:   Procedure Laterality Date     C APPENDECTOMY  2002    done with hysterectomy     C EYE EXAM ESTABLISHED PT  2003     C GOETZ W/O FACETEC FORAMOT/DSKC 1/2 VRT SEG, LUMBAR  10/02/2000    Laminectomy, Lumbar     CHOLECYSTECTOMY  2002     HC DILATION/CURETTAGE DIAG/THER NON OB  1977    D & C     HYSTERECTOMY TOTAL ABDOMINAL, BILATERAL SALPINGO-OOPHORECTOMY, COMBINED  2002     REMV PILONIDAL LESION SIMPLE  age 17     TONSILLECTOMY, ADENOIDECTOMY, COMBINED  age 5     Current Outpatient Prescriptions   Medication Sig Dispense Refill     Prenatal Vit-Fe Fumarate-FA (PRENATAL MULTIVITAMIN PLUS IRON) 27-0.8 MG TABS per tablet Take 1 tablet by mouth daily 100 tablet 1     traZODone (DESYREL) 50 MG tablet Take 1 tablet (50 mg) by mouth At Bedtime 90 tablet 3     irbesartan-hydrochlorothiazide (AVALIDE) 150-12.5 MG per tablet Take 1 tablet by mouth daily 90 tablet 1     FLUoxetine (PROZAC) 40 MG capsule Take 1 capsule (40 mg) by mouth daily 90 capsule 3     omeprazole (PRILOSEC) 20 MG CR capsule Take 1 capsule (20 mg) by mouth daily 90  "capsule 3     insulin aspart (NOVOLOG) 100 UNITS/ML injection Inject 5 Units Subcutaneous       blood glucose monitoring (ACCU-CHEK KWASI) test strip Use to test blood sugars 3 times daily or as directed. 1 Box 3     latanoprost (XALATAN) 0.005 % ophthalmic solution INSTILL 1 DROP INTO THE LEFT EYE QHS  3     Modafinil (PROVIGIL PO)        LANTUS SOLOSTAR 100 UNIT/ML soln   1     atorvastatin (LIPITOR) 20 MG tablet   1     B-D U/F 31G X 8 MM insulin pen needle   1     ONETOUCH DELICA LANCETS 33G MISC   0     aluminum acetate (BUROW'S) SOLN Apply 3 mLs topically daily as needed 1 Bottle 1     Blood Glucose Calibration (ACCU-CHEK INSTANT CONTROL) LIQD Control for accu-check Kwasi 1 Bottle 1     timolol (TIMOPTIC) 0.25 % ophthalmic solution INT 1 GTT INTO THE OS QAM  1     OTC products: None, except as noted above    Allergies   Allergen Reactions     Lisinopril Cough      Latex Allergy: NO    Social History   Substance Use Topics     Smoking status: Never Smoker     Smokeless tobacco: Never Used     Alcohol use No     History   Drug Use No       REVIEW OF SYSTEMS:                                                    Constitutional, neuro, ENT, endocrine, pulmonary, cardiac, gastrointestinal, genitourinary, musculoskeletal, integument and psychiatric systems are negative, except as otherwise noted.      EXAM:                                                    /82 (BP Location: Right arm, Cuff Size: Adult Large)  Pulse 76  Temp 98.3  F (36.8  C) (Oral)  Resp 14  Ht 1.537 m (5' 0.5\")  Wt 83.5 kg (184 lb)  LMP 12/04/2003  SpO2 98%  BMI 35.34 kg/m2    GENERAL APPEARANCE: healthy, alert and no distress     EYES: EOMI, PERRL     HENT: ear canals and TM's normal and nose and mouth without ulcers or lesions     NECK: no adenopathy, no asymmetry, masses, or scars and thyroid normal to palpation     RESP: lungs clear to auscultation - no rales, rhonchi or wheezes     CV: regular rates and rhythm, normal S1 S2, no S3 " or S4 and no murmur, click or rub     ABDOMEN:  soft, nontender, no HSM or masses and bowel sounds normal     MS: extremities normal- no gross deformities noted, no evidence of inflammation in joints, FROM in all extremities.     SKIN: no suspicious lesions or rashes     NEURO: Normal strength and tone, sensory exam grossly normal, mentation intact and speech normal     PSYCH: mentation appears normal. and affect normal/bright     LYMPHATICS: No axillary, cervical, or supraclavicular nodes     BMI : Body mass index is 35.34 kg/(m^2).      DIAGNOSTICS:                                                    Hemoglobin A1C: 7.8  Potassium pending    See labs from [facility] pathology    Recent Labs   Lab Test  11/15/17   1303  08/08/17   1455  08/08/17   1443 06/20/17 05/29/17   1450  10/26/16   11/15/11   1125   HGB  11.8  Canceled, Test credited   --   12.7   --    < >  10.8*   < >   --    < >  11.4*   PLT  319   --   425*   --    < >  334   < >   --    < >  331   INR   --    --    --    --    --    --    --    --    --   0.96   NA   --   139   --    --    --   137   < >  138   < >  139   POTASSIUM   --   4.1   --    --    --   3.2*   < >  3.9   < >  3.2*   CR  0.72  0.73   --    --    --   0.60   < >  0.79   < >  0.68   A1C   --    --    --   9.1*   --    --    --   7.4*   < >   --     < > = values in this interval not displayed.        IMPRESSION:                                                    Diagnosis/reason for consult: (M17.12) Primary osteoarthritis of left knee  (primary encounter diagnosis)    (Z01.818) Pre-op exam      (D50.8) Other iron deficiency anemia    (I10) Essential hypertension, benign      (E11.49) Type 2 diabetes mellitus with neurological manifestations (HCC)  Plan: Hemoglobin A1c (BFP), VENOUS COLLECTION            Sleep apnea on CPAP        The proposed surgical procedure is considered INTERMEDIATE risk.    REVISED CARDIAC RISK INDEX  The patient has the following serious cardiovascular risks  for perioperative complications such as (MI, PE, VFib and 3  AV Block):  Diabetes Mellitus (on Insulin)  INTERPRETATION: 2 risks: Class III (moderate risk - 6.6% complication rate)    The patient has the following additional risks for perioperative complications:  No identified additional risks        RECOMMENDATIONS:                                                      --Consult hospital rounder / IM to assist post-op medical management    --Patient is to take all scheduled medications on the day of surgery EXCEPT for modifications listed below.    APPROVAL GIVEN to proceed with proposed procedure, without further diagnostic evaluation       Signed Electronically by: Estefania Emery MD    Copy of this evaluation report is provided to requesting physician.

## 2017-12-15 ENCOUNTER — TRANSFERRED RECORDS (OUTPATIENT)
Dept: HEALTH INFORMATION MANAGEMENT | Facility: CLINIC | Age: 62
End: 2017-12-15

## 2017-12-15 ENCOUNTER — INFUSION THERAPY VISIT (OUTPATIENT)
Dept: INFUSION THERAPY | Facility: CLINIC | Age: 62
End: 2017-12-15
Attending: PATHOLOGY
Payer: COMMERCIAL

## 2017-12-15 VITALS
RESPIRATION RATE: 16 BRPM | TEMPERATURE: 98.6 F | HEART RATE: 99 BPM | SYSTOLIC BLOOD PRESSURE: 161 MMHG | DIASTOLIC BLOOD PRESSURE: 87 MMHG

## 2017-12-15 DIAGNOSIS — Z41.8 ENCOUNTER FOR OTHER PROCEDURES FOR PURPOSES OTHER THAN REMEDYING HEALTH STATE (CODE): ICD-10-CM

## 2017-12-15 DIAGNOSIS — D64.9 ANEMIA: Primary | ICD-10-CM

## 2017-12-15 PROCEDURE — 25000128 H RX IP 250 OP 636: Performed by: PATHOLOGY

## 2017-12-15 PROCEDURE — 96374 THER/PROPH/DIAG INJ IV PUSH: CPT

## 2017-12-15 RX ADMIN — ERYTHROPOIETIN 40000 UNITS: 40000 INJECTION, SOLUTION INTRAVENOUS; SUBCUTANEOUS at 12:44

## 2017-12-15 NOTE — MR AVS SNAPSHOT
"              After Visit Summary   12/15/2017    Kemi Casper    MRN: [id number]           Patient Information     Date Of Birth          1955        Visit Information        Provider Department      12/15/2017 12:30 PM [facility] INFUSION CHAIR 3 [facility]        Today's Diagnoses     Anemia    -  1    Encounter for other procedures for purposes other than remedying health state (CODE)           Follow-ups after your visit        Your next 10 appointments already scheduled     Dec 18, 2017   Procedure with Tin Shetty MD   [facility] PeriOp Services (--)    [address]  [facility] [id number] [phone]              Who to contact     If you have questions or need follow up information about today's clinic visit or your schedule please contact [facility] INFUSION [facility] directly at [phone].  Normal or non-critical lab and imaging results will be communicated to you by DeliveryEdgehart, letter or phone within 4 business days after the clinic has received the results. If you do not hear from us within 7 days, please contact the clinic through MyChart or phone. If you have a critical or abnormal lab result, we will notify you by phone as soon as possible.  Submit refill requests through Filtrbox or call your pharmacy and they will forward the refill request to us. Please allow 3 business days for your refill to be completed.          Additional Information About Your Visit        MyChart Information     Filtrbox lets you send messages to your doctor, view your test results, renew your prescriptions, schedule appointments and more. To sign up, go to [email or web address] . Click on \"Log in\" on the left side of the screen, which will take you to the Welcome page. Then click on \"Sign up Now\" on the right side of the page.     You will be asked to enter the access code listed below, as well as some personal information. Please " follow the directions to create your username and password.     Your access code is: [email or web address]  Expires: 3/11/2018 11:37 AM     Your access code will  in 90 days. If you need help or a new code, please call your [facility] clinic or [phone].        Care EveryWhere ID     This is your Care EveryWhere ID. This could be used by other organizations to access your [facility] medical records  [id number]        Your Vitals Were     Pulse Temperature Respirations Last Period          99 98.6  F (37  C) (Tympanic) 16 2003         Blood Pressure from Last 3 Encounters:   12/15/17 161/87   17 151/76   17 132/82    Weight from Last 3 Encounters:   17 83.5 kg (184 lb)   17 85.8 kg (189 lb 3.2 oz)   17 86 kg (189 lb 9.6 oz)              Today, you had the following     No orders found for display       Primary Care Provider Office Phone # Fax #    Estefania Emery [phone]       [address] [address]        Equal Access to Services     [facility]IO [facility] : Hadii jessica ratliff hadyneso Sojohnnyali, waaxda luqadaha, qaybta kaalmada adeegyada, june sauceda . So [facility] [phone].    ATENCIÓN: Si habla español, tiene a head disposición servicios gratuitos de asistencia lingüística. Izabelaame al [phone].    We comply with applicable federal civil rights laws and [place] laws. We do not discriminate on the basis of race, color, national origin, age, disability, sex, sexual orientation, or gender identity.            Thank you!     Thank you for choosing [facility] INF[facility]ION [facility]  for your care. Our goal is always to provide you with excellent care. Hearing back from our patients is one way we can continue to improve our services. Please take a few minutes to complete the written survey that you may receive in the mail after your visit with us. Thank you!             Your Updated Medication List - Protect  others around you: Learn how to safely use, store and throw away your medicines at www.disposemymeds.org.          This list is accurate as of: 12/15/17  2:35 PM.  Always use your most recent med list.                   Brand Name Dispense Instructions for use Diagnosis    ACCU-CHEK INSTANT CONTROL Liqd     1 Bottle    Control for accu-check Pily    Type II or unspecified type diabetes mellitus without mention of complication, not stated as uncontrolled       aluminum acetate Soln solution    BUROW'S    1 Bottle    Apply 3 mLs topically daily as needed    Perianal dermatitis       atorvastatin 20 MG tablet    LIPITOR          B-D U/F 31G X 8 MM   Generic drug:  insulin pen needle           blood glucose monitoring test strip    ACCU-CHEK PILY    1 Box    Use to test blood sugars 3 times daily or as directed.    Type 2 diabetes mellitus with diabetic neuropathy, with long-term current use of insulin (H)       FLUoxetine 40 MG capsule    PROzac    90 capsule    Take 1 capsule (40 mg) by mouth daily    Major depressive disorder, recurrent episode, in full remission (H), LUIS (generalized anxiety disorder)       insulin aspart 100 UNITS/ML injection    NovoLOG     Inject 5 Units Subcutaneous daily (with dinner)    Type 2 diabetes mellitus with neurological manifestations (H)       irbesartan-hydrochlorothiazide 150-12.5 MG per tablet    AVALIDE    90 tablet    Take 1 tablet by mouth daily    Essential hypertension, benign       LANTUS SOLOSTAR 100 UNIT/ML injection   Generic drug:  insulin glargine           latanoprost 0.005 % ophthalmic solution    XALATAN     INSTILL 1 DROP INTO THE LEFT EYE QHS        mupirocin 2 % nasal ointment    BACTROBAN     Apply 1 g into each nare 2 times daily Apply small amount to each nostril twice daily for seven days prior to surgery.        omeprazole 20 MG CR capsule    priLOSEC    90 capsule    Take 1 capsule (20 mg) by mouth daily    Gastroesophageal reflux disease without  esophagitis       ONETOUCH DELICA LANCETS 33G Misc           prenatal multivitamin plus iron 27-0.8 MG Tabs per tablet     100 tablet    Take 1 tablet by mouth daily    Gastroesophageal reflux disease without esophagitis       PROVIGIL PO       Organic hypersomnia       timolol 0.25 % ophthalmic solution    TIMOPTIC     INT 1 GTT INTO THE OS QAM        traZODone 50 MG tablet    DESYREL    90 tablet    Take 1 tablet (50 mg) by mouth At Bedtime    Major depressive disorder, recurrent episode, in full remission (H), LUIS (generalized anxiety disorder)

## 2017-12-15 NOTE — PROGRESS NOTES
Infusion Nursing Note:  Kemi Casper presents today for Procrit.    Patient seen by provider today: No   present during visit today: Not Applicable.    Note: Patient understands why she is getting procrit and signed consent the same day as her iron infusion this week.    Intravenous Access:  No Intravenous access/labs at this visit.    Treatment Conditions:  Not Applicable.      Post Infusion Assessment:  Patient tolerated injection without incident.    Discharge Plan:   Patient discharged in stable condition accompanied by: self.  Departure Mode: Ambulatory.    Tasneem Staley RN

## 2017-12-16 RX ORDER — ACETAMINOPHEN 500 MG
1000 TABLET ORAL EVERY 6 HOURS PRN
Status: ON HOLD | COMMUNITY
End: 2018-04-11

## 2017-12-16 RX ORDER — LATANOPROST 50 UG/ML
1 SOLUTION/ DROPS OPHTHALMIC AT BEDTIME
COMMUNITY

## 2017-12-16 RX ORDER — TIMOLOL MALEATE 2.5 MG/ML
1 SOLUTION/ DROPS OPHTHALMIC EVERY MORNING
COMMUNITY
End: 2020-12-17

## 2017-12-17 NOTE — PROGRESS NOTES
Pre-Surgery Review for Left Total Knee Arthroplasty on 12/18/2017:   Patient flagged with the following risk factors:   - Current Upper Respiratory Infection: No cough, fevers, chills, wheezing or SOB. Cleared by PCP for surgery but should return if symptoms do not improve or worsen in the next few days.    - Type 2 Diabetes Mellitus with Neurological Manifestations on Insulin: A1C 7.8 on 12/11/17 but BG s reportedly out of control. (PCP notes poorly controlled diabetes- [facility] with [facility]. [facility]ala [facility]")- per pre-admitting nurse call 12/11/17: pt reports BG s in range of 300-400 in afternoon. Has Endocrinology visit on 12/15/17. Have asked them to fax us their most recent visit note when available. On Novolog and Lantus insulin.  Per MDA: ok to proceed with surgery but will need blood glucose check here on AM of surgery.   - Anemia: History of Iron Deficiency Anemia: Hgb 11.8 on 11/15/17- Patient being treated with IV iron and EPO injections on 12/13 and 12/15/17 by Patient [facility] [place].   - Sleep Apnea and Periodic Limb Movement Disorder: Uses CPAP. Reminded to bring to hospital. On modafinil (Provigil).   - Depression: on fluoxetine.   Assessment/Plan:  Risk Factors identified above. Cleared by PCP and MDA for surgery but will need blood glucose checked AM of surgery. Recommend Hospitalist consult to assist with medical management. Recommend close monitoring of respiratory status and encouraging IS use given recent upper respiratory infection. Recommend more frequent blood glucose checks and avoiding IV fluids containing dextrose in arely-op period given diabetes with reportedly uncontrolled BG s in the afternoons (300-400 per patient report). Will need Insulin resumed day of surgery. Goal BG <200 to decrease risk for infection/wounding healing complications. RN should notify Hospitalist if BG >200. Monitor post-op Hgb. Encourage use of CPAP. Continue fluoxetine for depression.

## 2017-12-17 NOTE — PHARMACY-ADMISSION MEDICATION HISTORY
Admission medication history interview status for this patient is complete. See [facility] admission navigator for allergy information, prior to admission medications and immunization status.     Medication history interview source(s):Patient  Medication history resources (including written lists, pill bottles, clinic record):-  Primary pharmacy:-    Changes made to PTA medication list:  Added: Sliding scale novolog, turmeric  Deleted: None  Changed: Added doses to atorvastatin, modafanil    Actions taken by pharmacist (provider contacted, etc):Called patient for medication history    Additional medication history information:None    Medication reconciliation/reorder completed by provider prior to medication history? N/A    Do you take OTC medications (eg tylenol, ibuprofen, fish oil, eye/ear drops, etc)? Y    Prior to Admission medications    Medication Sig Last Dose Taking? Auth Provider   insulin aspart (NOVOLOG FLEXPEN) 100 UNIT/ML injection Inject Subcutaneous 3 times daily (with meals) Per sliding scale:   150-199 = 1 unit  200-250 = 2 units  251-300 = 3 units  301-350 = 4 units  351-400 = 5 units    > 400 = 6 units  Yes Unknown, Entered By History   insulin aspart (NOVOLOG FLEXPEN) 100 UNIT/ML injection Inject Subcutaneous At Bedtime Per sliding scale:  200-250 = 1 unit  251-300 = 2 unit  300-350 = 3 unit  351-400 = 4 units   > 400 = 5 units  Yes Unknown, Entered By History   Misc Natural Products (TURMERIC CURCUMIN) CAPS Take 1 capsule by mouth 2 times daily  Yes Unknown, Entered By History   acetaminophen (TYLENOL) 500 MG tablet Take 1,000 mg by mouth every 6 hours as needed for mild pain every four to six hours  Yes Unknown, Entered By History   latanoprost (XALATAN) 0.005 % ophthalmic solution Place 1 drop Into the left eye At Bedtime  Yes Unknown, Entered By History   timolol (TIMOPTIC) 0.25 % ophthalmic solution Place 1 drop Into the left eye every morning  Yes Unknown, Entered By History   mupirocin  (BACTROBAN) 2 % nasal ointment Apply 1 g into each nare 2 times daily Apply small amount to each nostril twice daily for seven days prior to surgery.  Yes Reported, Patient   Prenatal Vit-Fe Fumarate-FA (PRENATAL MULTIVITAMIN PLUS IRON) 27-0.8 MG TABS per tablet Take 1 tablet by mouth daily  Yes Estefania Emery MD   traZODone (DESYREL) 50 MG tablet Take 1 tablet (50 mg) by mouth At Bedtime  Yes Estefania Emery MD   irbesartan-hydrochlorothiazide (AVALIDE) 150-12.5 MG per tablet Take 1 tablet by mouth daily  Yes Estefania Emery MD   FLUoxetine (PROZAC) 40 MG capsule Take 1 capsule (40 mg) by mouth daily  Yes Estefania Emery MD   omeprazole (PRILOSEC) 20 MG CR capsule Take 1 capsule (20 mg) by mouth daily  Yes Estefania Emery MD   Modafinil (PROVIGIL PO) Take 200 mg by mouth every morning   Yes Reported, Patient   atorvastatin (LIPITOR) 20 MG tablet Take 20 mg by mouth daily   Yes Reported, Patient   aluminum acetate (BUROW'S) SOLN Apply 3 mLs topically daily as needed  Yes Estefania Emery MD   insulin aspart (NOVOLOG) 100 UNITS/ML injection Inject 3 Units Subcutaneous 3 times daily (with meals)    Reported, Patient   blood glucose monitoring (ACCU-CHEK PILY) test strip Use to test blood sugars 3 times daily or as directed.   Cass Cummings PA-C   LANTUS SOLOSTAR 100 UNIT/ML soln Inject 26 Units Subcutaneous At Bedtime    Reported, Patient   B-D U/F 31G X 8 MM insulin pen needle    Reported, Patient   ONETOUCH DELICA LANCETS 33G MISC    Reported, Patient   Blood Glucose Calibration (ACCU-CHEK INSTANT CONTROL) LIQD Control for accu-check Pily   Estefania Emery MD

## 2017-12-18 ENCOUNTER — SURGERY (OUTPATIENT)
Age: 62
End: 2017-12-18

## 2017-12-18 ENCOUNTER — HOSPITAL ENCOUNTER (OUTPATIENT)
Facility: CLINIC | Age: 62
Discharge: HOME | End: 2017-12-18
Attending: ORTHOPAEDIC SURGERY | Admitting: ORTHOPAEDIC SURGERY
Payer: COMMERCIAL

## 2017-12-18 VITALS
RESPIRATION RATE: 16 BRPM | DIASTOLIC BLOOD PRESSURE: 89 MMHG | HEIGHT: 61 IN | OXYGEN SATURATION: 94 % | BODY MASS INDEX: 34.55 KG/M2 | WEIGHT: 183 LBS | TEMPERATURE: 99.4 F | SYSTOLIC BLOOD PRESSURE: 160 MMHG

## 2017-12-18 PROCEDURE — 86900 BLOOD TYPING SEROLOGIC ABO: CPT | Performed by: ANESTHESIOLOGY

## 2017-12-18 PROCEDURE — 27210794 ZZH OR GENERAL SUPPLY STERILE: Performed by: ORTHOPAEDIC SURGERY

## 2017-12-18 PROCEDURE — 40000306 ZZH STATISTIC PRE PROC ASSESS II: Performed by: ORTHOPAEDIC SURGERY

## 2017-12-18 PROCEDURE — 40000879 ZZH CANCELLED SURGERY UP TO 16-30 MINS: Performed by: ORTHOPAEDIC SURGERY

## 2017-12-18 PROCEDURE — 25000132 ZZH RX MED GY IP 250 OP 250 PS 637: Performed by: PHYSICIAN ASSISTANT

## 2017-12-18 RX ORDER — DIPHENHYDRAMINE HYDROCHLORIDE 50 MG/ML
25 INJECTION INTRAMUSCULAR; INTRAVENOUS EVERY 6 HOURS PRN
Status: CANCELLED | OUTPATIENT
Start: 2017-12-18

## 2017-12-18 RX ORDER — NALOXONE HYDROCHLORIDE 0.4 MG/ML
.1-.4 INJECTION, SOLUTION INTRAMUSCULAR; INTRAVENOUS; SUBCUTANEOUS
Status: CANCELLED | OUTPATIENT
Start: 2017-12-18

## 2017-12-18 RX ORDER — DIPHENHYDRAMINE HCL 25 MG
25 CAPSULE ORAL EVERY 6 HOURS PRN
Status: CANCELLED | OUTPATIENT
Start: 2017-12-18

## 2017-12-18 RX ORDER — SODIUM CHLORIDE, SODIUM LACTATE, POTASSIUM CHLORIDE, CALCIUM CHLORIDE 600; 310; 30; 20 MG/100ML; MG/100ML; MG/100ML; MG/100ML
INJECTION, SOLUTION INTRAVENOUS CONTINUOUS
Status: DISCONTINUED | OUTPATIENT
Start: 2017-12-18 | End: 2017-12-18 | Stop reason: HOSPADM

## 2017-12-18 RX ORDER — GABAPENTIN 300 MG/1
300 CAPSULE ORAL
Status: DISCONTINUED | OUTPATIENT
Start: 2017-12-18 | End: 2017-12-18 | Stop reason: HOSPADM

## 2017-12-18 RX ORDER — CEFAZOLIN SODIUM 2 G/100ML
2 INJECTION, SOLUTION INTRAVENOUS EVERY 8 HOURS
Status: CANCELLED | OUTPATIENT
Start: 2017-12-18 | End: 2017-12-18

## 2017-12-18 RX ORDER — CELECOXIB 200 MG/1
400 CAPSULE ORAL ONCE
Status: COMPLETED | OUTPATIENT
Start: 2017-12-18 | End: 2017-12-18

## 2017-12-18 RX ORDER — ACETAMINOPHEN 325 MG/1
975 TABLET ORAL EVERY 8 HOURS
Status: CANCELLED | OUTPATIENT
Start: 2017-12-18 | End: 2017-12-21

## 2017-12-18 RX ORDER — ONDANSETRON 4 MG/1
4 TABLET, ORALLY DISINTEGRATING ORAL EVERY 6 HOURS PRN
Status: CANCELLED | OUTPATIENT
Start: 2017-12-18

## 2017-12-18 RX ORDER — AMOXICILLIN 250 MG
1-2 CAPSULE ORAL 2 TIMES DAILY
Status: CANCELLED | OUTPATIENT
Start: 2017-12-18

## 2017-12-18 RX ORDER — CEFAZOLIN SODIUM 2 G/100ML
2 INJECTION, SOLUTION INTRAVENOUS
Status: DISCONTINUED | OUTPATIENT
Start: 2017-12-18 | End: 2017-12-18 | Stop reason: HOSPADM

## 2017-12-18 RX ORDER — FERROUS GLUCONATE 324(38)MG
324 TABLET ORAL DAILY
Status: CANCELLED | OUTPATIENT
Start: 2017-12-18

## 2017-12-18 RX ORDER — CEFAZOLIN SODIUM 1 G/3ML
1 INJECTION, POWDER, FOR SOLUTION INTRAMUSCULAR; INTRAVENOUS SEE ADMIN INSTRUCTIONS
Status: DISCONTINUED | OUTPATIENT
Start: 2017-12-18 | End: 2017-12-18 | Stop reason: HOSPADM

## 2017-12-18 RX ORDER — OXYCODONE HYDROCHLORIDE 5 MG/1
5-10 TABLET ORAL
Status: CANCELLED | OUTPATIENT
Start: 2017-12-18

## 2017-12-18 RX ORDER — LIDOCAINE 40 MG/G
CREAM TOPICAL
Status: DISCONTINUED | OUTPATIENT
Start: 2017-12-18 | End: 2017-12-18 | Stop reason: HOSPADM

## 2017-12-18 RX ORDER — ACETAMINOPHEN 325 MG/1
650 TABLET ORAL EVERY 4 HOURS PRN
Status: CANCELLED | OUTPATIENT
Start: 2017-12-21

## 2017-12-18 RX ORDER — CELECOXIB 200 MG/1
200 CAPSULE ORAL 2 TIMES DAILY
Status: CANCELLED | OUTPATIENT
Start: 2017-12-18 | End: 2017-12-20

## 2017-12-18 RX ORDER — PROCHLORPERAZINE MALEATE 10 MG
10 TABLET ORAL EVERY 6 HOURS PRN
Status: CANCELLED | OUTPATIENT
Start: 2017-12-18

## 2017-12-18 RX ORDER — OXYCODONE HCL 10 MG/1
10 TABLET, FILM COATED, EXTENDED RELEASE ORAL EVERY 12 HOURS
Status: CANCELLED | OUTPATIENT
Start: 2017-12-18 | End: 2017-12-20

## 2017-12-18 RX ORDER — OXYCODONE HCL 10 MG/1
10 TABLET, FILM COATED, EXTENDED RELEASE ORAL ONCE
Status: COMPLETED | OUTPATIENT
Start: 2017-12-18 | End: 2017-12-18

## 2017-12-18 RX ORDER — ONDANSETRON 2 MG/ML
4 INJECTION INTRAMUSCULAR; INTRAVENOUS EVERY 6 HOURS PRN
Status: CANCELLED | OUTPATIENT
Start: 2017-12-18

## 2017-12-18 RX ORDER — LIDOCAINE 40 MG/G
CREAM TOPICAL
Status: CANCELLED | OUTPATIENT
Start: 2017-12-18

## 2017-12-18 RX ADMIN — OXYCODONE HYDROCHLORIDE 10 MG: 10 TABLET, FILM COATED, EXTENDED RELEASE ORAL at 08:16

## 2017-12-18 RX ADMIN — CELECOXIB 400 MG: 200 CAPSULE ORAL at 08:15

## 2017-12-18 NOTE — BRIEF OP NOTE
[facility] Brief Operative Note    Pre-operative diagnosis: left knee degenerative joint disease   Post-operative diagnosis same   Procedure: Procedure(s):   left total knee arthroplasty    surgeon request adductor canal block - Wound Class: I-Clean   Surgeon(s): Surgeon(s) and Role:     * Tin Shetty MD - Primary   Estimated blood loss: * No values recorded between 12/18/2017 12:00 AM and 12/18/2017  7:04 AM *    Specimens: * No specimens in log *   Findings: No anticipated complications

## 2017-12-18 NOTE — OR NURSING
"Patient stating \"I feel just awful, I have had this cold going on 3 weeks, I have a headache and am so tired\".  Temp initially 100.4 temporal then rechecked at 100.7, approx. 20 min later took again to recomfirm and it was 99.4.  MDA and surgeon notified about patient status.  Surgery cancelled due to infection risk and to give pt a little more time to recover from this cold.  OR notified about cancellation.   "

## 2017-12-21 ENCOUNTER — TELEPHONE (OUTPATIENT)
Dept: FAMILY MEDICINE | Facility: CLINIC | Age: 62
End: 2017-12-21

## 2017-12-21 NOTE — TELEPHONE ENCOUNTER
I am not sure what she is to do. Probably first reschedule her surgery with the orthopedic specialists. Second ask the facility how long her current pre-op will satisfy their requirements.  If needed , reschedule a pre-op?

## 2017-12-21 NOTE — TELEPHONE ENCOUNTER
Aminata called to say her surgery was cancelled as she had a temperature and would like to discuss what to do now per message    Please call [phone]

## 2017-12-21 NOTE — TELEPHONE ENCOUNTER
Spoke to Aminata - she has already re-scheduled the surgery for the 29th and the pre-op will be good as they stated within 30 days.  Her main reason for wanting to talk with you was to let you know that she was sick when she came in for the pre-op and that she told you she was sick, but you still signed off on her pre-op so she was upset about that.  I explained that she did not have a fever and her lungs were clear so she was ok for surgery, and then when she went to surgery, she had a fever so they cancelled it.  She says that she has a runny nose, HA and terrible cough with wheezing which she says is coming from her trachea which she said she told you but you didn't listen in her neck, you only listened to her lungs.  She would like you to call a prescription in to the [facility]'s pharmacy for her cough.  I explained that she would probably need to be seen again but she wanted me to let you know first.

## 2017-12-22 ENCOUNTER — OFFICE VISIT (OUTPATIENT)
Dept: FAMILY MEDICINE | Facility: CLINIC | Age: 62
End: 2017-12-22

## 2017-12-22 VITALS
HEIGHT: 61 IN | SYSTOLIC BLOOD PRESSURE: 164 MMHG | WEIGHT: 183 LBS | OXYGEN SATURATION: 95 % | DIASTOLIC BLOOD PRESSURE: 92 MMHG | BODY MASS INDEX: 34.55 KG/M2 | HEART RATE: 96 BPM | RESPIRATION RATE: 22 BRPM | TEMPERATURE: 98.3 F

## 2017-12-22 DIAGNOSIS — R50.9 FEVER, UNSPECIFIED FEVER CAUSE: ICD-10-CM

## 2017-12-22 DIAGNOSIS — R05.9 COUGH: Primary | ICD-10-CM

## 2017-12-22 LAB
ERYTHROCYTE [DISTWIDTH] IN BLOOD BY AUTOMATED COUNT: 12.4 %
HCT VFR BLD AUTO: 41.5 % (ref 35–47)
HEMOGLOBIN: 13.4 G/DL (ref 11.7–15.7)
MCH RBC QN AUTO: 31.1 PG (ref 26–33)
MCHC RBC AUTO-ENTMCNC: 32.3 G/DL (ref 31–36)
MCV RBC AUTO: 96.3 FL (ref 78–100)
PLATELET COUNT - QUEST: 418 10^9/L (ref 150–375)
RBC # BLD AUTO: 4.31 10*12/L (ref 3.8–5.2)
WBC # BLD AUTO: 8 10*9/L (ref 4–11)

## 2017-12-22 PROCEDURE — 85027 COMPLETE CBC AUTOMATED: CPT | Performed by: FAMILY MEDICINE

## 2017-12-22 PROCEDURE — 71020 XR CHEST 2 VW: CPT | Performed by: FAMILY MEDICINE

## 2017-12-22 PROCEDURE — 36415 COLL VENOUS BLD VENIPUNCTURE: CPT | Performed by: FAMILY MEDICINE

## 2017-12-22 PROCEDURE — 99214 OFFICE O/P EST MOD 30 MIN: CPT | Performed by: FAMILY MEDICINE

## 2017-12-22 RX ORDER — AZITHROMYCIN 250 MG/1
TABLET, FILM COATED ORAL
Qty: 6 TABLET | Refills: 0 | Status: SHIPPED | OUTPATIENT
Start: 2017-12-22 | End: 2018-03-20

## 2017-12-22 RX ORDER — CODEINE PHOSPHATE AND GUAIFENESIN 10; 100 MG/5ML; MG/5ML
1 SOLUTION ORAL EVERY 4 HOURS PRN
Qty: 240 ML | Refills: 0 | Status: SHIPPED | OUTPATIENT
Start: 2017-12-22 | End: 2018-03-20

## 2017-12-22 NOTE — PROGRESS NOTES
SUBJECTIVE:  62 year old female who was scheduled for total knee replacement,  presents to the clinic for evaluation of low grade fever and cough- surgery cancelled.    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    Patient Active Problem List   Diagnosis     Essential hypertension, benign     Sleep apnea     Periodic limb movement disorder     Major depressive disorder, recurrent episode, in full remission (H)     ACP (advance care planning)     Health Care Home     Type 2 diabetes mellitus with neurological manifestations (HCC)     Mixed hyperlipidemia     Obesity (BMI 30.0-34.9)     Fibromyalgia     Anemia     Intestinal malabsorption, unspecified type     Iron adverse reaction     Encounter for other procedures for purposes other than remedying health state (CODE)     Past Surgical History:   Procedure Laterality Date     ARTHROSCOPY KNEE Left 07/26/2016     C APPENDECTOMY  2002    done with hysterectomy     C EYE EXAM ESTABLISHED PT  2003     C GOETZ W/O FACETEC FORAMOT/DSKC 1/2 VRT SEG, LUMBAR  10/02/2000    Laminectomy, Lumbar     CHOLECYSTECTOMY  2002     HC DILATION/CURETTAGE DIAG/THER NON OB  1977    D & C     HYSTERECTOMY TOTAL ABDOMINAL, BILATERAL SALPINGO-OOPHORECTOMY, COMBINED  2002     REMV PILONIDAL LESION SIMPLE  age 17     TONSILLECTOMY, ADENOIDECTOMY, COMBINED  age 5     Current Outpatient Prescriptions   Medication     azithromycin (ZITHROMAX) 250 MG tablet     guaiFENesin-codeine (ROBITUSSIN AC) 100-10 MG/5ML SOLN solution     insulin aspart (NOVOLOG FLEXPEN) 100 UNIT/ML injection     insulin aspart (NOVOLOG FLEXPEN) 100 UNIT/ML injection      "Misc Natural Products (TURMERIC CURCUMIN) CAPS     acetaminophen (TYLENOL) 500 MG tablet     latanoprost (XALATAN) 0.005 % ophthalmic solution     timolol (TIMOPTIC) 0.25 % ophthalmic solution     mupirocin (BACTROBAN) 2 % nasal ointment     Prenatal Vit-Fe Fumarate-FA (PRENATAL MULTIVITAMIN PLUS IRON) 27-0.8 MG TABS per tablet     traZODone (DESYREL) 50 MG tablet     irbesartan-hydrochlorothiazide (AVALIDE) 150-12.5 MG per tablet     FLUoxetine (PROZAC) 40 MG capsule     omeprazole (PRILOSEC) 20 MG CR capsule     insulin aspart (NOVOLOG) 100 UNITS/ML injection     blood glucose monitoring (ACCU-CHEK KWASI) test strip     Modafinil (PROVIGIL PO)     LANTUS SOLOSTAR 100 UNIT/ML soln     atorvastatin (LIPITOR) 20 MG tablet     B-D U/F 31G X 8 MM insulin pen needle     ONETOUCH DELICA LANCETS 33G MISC     aluminum acetate (BUROW'S) SOLN     Blood Glucose Calibration (ACCU-CHEK INSTANT CONTROL) LIQD     No current facility-administered medications for this visit.       ROS: 7 point ROS neg other than the symptoms noted above in the HPI.  She denies rash, sore throat, urinary symptoms, nausea or diarrhea.    OBJECTIVE:  BP (!) 164/92 (BP Location: Left arm, Patient Position: Chair, Cuff Size: Adult Large)  Pulse 96  Temp 98.3  F (36.8  C) (Oral)  Resp 22  Ht 1.537 m (5' 0.5\")  Wt 83 kg (183 lb)  LMP 12/04/2003  SpO2 95%  BMI 35.15 kg/m2  External ears  and canals clear bilaterally. TM's normal bilaterally. Nose normal without lesions or discharge. Oropharynx normal. Neck supple without palpable adenopathy.  Regular rate and  rhythm. no murmurs.  No edema or JVD. Chest is clear; no wheezes or rales.  The abdomen is soft without tenderness, guarding, mass or organomegaly. Bowel sounds are normal. No CVA tenderness.    UA declined.  CBC : normal WBC  Chest x-ray is Normal    Assessment   Several week history of cough with associated fever    PLAN:  Empiric treatment with antibiotic  If symptoms persist- recheck " advised with her primary care physician.

## 2017-12-22 NOTE — NURSING NOTE
Kemi OCTAVIO Bobdawson Pennie is here for a cough for the past few weeks. Had surgery but was sent home due to fever.    Questioned patient about current smoking habits.  Pt. has never smoked.  PULSE regular  My Chart:   CLASSIFICATION OF OVERWEIGHT AND OBESITY BY BMI                        Obesity Class           BMI(kg/m2)  Underweight                                    < 18.5  Normal                                         18.5-24.9  Overweight                                     25.0-29.9  OBESITY                     I                  30.0-34.9                             II                 35.0-39.9  EXTREME OBESITY             III                >40                            Patient's  BMI Body mass index is 35.15 kg/(m^2).  http://hin.nhlbi.nih.gov/menuplanner/menu.cgi  Pre-visit planning  Immunizations - up to date  Colonoscopy - is up to date  Mammogram - is up to date  Asthma -   PHQ9 -    LUIS-7 -

## 2017-12-22 NOTE — LETTER
"2017      Michael Bobdawson Gonsalezluz  [address]  [facility] [address]        Dear Ms.Kulak Casper,    We are writing to inform you of your test results.    The final result of your recent chest x-ray is Normal.    Resulted Orders   XR Chest 2 Views    Narrative    [facility]  Phone: [phone] * Fax: [phone] [address], [place] [address]  Age: 62 Y Work Phone:  Dept No.: [phone]  MICHAEL RAIN : 1955 Home Phone:  Chart #  Gender: F  Req. Phys: Tyra Ordonez MD Clinic MRN:  Clinic: [facility] Acc#: [id number]  Exam: CHEST 2 VIEWS PA AND LATERAL Exam Date: 2017  CHEST 2 VIEWS PA AND LATERAL 2017 1:00 PM  HISTORY: Cough.  COMPARISON: None.  FINDINGS: Heart size normal. Lungs clear.  IMPRESSION: Negative chest.  Performed by: ZACKERY  Transcribed By: RACHELL on: 2017 1:21 PM CST  Finalized By: Nestor Leyva M.D. on: 2017 1:21 PM CST  Dictated By: Nestor Leyva M.D. Signed by: YAMIL  \"Thank You for choosing [facility]\" Page 1 of 1       If you have any questions or concerns, please call the clinic at the number listed above.       Sincerely,        Tyra Ordonez MD                "

## 2017-12-22 NOTE — MR AVS SNAPSHOT
"              After Visit Summary   12/22/2017    Kemi Casper    MRN: [id number]           Patient Information     Date Of Birth          1955        Visit Information        Provider Department      12/22/2017 11:15 AM Tyra Ordonez MD [facility]        Today's Diagnoses     Cough    -  1    Fever, unspecified fever cause           Follow-ups after your visit        Your next 10 appointments already scheduled     Dec 29, 2017   Procedure with Tin Shetty MD   [facility] (--)    [address] [facility] [id number] [phone]              Who to contact     If you have questions or need follow up information about today's clinic visit or your schedule please contact [facility] directly at [phone].  Normal or non-critical lab and imaging results will be communicated to you by InMyShowhart, letter or phone within 4 business days after the clinic has received the results. If you do not hear from us within 7 days, please contact the clinic through MyChart or phone. If you have a critical or abnormal lab result, we will notify you by phone as soon as possible.  Submit refill requests through [employer] or call your pharmacy and they will forward the refill request to us. Please allow 3 business days for your refill to be completed.          Additional Information About Your Visit        MyChart Information     [employer] lets you send messages to your doctor, view your test results, renew your prescriptions, schedule appointments and more. To sign up, go to [email or web address] . Click on \"Log in\" on the left side of the screen, which will take you to the Welcome page. Then click on \"Sign up Now\" on the right side of the page.     You will be asked to enter the access code listed below, as well as some personal information. Please follow the directions to create your username and password.     Your access code " "is: [email or web address]  Expires: 3/11/2018 11:37 AM     Your access code will  in 90 days. If you need help or a new code, please call your [place] clinic or [phone].        Care EveryWhere ID     This is your Care EveryWhere ID. This could be used by other organizations to access your [place] medical records  [id number]        Your Vitals Were     Pulse Temperature Respirations Height Last Period Pulse Oximetry    96 98.3  F (36.8  C) (Oral) 22 1.537 m (5' 0.5\") 2003 95%    BMI (Body Mass Index)                   35.15 kg/m2            Blood Pressure from Last 3 Encounters:   17 (!) 164/92   17 160/89   12/15/17 161/87    Weight from Last 3 Encounters:   17 83 kg (183 lb)   17 83 kg (183 lb)   17 83.5 kg (184 lb)              We Performed the Following     HEMOGRAM/PLATELET (BFP)     VENOUS COLLECTION     XR Chest 2 Views          Today's Medication Changes          These changes are accurate as of: 17 11:59 PM.  If you have any questions, ask your nurse or doctor.               Start taking these medicines.        Dose/Directions    azithromycin 250 MG tablet   Commonly known as:  ZITHROMAX   Used for:  Cough   Started by:  Tyra Ordonez MD        Two tablets first day, then one tablet daily for four days.   Quantity:  6 tablet   Refills:  0       guaiFENesin-codeine 100-10 MG/5ML Soln solution   Commonly known as:  ROBITUSSIN AC   Used for:  Cough   Started by:  Tyra Ordonez MD        Dose:  1 tsp.   Take 5 mLs by mouth every 4 hours as needed for cough   Quantity:  240 mL   Refills:  0            Where to get your medicines      These medications were sent to [facility] [address] [address] AT [facility] [address] [address], [facility] [address]    Hours:  24-hours Phone:  [phone]     azithromycin 250 MG tablet         Some of these will need a paper prescription and others can be bought over " the counter.  Ask your nurse if you have questions.     Bring a paper prescription for each of these medications     guaiFENesin-codeine 100-10 MG/5ML Soln solution                Primary Care Provider Office Phone # Fax #    Estefania Emery [phone] [address]  [facility] [address]        Equal Access to Services     [facility]: Hadii aad ku hadasho Soomaali, waaxda luqadaha, qaybta kaalmada adeegyada, waxay idiin hayaan adeeg kharash la'aan ah. So [facility] [phone].    ATENCIÓN: Si habla español, tiene a head disposición servicios gratuitos de asistencia lingüística. Llame al [phone].    We comply with applicable federal civil rights laws and [place] laws. We do not discriminate on the basis of race, color, national origin, age, disability, sex, sexual orientation, or gender identity.            Thank you!     Thank you for choosing [facility]  for your care. Our goal is always to provide you with excellent care. Hearing back from our patients is one way we can continue to improve our services. Please take a few minutes to complete the written survey that you may receive in the mail after your visit with us. Thank you!             Your Updated Medication List - Protect others around you: Learn how to safely use, store and throw away your medicines at www.disposemymeds.org.          This list is accurate as of: 12/22/17 11:59 PM.  Always use your most recent med list.                   Brand Name Dispense Instructions for use Diagnosis    ACCU-CHEK INSTANT CONTROL Liqd     1 Bottle    Control for accu-check Pily    Type II or unspecified type diabetes mellitus without mention of complication, not stated as uncontrolled       aluminum acetate Soln solution    BUROW'S    1 Bottle    Apply 3 mLs topically daily as needed    Perianal dermatitis       atorvastatin 20 MG tablet    LIPITOR     Take 20 mg by mouth daily        azithromycin 250 MG  tablet    ZITHROMAX    6 tablet    Two tablets first day, then one tablet daily for four days.    Cough       B-D U/F 31G X 8 MM   Generic drug:  insulin pen needle           blood glucose monitoring test strip    ACCU-CHEK KWASI    1 Box    Use to test blood sugars 3 times daily or as directed.    Type 2 diabetes mellitus with diabetic neuropathy, with long-term current use of insulin (H)       FLUoxetine 40 MG capsule    PROzac    90 capsule    Take 1 capsule (40 mg) by mouth daily    Major depressive disorder, recurrent episode, in full remission (H), LUIS (generalized anxiety disorder)       guaiFENesin-codeine 100-10 MG/5ML Soln solution    ROBITUSSIN AC    240 mL    Take 5 mLs by mouth every 4 hours as needed for cough    Cough       * insulin aspart 100 UNITS/ML injection    NovoLOG     Inject 3 Units Subcutaneous 3 times daily (with meals)    Type 2 diabetes mellitus with neurological manifestations (H)       * NovoLOG FLEXPEN 100 UNIT/ML injection   Generic drug:  insulin aspart      Inject Subcutaneous 3 times daily (with meals) Per sliding scale:  150-199 = 1 unit 200-250 = 2 units 251-300 = 3 units 301-350 = 4 units 351-400 = 5 units   > 400 = 6 units        * NovoLOG FLEXPEN 100 UNIT/ML injection   Generic drug:  insulin aspart      Inject Subcutaneous At Bedtime Per sliding scale: 200-250 = 1 unit 251-300 = 2 unit 300-350 = 3 unit 351-400 = 4 units  > 400 = 5 units        irbesartan-hydrochlorothiazide 150-12.5 MG per tablet    AVALIDE    90 tablet    Take 1 tablet by mouth daily    Essential hypertension, benign       LANTUS SOLOSTAR 100 UNIT/ML injection   Generic drug:  insulin glargine      Inject 26 Units Subcutaneous At Bedtime        latanoprost 0.005 % ophthalmic solution    XALATAN     Place 1 drop Into the left eye At Bedtime        mupirocin 2 % nasal ointment    BACTROBAN     Apply 1 g into each nare 2 times daily Apply small amount to each nostril twice daily for seven days prior to  surgery.        omeprazole 20 MG CR capsule    priLOSEC    90 capsule    Take 1 capsule (20 mg) by mouth daily    Gastroesophageal reflux disease without esophagitis       ONETOUCH DELICA LANCETS 33G Misc           prenatal multivitamin plus iron 27-0.8 MG Tabs per tablet     100 tablet    Take 1 tablet by mouth daily    Gastroesophageal reflux disease without esophagitis       PROVIGIL PO      Take 200 mg by mouth every morning    Organic hypersomnia       timolol 0.25 % ophthalmic solution    TIMOPTIC     Place 1 drop Into the left eye every morning        traZODone 50 MG tablet    DESYREL    90 tablet    Take 1 tablet (50 mg) by mouth At Bedtime    Major depressive disorder, recurrent episode, in full remission (H), LUIS (generalized anxiety disorder)       Turmeric Curcumin Caps      Take 1 capsule by mouth 2 times daily        TYLENOL 500 MG tablet   Generic drug:  acetaminophen      Take 1,000 mg by mouth every 6 hours as needed for mild pain every four to six hours        * Notice:  This list has 3 medication(s) that are the same as other medications prescribed for you. Read the directions carefully, and ask your doctor or other care provider to review them with you.

## 2018-02-14 ENCOUNTER — TRANSFERRED RECORDS (OUTPATIENT)
Dept: FAMILY MEDICINE | Facility: CLINIC | Age: 63
End: 2018-02-14

## 2018-02-26 LAB
CHOL/HDLC RATIO - QUEST: 5.7
CHOLEST SERPL-MCNC: 234 MG/DL
GLUCOSE SERPL-MCNC: 115 MG/DL (ref 70–99)
HBA1C MFR BLD: 7.9 % (ref 4–6)
HDLC SERPL-MCNC: 41 MG/DL
LDLC SERPL CALC-MCNC: 171 MG/DL
NONHDLC SERPL-MCNC: 193 MG/DL
T4 FREE SERPL-MCNC: 1.2 NG/DL (ref 0.71–1.85)
TRIGL SERPL-MCNC: 100 MG/DL
TSH SERPL-ACNC: 2.03 MCU/ML (ref 0.3–5)

## 2018-02-27 ENCOUNTER — TRANSFERRED RECORDS (OUTPATIENT)
Dept: FAMILY MEDICINE | Facility: CLINIC | Age: 63
End: 2018-02-27

## 2018-03-15 ENCOUNTER — HOSPITAL ENCOUNTER (OUTPATIENT)
Dept: LAB | Facility: CLINIC | Age: 63
End: 2018-03-15
Attending: ORTHOPAEDIC SURGERY
Payer: MEDICAID

## 2018-03-18 ENCOUNTER — TRANSFERRED RECORDS (OUTPATIENT)
Dept: HEALTH INFORMATION MANAGEMENT | Facility: CLINIC | Age: 63
End: 2018-03-18

## 2018-03-20 ENCOUNTER — HOSPITAL ENCOUNTER (OUTPATIENT)
Dept: LAB | Facility: CLINIC | Age: 63
Discharge: HOME | End: 2018-03-20
Attending: ORTHOPAEDIC SURGERY | Admitting: ORTHOPAEDIC SURGERY
Payer: MEDICAID

## 2018-03-20 DIAGNOSIS — Z01.812 PRE-OPERATIVE LABORATORY EXAMINATION: ICD-10-CM

## 2018-03-20 LAB
MRSA DNA SPEC QL NAA+PROBE: NEGATIVE
SPECIMEN SOURCE: NORMAL

## 2018-03-20 PROCEDURE — 87641 MR-STAPH DNA AMP PROBE: CPT | Performed by: ORTHOPAEDIC SURGERY

## 2018-03-20 PROCEDURE — 87640 STAPH A DNA AMP PROBE: CPT | Performed by: ORTHOPAEDIC SURGERY

## 2018-03-20 PROCEDURE — 40000829 ZZHCL STATISTIC STAPH AUREUS SUSCEPT SCREEN PCR: Performed by: ORTHOPAEDIC SURGERY

## 2018-03-20 PROCEDURE — 40000830 ZZHCL STATISTIC STAPH AUREUS METH RESIST SCREEN PCR: Performed by: ORTHOPAEDIC SURGERY

## 2018-03-26 ENCOUNTER — TELEPHONE (OUTPATIENT)
Dept: FAMILY MEDICINE | Facility: CLINIC | Age: 63
End: 2018-03-26

## 2018-03-26 DIAGNOSIS — I10 ESSENTIAL HYPERTENSION, BENIGN: ICD-10-CM

## 2018-03-26 RX ORDER — IRBESARTAN AND HYDROCHLOROTHIAZIDE 150; 12.5 MG/1; MG/1
1 TABLET, FILM COATED ORAL DAILY
Qty: 30 TABLET | Refills: 0 | COMMUNITY
Start: 2018-03-26 | End: 2018-05-02

## 2018-03-26 RX ORDER — IRBESARTAN AND HYDROCHLOROTHIAZIDE 150; 12.5 MG/1; MG/1
TABLET, FILM COATED ORAL
Qty: 90 TABLET | Refills: 0 | OUTPATIENT
Start: 2018-03-26

## 2018-03-26 NOTE — TELEPHONE ENCOUNTER
[facility] in [place]  irbesartan-hydrochlorothiazide (AVALIDE) 150-12.5 MG  Pt is due for a non fasting ov  [facility]  [phone] (home) NONE (work)

## 2018-03-27 ENCOUNTER — TELEPHONE (OUTPATIENT)
Dept: FAMILY MEDICINE | Facility: CLINIC | Age: 63
End: 2018-03-27

## 2018-04-03 NOTE — PHARMACY-ADMISSION MEDICATION HISTORY
Admission medication history interview status for this patient is complete. See [facility] admission navigator for allergy information, prior to admission medications and immunization status.           For patients on insulin therapy: Y     SEE PTA MED LIST  Lantus/levemir/NPH/Mix 70/30 dose:   (Y/N) (see Med list for doses)   Sliding scale Novolog Y/N  If Yes, do you have a baseline novolog pre-meal dose:  units with meals  Patients eat three meals a day:   Y/N    How many episodes of hypoglycemia do you have per week: _______  How many missed doses do you have per week: ______  How many times do you check your blood glucose per day: _______   Any Barriers to therapy - Be specific :  cost of medications, comfortable with giving injections (if applicable), comfortable and confident with current diabetes regimen: Y/N ______________      Prior to Admission medications    Medication Sig Last Dose Taking? Auth Provider   TraMADol HCl (ULTRAM PO) Take 50 mg by mouth every 6 hours as needed for moderate to severe pain  Yes Reported, Patient   insulin aspart (NOVOLOG FLEXPEN) 100 UNIT/ML injection Inject 5 Units Subcutaneous daily (with lunch)  Yes Reported, Patient   insulin aspart (NOVOLOG FLEXPEN) 100 UNIT/ML injection Inject 8 Units Subcutaneous daily (with dinner)  Yes Reported, Patient   insulin aspart (NOVOLOG FLEXPEN) 100 UNIT/ML injection Inject Subcutaneous 3 times daily (with meals) Per sliding scale:   150-199 = 1 unit  200-250 = 2 units  251-300 = 3 units  301-350 = 4 units  351-400 = 5 units    > 400 = 6 units  Yes Unknown, Entered By History   insulin aspart (NOVOLOG FLEXPEN) 100 UNIT/ML injection Inject Subcutaneous At Bedtime Per sliding scale:  200-250 = 1 unit  251-300 = 2 unit  300-350 = 3 unit  351-400 = 4 units   > 400 = 5 units  Yes Unknown, Entered By History   acetaminophen (TYLENOL) 500 MG tablet Take 1,000 mg by mouth every 6 hours as needed for mild pain every four to six hours  Yes Unknown, Entered  By History   latanoprost (XALATAN) 0.005 % ophthalmic solution Place 1 drop Into the left eye At Bedtime  Yes Unknown, Entered By History   timolol (TIMOPTIC) 0.25 % ophthalmic solution Place 1 drop Into the left eye every morning  Yes Unknown, Entered By History   traZODone (DESYREL) 50 MG tablet Take 1 tablet (50 mg) by mouth At Bedtime  Yes Estefania Emery MD   FLUoxetine (PROZAC) 40 MG capsule Take 1 capsule (40 mg) by mouth daily  Yes Estefania Emery MD   omeprazole (PRILOSEC) 20 MG CR capsule Take 1 capsule (20 mg) by mouth daily  Yes Estefania Emery MD   insulin aspart (NOVOLOG) 100 UNITS/ML injection Inject 3 Units Subcutaneous daily (with breakfast)   Yes Reported, Patient   Modafinil (PROVIGIL PO) Take 200 mg by mouth every morning   Yes Reported, Patient   LANTUS SOLOSTAR 100 UNIT/ML soln Inject 24 Units Subcutaneous At Bedtime   Yes Reported, Patient   atorvastatin (LIPITOR) 20 MG tablet Take 20 mg by mouth daily   Yes Reported, Patient   irbesartan-hydrochlorothiazide (AVALIDE) 150-12.5 MG per tablet Take 1 tablet by mouth daily   Estefania Emery MD   Misc Natural Products (TURMERIC CURCUMIN) CAPS Take 1 capsule by mouth 2 times daily   Unknown, Entered By History   mupirocin (BACTROBAN) 2 % nasal ointment Apply 1 g into each nare 2 times daily Apply small amount to each nostril twice daily for seven days prior to surgery.   Reported, Patient   blood glucose monitoring (ACCU-CHEK PILY) test strip Use to test blood sugars 3 times daily or as directed.   Cass Cummings PA-C   B-D U/F 31G X 8 MM insulin pen needle    Reported, Patient   ONETOUCH DELICA LANCETS 33G MISC    Reported, Patient   Blood Glucose Calibration (ACCU-CHEK INSTANT CONTROL) LIQD Control for accu-check Pily   Estefania Emery MD

## 2018-04-09 ENCOUNTER — ANESTHESIA (OUTPATIENT)
Dept: SURGERY | Facility: CLINIC | Age: 63
DRG: 470 | End: 2018-04-09
Payer: COMMERCIAL

## 2018-04-09 ENCOUNTER — HOSPITAL ENCOUNTER (INPATIENT)
Facility: CLINIC | Age: 63
LOS: 3 days | Discharge: HOME | DRG: 470 | End: 2018-04-12
Attending: ORTHOPAEDIC SURGERY | Admitting: ORTHOPAEDIC SURGERY
Payer: COMMERCIAL

## 2018-04-09 ENCOUNTER — APPOINTMENT (OUTPATIENT)
Dept: GENERAL RADIOLOGY | Facility: CLINIC | Age: 63
DRG: 470 | End: 2018-04-09
Attending: ORTHOPAEDIC SURGERY
Payer: COMMERCIAL

## 2018-04-09 ENCOUNTER — ANESTHESIA EVENT (OUTPATIENT)
Dept: SURGERY | Facility: CLINIC | Age: 63
DRG: 470 | End: 2018-04-09
Payer: COMMERCIAL

## 2018-04-09 DIAGNOSIS — Z96.652 STATUS POST TOTAL LEFT KNEE REPLACEMENT: Primary | ICD-10-CM

## 2018-04-09 PROBLEM — Z96.659 S/P TOTAL KNEE ARTHROPLASTY: Status: ACTIVE | Noted: 2018-04-09

## 2018-04-09 LAB
CREAT SERPL-MCNC: 0.73 MG/DL (ref 0.52–1.04)
GFR SERPL CREATININE-BSD FRML MDRD: 81 ML/MIN/1.7M2
GLUCOSE BLDC GLUCOMTR-MCNC: 161 MG/DL (ref 70–99)
GLUCOSE BLDC GLUCOMTR-MCNC: 162 MG/DL (ref 70–99)
GLUCOSE BLDC GLUCOMTR-MCNC: 171 MG/DL (ref 70–99)
GLUCOSE BLDC GLUCOMTR-MCNC: 246 MG/DL (ref 70–99)
GLUCOSE BLDC GLUCOMTR-MCNC: 272 MG/DL (ref 70–99)
HGB BLD-MCNC: 12.2 G/DL (ref 11.7–15.7)
POTASSIUM SERPL-SCNC: 3.3 MMOL/L (ref 3.4–5.3)

## 2018-04-09 PROCEDURE — 25800025 ZZH RX 258: Performed by: ORTHOPAEDIC SURGERY

## 2018-04-09 PROCEDURE — 25000125 ZZHC RX 250: Performed by: ANESTHESIOLOGY

## 2018-04-09 PROCEDURE — 36415 COLL VENOUS BLD VENIPUNCTURE: CPT | Performed by: ANESTHESIOLOGY

## 2018-04-09 PROCEDURE — 37000009 ZZH ANESTHESIA TECHNICAL FEE, EACH ADDTL 15 MIN: Performed by: ORTHOPAEDIC SURGERY

## 2018-04-09 PROCEDURE — 27810169 ZZH OR IMPLANT GENERAL: Performed by: ORTHOPAEDIC SURGERY

## 2018-04-09 PROCEDURE — 40000985 XR KNEE PORT LT 1/2 VW: Mod: LT

## 2018-04-09 PROCEDURE — 25000125 ZZHC RX 250: Performed by: NURSE ANESTHETIST, CERTIFIED REGISTERED

## 2018-04-09 PROCEDURE — 82565 ASSAY OF CREATININE: CPT | Performed by: ANESTHESIOLOGY

## 2018-04-09 PROCEDURE — 25000128 H RX IP 250 OP 636: Performed by: PHYSICIAN ASSISTANT

## 2018-04-09 PROCEDURE — 25000128 H RX IP 250 OP 636: Performed by: NURSE ANESTHETIST, CERTIFIED REGISTERED

## 2018-04-09 PROCEDURE — 25000132 ZZH RX MED GY IP 250 OP 250 PS 637: Performed by: PHYSICIAN ASSISTANT

## 2018-04-09 PROCEDURE — 84132 ASSAY OF SERUM POTASSIUM: CPT | Performed by: ANESTHESIOLOGY

## 2018-04-09 PROCEDURE — 25000128 H RX IP 250 OP 636: Performed by: ANESTHESIOLOGY

## 2018-04-09 PROCEDURE — 12000007 ZZH R&B INTERMEDIATE

## 2018-04-09 PROCEDURE — C1713 ANCHOR/SCREW BN/BN,TIS/BN: HCPCS | Performed by: ORTHOPAEDIC SURGERY

## 2018-04-09 PROCEDURE — 36000063 ZZH SURGERY LEVEL 4 EA 15 ADDTL MIN: Performed by: ORTHOPAEDIC SURGERY

## 2018-04-09 PROCEDURE — 85018 HEMOGLOBIN: CPT | Performed by: ANESTHESIOLOGY

## 2018-04-09 PROCEDURE — 71000012 ZZH RECOVERY PHASE 1 LEVEL 1 FIRST HR: Performed by: ORTHOPAEDIC SURGERY

## 2018-04-09 PROCEDURE — 0SRD0J9 REPLACEMENT OF LEFT KNEE JOINT WITH SYNTHETIC SUBSTITUTE, CEMENTED, OPEN APPROACH: ICD-10-PCS | Performed by: ORTHOPAEDIC SURGERY

## 2018-04-09 PROCEDURE — 37000008 ZZH ANESTHESIA TECHNICAL FEE, 1ST 30 MIN: Performed by: ORTHOPAEDIC SURGERY

## 2018-04-09 PROCEDURE — 25000125 ZZHC RX 250: Performed by: ORTHOPAEDIC SURGERY

## 2018-04-09 PROCEDURE — 27210794 ZZH OR GENERAL SUPPLY STERILE: Performed by: ORTHOPAEDIC SURGERY

## 2018-04-09 PROCEDURE — 71000013 ZZH RECOVERY PHASE 1 LEVEL 1 EA ADDTL HR: Performed by: ORTHOPAEDIC SURGERY

## 2018-04-09 PROCEDURE — 40000306 ZZH STATISTIC PRE PROC ASSESS II: Performed by: ORTHOPAEDIC SURGERY

## 2018-04-09 PROCEDURE — 25000131 ZZH RX MED GY IP 250 OP 636 PS 637: Performed by: PHYSICIAN ASSISTANT

## 2018-04-09 PROCEDURE — 36000093 ZZH SURGERY LEVEL 4 1ST 30 MIN: Performed by: ORTHOPAEDIC SURGERY

## 2018-04-09 PROCEDURE — 25000125 ZZHC RX 250: Performed by: PHYSICIAN ASSISTANT

## 2018-04-09 PROCEDURE — 00000146 ZZHCL STATISTIC GLUCOSE BY METER IP

## 2018-04-09 PROCEDURE — 3E0T3BZ INTRODUCTION OF ANESTHETIC AGENT INTO PERIPHERAL NERVES AND PLEXI, PERCUTANEOUS APPROACH: ICD-10-PCS | Performed by: ORTHOPAEDIC SURGERY

## 2018-04-09 PROCEDURE — 27210995 ZZH RX 272: Performed by: ORTHOPAEDIC SURGERY

## 2018-04-09 PROCEDURE — 25000566 ZZH SEVOFLURANE, EA 15 MIN: Performed by: ORTHOPAEDIC SURGERY

## 2018-04-09 PROCEDURE — 25000132 ZZH RX MED GY IP 250 OP 250 PS 637: Performed by: ORTHOPAEDIC SURGERY

## 2018-04-09 PROCEDURE — 25000128 H RX IP 250 OP 636: Performed by: ORTHOPAEDIC SURGERY

## 2018-04-09 PROCEDURE — C1776 JOINT DEVICE (IMPLANTABLE): HCPCS | Performed by: ORTHOPAEDIC SURGERY

## 2018-04-09 DEVICE — IMPLANTABLE DEVICE: Type: IMPLANTABLE DEVICE | Site: KNEE | Status: FUNCTIONAL

## 2018-04-09 DEVICE — BONE CEMENT SIMPLEX W/TOBRAMYCIN 6197-9-001: Type: IMPLANTABLE DEVICE | Site: KNEE | Status: FUNCTIONAL

## 2018-04-09 DEVICE — IMP TIBIAL ZIM PSN NP STM 5DEG SZ DL 42-5320-067-01: Type: IMPLANTABLE DEVICE | Site: KNEE | Status: FUNCTIONAL

## 2018-04-09 RX ORDER — BUPIVACAINE HYDROCHLORIDE 5 MG/ML
INJECTION, SOLUTION EPIDURAL; INTRACAUDAL PRN
Status: DISCONTINUED | OUTPATIENT
Start: 2018-04-09 | End: 2018-04-09

## 2018-04-09 RX ORDER — CEFAZOLIN SODIUM 2 G/100ML
2 INJECTION, SOLUTION INTRAVENOUS EVERY 8 HOURS
Status: COMPLETED | OUTPATIENT
Start: 2018-04-09 | End: 2018-04-10

## 2018-04-09 RX ORDER — FERROUS GLUCONATE 324(38)MG
324 TABLET ORAL DAILY
Status: DISCONTINUED | OUTPATIENT
Start: 2018-04-09 | End: 2018-04-12 | Stop reason: HOSPADM

## 2018-04-09 RX ORDER — PROCHLORPERAZINE MALEATE 5 MG
10 TABLET ORAL EVERY 6 HOURS PRN
Status: DISCONTINUED | OUTPATIENT
Start: 2018-04-09 | End: 2018-04-12 | Stop reason: HOSPADM

## 2018-04-09 RX ORDER — LIDOCAINE HYDROCHLORIDE 10 MG/ML
INJECTION, SOLUTION INFILTRATION; PERINEURAL PRN
Status: DISCONTINUED | OUTPATIENT
Start: 2018-04-09 | End: 2018-04-09

## 2018-04-09 RX ORDER — BUPIVACAINE HYDROCHLORIDE AND EPINEPHRINE 2.5; 5 MG/ML; UG/ML
30 INJECTION, SOLUTION EPIDURAL; INFILTRATION; INTRACAUDAL; PERINEURAL ONCE
Status: DISCONTINUED | OUTPATIENT
Start: 2018-04-09 | End: 2018-04-09 | Stop reason: HOSPADM

## 2018-04-09 RX ORDER — NALOXONE HYDROCHLORIDE 0.4 MG/ML
.1-.4 INJECTION, SOLUTION INTRAMUSCULAR; INTRAVENOUS; SUBCUTANEOUS
Status: DISCONTINUED | OUTPATIENT
Start: 2018-04-09 | End: 2018-04-12 | Stop reason: HOSPADM

## 2018-04-09 RX ORDER — LIDOCAINE 40 MG/G
CREAM TOPICAL
Status: DISCONTINUED | OUTPATIENT
Start: 2018-04-09 | End: 2018-04-12 | Stop reason: HOSPADM

## 2018-04-09 RX ORDER — ONDANSETRON 4 MG/1
4 TABLET, ORALLY DISINTEGRATING ORAL EVERY 30 MIN PRN
Status: DISCONTINUED | OUTPATIENT
Start: 2018-04-09 | End: 2018-04-09 | Stop reason: HOSPADM

## 2018-04-09 RX ORDER — AMOXICILLIN 250 MG
1 CAPSULE ORAL 2 TIMES DAILY
Status: DISCONTINUED | OUTPATIENT
Start: 2018-04-09 | End: 2018-04-12 | Stop reason: HOSPADM

## 2018-04-09 RX ORDER — AMOXICILLIN 250 MG
2 CAPSULE ORAL 2 TIMES DAILY
Status: DISCONTINUED | OUTPATIENT
Start: 2018-04-09 | End: 2018-04-12 | Stop reason: HOSPADM

## 2018-04-09 RX ORDER — SODIUM CHLORIDE, SODIUM LACTATE, POTASSIUM CHLORIDE, CALCIUM CHLORIDE 600; 310; 30; 20 MG/100ML; MG/100ML; MG/100ML; MG/100ML
INJECTION, SOLUTION INTRAVENOUS CONTINUOUS
Status: DISCONTINUED | OUTPATIENT
Start: 2018-04-09 | End: 2018-04-09 | Stop reason: HOSPADM

## 2018-04-09 RX ORDER — CEFAZOLIN SODIUM 1 G/3ML
1 INJECTION, POWDER, FOR SOLUTION INTRAMUSCULAR; INTRAVENOUS SEE ADMIN INSTRUCTIONS
Status: DISCONTINUED | OUTPATIENT
Start: 2018-04-09 | End: 2018-04-09 | Stop reason: HOSPADM

## 2018-04-09 RX ORDER — HYDROMORPHONE HYDROCHLORIDE 1 MG/ML
.3-.5 INJECTION, SOLUTION INTRAMUSCULAR; INTRAVENOUS; SUBCUTANEOUS EVERY 5 MIN PRN
Status: DISCONTINUED | OUTPATIENT
Start: 2018-04-09 | End: 2018-04-09 | Stop reason: HOSPADM

## 2018-04-09 RX ORDER — ACETAMINOPHEN 325 MG/1
975 TABLET ORAL EVERY 8 HOURS
Status: DISCONTINUED | OUTPATIENT
Start: 2018-04-09 | End: 2018-04-12 | Stop reason: HOSPADM

## 2018-04-09 RX ORDER — HYDROMORPHONE HYDROCHLORIDE 1 MG/ML
.3-.5 INJECTION, SOLUTION INTRAMUSCULAR; INTRAVENOUS; SUBCUTANEOUS
Status: DISCONTINUED | OUTPATIENT
Start: 2018-04-09 | End: 2018-04-12 | Stop reason: HOSPADM

## 2018-04-09 RX ORDER — FENTANYL CITRATE 50 UG/ML
INJECTION, SOLUTION INTRAMUSCULAR; INTRAVENOUS PRN
Status: DISCONTINUED | OUTPATIENT
Start: 2018-04-09 | End: 2018-04-09

## 2018-04-09 RX ORDER — HYDRALAZINE HYDROCHLORIDE 20 MG/ML
INJECTION INTRAMUSCULAR; INTRAVENOUS PRN
Status: DISCONTINUED | OUTPATIENT
Start: 2018-04-09 | End: 2018-04-09

## 2018-04-09 RX ORDER — NALOXONE HYDROCHLORIDE 0.4 MG/ML
.1-.4 INJECTION, SOLUTION INTRAMUSCULAR; INTRAVENOUS; SUBCUTANEOUS
Status: DISCONTINUED | OUTPATIENT
Start: 2018-04-09 | End: 2018-04-09

## 2018-04-09 RX ORDER — FENTANYL CITRATE 50 UG/ML
25-50 INJECTION, SOLUTION INTRAMUSCULAR; INTRAVENOUS
Status: DISCONTINUED | OUTPATIENT
Start: 2018-04-09 | End: 2018-04-09 | Stop reason: HOSPADM

## 2018-04-09 RX ORDER — DEXTROSE MONOHYDRATE 25 G/50ML
25-50 INJECTION, SOLUTION INTRAVENOUS
Status: DISCONTINUED | OUTPATIENT
Start: 2018-04-09 | End: 2018-04-12 | Stop reason: HOSPADM

## 2018-04-09 RX ORDER — LIDOCAINE HCL/EPINEPHRINE/PF 2%-1:200K
VIAL (ML) INJECTION PRN
Status: DISCONTINUED | OUTPATIENT
Start: 2018-04-09 | End: 2018-04-09

## 2018-04-09 RX ORDER — OXYCODONE HCL 10 MG/1
10 TABLET, FILM COATED, EXTENDED RELEASE ORAL EVERY 12 HOURS
Status: DISCONTINUED | OUTPATIENT
Start: 2018-04-09 | End: 2018-04-11

## 2018-04-09 RX ORDER — CEFAZOLIN SODIUM 2 G/100ML
2 INJECTION, SOLUTION INTRAVENOUS
Status: COMPLETED | OUTPATIENT
Start: 2018-04-09 | End: 2018-04-09

## 2018-04-09 RX ORDER — ONDANSETRON 2 MG/ML
4 INJECTION INTRAMUSCULAR; INTRAVENOUS EVERY 6 HOURS PRN
Status: DISCONTINUED | OUTPATIENT
Start: 2018-04-09 | End: 2018-04-12 | Stop reason: HOSPADM

## 2018-04-09 RX ORDER — ONDANSETRON 4 MG/1
4 TABLET, ORALLY DISINTEGRATING ORAL EVERY 6 HOURS PRN
Status: DISCONTINUED | OUTPATIENT
Start: 2018-04-09 | End: 2018-04-12 | Stop reason: HOSPADM

## 2018-04-09 RX ORDER — ACETAMINOPHEN 325 MG/1
650 TABLET ORAL EVERY 4 HOURS PRN
Status: DISCONTINUED | OUTPATIENT
Start: 2018-04-12 | End: 2018-04-12 | Stop reason: HOSPADM

## 2018-04-09 RX ORDER — OXYCODONE HYDROCHLORIDE 5 MG/1
5-10 TABLET ORAL
Status: DISCONTINUED | OUTPATIENT
Start: 2018-04-09 | End: 2018-04-12

## 2018-04-09 RX ORDER — LANOLIN ALCOHOL/MO/W.PET/CERES
3-6 CREAM (GRAM) TOPICAL
Status: DISCONTINUED | OUTPATIENT
Start: 2018-04-10 | End: 2018-04-12 | Stop reason: HOSPADM

## 2018-04-09 RX ORDER — SODIUM CHLORIDE, SODIUM LACTATE, POTASSIUM CHLORIDE, CALCIUM CHLORIDE 600; 310; 30; 20 MG/100ML; MG/100ML; MG/100ML; MG/100ML
INJECTION, SOLUTION INTRAVENOUS CONTINUOUS
Status: DISCONTINUED | OUTPATIENT
Start: 2018-04-09 | End: 2018-04-10

## 2018-04-09 RX ORDER — CELECOXIB 200 MG/1
400 CAPSULE ORAL DAILY
Status: COMPLETED | OUTPATIENT
Start: 2018-04-10 | End: 2018-04-12

## 2018-04-09 RX ORDER — LABETALOL HYDROCHLORIDE 5 MG/ML
10 INJECTION, SOLUTION INTRAVENOUS
Status: DISCONTINUED | OUTPATIENT
Start: 2018-04-09 | End: 2018-04-09 | Stop reason: HOSPADM

## 2018-04-09 RX ORDER — NICOTINE POLACRILEX 4 MG
15-30 LOZENGE BUCCAL
Status: DISCONTINUED | OUTPATIENT
Start: 2018-04-09 | End: 2018-04-12 | Stop reason: HOSPADM

## 2018-04-09 RX ORDER — CELECOXIB 200 MG/1
400 CAPSULE ORAL ONCE
Status: COMPLETED | OUTPATIENT
Start: 2018-04-09 | End: 2018-04-09

## 2018-04-09 RX ORDER — LIDOCAINE 40 MG/G
CREAM TOPICAL
Status: DISCONTINUED | OUTPATIENT
Start: 2018-04-09 | End: 2018-04-09 | Stop reason: HOSPADM

## 2018-04-09 RX ORDER — ONDANSETRON 2 MG/ML
INJECTION INTRAMUSCULAR; INTRAVENOUS PRN
Status: DISCONTINUED | OUTPATIENT
Start: 2018-04-09 | End: 2018-04-09

## 2018-04-09 RX ORDER — DIPHENHYDRAMINE HYDROCHLORIDE 50 MG/ML
25 INJECTION INTRAMUSCULAR; INTRAVENOUS EVERY 6 HOURS PRN
Status: DISCONTINUED | OUTPATIENT
Start: 2018-04-09 | End: 2018-04-12 | Stop reason: HOSPADM

## 2018-04-09 RX ORDER — DIPHENHYDRAMINE HCL 25 MG
25 CAPSULE ORAL EVERY 6 HOURS PRN
Status: DISCONTINUED | OUTPATIENT
Start: 2018-04-09 | End: 2018-04-12 | Stop reason: HOSPADM

## 2018-04-09 RX ORDER — OXYCODONE HCL 10 MG/1
10 TABLET, FILM COATED, EXTENDED RELEASE ORAL ONCE
Status: COMPLETED | OUTPATIENT
Start: 2018-04-09 | End: 2018-04-09

## 2018-04-09 RX ORDER — PROPOFOL 10 MG/ML
INJECTION, EMULSION INTRAVENOUS PRN
Status: DISCONTINUED | OUTPATIENT
Start: 2018-04-09 | End: 2018-04-09

## 2018-04-09 RX ORDER — EPHEDRINE SULFATE 50 MG/ML
INJECTION, SOLUTION INTRAMUSCULAR; INTRAVENOUS; SUBCUTANEOUS PRN
Status: DISCONTINUED | OUTPATIENT
Start: 2018-04-09 | End: 2018-04-09

## 2018-04-09 RX ORDER — ONDANSETRON 2 MG/ML
4 INJECTION INTRAMUSCULAR; INTRAVENOUS EVERY 30 MIN PRN
Status: DISCONTINUED | OUTPATIENT
Start: 2018-04-09 | End: 2018-04-09 | Stop reason: HOSPADM

## 2018-04-09 RX ADMIN — BUPIVACAINE HYDROCHLORIDE 20 ML: 5 INJECTION, SOLUTION EPIDURAL; INTRACAUDAL at 08:10

## 2018-04-09 RX ADMIN — FENTANYL CITRATE 100 MCG: 50 INJECTION, SOLUTION INTRAMUSCULAR; INTRAVENOUS at 08:53

## 2018-04-09 RX ADMIN — Medication 5 MG: at 09:56

## 2018-04-09 RX ADMIN — HYDRALAZINE HYDROCHLORIDE 10 MG: 20 INJECTION INTRAMUSCULAR; INTRAVENOUS at 09:17

## 2018-04-09 RX ADMIN — MIDAZOLAM 2 MG: 1 INJECTION INTRAMUSCULAR; INTRAVENOUS at 08:05

## 2018-04-09 RX ADMIN — SODIUM CHLORIDE, POTASSIUM CHLORIDE, SODIUM LACTATE AND CALCIUM CHLORIDE: 600; 310; 30; 20 INJECTION, SOLUTION INTRAVENOUS at 08:59

## 2018-04-09 RX ADMIN — ONDANSETRON 4 MG: 2 INJECTION INTRAMUSCULAR; INTRAVENOUS at 09:56

## 2018-04-09 RX ADMIN — Medication 1 G: at 10:07

## 2018-04-09 RX ADMIN — OXYCODONE HYDROCHLORIDE 10 MG: 10 TABLET, FILM COATED, EXTENDED RELEASE ORAL at 07:30

## 2018-04-09 RX ADMIN — HYDROMORPHONE HYDROCHLORIDE 1 MG: 1 INJECTION, SOLUTION INTRAMUSCULAR; INTRAVENOUS; SUBCUTANEOUS at 09:03

## 2018-04-09 RX ADMIN — LIDOCAINE HYDROCHLORIDE,EPINEPHRINE BITARTRATE 10 ML: 20; .005 INJECTION, SOLUTION EPIDURAL; INFILTRATION; INTRACAUDAL; PERINEURAL at 08:10

## 2018-04-09 RX ADMIN — LIDOCAINE HYDROCHLORIDE 30 MG: 10 INJECTION, SOLUTION INFILTRATION; PERINEURAL at 08:38

## 2018-04-09 RX ADMIN — FERROUS GLUCONATE 324 MG: 324 TABLET ORAL at 18:04

## 2018-04-09 RX ADMIN — ROCURONIUM BROMIDE 35 MG: 10 INJECTION INTRAVENOUS at 08:38

## 2018-04-09 RX ADMIN — FENTANYL CITRATE 100 MCG: 50 INJECTION, SOLUTION INTRAMUSCULAR; INTRAVENOUS at 08:05

## 2018-04-09 RX ADMIN — OXYCODONE HYDROCHLORIDE 10 MG: 10 TABLET, FILM COATED, EXTENDED RELEASE ORAL at 21:19

## 2018-04-09 RX ADMIN — ASPIRIN 325 MG: 325 TABLET, DELAYED RELEASE ORAL at 21:20

## 2018-04-09 RX ADMIN — CEFAZOLIN SODIUM 2 G: 2 INJECTION, SOLUTION INTRAVENOUS at 08:32

## 2018-04-09 RX ADMIN — CEFAZOLIN SODIUM 2 G: 2 INJECTION, SOLUTION INTRAVENOUS at 18:33

## 2018-04-09 RX ADMIN — HYDRALAZINE HYDROCHLORIDE 10 MG: 20 INJECTION INTRAMUSCULAR; INTRAVENOUS at 09:08

## 2018-04-09 RX ADMIN — CELECOXIB 400 MG: 200 CAPSULE ORAL at 07:30

## 2018-04-09 RX ADMIN — SENNOSIDES AND DOCUSATE SODIUM 1 TABLET: 8.6; 5 TABLET ORAL at 21:20

## 2018-04-09 RX ADMIN — SODIUM CHLORIDE, POTASSIUM CHLORIDE, SODIUM LACTATE AND CALCIUM CHLORIDE: 600; 310; 30; 20 INJECTION, SOLUTION INTRAVENOUS at 11:10

## 2018-04-09 RX ADMIN — OXYCODONE HYDROCHLORIDE 5 MG: 5 TABLET ORAL at 21:20

## 2018-04-09 RX ADMIN — PROPOFOL 200 MG: 10 INJECTION, EMULSION INTRAVENOUS at 08:38

## 2018-04-09 RX ADMIN — INSULIN GLARGINE 24 UNITS: 100 INJECTION, SOLUTION SUBCUTANEOUS at 22:04

## 2018-04-09 RX ADMIN — SODIUM CHLORIDE, POTASSIUM CHLORIDE, SODIUM LACTATE AND CALCIUM CHLORIDE: 600; 310; 30; 20 INJECTION, SOLUTION INTRAVENOUS at 18:04

## 2018-04-09 RX ADMIN — ACETAMINOPHEN 975 MG: 325 TABLET, FILM COATED ORAL at 18:04

## 2018-04-09 RX ADMIN — FENTANYL CITRATE 150 MCG: 50 INJECTION, SOLUTION INTRAMUSCULAR; INTRAVENOUS at 08:38

## 2018-04-09 RX ADMIN — Medication 1 G: at 08:44

## 2018-04-09 RX ADMIN — SODIUM CHLORIDE, POTASSIUM CHLORIDE, SODIUM LACTATE AND CALCIUM CHLORIDE: 600; 310; 30; 20 INJECTION, SOLUTION INTRAVENOUS at 08:32

## 2018-04-09 ASSESSMENT — ENCOUNTER SYMPTOMS
DYSRHYTHMIAS: 0
SEIZURES: 0

## 2018-04-09 ASSESSMENT — LIFESTYLE VARIABLES: TOBACCO_USE: 0

## 2018-04-09 NOTE — ANESTHESIA POSTPROCEDURE EVALUATION
Patient: Kemi Casper    Procedure(s):  Left Total knee Arthroplasty    - Wound Class: I-Clean    Diagnosis:djd  Diagnosis Additional Information: OPERATIVE REPORT:       PREOPERATIVE DIAGNOSIS:  Osteoarthritis, left knee.       POSTOPERATIVE DIAGNOSIS:  Osteoarthritis, left knee.       PROCEDURE PERFORMED:  Left total knee arthroplasty.     Anesthesia Type:  General, ETT, Periph. Nerve Block for postop pain    Note:  Anesthesia Post Evaluation    Patient location during evaluation: PACU  Patient participation: Able to fully participate in evaluation  Level of consciousness: awake  Pain management: adequate  Airway patency: patent  Cardiovascular status: acceptable  Respiratory status: acceptable  Hydration status: euvolemic  PONV: controlled     Anesthetic complications: None          Last vitals:  Vitals:    04/09/18 1115 04/09/18 1130 04/09/18 1145   BP: 118/63 97/55 103/60   Resp: 10 12 12   Temp:      SpO2: 97% 96% 96%         Electronically Signed By: Franklin Monique MD  April 9, 2018  11:59 AM

## 2018-04-09 NOTE — ANESTHESIA PROCEDURE NOTES
Peripheral nerve/Neuraxial procedure note : Femoral (Adductor Approach)  Pre-Procedure  Performed by TRA SEGURA  Referred by ROCIO  Location: pre-op      Pre-Anesthestic Checklist: patient identified, IV checked, site marked, risks and benefits discussed, informed consent, monitors and equipment checked, pre-op evaluation, at physician/surgeon's request and post-op pain management    Timeout  Correct Patient: Yes   Correct Procedure: Yes   Correct Site: Yes   Correct Laterality: Yes   Correct Position: Yes   Site Marked: Yes   .   Procedure Documentation    .    Procedure:  left  Femoral (Adductor Approach).     Ultrasound used to identify targeted nerve, plexus, or vascular marker and placed a needle adjacent to it., Ultrasound was used to visualize the spread of the anesthetic in close proximity to the above stated nerve.   Patient Prep;mask, povidone-iodine 7.5% surgical scrub.  .  Needle: insulated, short bevel Needle Gauge: 20.    Needle Length (Inches) 2  Insertion Method: Single Shot.       Assessment/Narrative  Paresthesias: No.  Injection made incrementally with aspirations every 5 mL..  The placement was negative for: blood aspirated, painful injection and site bleeding.  Bolus given via needle..   Secured via.   Complications: none. Test dose of mL at. Test dose negative for signs of intravascular, subdural or intrathecal injection. Comments:  The surgeon has given a verbal order transferring care of this patient to me for the performance of a regional analgesia block for post-op pain control. It is requested of me because I am uniquely trained and qualified to perform this block and the surgeon is neither trained nor qualified to perform this procedure.

## 2018-04-09 NOTE — ANESTHESIA CARE TRANSFER NOTE
Patient: Kemi Casper    Procedure(s):  Left Total knee Arthroplasty    - Wound Class: I-Clean    Diagnosis: djd  Diagnosis Additional Information: No value filed.    Anesthesia Type:   General, ETT, Periph. Nerve Block for postop pain     Note:  Airway :Face Mask  Patient transferred to:PACU  Comments: Spont Resps. Follows commands. Extubated. Maintains Resps. Tx to pacu. Report to RNHandoff Report: Identifed the Patient, Identified the Reponsible Provider, Reviewed the pertinent medical history, Discussed the surgical course, Reviewed Intra-OP anesthesia mangement and issues during anesthesia, Set expectations for post-procedure period and Allowed opportunity for questions and acknowledgement of understanding      Vitals: (Last set prior to Anesthesia Care Transfer)    CRNA VITALS  4/9/2018 0947 - 4/9/2018 1021      4/9/2018             Pulse: 125    SpO2: 97 %    Resp Rate (observed): 16                Electronically Signed By: YEE Coates CRNA  April 9, 2018  10:21 AM

## 2018-04-09 NOTE — BRIEF OP NOTE
[facility] Brief Operative Note    Pre-operative diagnosis: djd   Post-operative diagnosis same   Procedure: Procedure(s):  Left Total knee Arthroplasty   Surgeon Request Adductor Canal Block  - Wound Class: I-Clean   Surgeon(s): Surgeon(s) and Role:     * Tin Shetty MD - Primary   Estimated blood loss: * No values recorded between 4/9/2018 12:00 AM and 4/9/2018  7:05 AM *    Specimens: * No specimens in log *   Findings: No anticipated complications

## 2018-04-09 NOTE — PLAN OF CARE
Problem: Patient Care Overview  Goal: Plan of Care/Patient Progress Review  PT: Pt not up to room at time of scheduled PT session. Will reschedule pt for PT evaluation with full recommendation to come at that time.

## 2018-04-09 NOTE — IP AVS SNAPSHOT
[facility]    [address]    [facility] [address]    Phone:  [phone]    Fax:  [phone]                                       After Visit Summary   4/9/2018    Kemi Casper    MRN: [id number]           After Visit Summary Signature Page     I have received my discharge instructions, and my questions have been answered. I have discussed any challenges I see with this plan with the nurse or doctor.    ..........................................................................................................................................  Patient/Patient Representative Signature      ..........................................................................................................................................  Patient Representative Print Name and Relationship to Patient    ..................................................               ................................................  Date                                            Time    ..........................................................................................................................................  Reviewed by Signature/Title    ...................................................              ..............................................  Date                                                            Time

## 2018-04-09 NOTE — ANESTHESIA PREPROCEDURE EVALUATION
Anesthesia Evaluation     .             ROS/MED HX    ENT/Pulmonary:     (+)sleep apnea, , . .   (-) tobacco use and Other pulmonary disease   Neurologic:     (+)neuropathy    (-) seizures, Other neuro hx, Dementia and migraines   Cardiovascular:     (+) Dyslipidemia, hypertension----. : . . . :. .      (-) CAD, CHF, arrhythmias and pulmonary hypertension   METS/Exercise Tolerance:     Hematologic:        (-) anemia   Musculoskeletal:   (+) arthritis, , , other musculoskeletal- fibromyalgia, chronic back and leg pain      GI/Hepatic:     (+) GERD      (-) hepatitis   Renal/Genitourinary:      (-) renal disease   Endo:     (+) type II DM Using insulin Obesity, .   (-) thyroid disease, chronic steroid usage and other endocrine disorder   Psychiatric: Comment: Fear of waking up during surgery    (+) psychiatric history depression and anxiety      Infectious Disease:         Malignancy:         Other:    - neg other ROS                 Physical Exam      Airway   Mallampati: II  TM distance: >3 FB  Neck ROM: full    Dental     Cardiovascular   Rhythm and rate: regular and normal  (-) no murmur    Pulmonary    breath sounds clear to auscultation    Other findings: Lab Test        04/09/18     12/22/17     11/15/17     08/08/17      --          11/15/11                       0717          1159          1303          1443           --           1125          WBC           --          8.0          5.1          7.6            < >        6.5           HGB          12.2         13.4         11.8  Canc* 12.7           < >        11.4*         MCV           --          96.3         94           94.0           < >        88            PLT           --          418*         319          425*           < >        331           INR           --           --           --           --           --          0.96           < > = values in this interval not displayed.                  Lab Test        02/26/18     12/11/17     11/15/17      08/08/17 05/29/17 02/24/17      --          09/17/16      --          01/04/12                                         1155          1303          1455          1450          1352           --           0718           --           0355          NA            --           --           --          139          137          140            < >        137            < >        140           POTASSIUM     --          4.1           --          4.1          3.2*         3.9            < >        3.0*           < >        3.4           CHLORIDE      --           --           --          99           101          99             < >        99             < >        98            CO2           --           --           --          29           32           31             < >        28             < >        32            BUN           --           --           --          25  NOT AP* 13           22  NOT AP*   < >        29             < >        15            CR            --           --          0.72         0.73         0.60         0.83           < >        1.06*          < >        0.68          ANIONGAP      --           --           --           --          4             --           --          10            --          10            DEEDEE           --           --           --          9.8          8.6          9.8            < >        9.0            < >        9.2           GLC          115*          --           --          85           203*         187*           < >        180*           < >        215*           < > = values in this interval not displayed.                               Anesthesia Plan      History & Physical Review  History and physical reviewed and following examination; no interval change.    ASA Status:  3 .    NPO Status:  > 8 hours    Plan for General, ETT and Periph. Nerve Block for postop pain with Propofol induction. Maintenance will be Balanced.    PONV prophylaxis:   Ondansetron (or other 5HT-3)       Postoperative Care  Postoperative pain management:  IV analgesics, Peripheral nerve block (Single Shot) and Oral pain medications.      Consents  Anesthetic plan, risks, benefits and alternatives discussed with:  Patient.  Use of blood products discussed: Yes.   Use of blood products discussed with Patient.  Consented to blood products.  .                          .

## 2018-04-09 NOTE — IP AVS SNAPSHOT
MRN:[id number]                      After Visit Summary   4/9/2018    Kemi Casper    MRN: [id number]           Thank you!     Thank you for choosing [facility] for your care. Our goal is always to provide you with excellent care. Hearing back from our patients is one way we can continue to improve our services. Please take a few minutes to complete the written survey that you may receive in the mail after you visit. If you would like to speak to someone directly about your visit please contact Patient Relations at [phone]. Thank you!          Patient Information     Date Of Birth          1955        About your hospital stay     You were admitted on:  April 9, 2018 You last received care in the:  [facility]    You were discharged on:  April 12, 2018        Reason for your hospital stay       Diagnosis: left DJD knee  Procedure: left Cemented total knee replacement  Surgeon: Tin Shetty MD  Patient underwent total knee replacement without complication. Patient had typical transient post-op anemia. Patient's hospital stay included physical therapy and DVT prophylaxis. After discussion with patient regarding no history of DVT and current high mobility, patient is discharged with ASA for home DVT pphx. Patient will follow -up in the office 10-14days post-op for wound check. Please refer to chart for any other specifics of this hospital stay.  Nadia Elias PA-C                  Who to Call     For medical emergencies, please call 911.  For non-urgent questions about your medical care, please call your primary care provider or clinic, None  For questions related to your surgery, please call your surgery clinic        Attending Provider     Provider Specialty    Tin Shetty MD Orthopedics       Primary Care Provider Fax #    [facility] [phone]      After Care Instructions     Activity       Your activity upon  "discharge: activity as tolerated            Diet       Follow this diet upon discharge: Orders Placed This Encounter      Advance Diet as Tolerated: Regular Diet Adult            Supplies       List the supplies the pt needs to go home: Donnie stockings measured and applied for home use. May remove QHS but should be word during day.            Wound care and dressings       Instructions to care for your wound at home: Keep waterproof dressing in place until post-op visit with Dr Shetty. (10-14 days from surgery)                  Follow-up Appointments     Follow-up and recommended labs and tests        Follow-up with Dr Shetty for wound check in 10-14 days. Call for appointment [phone].                  Additional Services     Physical Therapy Referral       *This therapy referral will be filtered to a centralized scheduling office at [facility] and the patient will receive a call to schedule an appointment at a [place] location most convenient for them. *     For [facility] scheduling, Call [phone]  Treatment: Evaluation & Treatment  Special Instructions/Modalities: Physical therapy to be done at [facility]  Call to set up appointment if not already scheduled; [phone]  Special Programs TKR    Please be aware that coverage of these services is subject to the terms and limitations of your health insurance plan.  Call member services at your health plan with any benefit or coverage questions.      **Note to Provider:  If you are referring outside of [place] for the therapy appointment, please list the name of the location in the \"special instructions\" above, print the referral and give to the patient to schedule the appointment.                  Further instructions from your care team         Call ortho surgeon before or after your follow up appointment if you experience any of these issues or have concerns:  1. Increased/persistent temperature chills and/or " sweats. Persistent low grade >99 or temp >100  2.increased/uncontrolled pain despite pain medication, sedated   3.increased/persistent bleeding, redness, swelling, bruising, drainage (yellow/odorous) or incision would pull apart  4. Calf pain, swollen/hard/warm area, chest pain or shortness of breath  5.weakness in operative leg, knee buckling, numbness and/or tingling. Or if you Fall  6. Nausea, vomiting, constipation and/or diarrhea  7. Too sleepy  8. Trouble voiding or signs and symptoms of urinary tract infection.  9. Constipation unrelieved by stool softeners (see Other instructions below)  10. General feeling illness  11. uncontrolled bleeding. Cut, incision, nose    Activity instructions:  1. Do exercises at home as instructed by therapist twice a day. Continue outpatient therapy as ordered by your doctor.  2. Ice knee after exercises and therapy or 20 minutes 3-4 times a day to reduce pain and swelling  3.  4. Slowly increase activity back to baseline    Diet Information  Drink plenty of fluids  Eat a regular balanced diet    Dressing information:   May shower (leave dressing on ),dressing is water proof. examine around incision daily  for any signs and symptoms of infection.     Wash hands before touching skin surrounding incision   DO NOT change dressing daily leave on until follow up apt.   Inspect surrounding skin daily for s/s of infections.   Do not soak in tub or pool.   If dressing loosens wash hands, tape down edge and call surgeon.  If dressing is 1/2 or more full of drainage or leaking call your suregon  If the dressing comes off completely then place sterile gauze over incision and tape around all edges and call surgeon for further direction.    Other instructions:  1. Notify your dentist of your implant so you can get antibiotics before any dental work or before any invasive procedure  2.   3. Take an over the counter stool softener (mirilax or senna) as directed while on narcotics to ensure  bowel movements are regular.  Take a laxative (milk of magnesia and/or a suppository )as directed if no bm in 2 days.  4. Keep track of when you take all medication, especially pain medication. See bottles for instructions and side effects  5. Use incentive spirometry hourly until you are back to normal activity (helps prevent pneumonia)  6. See medication handouts for medication information and side effects  7. DO NOT DRINK or DRIVE on narcotic pain medication.  8. It is important to take your anti-blood clot medication,see medication handout    *Friends, family and or care givers please read and review all this discharge information and medication sheet      Use cpap when napping and sleeping for the night.  Check blood sugars daily before meals and bedtime. Call primary care md if blood sugars are consistently above 140    If unable to wake call 911-due to pain medication  Please have all family and care givers review this information.      Follow up information  Call Md to make a follow up apt. @ [facility] [phone] 10-14 days from the day of surgery    Thank you for choosing [facility]         Pending Results     No orders found from 4/7/2018 to 4/10/2018.            Statement of Approval     Ordered          04/12/18 0656  I have reviewed and agree with all the recommendations and orders detailed in this document.  EFFECTIVE NOW     Approved and electronically signed by:  Nadia Elias PA-C           04/11/18 0709  I have reviewed and agree with all the recommendations and orders detailed in this document.  EFFECTIVE NOW     Approved and electronically signed by:  Nadia Elias PA-C             Admission Information     Date & Time Provider Department Dept. Phone    4/9/2018 Tin Shetty MD [facility] [phone]      Your Vitals Were     Blood Pressure Pulse Temperature Respirations Height Weight    150/63 90 98  F (36.7  C) 16 1.549  "m (5' 1\") 84.4 kg (186 lb)    Last Period Pulse Oximetry BMI (Body Mass Index)             2003 91% 35.14 kg/m2         LetMeGo Information     LetMeGo lets you send messages to your doctor, view your test results, renew your prescriptions, schedule appointments and more. To sign up, go to [email or web address] . Click on \"Log in\" on the left side of the screen, which will take you to the Welcome page. Then click on \"Sign up Now\" on the right side of the page.     You will be asked to enter the access code listed below, as well as some personal information. Please follow the directions to create your username and password.     Your access code is: [email or web address]  Expires: 2018  9:37 AM     Your access code will  in 90 days. If you need help or a new code, please call your [place] clinic or [phone].        Care EveryWhere ID     This is your Care EveryWhere ID. This could be used by other organizations to access your [facility] medical records  [id number]        Equal Access to Services     ANTONIO HARO AH: Hadcarlos Davis, jabari mendoza, eliecer knowles, june sauceda . So [facility] [phone].    ATENCIÓN: Si habla español, tiene a head disposición servicios gratuitos de asistencia lingüística. Micah al [phone].    We comply with applicable federal civil rights laws and [place] laws. We do not discriminate on the basis of race, color, national origin, age, disability, sex, sexual orientation, or gender identity.               Review of your medicines      START taking        Dose / Directions    aspirin 325 MG EC tablet        Dose:  325 mg   Take 1 tablet (325 mg) by mouth 2 times daily   Quantity:  60 tablet   Refills:  0       hydrOXYzine 25 MG tablet   Commonly known as:  ATARAX   Notes to Patient:  Take if ultram is not enough for pain. This medication is like benadryl          Dose:  25 mg   Take 1 tablet (25 mg) by mouth every 6 " hours as needed for other (adjuvant pain)   Quantity:  65 tablet   Refills:  0       naproxen 500 MG tablet   Commonly known as:  NAPROSYN        Dose:  500 mg   Take 1 tablet (500 mg) by mouth 2 times daily (with meals) Aspirin is to be taken a minimum of 2 hours prior to taking Naproxen.   Quantity:  60 tablet   Refills:  1       order for DME        Equipment being ordered: Walker Wheels () and Walker () Treatment Diagnosis: impaired gait   Quantity:  1 each   Refills:  0       senna-docusate 8.6-50 MG per tablet   Commonly known as:  SENOKOT-S;PERICOLACE        Dose:  1-2 tablet   Take 1-2 tablets by mouth 2 times daily as needed for constipation   Quantity:  60 tablet   Refills:  0         CONTINUE these medicines which may have CHANGED, or have new prescriptions. If we are uncertain of the size of tablets/capsules you have at home, strength may be listed as something that might have changed.        Dose / Directions    acetaminophen 325 MG tablet   Commonly known as:  TYLENOL   This may have changed:    - medication strength  - how much to take  - when to take this  - reasons to take this  - additional instructions        Dose:  975 mg   Take 3 tablets (975 mg) by mouth every 8 hours   Quantity:  200 tablet   Refills:  1       traMADol 50 MG tablet   Commonly known as:  ULTRAM   This may have changed:    - how much to take  - how to take this  - when to take this  - reasons to take this  - additional instructions        1 tab po q 6 hrs prn pain scale 3-6,  2 tabs po q 6hrs prn pain scale 7-10   Quantity:  65 tablet   Refills:  0         CONTINUE these medicines which have NOT CHANGED        Dose / Directions    ACCU-CHEK INSTANT CONTROL Liqd   Used for:  Type II or unspecified type diabetes mellitus without mention of complication, not stated as uncontrolled        Control for accu-check Pily   Quantity:  1 Bottle   Refills:  1       atorvastatin 20 MG tablet   Commonly known as:  LIPITOR         Dose:  20 mg   Take 20 mg by mouth daily   Refills:  1       B-D U/F 31G X 8 MM   Generic drug:  insulin pen needle        Refills:  1       blood glucose monitoring test strip   Commonly known as:  ACCU-CHEK KWASI   Used for:  Type 2 diabetes mellitus with diabetic neuropathy, with long-term current use of insulin (H)        Use to test blood sugars 3 times daily or as directed.   Quantity:  1 Box   Refills:  3       FLUoxetine 40 MG capsule   Commonly known as:  PROzac   Used for:  Major depressive disorder, recurrent episode, in full remission (H), LUIS (generalized anxiety disorder)        Dose:  40 mg   Take 1 capsule (40 mg) by mouth daily   Quantity:  90 capsule   Refills:  3       * insulin aspart 100 UNITS/ML injection   Commonly known as:  NovoLOG   Indication:  Type 2 Diabetes   Used for:  Type 2 diabetes mellitus with neurological manifestations (H)        Dose:  3 Units   Inject 3 Units Subcutaneous daily (with breakfast)   Refills:  0       * NovoLOG FLEXPEN 100 UNIT/ML injection   Generic drug:  insulin aspart        Inject Subcutaneous 3 times daily (with meals) Per sliding scale:  150-199 = 1 unit 200-250 = 2 units 251-300 = 3 units 301-350 = 4 units 351-400 = 5 units   > 400 = 6 units   Refills:  0       * NovoLOG FLEXPEN 100 UNIT/ML injection   Generic drug:  insulin aspart        Inject Subcutaneous At Bedtime Per sliding scale: 200-250 = 1 unit 251-300 = 2 unit 300-350 = 3 unit 351-400 = 4 units  > 400 = 5 units   Refills:  0       * NovoLOG FLEXPEN 100 UNIT/ML injection   Generic drug:  insulin aspart        Dose:  5 Units   Inject 5 Units Subcutaneous daily (with lunch)   Refills:  0       * NovoLOG FLEXPEN 100 UNIT/ML injection   Indication:  Type 2 Diabetes   Generic drug:  insulin aspart        Dose:  8 Units   Inject 8 Units Subcutaneous daily (with dinner)   Refills:  0       irbesartan-hydrochlorothiazide 150-12.5 MG per tablet   Commonly known as:  AVALIDE   Used for:  Essential  hypertension, benign        Dose:  1 tablet   Take 1 tablet by mouth daily   Quantity:  30 tablet   Refills:  0       LANTUS SOLOSTAR 100 UNIT/ML injection   Generic drug:  insulin glargine        Dose:  24 Units   Inject 24 Units Subcutaneous At Bedtime   Refills:  1       latanoprost 0.005 % ophthalmic solution   Commonly known as:  XALATAN        Dose:  1 drop   Place 1 drop Into the left eye At Bedtime   Refills:  0       mupirocin 2 % nasal ointment   Commonly known as:  BACTROBAN        Dose:  1 g   Apply 1 g into each nare 2 times daily Apply small amount to each nostril twice daily for seven days prior to surgery.   Refills:  0       omeprazole 20 MG CR capsule   Commonly known as:  priLOSEC   Used for:  Gastroesophageal reflux disease without esophagitis        Dose:  20 mg   Take 1 capsule (20 mg) by mouth daily   Quantity:  90 capsule   Refills:  3       ONETOUCH DELICA LANCETS 33G Misc        Refills:  0       PROVIGIL PO   Used for:  Organic hypersomnia        Dose:  200 mg   Take 200 mg by mouth every morning   Refills:  0       timolol 0.25 % ophthalmic solution   Commonly known as:  TIMOPTIC        Dose:  1 drop   Place 1 drop Into the left eye every morning   Refills:  0       traZODone 50 MG tablet   Commonly known as:  DESYREL   Used for:  Major depressive disorder, recurrent episode, in full remission (H), LUIS (generalized anxiety disorder)        Dose:  50 mg   Take 1 tablet (50 mg) by mouth At Bedtime   Quantity:  90 tablet   Refills:  3       Turmeric Curcumin Caps        Dose:  1 capsule   Take 1 capsule by mouth 2 times daily   Refills:  0       * Notice:  This list has 5 medication(s) that are the same as other medications prescribed for you. Read the directions carefully, and ask your doctor or other care provider to review them with you.         Where to get your medicines      These medications were sent to [facility] [address] [facility]  [address]  [facility] [address]     Phone:  [phone]     acetaminophen 325 MG tablet    aspirin 325 MG EC tablet    hydrOXYzine 25 MG tablet    naproxen 500 MG tablet    senna-docusate 8.6-50 MG per tablet         Some of these will need a paper prescription and others can be bought over the counter. Ask your nurse if you have questions.     Bring a paper prescription for each of these medications     order for DME    traMADol 50 MG tablet                Protect others around you: Learn how to safely use, store and throw away your medicines at www.disposemymeds.org.        Information about OPIOIDS     PRESCRIPTION OPIOIDS: WHAT YOU NEED TO KNOW    Prescription opioids can be used to help relieve moderate to severe pain and are often prescribed following a surgery or injury, or for certain health conditions. These medications can be an important part of treatment but also come with serious risks. It is important to work with your health care provider to make sure you are getting the safest, most effective care.    WHAT ARE THE RISKS AND SIDE EFFECTS OF OPIOID USE?  Prescription opioids carry serious risks of addiction and overdose, especially with prolonged use. An opioid overdose, often marked by slowed breathing can cause sudden death. The use of prescription opioids can have a number of side effects as well, even when taken as directed:      Tolerance - meaning you might need to take more of a medication for the same pain relief    Physical dependence - meaning you have symptoms of withdrawal when a medication is stopped    Increased sensitivity to pain    Constipation    Nausea, vomiting, and dry mouth    Sleepiness and dizziness    Confusion    Depression    Low levels of testosterone that can result in lower sex drive, energy, and strength    Itching and sweating    RISKS ARE GREATER WITH:    History of drug misuse, substance use disorder, or overdose    Mental health conditions (such as depression or  anxiety)    Sleep apnea    Older age (65 years or older)    Pregnancy    Avoid alcohol while taking prescription opioids.   Also, unless specifically advised by your health care provider, medications to avoid include:    Benzodiazepines (such as Xanax or Valium)    Muscle relaxants (such as Soma or Flexeril)    Hypnotics (such as Ambien or Lunesta)    Other prescription opioids    KNOW YOUR OPTIONS:  Talk to your health care provider about ways to manage your pain that do not involve prescription opioids. Some of these options may actually work better and have fewer risks and side effects:    Pain relievers such as acetaminophen, ibuprofen, and naproxen    Some medications that are also used for depression or seizures    Physical therapy and exercise    Cognitive behavioral therapy, a psychological, goal-directed approach, in which patients learn how to modify physical, behavioral, and emotional triggers of pain and stress    IF YOU ARE PRESCRIBED OPIOIDS FOR PAIN:    Never take opioids in greater amounts or more often than prescribed    Follow up with your primary health care provider and work together to create a plan on how to manage your pain.    Talk about ways to help manage your pain that do not involve prescription opioids    Talk about all concerns and side effects    Help prevent misuse and abuse    Never sell or share prescription opioids    Never use another person's prescription opioids    Store prescription opioids in a secure place and out of reach of others (this may include visitors, children, friends, and family)    Visit www.cdc.gov/drugoverdose to learn about risks of opioid abuse and overdose    If you believe you may be struggling with addiction, tell your health care provider and ask for guidance or call [facility] National Helpline at 9-978-609-HELP    LEARN MORE / www.cdc.gov/drugoverdose/prescribing/guideline.html    Safely dispose of unused prescription opioids: Find your local drug  take-back programs and more information about the importance of safe disposal at www.doseofreality.mn.gov             Medication List: This is a list of all your medications and when to take them. Check marks below indicate your daily home schedule. Keep this list as a reference.      Medications           Morning Afternoon Evening Bedtime As Needed    ACCU-CHEK INSTANT CONTROL Liqd   Control for accu-check Pily                                acetaminophen 325 MG tablet   Commonly known as:  TYLENOL   Take 3 tablets (975 mg) by mouth every 8 hours   Last time this was given:  975 mg on 4/12/2018  6:41 AM   Next Dose Due:  Can have after 2pm                                     aspirin 325 MG EC tablet   Take 1 tablet (325 mg) by mouth 2 times daily   Last time this was given:  325 mg on 4/12/2018  8:30 AM   Next Dose Due:  Thursday evening                                        atorvastatin 20 MG tablet   Commonly known as:  LIPITOR   Take 20 mg by mouth daily   Last time this was given:  20 mg on 4/12/2018  8:30 AM                                   B-D U/F 31G X 8 MM   Generic drug:  insulin pen needle                                blood glucose monitoring test strip   Commonly known as:  ACCU-CHEK PILY   Use to test blood sugars 3 times daily or as directed.                                FLUoxetine 40 MG capsule   Commonly known as:  PROzac   Take 1 capsule (40 mg) by mouth daily   Last time this was given:  40 mg on 4/12/2018  8:30 AM                                   hydrOXYzine 25 MG tablet   Commonly known as:  ATARAX   Take 1 tablet (25 mg) by mouth every 6 hours as needed for other (adjuvant pain)   Notes to Patient:  Take if ultram is not enough for pain. This medication is like benadryl                                     * insulin aspart 100 UNITS/ML injection   Commonly known as:  NovoLOG   Inject 3 Units Subcutaneous daily (with breakfast)                                * NovoLOG FLEXPEN 100 UNIT/ML  injection   Inject Subcutaneous 3 times daily (with meals) Per sliding scale:  150-199 = 1 unit 200-250 = 2 units 251-300 = 3 units 301-350 = 4 units 351-400 = 5 units   > 400 = 6 units   Last time this was given:  3 Units on 4/12/2018  8:34 AM   Generic drug:  insulin aspart                                            * NovoLOG FLEXPEN 100 UNIT/ML injection   Inject Subcutaneous At Bedtime Per sliding scale: 200-250 = 1 unit 251-300 = 2 unit 300-350 = 3 unit 351-400 = 4 units  > 400 = 5 units   Last time this was given:  3 Units on 4/12/2018  8:34 AM   Generic drug:  insulin aspart                                      * NovoLOG FLEXPEN 100 UNIT/ML injection   Inject 5 Units Subcutaneous daily (with lunch)   Last time this was given:  3 Units on 4/12/2018  8:34 AM   Generic drug:  insulin aspart                                   * NovoLOG FLEXPEN 100 UNIT/ML injection   Inject 8 Units Subcutaneous daily (with dinner)   Last time this was given:  3 Units on 4/12/2018  8:34 AM   Generic drug:  insulin aspart                                   irbesartan-hydrochlorothiazide 150-12.5 MG per tablet   Commonly known as:  AVALIDE   Take 1 tablet by mouth daily                                   LANTUS SOLOSTAR 100 UNIT/ML injection   Inject 24 Units Subcutaneous At Bedtime   Generic drug:  insulin glargine                                   latanoprost 0.005 % ophthalmic solution   Commonly known as:  XALATAN   Place 1 drop Into the left eye At Bedtime   Last time this was given:  1 drop on 4/11/2018  9:31 PM                                   mupirocin 2 % nasal ointment   Commonly known as:  BACTROBAN   Apply 1 g into each nare 2 times daily Apply small amount to each nostril twice daily for seven days prior to surgery.                                naproxen 500 MG tablet   Commonly known as:  NAPROSYN   Take 1 tablet (500 mg) by mouth 2 times daily (with meals) Aspirin is to be taken a minimum of 2 hours prior to taking  Naproxen.                                   omeprazole 20 MG CR capsule   Commonly known as:  priLOSEC   Take 1 capsule (20 mg) by mouth daily   Last time this was given:  20 mg on 4/12/2018  8:30 AM                                   ONETOUCH DELICA LANCETS 33G Misc                                order for DME   Equipment being ordered: Walker Wheels () and Walker () Treatment Diagnosis: impaired gait                                PROVIGIL PO   Take 200 mg by mouth every morning   Last time this was given:  200 mg on 4/12/2018  8:30 AM                                   senna-docusate 8.6-50 MG per tablet   Commonly known as:  SENOKOT-S;PERICOLACE   Take 1-2 tablets by mouth 2 times daily as needed for constipation   Last time this was given:  2 tablets on 4/12/2018  8:30 AM                                         timolol 0.25 % ophthalmic solution   Commonly known as:  TIMOPTIC   Place 1 drop Into the left eye every morning   Last time this was given:  1 drop on 4/12/2018  8:34 AM                                   traMADol 50 MG tablet   Commonly known as:  ULTRAM   1 tab po q 6 hrs prn pain scale 3-6,  2 tabs po q 6hrs prn pain scale 7-10   Last time this was given:  50 mg on 4/11/2018  2:01 PM                                   traZODone 50 MG tablet   Commonly known as:  DESYREL   Take 1 tablet (50 mg) by mouth At Bedtime   Last time this was given:  50 mg on 4/11/2018  9:27 PM                                   Turmeric Curcumin Caps   Take 1 capsule by mouth 2 times daily                                      * Notice:  This list has 5 medication(s) that are the same as other medications prescribed for you. Read the directions carefully, and ask your doctor or other care provider to review them with you.

## 2018-04-09 NOTE — OP NOTE
Procedure Date: 04/09/2018      OPERATIVE REPORT:      PREOPERATIVE DIAGNOSIS:  Osteoarthritis, left knee.      POSTOPERATIVE DIAGNOSIS:  Osteoarthritis, left knee.      PROCEDURE PERFORMED:  Left total knee arthroplasty.      SURGEON:  Tin Shetty MD      ASSISTANT:  Nadia Elias PA-C      ANESTHESIA:  General with adductor canal block.      ESTIMATED BLOOD LOSS:  25 mL      TOURNIQUET TIME:  Approximately 75 minutes.      COMPLICATIONS:  None.      DESCRIPTION OF PROCEDURE:  The patient was taken to the operating room where after successful administration of general anesthetic, antibiotic prophylaxis, tranexamic acid, adductor canal block and sterile prep and drape, the leg was exsanguinated and an anterior incision was made followed by ultimately moderate/near complete medial release required for ligamentous balancing.  An intramedullary 5-degree guide was used with anterior referencing at 3-degrees of external rotation which did appear to match the epicondylar axis and a box cut was made for PCL substitution.  Retractors were carefully placed about the proximal tibia and a cut was made perpendicular to the mechanical axis of the tibia, removing approximately 2-3 mm of bone from the more deficient medial side.  Broad grade IV changes were present.  Tibial rotation was matched to the femur and at the junction of the medial and middle thirds of the tubercle.  Small posterior osteophytes were removed under direct vision and a capsular injection was performed carefully protecting the neurovascular structures; 9 mm was resected from the undersurface of a 22 mm patella and sized appropriately.  Of note, the patient's general soft tissue envelope was stiff and fibrous.  There was poor flexion noted during prepping and draping and also difficulty everting the patella.      After copious lavage and drying, antibiotic-impregnated cement was used due to the patient's insulin-dependent diabetes.  A Sam Persona  size 3 femur, size D tibia, 14 mm polyethylene insert, and a 29 mm patellar button were used.  Range of motion, balance and tracking were acceptable.  Due to the medial release, there was grade II medial and lateral laxity at 30-degrees with grade I+ medial and lateral laxity in full extension.  There was no evidence of flexion instability and there was slight recurvatum.  After Betadine and antibiotic irrigation, layered closure was accomplished over a deep drain.        She will receive 24 hours of antibiotic prophylaxis and 2-4 weeks of DVT prophylaxis.         WENDY CHAN MD             D: 2018   T: 2018   MT: SUSU      Name:     MICHAEL RAIN   MRN:      [id number]        Account:        [id number]   :      1955           Procedure Date: 2018      Document: [id number]

## 2018-04-10 ENCOUNTER — APPOINTMENT (OUTPATIENT)
Dept: PHYSICAL THERAPY | Facility: CLINIC | Age: 63
DRG: 470 | End: 2018-04-10
Attending: ORTHOPAEDIC SURGERY
Payer: COMMERCIAL

## 2018-04-10 ENCOUNTER — APPOINTMENT (OUTPATIENT)
Dept: OCCUPATIONAL THERAPY | Facility: CLINIC | Age: 63
DRG: 470 | End: 2018-04-10
Attending: ORTHOPAEDIC SURGERY
Payer: COMMERCIAL

## 2018-04-10 LAB
ANION GAP SERPL CALCULATED.3IONS-SCNC: 4 MMOL/L (ref 3–14)
BUN SERPL-MCNC: 18 MG/DL (ref 7–30)
CALCIUM SERPL-MCNC: 8.5 MG/DL (ref 8.5–10.1)
CHLORIDE SERPL-SCNC: 102 MMOL/L (ref 94–109)
CO2 SERPL-SCNC: 33 MMOL/L (ref 20–32)
CREAT SERPL-MCNC: 0.86 MG/DL (ref 0.52–1.04)
GFR SERPL CREATININE-BSD FRML MDRD: 67 ML/MIN/1.7M2
GLUCOSE BLDC GLUCOMTR-MCNC: 178 MG/DL (ref 70–99)
GLUCOSE BLDC GLUCOMTR-MCNC: 180 MG/DL (ref 70–99)
GLUCOSE BLDC GLUCOMTR-MCNC: 205 MG/DL (ref 70–99)
GLUCOSE BLDC GLUCOMTR-MCNC: 225 MG/DL (ref 70–99)
GLUCOSE BLDC GLUCOMTR-MCNC: 291 MG/DL (ref 70–99)
GLUCOSE SERPL-MCNC: 160 MG/DL (ref 70–99)
HGB BLD-MCNC: 10 G/DL (ref 11.7–15.7)
POTASSIUM SERPL-SCNC: 3.6 MMOL/L (ref 3.4–5.3)
SODIUM SERPL-SCNC: 139 MMOL/L (ref 133–144)

## 2018-04-10 PROCEDURE — 36415 COLL VENOUS BLD VENIPUNCTURE: CPT | Performed by: ORTHOPAEDIC SURGERY

## 2018-04-10 PROCEDURE — 00000146 ZZHCL STATISTIC GLUCOSE BY METER IP

## 2018-04-10 PROCEDURE — 12000000 ZZH R&B MED SURG/OB

## 2018-04-10 PROCEDURE — 97110 THERAPEUTIC EXERCISES: CPT | Mod: GP

## 2018-04-10 PROCEDURE — 85018 HEMOGLOBIN: CPT | Performed by: ORTHOPAEDIC SURGERY

## 2018-04-10 PROCEDURE — 36415 COLL VENOUS BLD VENIPUNCTURE: CPT | Performed by: PHYSICIAN ASSISTANT

## 2018-04-10 PROCEDURE — 99222 1ST HOSP IP/OBS MODERATE 55: CPT | Performed by: INTERNAL MEDICINE

## 2018-04-10 PROCEDURE — 97530 THERAPEUTIC ACTIVITIES: CPT | Mod: GP

## 2018-04-10 PROCEDURE — 25000125 ZZHC RX 250: Performed by: ORTHOPAEDIC SURGERY

## 2018-04-10 PROCEDURE — 97530 THERAPEUTIC ACTIVITIES: CPT | Mod: GO

## 2018-04-10 PROCEDURE — 80048 BASIC METABOLIC PNL TOTAL CA: CPT | Performed by: PHYSICIAN ASSISTANT

## 2018-04-10 PROCEDURE — 25000131 ZZH RX MED GY IP 250 OP 636 PS 637: Performed by: PHYSICIAN ASSISTANT

## 2018-04-10 PROCEDURE — 25000132 ZZH RX MED GY IP 250 OP 250 PS 637: Performed by: ORTHOPAEDIC SURGERY

## 2018-04-10 PROCEDURE — 97161 PT EVAL LOW COMPLEX 20 MIN: CPT | Mod: GP

## 2018-04-10 PROCEDURE — 97535 SELF CARE MNGMENT TRAINING: CPT | Mod: GO

## 2018-04-10 PROCEDURE — 25000128 H RX IP 250 OP 636: Performed by: ORTHOPAEDIC SURGERY

## 2018-04-10 PROCEDURE — 97116 GAIT TRAINING THERAPY: CPT | Mod: GP

## 2018-04-10 PROCEDURE — 25000132 ZZH RX MED GY IP 250 OP 250 PS 637: Performed by: PHYSICIAN ASSISTANT

## 2018-04-10 PROCEDURE — 25000125 ZZHC RX 250: Performed by: PHYSICIAN ASSISTANT

## 2018-04-10 PROCEDURE — 40000193 ZZH STATISTIC PT WARD VISIT

## 2018-04-10 PROCEDURE — 40000133 ZZH STATISTIC OT WARD VISIT

## 2018-04-10 PROCEDURE — 99207 ZZC CONSULT E&M CHANGED TO INITIAL LEVEL: CPT | Performed by: INTERNAL MEDICINE

## 2018-04-10 RX ORDER — ATORVASTATIN CALCIUM 20 MG/1
20 TABLET, FILM COATED ORAL DAILY
Status: DISCONTINUED | OUTPATIENT
Start: 2018-04-10 | End: 2018-04-12 | Stop reason: HOSPADM

## 2018-04-10 RX ORDER — TRAZODONE HYDROCHLORIDE 50 MG/1
50 TABLET, FILM COATED ORAL AT BEDTIME
Status: DISCONTINUED | OUTPATIENT
Start: 2018-04-10 | End: 2018-04-12 | Stop reason: HOSPADM

## 2018-04-10 RX ORDER — TIMOLOL MALEATE 2.5 MG/ML
1 SOLUTION/ DROPS OPHTHALMIC EVERY MORNING
Status: DISCONTINUED | OUTPATIENT
Start: 2018-04-10 | End: 2018-04-12 | Stop reason: HOSPADM

## 2018-04-10 RX ORDER — IRBESARTAN AND HYDROCHLOROTHIAZIDE 150; 12.5 MG/1; MG/1
1 TABLET, FILM COATED ORAL DAILY
Status: DISCONTINUED | OUTPATIENT
Start: 2018-04-10 | End: 2018-04-10

## 2018-04-10 RX ORDER — MODAFINIL 200 MG/1
200 TABLET ORAL EVERY MORNING
Status: DISCONTINUED | OUTPATIENT
Start: 2018-04-10 | End: 2018-04-12 | Stop reason: HOSPADM

## 2018-04-10 RX ORDER — LATANOPROST 50 UG/ML
1 SOLUTION/ DROPS OPHTHALMIC AT BEDTIME
Status: DISCONTINUED | OUTPATIENT
Start: 2018-04-10 | End: 2018-04-12 | Stop reason: HOSPADM

## 2018-04-10 RX ORDER — HYDROCHLOROTHIAZIDE 12.5 MG/1
12.5 CAPSULE ORAL DAILY
Status: DISCONTINUED | OUTPATIENT
Start: 2018-04-10 | End: 2018-04-12 | Stop reason: HOSPADM

## 2018-04-10 RX ORDER — IRBESARTAN 150 MG/1
150 TABLET ORAL DAILY
Status: DISCONTINUED | OUTPATIENT
Start: 2018-04-10 | End: 2018-04-12 | Stop reason: HOSPADM

## 2018-04-10 RX ADMIN — ACETAMINOPHEN 975 MG: 325 TABLET, FILM COATED ORAL at 01:05

## 2018-04-10 RX ADMIN — ASPIRIN 325 MG: 325 TABLET, DELAYED RELEASE ORAL at 20:25

## 2018-04-10 RX ADMIN — ACETAMINOPHEN 975 MG: 325 TABLET, FILM COATED ORAL at 18:03

## 2018-04-10 RX ADMIN — LATANOPROST 1 DROP: 50 SOLUTION/ DROPS OPHTHALMIC at 21:33

## 2018-04-10 RX ADMIN — INSULIN ASPART 5 UNITS: 100 INJECTION, SOLUTION INTRAVENOUS; SUBCUTANEOUS at 13:27

## 2018-04-10 RX ADMIN — SENNOSIDES AND DOCUSATE SODIUM 1 TABLET: 8.6; 5 TABLET ORAL at 20:24

## 2018-04-10 RX ADMIN — ACETAMINOPHEN 975 MG: 325 TABLET, FILM COATED ORAL at 08:38

## 2018-04-10 RX ADMIN — SENNOSIDES AND DOCUSATE SODIUM 1 TABLET: 8.6; 5 TABLET ORAL at 08:39

## 2018-04-10 RX ADMIN — ONDANSETRON 4 MG: 4 TABLET, ORALLY DISINTEGRATING ORAL at 11:45

## 2018-04-10 RX ADMIN — INSULIN ASPART 8 UNITS: 100 INJECTION, SOLUTION INTRAVENOUS; SUBCUTANEOUS at 17:56

## 2018-04-10 RX ADMIN — FERROUS GLUCONATE 324 MG: 324 TABLET ORAL at 08:39

## 2018-04-10 RX ADMIN — ASPIRIN 325 MG: 325 TABLET, DELAYED RELEASE ORAL at 08:39

## 2018-04-10 RX ADMIN — ATORVASTATIN CALCIUM 20 MG: 20 TABLET, FILM COATED ORAL at 09:56

## 2018-04-10 RX ADMIN — TIMOLOL MALEATE 1 DROP: 2.5 SOLUTION/ DROPS OPHTHALMIC at 09:57

## 2018-04-10 RX ADMIN — CEFAZOLIN SODIUM 2 G: 2 INJECTION, SOLUTION INTRAVENOUS at 01:06

## 2018-04-10 RX ADMIN — INSULIN GLARGINE 24 UNITS: 100 INJECTION, SOLUTION SUBCUTANEOUS at 21:33

## 2018-04-10 RX ADMIN — OXYCODONE HYDROCHLORIDE 10 MG: 10 TABLET, FILM COATED, EXTENDED RELEASE ORAL at 08:38

## 2018-04-10 RX ADMIN — INSULIN ASPART 3 UNITS: 100 INJECTION, SOLUTION INTRAVENOUS; SUBCUTANEOUS at 08:38

## 2018-04-10 RX ADMIN — OMEPRAZOLE 20 MG: 20 CAPSULE, DELAYED RELEASE ORAL at 09:56

## 2018-04-10 RX ADMIN — CELECOXIB 400 MG: 200 CAPSULE ORAL at 08:39

## 2018-04-10 RX ADMIN — OXYCODONE HYDROCHLORIDE 5 MG: 5 TABLET ORAL at 08:38

## 2018-04-10 RX ADMIN — OXYCODONE HYDROCHLORIDE 5 MG: 5 TABLET ORAL at 11:45

## 2018-04-10 RX ADMIN — FLUOXETINE 40 MG: 20 CAPSULE ORAL at 09:56

## 2018-04-10 NOTE — PLAN OF CARE
"Problem: Patient Care Overview  Goal: Plan of Care/Patient Progress Review  Outcome: Improving  Vitals:/55  Pulse 90  Temp 96.5  F (35.8  C) (Oral)  Resp 12  Ht 1.549 m (5' 1\")  Wt 84.4 kg (186 lb)  LMP 12/04/2003  SpO2 96%  BMI 35.14 kg/m2  Pain: Pt denies pain overnight  LOC: Alert and oriented x4  Mobility: Up with an assist of 2  Lungs: Lung sounds clear.  : Anna  GI: Last BM 4/8/18. Bowel sounds active.   IV: Lactated ringer 100ml/hr            "

## 2018-04-10 NOTE — PLAN OF CARE
"Discharge Planner PT     PT: Orders received and PT eval completed. Pt is 64 yo female POD #1 following L TKA. Pt reports living in a rambler style home with 2 steps to enter from the garage and full flight to her basement sewing room. She reports being independent at baseline but states she feels she has been very unsteady at baseline and reporting \"bumping\" into walls. She also reports 2 falls over the past year.     Patient plan for discharge: Home with assist of her   Current status: Pt performs supine to sit transfer with extra time and CGA with use of bed rail and cues for improved technique. Pt transfers sit to/from stand with CGA and cues for safety hand placement. Pt ambualtes with fww and CGA 15ft with step-to gait pattern and short shuffling steps with difficulty with weight acceptance on her L LE. SLR is independent, no KI. Vitals stable throughout mobility.   Barriers to return to prior living situation: Stairs to access home, will trial with PT  Recommendations for discharge: Home with assist of  as needed and OP PT  Rationale for recommendations: Pt is below their functional baseline and would benefit from continued skilled PT intervention to address their functional limitations and restrictions prior to returning home to maximize their functional potential.       Entered by: Cass Ortiz 04/10/2018 10:05 AM       "

## 2018-04-10 NOTE — PROGRESS NOTES
" 04/10/18 0947   Quick Adds   Type of Visit Initial PT Evaluation   Living Environment   Lives With spouse   Living Arrangements house   Home Accessibility stairs to enter home;stairs within home   Number of Stairs to Enter Home 2   Number of Stairs Within Home 12  (to basement sewing room)   Stair Railings at Home outside, present on right side;inside, present on right side   Transportation Available family or friend will provide   Living Environment Comment Pt reports living in a rambler style home with 2 steps to enter from the garage and full flight to her basement sewing room. She reports being independent at baseline but states she feels she has been very unsteady at baseline and reporting \"bumping\"into walls. She also reports 2 falls over the past year.    Self-Care   Usual Activity Tolerance good   Current Activity Tolerance fair   Regular Exercise yes   Activity/Exercise Type biking;walking  (pt enjoys camping in the summer)   Equipment Currently Used at Home cane, straight   Functional Level Prior   Ambulation 1-->assistive equipment  (Uses a Cane)   Transferring 1-->assistive equipment   Toileting 0-->independent   Bathing 0-->independent   Dressing 0-->independent   Eating 0-->independent   Communication 0-->understands/communicates without difficulty   Swallowing 0-->swallows foods/liquids without difficulty   Cognition 0 - no cognition issues reported   Fall history within last six months yes   Number of times patient has fallen within last six months 2   Which of the above functional risks had a recent onset or change? transferring;ambulation   Prior Functional Level Comment Able to take care of self, but household chores are limited.   General Information   Onset of Illness/Injury or Date of Surgery - Date 04/09/18   Referring Physician Dr. Tin Shetty   Patient/Family Goals Statement to return home tomorrow   Pertinent History of Current Problem (include personal factors and/or comorbidities that " impact the POC) Pt is 64 yo female POD #1 following L TKA.    Precautions/Limitations fall precautions   Weight-Bearing Status - LLE weight-bearing as tolerated   Weight-Bearing Status - RLE full weight-bearing   Cognitive Status Examination   Orientation orientation to person, place and time   Level of Consciousness alert   Follows Commands and Answers Questions 100% of the time;able to follow multistep instructions   Personal Safety and Judgment intact   Memory intact   Pain Assessment   Patient Currently in Pain Yes, see Vital Sign flowsheet   Integumentary/Edema   Integumentary/Edema Comments unable to visualize incision due to dressing   Posture    Posture Forward head position;Protracted shoulders   Range of Motion (ROM)   ROM Comment Generally WFL, L knee approx 10-65 degrees   Strength   Strength Comments Generally WFL, L knee full AROM against gravity   Bed Mobility   Bed Mobility Comments Pt performs bed mobility with CGA   Transfer Skills   Transfer Comments Pt transfers sit to/from stand with CGA   Gait   Gait Comments Pt ambulates with fww and CGA with slow unsteady gait   Balance   Balance Comments good seated, good/fair standing   Coordination   Coordination no deficits were identified   Muscle Tone   Muscle Tone no deficits were identified   Modality Interventions   Planned Modality Interventions Cryotherapy   General Therapy Interventions   Planned Therapy Interventions bed mobility training;gait training;neuromuscular re-education;strengthening;ROM;stretching;transfer training;risk factor education;home program guidelines   Clinical Impression   Criteria for Skilled Therapeutic Intervention yes, treatment indicated   PT Diagnosis Difficulty with gait   Influenced by the following impairments Pt presents with increased pain and weakness, impaired balance, and decreased functional capacity   Functional limitations due to impairments Pt demonstrates increased difficulty with bed mobility, transfers,  "and ambulation    Clinical Presentation Stable/Uncomplicated   Clinical Presentation Rationale clinical observation   Clinical Decision Making (Complexity) Low complexity   Therapy Frequency` 2 times/day   Predicted Duration of Therapy Intervention (days/wks) 3 days   Anticipated Equipment Needs at Discharge front wheeled walker   Anticipated Discharge Disposition Home with Assist;Home with Outpatient Therapy   Risk & Benefits of therapy have been explained Yes   Patient, Family & other staff in agreement with plan of care Yes   [facility] TM \"6 Clicks\"   2016, Trustees of [employer], under license to [employer]  All rights reserved.   6 Clicks Short Forms Basic Mobility Inpatient Short Form   [facility]  \"6 Clicks\" V.2 Basic Mobility Inpatient Short Form   1. Turning from your back to your side while in a flat bed without using bedrails? 3 - A Little   2. Moving from lying on your back to sitting on the side of a flat bed without using bedrails? 3 - A Little   3. Moving to and from a bed to a chair (including a wheelchair)? 3 - A Little   4. Standing up from a chair using your arms (e.g., wheelchair, or bedside chair)? 3 - A Little   5. To walk in hospital room? 3 - A Little   6. Climbing 3-5 steps with a railing? 2 - A Lot   Basic Mobility Raw Score (Score out of 24.Lower scores equate to lower levels of function) 17   Total Evaluation Time   Total Evaluation Time (Minutes) 10     "

## 2018-04-10 NOTE — PROGRESS NOTES
Consult for was received.  Chart review was performed and diabetic medications were restarted.  I confirmed with nursing that she was eating a full diet and given this information her pre-meal insulin was reordered as well as her nightly Lantus 24 units.  A medium resistance sliding scale was also ordered.  Full consult will be performed tomorrow

## 2018-04-10 NOTE — PLAN OF CARE
Problem: Knee Arthroplasty (Total, Partial) (Adult)  Goal: Signs and Symptoms of Listed Potential Problems Will be Absent, Minimized or Managed (Knee Arthroplasty)  Signs and symptoms of listed potential problems will be absent, minimized or managed by discharge/transition of care (reference Knee Arthroplasty (Total, Partial) (Adult) CPG).   Outcome: No Change  AOx4, assist x1 with transfers, WBAT. Left knee dressing is CDI, CMS intact. PRN oxycodone effective for pain control. Pt is tolerating a regular diet without N/V. Anna patent and draining clear yellow urine. VSS.

## 2018-04-10 NOTE — PROGRESS NOTES
"Orthopedic Surgery  4/10/2018  POD 1    S: Patient voices no unexpected ortho complaints today. Denies chest pain or shortness of breath. Mild pain; did not sleep well due to interruptions.    O: Blood pressure 111/55, pulse 90, temperature 96.5  F (35.8  C), temperature source Oral, resp. rate 12, height 1.549 m (5' 1\"), weight 84.4 kg (186 lb), last menstrual period 12/04/2003, SpO2 96 %, not currently breastfeeding.  Lab Results   Component Value Date    HGB 10.0 04/10/2018     Lab Results   Component Value Date    INR 0.96 11/15/2011     I/O last 3 completed shifts:  In: 3926 [P.O.:520; I.V.:3406]  Out: 1925 [Urine:1525; Drains:375; Blood:25]  Distal extremity CMSI bilaterally.  Calves are negative bilaterally, both soft and nontender.  The dressing is C/D/I.      A: Ms. Stanley Casper is doing well status post Procedure(s):  ARTHROPLASTY KNEE.    P: Continue physical therapy. Continue DVT pphx with ASA due to high mobility and low risk factors for DVT. Anticipate discharge to home likely on POD 2 sissy Elias PA-C  [phone]  "

## 2018-04-10 NOTE — PLAN OF CARE
Problem: Patient Care Overview  Goal: Plan of Care/Patient Progress Review  Outcome: Improving  pts CMS intact . pts pain is controlled with po meds.  Pt is voiding and tolerating diet.  Pt is up with assist and walker.

## 2018-04-10 NOTE — CONSULTS
Hospitalist Consultation      Kemi Casper MRN# [id number]   YOB: 1955 Age: 63 year old   Date of Admission: 4/9/2018     Requesting Physician: Dr. Shetty   Reason for consult:  Post operative medical management            Assessment and Plan:   This patient is a 63 year old female with a PMH significant for IDDM, HTN, CKD, HLD, GERD, SHAREE compliant with CPAP, OA, depression, and chronic anemia who is POD 1 s/p left TKA.     1. S/p left TKA: doing well, cont PT/OT and pain control as per primary    2. IDDM2: most recent HgbA1C of 7.9 on 2/26/18. Home regimen includes Lantus 24 units qhs, Novolog TID with meals (3 units with breakfast, 5 units with lunch, and 8 units with dinner), and SSI   - Continue PTA Lantus 24 units qhs and Novolog TID with meals   - SSI ordered     3. HTN: stable, continue Irbesartan-HCTZ with parameters     4. CKD: stable with Cr today of 0.86.     5. HLD: continue PTA Atorvastatin     6. GERD: resume Omeprazole     7. Acute on chronic anemia: unclear baseline, but appears to be near 11.0-12.0. Mild drop from pre-op Hgb of 12.2 to 10.0 today, likely due to acute blood loss from surgery. Continue to monitor.     8. SHAREE: continue nightly CPAP use     DVT Prophylaxis: on ASA and PCDs as per primary  D/C planning: To home with assistance of her               History of Present Illness:   This patient is a 63 year old female who is POD 1 s/p left TKA. Intra-op report reviewed and showed no intra-op complications. I/o's reviewed, currently net +2.0 L with good UOP since OR. Hgb stable this am at 10.0.    Overnight did pretty well, no complaints, VSS. Currently tolerating a full diet, pain tolerable but starting to increase with nerve block wearing off, but patient denies fever, chills, nausea, vomiting, diarrhea, chest pain, shortness of breath, or abdominal pain. She is passing flatus. Just had her barr removed this morning and has yet to void. O/w other medical  problems have been stable, with no recent c/o illness.               Past Medical History:     Past Medical History:   Diagnosis Date     Complication of anesthesia     psychological fear of waking up during surgery     Depression      Diabetes mellitus      Essential hypertension, benign 7/2/2002     Fibromyalgia      GERD (gastroesophageal reflux disease)      Hyperlipidemia      Incontinence of urine      Major depressive disorder, recurrent episode, in full remission (H) 4/20/2011     Mixed hyperlipidemia 7/22/2016     Need for prophylactic hormone replacement therapy (postmenopausal) 12/14/2005     Obesity (BMI 30.0-34.9) 7/22/2016     Osteoarthritis      Other abnormal glucose 2007     Sleep apnea 2/9/2011     Type 2 diabetes mellitus with neurological manifestations (HCC) 5/6/2015          Past Surgical History:     Past Surgical History:   Procedure Laterality Date     ARTHROSCOPY KNEE Left 07/26/2016     C APPENDECTOMY  2002    done with hysterectomy     C EYE EXAM ESTABLISHED PT  2003     C GOETZ W/O FACETEC FORAMOT/DSKC 1/2 VRT SEG, LUMBAR  10/02/2000    Laminectomy, Lumbar     CHOLECYSTECTOMY  2002     HC DILATION/CURETTAGE DIAG/THER NON OB  1977    D & C     HYSTERECTOMY TOTAL ABDOMINAL, BILATERAL SALPINGO-OOPHORECTOMY, COMBINED  2002     REMV PILONIDAL LESION SIMPLE  age 17     TONSILLECTOMY, ADENOIDECTOMY, COMBINED  age 5          Social History:     Social History   Substance Use Topics     Smoking status: Never Smoker     Smokeless tobacco: Never Used     Alcohol use No          Family History:   Family history fully reviewed with patient and noncontributory.           Allergies:     Allergies   Allergen Reactions     Lisinopril Cough     Adhesive Tape Rash          Medications:     Prior to Admission medications    Medication Sig Last Dose Taking? Auth Provider   irbesartan-hydrochlorothiazide (AVALIDE) 150-12.5 MG per tablet Take 1 tablet by mouth daily 4/9/2018 at 0600 Yes Estefania Emery MD    TraMADol HCl (ULTRAM PO) Take 50 mg by mouth every 6 hours as needed for moderate to severe pain Past Month at Unknown time Yes Reported, Patient   insulin aspart (NOVOLOG FLEXPEN) 100 UNIT/ML injection Inject Subcutaneous At Bedtime Per sliding scale:  200-250 = 1 unit  251-300 = 2 unit  300-350 = 3 unit  351-400 = 4 units   > 400 = 5 units 4/8/2018 at 2300 Yes Unknown, Entered By History   acetaminophen (TYLENOL) 500 MG tablet Take 1,000 mg by mouth every 6 hours as needed for mild pain every four to six hours 4/8/2018 at 0800 Yes Unknown, Entered By History   latanoprost (XALATAN) 0.005 % ophthalmic solution Place 1 drop Into the left eye At Bedtime 4/8/2018 at Unknown time Yes Unknown, Entered By History   timolol (TIMOPTIC) 0.25 % ophthalmic solution Place 1 drop Into the left eye every morning 4/8/2018 at Unknown time Yes Unknown, Entered By History   traZODone (DESYREL) 50 MG tablet Take 1 tablet (50 mg) by mouth At Bedtime 4/8/2018 at 2300 Yes Estefania Emery MD   FLUoxetine (PROZAC) 40 MG capsule Take 1 capsule (40 mg) by mouth daily 4/8/2018 at 0800 Yes Estefania Emery MD   omeprazole (PRILOSEC) 20 MG CR capsule Take 1 capsule (20 mg) by mouth daily 4/9/2018 at 0600 Yes Estefania Emery MD   Modafinil (PROVIGIL PO) Take 200 mg by mouth every morning  4/8/2018 at 0800 Yes Reported, Patient   LANTUS SOLOSTAR 100 UNIT/ML soln Inject 24 Units Subcutaneous At Bedtime  4/8/2018 at 2300 Yes Reported, Patient   atorvastatin (LIPITOR) 20 MG tablet Take 20 mg by mouth daily  4/8/2018 at 0800 Yes Reported, Patient   insulin aspart (NOVOLOG FLEXPEN) 100 UNIT/ML injection Inject 5 Units Subcutaneous daily (with lunch) Unknown at Unknown time  Reported, Patient   insulin aspart (NOVOLOG FLEXPEN) 100 UNIT/ML injection Inject 8 Units Subcutaneous daily (with dinner) Unknown at Unknown time  Reported, Patient   insulin aspart (NOVOLOG FLEXPEN) 100 UNIT/ML injection Inject Subcutaneous 3 times daily (with meals) Per  "sliding scale:   150-199 = 1 unit  200-250 = 2 units  251-300 = 3 units  301-350 = 4 units  351-400 = 5 units    > 400 = 6 units Unknown at Unknown time  Unknown, Entered By History   Misc Natural Products (TURMERIC CURCUMIN) CAPS Take 1 capsule by mouth 2 times daily Unknown at Unknown time  Unknown, Entered By History   mupirocin (BACTROBAN) 2 % nasal ointment Apply 1 g into each nare 2 times daily Apply small amount to each nostril twice daily for seven days prior to surgery. Unknown at Unknown time  Reported, Patient   insulin aspart (NOVOLOG) 100 UNITS/ML injection Inject 3 Units Subcutaneous daily (with breakfast)  Unknown at Unknown time  Reported, Patient   blood glucose monitoring (ACCU-CHEK PILY) test strip Use to test blood sugars 3 times daily or as directed.   Cass Cummings PA-C B-D U/F 31G X 8 MM insulin pen needle    Reported, Patient   ONETOUCH DELICA LANCETS 33G MISC    Reported, Patient   Blood Glucose Calibration (ACCU-CHEK INSTANT CONTROL) LIQD Control for accu-check Pily   Estefania Emery MD          Review of Systems:   A comprehensive greater than 10 system review of systems was carried out.  Pertinent positives and negatives are noted above.  Otherwise negative for contributory info.            Physical Exam:   Vitals were reviewed  Blood pressure 111/55, pulse 90, temperature 96.5  F (35.8  C), temperature source Oral, resp. rate 12, height 1.549 m (5' 1\"), weight 84.4 kg (186 lb), last menstrual period 12/04/2003, SpO2 96 %, not currently breastfeeding.  Exam:    GENERAL:  Comfortable.  PSYCH: pleasant, oriented, No acute distress.  HEENT:  PERRLA. Normal conjunctiva, normal hearing, nasal mucosa and oropharynx are normal.  NECK:  Supple, no neck vein distention, adenopathy or bruits, normal thyroid.  HEART:  Normal S1, S2 with no murmur, no pericardial rub, S3 or S4.  LUNGS:  Clear to auscultation, normal respiratory effort.  ABDOMEN:  Soft, no hepatosplenomegaly, normal " bowel sounds.  EXTREMITIES:  No pedal edema, +2 pulses bilateral and equal. Left knee dressing c/d/i with drain in place   SKIN:  Dry to touch, No rash, wound or ulcerations.  NEUROLOGIC:  Nonfocal with normal cranial nerve and motor power and sensation.          Data:   Past 24 hours labs, studies, and imaging were reviewed.    Results for orders placed or performed during the hospital encounter of 04/09/18   XR Knee Port Left 1/2 Views    Narrative    XR KNEE PORT LT 1/2 VW 4/9/2018 11:07 AM    HISTORY: Knee replacement.    COMPARISON: None.      Impression    IMPRESSION: Status post knee arthroplasty.  Hardware is intact.  Alignment is anatomic. There is a surgical drain within the knee  joint.    AFRICA GRANT MD   Hemoglobin   Result Value Ref Range    Hemoglobin 12.2 11.7 - 15.7 g/dL   Creatinine   Result Value Ref Range    Creatinine 0.73 0.52 - 1.04 mg/dL    GFR Estimate 81 >60 mL/min/1.7m2    GFR Estimate If Black >90 >60 mL/min/1.7m2   Potassium   Result Value Ref Range    Potassium 3.3 (L) 3.4 - 5.3 mmol/L   Glucose by meter   Result Value Ref Range    Glucose 162 (H) 70 - 99 mg/dL   Glucose by meter   Result Value Ref Range    Glucose 171 (H) 70 - 99 mg/dL   Glucose by meter   Result Value Ref Range    Glucose 161 (H) 70 - 99 mg/dL   Glucose by meter   Result Value Ref Range    Glucose 246 (H) 70 - 99 mg/dL   Glucose by meter   Result Value Ref Range    Glucose 272 (H) 70 - 99 mg/dL   Hemoglobin   Result Value Ref Range    Hemoglobin 10.0 (L) 11.7 - 15.7 g/dL   Glucose by meter   Result Value Ref Range    Glucose 291 (H) 70 - 99 mg/dL       Kavita Arthur PA-C    Pt discussed with Dr. Salguero who agrees with the care as discussed above.

## 2018-04-10 NOTE — PROGRESS NOTES
04/10/18 1500   Quick Adds   Type of Visit Initial Occupational Therapy Evaluation   Living Environment   Lives With spouse   Living Arrangements house   Home Accessibility stairs to enter home;stairs within home   Number of Stairs to Enter Home 2   Number of Stairs Within Home 12  (to sewing room in basement)   Stair Railings at Home outside, present on right side;inside, present on right side   Transportation Available family or friend will provide;car   Living Environment Comment Pt resdies in a rambler, she does use the basement for sewing. 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 transferring;ambulation   Prior Functional Level Comment Pt reports she uses a reacher in the kitchen to access higher cabinets. Pt otherwise ind-mod I with ADL's/IADLs. Spouse did assist with cooking   General Information   Onset of Illness/Injury or Date of Surgery - Date 04/09/18   Referring Physician Tin Shetty MD   Patient/Family Goals Statement to return home and to work   Additional  "Occupational Profile Info/Pertinent History of Current Problem Pt is POD #1 L TKA, previous hx of spinal surgery and hysterectomy   Precautions/Limitations fall precautions   Weight-Bearing Status - LLE weight-bearing as tolerated   Heart Disease Risk Factors Overweight;Age   General Observations When OT arrived, had been incontinent and urine was on the floor, up with LPN.  Reported feeling \"loopy\" and \"unsteady\"   Cognitive Status Examination   Orientation person;place;time   Level of Consciousness confused   Able to Follow Commands WNL/WFL;success, 3-step commands  (reported having a hard time following directions)   Personal Safety (Cognitive) WNL/WFL   Memory intact   Attention Reports problems attending   Organization/Problem Solving Reports problems with organization   Executive Function Planning ability impaired   Cognitive Comment Pt noted that she felt \"foggy\" from the medications   Visual Perception   Visual Perception Wears glasses   Sensory Examination   Sensory Comments denies   Pain Assessment   Patient Currently in Pain No   Integumentary/Edema   Integumentary/Edema Comments unable to assess LLE due to wrap   Posture   Posture forward head position   Range of Motion (ROM)   ROM Comment BUE WFL   Strength   Strength Comments BUE WFL   Mobility   Bed Mobility Comments sit>sup mod I use of overhead trapeze   Transfer Skill: Bed to Chair/Chair to Bed   Level of [place]: Bed to Chair contact guard   Physical Assist/Nonphysical Assist: Bed to Chair supervision;verbal cues   Weight-Bearing Restrictions weight-bearing as tolerated   Assistive Device - Transfer Skill Bed to Chair Chair to Bed Rehab Eval standard walker   Transfer Skill: Sit to Stand   Level of [place]: Sit/Stand contact guard   Physical Assist/Nonphysical Assist: Sit/Stand supervision;verbal cues   Transfer Skill: Sit to Stand weight-bearing as tolerated   Transfer Skill: Toilet Transfer   Level of [place]: Toilet contact guard "   Physical Assist/Nonphysical Assist: Toilet supervision;verbal cues   Weight-Bearing Restrictions: Toilet weight-bearing as tolerated   Assistive Device standard walker;grab bars   Upper Body Dressing   Level of [place]: Dress Upper Body stand-by assist   Physical Assist/Nonphysical Assist: Dress Upper Body set-up required   Lower Body Dressing   Level of [place]: Dress Lower Body minimum assist (75% patients effort)   Physical Assist/Nonphysical Assist: Dress Lower Body 1 person assist   Toileting   Level of [place]: Toilet minimum assist (75% patients effort)   Physical Assist/Nonphysical Assist: Toilet set-up required   Activities of Daily Living Analysis   Impairments Contributing to Impaired Activities of Daily Living balance impaired;cognition impaired;ROM decreased;strength decreased   General Therapy Interventions   Planned Therapy Interventions ADL retraining;transfer training;progressive activity/exercise;home program guidelines   Clinical Impression   Criteria for Skilled Therapeutic Interventions Met yes, treatment indicated   OT Diagnosis decreased indepnedence in ADL's following TKA   Influenced by the following impairments decreased balance, imapired cognition, decreased strength   Assessment of Occupational Performance 3-5 Performance Deficits   Identified Performance Deficits bathing, dressing, transfering, social   Clinical Decision Making (Complexity) Low complexity   Therapy Frequency daily   Predicted Duration of Therapy Intervention (days/wks) 2 days   Anticipated Equipment Needs at Discharge raised toilet seat;shower chair   Anticipated Discharge Disposition Home with Assist   Risks and Benefits of Treatment have been explained. Yes   Patient, Family & other staff in agreement with plan of care Yes   Clinical Impression Comments Pt is 63-year-old female POD #1 LTKA who presents with decreased balance, strength and function below her baseline level of activity and would benefit  "from skilled IP OT in order to improve safety and independence in ADL's/IADL's   [facility] AM-PAC  \"6 Clicks\" Daily Activity Inpatient Short Form   1. Putting on and taking off regular lower body clothing? 3 - A Little   2. Bathing (including washing, rinsing, drying)? 3 - A Little   3. Toileting, which includes using toilet, bedpan or urinal? 3 - A Little   4. Putting on and taking off regular upper body clothing? 4 - None   5. Taking care of personal grooming such as brushing teeth? 4 - None   6. Eating meals? 4 - None   Daily Activity Raw Score (Score out of 24.Lower scores equate to lower levels of function) 21     "

## 2018-04-11 ENCOUNTER — APPOINTMENT (OUTPATIENT)
Dept: ULTRASOUND IMAGING | Facility: CLINIC | Age: 63
DRG: 470 | End: 2018-04-11
Attending: PHYSICIAN ASSISTANT
Payer: COMMERCIAL

## 2018-04-11 ENCOUNTER — APPOINTMENT (OUTPATIENT)
Dept: PHYSICAL THERAPY | Facility: CLINIC | Age: 63
DRG: 470 | End: 2018-04-11
Attending: ORTHOPAEDIC SURGERY
Payer: COMMERCIAL

## 2018-04-11 LAB
GLUCOSE BLDC GLUCOMTR-MCNC: 162 MG/DL (ref 70–99)
GLUCOSE BLDC GLUCOMTR-MCNC: 186 MG/DL (ref 70–99)
GLUCOSE BLDC GLUCOMTR-MCNC: 191 MG/DL (ref 70–99)
GLUCOSE BLDC GLUCOMTR-MCNC: 232 MG/DL (ref 70–99)
GLUCOSE BLDC GLUCOMTR-MCNC: 248 MG/DL (ref 70–99)
HGB BLD-MCNC: 10 G/DL (ref 11.7–15.7)

## 2018-04-11 PROCEDURE — 25000131 ZZH RX MED GY IP 250 OP 636 PS 637: Performed by: PHYSICIAN ASSISTANT

## 2018-04-11 PROCEDURE — 25000125 ZZHC RX 250: Performed by: ORTHOPAEDIC SURGERY

## 2018-04-11 PROCEDURE — 25000132 ZZH RX MED GY IP 250 OP 250 PS 637: Performed by: ORTHOPAEDIC SURGERY

## 2018-04-11 PROCEDURE — 99232 SBSQ HOSP IP/OBS MODERATE 35: CPT | Performed by: INTERNAL MEDICINE

## 2018-04-11 PROCEDURE — 25000132 ZZH RX MED GY IP 250 OP 250 PS 637: Performed by: PHYSICIAN ASSISTANT

## 2018-04-11 PROCEDURE — 12000000 ZZH R&B MED SURG/OB

## 2018-04-11 PROCEDURE — 00000146 ZZHCL STATISTIC GLUCOSE BY METER IP

## 2018-04-11 PROCEDURE — 93971 EXTREMITY STUDY: CPT | Mod: LT

## 2018-04-11 PROCEDURE — 97530 THERAPEUTIC ACTIVITIES: CPT | Mod: GP | Performed by: PHYSICAL THERAPY ASSISTANT

## 2018-04-11 PROCEDURE — 97116 GAIT TRAINING THERAPY: CPT | Mod: GP | Performed by: PHYSICAL THERAPY ASSISTANT

## 2018-04-11 PROCEDURE — 97110 THERAPEUTIC EXERCISES: CPT | Mod: GP | Performed by: PHYSICAL THERAPY ASSISTANT

## 2018-04-11 PROCEDURE — 36415 COLL VENOUS BLD VENIPUNCTURE: CPT | Performed by: ORTHOPAEDIC SURGERY

## 2018-04-11 PROCEDURE — 85018 HEMOGLOBIN: CPT | Performed by: ORTHOPAEDIC SURGERY

## 2018-04-11 PROCEDURE — 40000193 ZZH STATISTIC PT WARD VISIT: Performed by: PHYSICAL THERAPY ASSISTANT

## 2018-04-11 PROCEDURE — 25000128 H RX IP 250 OP 636: Performed by: ORTHOPAEDIC SURGERY

## 2018-04-11 RX ORDER — AMOXICILLIN 250 MG
1-2 CAPSULE ORAL 2 TIMES DAILY PRN
Qty: 60 TABLET | Refills: 0 | Status: SHIPPED | OUTPATIENT
Start: 2018-04-11 | End: 2019-05-06

## 2018-04-11 RX ORDER — NAPROXEN 500 MG/1
500 TABLET ORAL 2 TIMES DAILY WITH MEALS
Qty: 60 TABLET | Refills: 1 | Status: SHIPPED | OUTPATIENT
Start: 2018-04-11 | End: 2019-05-06

## 2018-04-11 RX ORDER — ASPIRIN 325 MG
325 TABLET, DELAYED RELEASE (ENTERIC COATED) ORAL 2 TIMES DAILY
Qty: 60 TABLET | Refills: 0 | Status: SHIPPED | OUTPATIENT
Start: 2018-04-11 | End: 2018-05-11

## 2018-04-11 RX ORDER — ACETAMINOPHEN 325 MG/1
975 TABLET ORAL EVERY 8 HOURS
Qty: 200 TABLET | Refills: 1 | Status: SHIPPED | OUTPATIENT
Start: 2018-04-11 | End: 2019-05-06 | Stop reason: ALTCHOICE

## 2018-04-11 RX ORDER — TRAMADOL HYDROCHLORIDE 50 MG/1
50 TABLET ORAL EVERY 6 HOURS PRN
Status: DISCONTINUED | OUTPATIENT
Start: 2018-04-11 | End: 2018-04-12 | Stop reason: HOSPADM

## 2018-04-11 RX ORDER — HYDRALAZINE HYDROCHLORIDE 20 MG/ML
10 INJECTION INTRAMUSCULAR; INTRAVENOUS EVERY 4 HOURS PRN
Status: DISCONTINUED | OUTPATIENT
Start: 2018-04-11 | End: 2018-04-12 | Stop reason: HOSPADM

## 2018-04-11 RX ORDER — OXYCODONE HYDROCHLORIDE 5 MG/1
TABLET ORAL
Qty: 65 TABLET | Refills: 0 | Status: SHIPPED | OUTPATIENT
Start: 2018-04-11 | End: 2018-04-12

## 2018-04-11 RX ADMIN — ACETAMINOPHEN 975 MG: 325 TABLET, FILM COATED ORAL at 14:00

## 2018-04-11 RX ADMIN — IRBESARTAN 150 MG: 150 TABLET ORAL at 14:01

## 2018-04-11 RX ADMIN — FLUOXETINE 40 MG: 20 CAPSULE ORAL at 14:01

## 2018-04-11 RX ADMIN — ACETAMINOPHEN 975 MG: 325 TABLET, FILM COATED ORAL at 01:22

## 2018-04-11 RX ADMIN — SENNOSIDES AND DOCUSATE SODIUM 1 TABLET: 8.6; 5 TABLET ORAL at 21:27

## 2018-04-11 RX ADMIN — ONDANSETRON 4 MG: 4 TABLET, ORALLY DISINTEGRATING ORAL at 07:54

## 2018-04-11 RX ADMIN — TIMOLOL MALEATE 1 DROP: 2.5 SOLUTION/ DROPS OPHTHALMIC at 09:22

## 2018-04-11 RX ADMIN — TRAMADOL HYDROCHLORIDE 50 MG: 50 TABLET, COATED ORAL at 14:01

## 2018-04-11 RX ADMIN — ONDANSETRON 4 MG: 4 TABLET, ORALLY DISINTEGRATING ORAL at 14:01

## 2018-04-11 RX ADMIN — INSULIN GLARGINE 24 UNITS: 100 INJECTION, SOLUTION SUBCUTANEOUS at 21:31

## 2018-04-11 RX ADMIN — ONDANSETRON 4 MG: 4 TABLET, ORALLY DISINTEGRATING ORAL at 19:52

## 2018-04-11 RX ADMIN — PROCHLORPERAZINE EDISYLATE 5 MG: 5 INJECTION INTRAMUSCULAR; INTRAVENOUS at 11:17

## 2018-04-11 RX ADMIN — TRAZODONE HYDROCHLORIDE 50 MG: 50 TABLET ORAL at 21:27

## 2018-04-11 RX ADMIN — CELECOXIB 400 MG: 200 CAPSULE ORAL at 14:01

## 2018-04-11 RX ADMIN — ACETAMINOPHEN 975 MG: 325 TABLET, FILM COATED ORAL at 21:27

## 2018-04-11 RX ADMIN — OXYCODONE HYDROCHLORIDE 10 MG: 5 TABLET ORAL at 00:00

## 2018-04-11 RX ADMIN — LATANOPROST 1 DROP: 50 SOLUTION/ DROPS OPHTHALMIC at 21:31

## 2018-04-11 RX ADMIN — HYDROCHLOROTHIAZIDE 12.5 MG: 12.5 CAPSULE ORAL at 14:01

## 2018-04-11 RX ADMIN — HYDROMORPHONE HYDROCHLORIDE 0.3 MG: 1 INJECTION, SOLUTION INTRAMUSCULAR; INTRAVENOUS; SUBCUTANEOUS at 09:30

## 2018-04-11 RX ADMIN — OMEPRAZOLE 20 MG: 20 CAPSULE, DELAYED RELEASE ORAL at 14:01

## 2018-04-11 ASSESSMENT — PAIN DESCRIPTION - DESCRIPTORS: DESCRIPTORS: ACHING;SORE

## 2018-04-11 NOTE — PLAN OF CARE
Problem: Patient Care Overview  Goal: Plan of Care/Patient Progress Review  PT - PT cx'd d/t pt being extremely nauseated @ this time. Will see pt for PT @ P.M. Session.

## 2018-04-11 NOTE — PLAN OF CARE
"Problem: Patient Care Overview  Goal: Plan of Care/Patient Progress Review  Outcome: Improving  Vitals: /58 (BP Location: Left arm)  Pulse 86  Temp 98.6  F (37  C) (Oral)  Resp 16  Ht 1.549 m (5' 1\")  Wt 84.4 kg (186 lb)  LMP 12/04/2003  SpO2 92%  BMI 35.14 kg/m2  Pain: Pt complained of pain 9/10. Irritable and frustrated with staff. Scheduled Tylenol, PRN Oxycodone and ice applied.  LOC: Alert and oriented x4   Mobility: Up with an assist of 1 walker and gait belt.  : Voiding without difficulty  GI:  Last BM 4/8/18 Bowel sounds active.  IV: Saline locked.  Dressing is CDI, CMI intact.   Other: Pt complained that staff didn't check on her for an hour. She was in pain and unable to get help. Pt was confused and trying to use the phone as her call light. Claimed the call light didn't work and no one was responding to her needs. I checked call light and it was in fact working,. Reassured that nurses were giving report and there was someone available, but the call light wasn't put on so therefore no one knew she needed assistance. Pt was able to calm down and get some rest.             "

## 2018-04-11 NOTE — PLAN OF CARE
Problem: Patient Care Overview  Goal: Plan of Care/Patient Progress Review  Outcome: No Change  DAY RN  BP in the 150's to 160's 02 stable while awake 02 needed @2L while sleeping or cpap. nausea and emesis in am didn't eat or drink. zofran and compazine given, going to try lunch tolerated sips of liquids. Incontinent x2 of large amounts (baseline since neck surgery up with A1 belt and walker moves ok. Declined am pt due to nausea did tolerated pm therapy.  No am pt due to nausea and not feeling well through out shift.  Took  am meds after eating lunch still kind of nauseated more zofran give at 2pm when available.  CMS+ +2 swelling in l knee and leg, pcd's in place all shift. Mid shift complained of behind the knee pain, US ordered per Sara  Dc to home Thursday

## 2018-04-11 NOTE — DISCHARGE SUMMARY
Diagnosis: left DJD knee  Procedure: left Cemented total knee replacement  Surgeon: Tin Shetty MD  Patient underwent total knee replacement without complication. Patient had typical transient post-op anemia. Patient's hospital stay included physical therapy and DVT prophylaxis. After discussion with patient regarding no history of DVT and current high mobility, patient is discharged with ASA for home DVT pphx. Patient will follow -up in the office 10-14days post-op for wound check. Please refer to chart for any other specifics of this hospital stay.  Nadia Elias PA-C

## 2018-04-11 NOTE — PLAN OF CARE
Problem: Patient Care Overview  Goal: Plan of Care/Patient Progress Review  OT: Attempted session, pt limited by nausea, pain and fatigue, pt in agreement for OT session tomorrow morning to address toilet transfer with RTS;  present and reports they have borrowed a RTS for use at home

## 2018-04-11 NOTE — PROGRESS NOTES
"Orthopedic Surgery  4/11/2018  POD 2    S: Patient voices no unexpected ortho complaints today. Denies chest pain or shortness of breath. Wants to go home tonight.    O: Blood pressure 144/58, pulse 86, temperature 98.6  F (37  C), temperature source Oral, resp. rate 16, height 1.549 m (5' 1\"), weight 84.4 kg (186 lb), last menstrual period 12/04/2003, SpO2 92 %, not currently breastfeeding.  Lab Results   Component Value Date    HGB 10.0 04/10/2018     Lab Results   Component Value Date    INR 0.96 11/15/2011     I/O last 3 completed shifts:  In: 1020 [P.O.:1020]  Out: 630 [Urine:400; Drains:230]  Distal extremity CMSI bilaterally.  Calves are negative bilaterally, both soft and nontender.  The dressing is C/D/I.      A: Ms. Stanley Casper is doing well status post Procedure(s):  ARTHROPLASTY KNEE.    P: Continue physical therapy. Continue DVT pphx with ASA due to high mobility and low risk factors for DVT. Anticipate discharge to home this steve if passes PT and does steps.    Nadia Elias PA-C  [phone]  "

## 2018-04-11 NOTE — PROVIDER NOTIFICATION
Called and talked with estella (PA) that patietn is complaining now of behind the knee pain, US LLE ordered .

## 2018-04-11 NOTE — PROGRESS NOTES
[facility]  Hospitalist Progress Note  Donato Salguero MD, MD 04/11/2018  (Text Page)  Reason for Stay (Diagnosis): post operative state         Assessment and Plan:      Summary of Stay: Kemi Casper is a 63 year old female admitted on 4/9/2018 with elective LTKA     Problem List:   1. Postoperative state left TKA  2. Diabetes mellitus insulin requiring  3. Postoperative nausea/vomiting  4. Acute on chronic anemia with stable hemoglobin at 10 g  5. SHAREE on appliance  6. Chronic kidney disease with stable creatinine  7. History of hypertension  8. GERD    Check UA with persistent nausea or vomiting even after changing her narcotics.  Currently afebrile but reportedly has some baseline incontinence.  No episodes of hypoglycemia.  Remains on insulin basal and prandial.  Please hold prandial coverage if only with minimal oral intake.  We will defer pain management and postoperative care with primary orthopedic service.    DVT Prophylaxis: Defer to primary service  Code Status: Full Code  Discharge Dispo: defer to orthopedic service          Interval History (Subjective):      Continuing care today.  at bedside. No current nausea.  Reported about earlier nausea and vomiting.  Afebrile, minimal oral intake. No hypoglycemia, no hypoxia.   Earlier had some disorientation.  No abdominal pain, now with therapy.     # Pain Assessment:  Current Pain Score 4/11/2018   Patient currently in pain? yes   Pain score (0-10) 6   Pain location Knee   Pain descriptors -   - Kemi is experiencing pain due to post operative state. Pain management was discussed and the plan was created in a collaborative fashion.  Kemi's response to the current recommendations: engaged  - defer with ortho as narcotics changed to ultram due to earlier suspicion of opioid related nausea/emesis              Physical Exam:      Last Vital Signs:  /71  Pulse 90  Temp 97.5  F (36.4  C) (Oral)  Resp 18  Ht 1.549 m  "(5' 1\")  Wt 84.4 kg (186 lb)  LMP 12/04/2003  SpO2 94%  BMI 35.14 kg/m2    I/O last 3 completed shifts:  In: 1020 [P.O.:1020]  Out: 630 [Urine:400; Drains:230]  Wt Readings from Last 1 Encounters:   04/09/18 84.4 kg (186 lb)     Vitals:    03/20/18 1027 04/09/18 0657   Weight: 82.6 kg (182 lb) 84.4 kg (186 lb)       Constitutional: Awake, alert, cooperative, no apparent distress   Respiratory: Clear to auscultation bilaterally, no crackles or wheezing   Cardiovascular: Regular rate and rhythm, normal S1 and S2, and no murmur noted   Abdomen: Normal bowel sounds, soft, non-distended, non-tender   Skin: No rashes, no cyanosis, dry to touch   Neuro: Alert and oriented x3, no weakness, spontaneous and coherent speech   Extremities: No edema, normal range of motion   Other(s): Euthymic mood, not agitated       All other systems: Negative          Medications:      All current medications were reviewed with changes reflected in problem list.         Data:      All new lab and imaging data was reviewed.   Labs:  No results for input(s): CULT in the last 168 hours.  No results for input(s): NTBNPI, NTBNP in the last 168 hours.    Recent Labs  Lab 04/11/18  0646 04/10/18  0627 04/09/18  0717   HGB 10.0* 10.0* 12.2     No results for input(s): SED, CRP in the last 168 hours.    Recent Labs  Lab 04/11/18  1221 04/11/18  0754 04/11/18  0120 04/10/18  2043 04/10/18  1654  04/10/18  0828   GLC  --   --   --   --   --   --  160*   * 162* 248* 225* 205*  < >  --    < > = values in this interval not displayed.  No results for input(s): COLOR, APPEARANCE, URINEGLC, URINEBILI, URINEKETONE, SG, UBLD, URINEPH, PROTEIN, UROBILINOGEN, NITRITE, LEUKEST, RBCU, WBCU in the last 168 hours.   Imaging:   Results for orders placed or performed during the hospital encounter of 04/09/18   XR Knee Port Left 1/2 Views    Narrative    XR KNEE PORT LT 1/2 VW 4/9/2018 11:07 AM    HISTORY: Knee replacement.    COMPARISON: None.      " Impression    IMPRESSION: Status post knee arthroplasty.  Hardware is intact.  Alignment is anatomic. There is a surgical drain within the knee  joint.    AFRICA GRANT MD

## 2018-04-11 NOTE — PROGRESS NOTES
Ortho:    Discussed with RN. Will try tramadol today and plan D/c home tomorrow.    Nadia Elias PA-C  [phone]

## 2018-04-11 NOTE — PLAN OF CARE
"Problem: Knee Arthroplasty (Total, Partial) (Adult)  Goal: Signs and Symptoms of Listed Potential Problems Will be Absent, Minimized or Managed (Knee Arthroplasty)  Signs and symptoms of listed potential problems will be absent, minimized or managed by discharge/transition of care (reference Knee Arthroplasty (Total, Partial) (Adult) CPG).   Outcome: Improving  AOx4, sleepy and states, \"I feel groggy\", scheduled oxycontin held as patient states she doesn't tolerate narcotics well. Pain controlled with scheduled tylenol and ice to area. Left leg dressing is CDI. Pt is voiding sufficient amounts of urine. VSS.       "

## 2018-04-11 NOTE — PLAN OF CARE
"Problem: Patient Care Overview  Goal: Plan of Care/Patient Progress Review  OT orders received, eval completed and treatment initiated.Pt is POD #1 TKA, pt resides with her spouse in a rambler stlye home with her sewing room in the basement. Pt has a walk-in shower, walk-in bathtub and grab bars present. Pt has a reacher at home she uses primarily in the kitchen. Pt's spouse will be available to A as needed for the first few days, he does work part-time otherwise.   Discharge Planner OT   Patient plan for discharge: Home with A  Current status:  Pt experienced incontinence episode as OT arrived, due to urgency pt has at baseline. CGA with FWW and w/c follow to BR as pt reported feeling \"weak\" and \"not trusting herself\".  Pt completed toilet transfer with CGA and vc's for handplacement on walker, use of grab bars. Sit>stand pt needing cues for bring feet hip distance apart, as pt had wide stance. Set-up for [facility], pt able to complete independently.  Min A to [facility] go. Min A for LE dressing, educated on using reacher for LE dressing, but pt stating she will likely have spouse A, still needing min A with reacher. Needed A to thread drain through pant leg. Drain came open, and A to clean up area.   Pt completed UE dressing with A for set-up.  Pt declining further activity reporting fatigue. W/c from BR>bed and CGA for sit>stand and transfer to bed. Use of overhead trapeze to move toward HOB. Pt noting throughout session that she felt \"loopy\", \"weak\" and having difficult time following directions. No cognitive concerns at baseline, pt feels due to meds.   Barriers to return to prior living situation: fatigue, weakness, balance, decreased strength cognition  Recommendations for discharge: Home with A from spouse for LE dressing, supervision for transfers  Rationale for recommendations: Pt likely to improve in the next night, has A available for needs.        Entered by: Acacia Alvarez 04/10/2018 9:10 PM      "

## 2018-04-11 NOTE — PLAN OF CARE
Discharge Planner PT     Patient plan for discharge: Home with assist of her   Current status: Pt transfers supine to/ from sit with CGA with L LE. Pt transfers sit to/from stand with supervision. Pt transfers bed to/from chair with ww with min assist - CGA. Pt amb 15' x 2 with ww with min assist.   Barriers to return to prior living situation: Stairs to access home, will trial with PT  Recommendations for discharge: Home with assist of  as needed and OP PT  Rationale for recommendations: Pt is below their functional baseline and would benefit from continued skilled PT intervention to address their functional limitations and restrictions prior to returning home to maximize their functional potential.       Entered by: Kemi Acosta 04/11/2018 2:46 PM

## 2018-04-12 ENCOUNTER — APPOINTMENT (OUTPATIENT)
Dept: OCCUPATIONAL THERAPY | Facility: CLINIC | Age: 63
DRG: 470 | End: 2018-04-12
Attending: ORTHOPAEDIC SURGERY
Payer: COMMERCIAL

## 2018-04-12 ENCOUNTER — APPOINTMENT (OUTPATIENT)
Dept: PHYSICAL THERAPY | Facility: CLINIC | Age: 63
DRG: 470 | End: 2018-04-12
Attending: ORTHOPAEDIC SURGERY
Payer: COMMERCIAL

## 2018-04-12 VITALS
RESPIRATION RATE: 16 BRPM | BODY MASS INDEX: 35.12 KG/M2 | WEIGHT: 186 LBS | DIASTOLIC BLOOD PRESSURE: 63 MMHG | HEIGHT: 61 IN | SYSTOLIC BLOOD PRESSURE: 150 MMHG | HEART RATE: 90 BPM | OXYGEN SATURATION: 91 % | TEMPERATURE: 98 F

## 2018-04-12 LAB
GLUCOSE BLDC GLUCOMTR-MCNC: 135 MG/DL (ref 70–99)
GLUCOSE BLDC GLUCOMTR-MCNC: 196 MG/DL (ref 70–99)

## 2018-04-12 PROCEDURE — 40000193 ZZH STATISTIC PT WARD VISIT: Performed by: PHYSICAL THERAPY ASSISTANT

## 2018-04-12 PROCEDURE — 97110 THERAPEUTIC EXERCISES: CPT | Mod: GP | Performed by: PHYSICAL THERAPY ASSISTANT

## 2018-04-12 PROCEDURE — 40000133 ZZH STATISTIC OT WARD VISIT

## 2018-04-12 PROCEDURE — 00000146 ZZHCL STATISTIC GLUCOSE BY METER IP

## 2018-04-12 PROCEDURE — 97530 THERAPEUTIC ACTIVITIES: CPT | Mod: GO

## 2018-04-12 PROCEDURE — 97535 SELF CARE MNGMENT TRAINING: CPT | Mod: GO

## 2018-04-12 PROCEDURE — 25000132 ZZH RX MED GY IP 250 OP 250 PS 637: Performed by: PHYSICIAN ASSISTANT

## 2018-04-12 PROCEDURE — 97530 THERAPEUTIC ACTIVITIES: CPT | Mod: GP | Performed by: PHYSICAL THERAPY ASSISTANT

## 2018-04-12 PROCEDURE — 97116 GAIT TRAINING THERAPY: CPT | Mod: GP | Performed by: PHYSICAL THERAPY ASSISTANT

## 2018-04-12 PROCEDURE — 25000132 ZZH RX MED GY IP 250 OP 250 PS 637: Performed by: ORTHOPAEDIC SURGERY

## 2018-04-12 RX ORDER — HYDROXYZINE HYDROCHLORIDE 50 MG/1
50 TABLET, FILM COATED ORAL EVERY 6 HOURS PRN
Status: DISCONTINUED | OUTPATIENT
Start: 2018-04-12 | End: 2018-04-12 | Stop reason: HOSPADM

## 2018-04-12 RX ORDER — TRAMADOL HYDROCHLORIDE 50 MG/1
TABLET ORAL
Qty: 65 TABLET | Refills: 0 | Status: SHIPPED | OUTPATIENT
Start: 2018-04-12 | End: 2019-05-07

## 2018-04-12 RX ORDER — HYDROXYZINE HYDROCHLORIDE 25 MG/1
25 TABLET, FILM COATED ORAL EVERY 6 HOURS PRN
Status: DISCONTINUED | OUTPATIENT
Start: 2018-04-12 | End: 2018-04-12 | Stop reason: HOSPADM

## 2018-04-12 RX ORDER — HYDROXYZINE HYDROCHLORIDE 25 MG/1
25 TABLET, FILM COATED ORAL EVERY 6 HOURS PRN
Qty: 65 TABLET | Refills: 0 | Status: SHIPPED | OUTPATIENT
Start: 2018-04-12 | End: 2019-05-06

## 2018-04-12 RX ADMIN — ACETAMINOPHEN 975 MG: 325 TABLET, FILM COATED ORAL at 06:41

## 2018-04-12 RX ADMIN — INSULIN ASPART 3 UNITS: 100 INJECTION, SOLUTION INTRAVENOUS; SUBCUTANEOUS at 08:34

## 2018-04-12 RX ADMIN — OMEPRAZOLE 20 MG: 20 CAPSULE, DELAYED RELEASE ORAL at 08:30

## 2018-04-12 RX ADMIN — ASPIRIN 325 MG: 325 TABLET, DELAYED RELEASE ORAL at 08:30

## 2018-04-12 RX ADMIN — MODAFINIL 200 MG: 200 TABLET ORAL at 08:30

## 2018-04-12 RX ADMIN — ATORVASTATIN CALCIUM 20 MG: 20 TABLET, FILM COATED ORAL at 08:30

## 2018-04-12 RX ADMIN — TRAMADOL HYDROCHLORIDE 50 MG: 50 TABLET, COATED ORAL at 12:30

## 2018-04-12 RX ADMIN — TIMOLOL MALEATE 1 DROP: 2.5 SOLUTION/ DROPS OPHTHALMIC at 08:34

## 2018-04-12 RX ADMIN — HYDROCHLOROTHIAZIDE 12.5 MG: 12.5 CAPSULE ORAL at 08:30

## 2018-04-12 RX ADMIN — CELECOXIB 400 MG: 200 CAPSULE ORAL at 08:30

## 2018-04-12 RX ADMIN — INSULIN ASPART 5 UNITS: 100 INJECTION, SOLUTION INTRAVENOUS; SUBCUTANEOUS at 12:24

## 2018-04-12 RX ADMIN — IRBESARTAN 150 MG: 150 TABLET ORAL at 08:30

## 2018-04-12 RX ADMIN — FLUOXETINE 40 MG: 20 CAPSULE ORAL at 08:30

## 2018-04-12 RX ADMIN — SENNOSIDES AND DOCUSATE SODIUM 2 TABLET: 8.6; 5 TABLET ORAL at 08:30

## 2018-04-12 NOTE — PLAN OF CARE
Problem: Patient Care Overview  Goal: Plan of Care/Patient Progress Review  Outcome: Improving  Night Shift:    Neuro: A&O, denies numbness/tingling overnight. VSS except elevated BPs  Lungs: LS clear, room air, personal CPAP overnight  Cardiac: BPs elevated. Systolic in 150s, HR regular  IV: Saline locked, flushes but pt c/o pain with flushing  Labs: Refused 0200 BG, wants check at 0900  Dressing: Scant dry drainage to aquacell  GI: BS hypoactive, no bm overnight. No nausea overnight but c/o slight nausea around 0645, does not like compazine  : up to void multiple times overnight to bedside commode  Diet: Regular, decreased appetite, tolerating/requesting applesauce and orange juice this morning.   Pain: Denies, covered with scheduled tylenol last given at 0630  Activity: Assist 1 with/walker, transferred self to commode overnight, no alarms  Plan: Plan to d/c home with  today. OT/PT working with patient, monitor nausea

## 2018-04-12 NOTE — PLAN OF CARE
Problem: Patient Care Overview  Goal: Plan of Care/Patient Progress Review    Discharge Planner OT   Patient plan for discharge: Home with A  Current status: Stating urinary urgency, supine>sit EOB, sit<>stand SBA with 2ww. Ambulated to bathroom SBA with 2ww, sit<>stand toilet SBA. Stood sinkside to complete hand hygiene SBA. Ambulated back to sit EOB SBA with 2ww. Completed TB dressing SBA using EC/WS techniques. Pt ambulated to and from therapy satellite bathroom, approx. 300 ft SBA with 2ww. Some pain during ambulation but pt able to complete.  Once in therapy bathroom, pt completed RTS transfer on standard height toilet SBA with use of hand supports. Pt with no further questions/concerns at end of session.   Barriers to return to prior living situation: none anticipated  Recommendations for discharge: Home with A from spouse as needed  Rationale for recommendations: Pt presenting with ability to complete ADL and functional mobility tasks appropriate for safe d/c home with spouse assist.       Entered by: Ronel Weir 04/12/2018 9:02 AM     Occupational Therapy Discharge Summary    Reason for therapy discharge:    All goals and outcomes met, no further needs identified.    Progress towards therapy goal(s). See goals on Care Plan in [facility] electronic health record for goal details.  Goals met    Therapy recommendation(s):    No further therapy is recommended.

## 2018-04-12 NOTE — PROVIDER NOTIFICATION
Pt BP evening shift 185/87 & 184/78. Pt on Avapro and HCTZ daily. Thanks    Admitting pager paged.

## 2018-04-12 NOTE — PROGRESS NOTES
No charge note  I was notified about plans for discharge today.  I was informed about no ongoing issues at the moment.  Chart reviewed.  Blood pressure levels improving currently off oxygen support.  Resumed on her home regimen for hypertension and insulin for diabetes upon discharge.  May proceed with planned home discharge as per orthopedic service.    Jose M

## 2018-04-12 NOTE — PLAN OF CARE
Discharge Planner PT     Patient plan for discharge: Home with assist of her   Current status: Pt is indep with all transfers. Goal met Pt amb 215' with ww indep with = step length and step thru gait pattern. Goal met Pt performed platform step with ww with SBA x 2. Goal met  Barriers to return to prior living situation: none Recommendations for discharge: Home with assist of  as needed and OP PT  Rationale for recommendations: Pt is below their functional baseline and would benefit from continued skilled PT intervention to address their functional limitations and restrictions prior to returning home to maximize their functional potential.    PT - Pt discharging to home with spouse and OP PT. All goals met.  Please refer to discharge summary.        Entered by: Kemi Acosta 04/12/2018 9:33 AM

## 2018-04-12 NOTE — PROGRESS NOTES
"Orthopedic Surgery  4/12/2018  POD 3    S: Patient voices no unexpected ortho complaints today. Denies chest pain or shortness of breath. States wants to go home today. We discussed need to mobilize and find medication that allows her enough pain control to do this.     O: Blood pressure 150/71, pulse 98, temperature 96.5  F (35.8  C), temperature source Oral, resp. rate 16, height 1.549 m (5' 1\"), weight 84.4 kg (186 lb), last menstrual period 12/04/2003, SpO2 90 %, not currently breastfeeding.  Lab Results   Component Value Date    HGB 10.0 04/11/2018     Lab Results   Component Value Date    INR 0.96 11/15/2011     I/O last 3 completed shifts:  In: 63 [P.O.:60; I.V.:3]  Out: -   Distal extremity CMSI bilaterally.  Calves are negative bilaterally, both soft and nontender.  The dressing is C/D/I.      A: Ms. Stanley Casper is doing well status post Procedure(s):  ARTHROPLASTY KNEE.    P: Continue physical therapy. Continue DVT pphx with ASA due to high mobility and low risk factors for DVT. Anticipate discharge to home later today. She states she would prefer Tramadol use. Will add hydroxyzine for ajuvant pain control and nausea control.    Nadia Elias PA-C  [phone]  "

## 2018-04-12 NOTE — PLAN OF CARE
Problem: Patient Care Overview  Goal: Plan of Care/Patient Progress Review  Outcome: Adequate for Discharge Date Met: 04/12/18  Day RN  Martha, CMS+ mild swelling in L knee. Dressing is CDI. Up with SBA and walker. Tolerated food well today, no nausea. Voiding and had a bm. Passed pt. Minimal pain took ultram prn.   Dc instruction follow up care and meds were all reveiwed prior to dc. Filled rx's of ultram, tylenol, senna, vistaril, asa and naproxen. Written rx for walker given to patient. Dc to home with

## 2018-04-12 NOTE — DISCHARGE INSTRUCTIONS
Call ortho surgeon before or after your follow up appointment if you experience any of these issues or have concerns:  1. Increased/persistent temperature chills and/or sweats. Persistent low grade >99 or temp >100  2.increased/uncontrolled pain despite pain medication, sedated   3.increased/persistent bleeding, redness, swelling, bruising, drainage (yellow/odorous) or incision would pull apart  4. Calf pain, swollen/hard/warm area, chest pain or shortness of breath  5.weakness in operative leg, knee buckling, numbness and/or tingling. Or if you Fall  6. Nausea, vomiting, constipation and/or diarrhea  7. Too sleepy  8. Trouble voiding or signs and symptoms of urinary tract infection.  9. Constipation unrelieved by stool softeners (see Other instructions below)  10. General feeling illness  11. uncontrolled bleeding. Cut, incision, nose    Activity instructions:  1. Do exercises at home as instructed by therapist twice a day. Continue outpatient therapy as ordered by your doctor.  2. Ice knee after exercises and therapy or 20 minutes 3-4 times a day to reduce pain and swelling  3.  4. Slowly increase activity back to baseline    Diet Information  Drink plenty of fluids  Eat a regular balanced diet    Dressing information:   May shower (leave dressing on ),dressing is water proof. examine around incision daily  for any signs and symptoms of infection.     Wash hands before touching skin surrounding incision   DO NOT change dressing daily leave on until follow up apt.   Inspect surrounding skin daily for s/s of infections.   Do not soak in tub or pool.   If dressing loosens wash hands, tape down edge and call surgeon.  If dressing is 1/2 or more full of drainage or leaking call your suregon  If the dressing comes off completely then place sterile gauze over incision and tape around all edges and call surgeon for further direction.    Other instructions:  1. Notify your dentist of your implant so you can get  antibiotics before any dental work or before any invasive procedure  2.   3. Take an over the counter stool softener (mirilax or senna) as directed while on narcotics to ensure bowel movements are regular.  Take a laxative (milk of magnesia and/or a suppository )as directed if no bm in 2 days.  4. Keep track of when you take all medication, especially pain medication. See bottles for instructions and side effects  5. Use incentive spirometry hourly until you are back to normal activity (helps prevent pneumonia)  6. See medication handouts for medication information and side effects  7. DO NOT DRINK or DRIVE on narcotic pain medication.  8. It is important to take your anti-blood clot medication,see medication handout    *Friends, family and or care givers please read and review all this discharge information and medication sheet      Use cpap when napping and sleeping for the night.  Check blood sugars daily before meals and bedtime. Call primary care md if blood sugars are consistently above 140    If unable to wake call 911-due to pain medication  Please have all family and care givers review this information.      Follow up information  Call Md to make a follow up apt. @ [facility] [phone] 10-14 days from the day of surgery    Thank you for choosing [facility]

## 2018-04-12 NOTE — PLAN OF CARE
Problem: Patient Care Overview  Goal: Plan of Care/Patient Progress Review    [place]: A&O x4, CMS intact  VSS: BP elevated. Tachy, afebrile  LS: clear on RA  GI: hypoactive, last BM 4/8. Pt has been nauseous this shift, taking zofran PRN, denies emesis. +flatus  : commode bedside  Skin: L knee aquacell, CDI  Activity: x1, walker   Diet: reg   Pain: denies  Plan: poss d/c tmrrw  Lab: , 191. US lower ext complete this shift  *Pt refused some meds today due to her nausea. She prefers not to use compazine, as she believes she gets anxiety from it.

## 2018-04-13 ENCOUNTER — CARE COORDINATION (OUTPATIENT)
Dept: FAMILY MEDICINE | Facility: CLINIC | Age: 63
End: 2018-04-13

## 2018-04-13 NOTE — PROGRESS NOTES
Care Coordination Assessment    PCP: [facility]    Referral Source:  ED/IP List    Clinical Data: Patient discharged from inpatient at [facility] 04/12/2018 S/P Total Knee Arthroplasty, Djd.  Patient discharged home with instructions to follow up with surgeon in 7-10 days    Plan: I will forward to Mallorie in clinical support to assess and assist with appropriate follow up

## 2018-04-16 NOTE — PROGRESS NOTES
I called the patient and checked in on her to see if she is doing ok since her surgery, she is doing ok just in pain. I asked that she contact us if there is anything she needs from us here.    Mallorie Oneill, ALF

## 2018-05-02 DIAGNOSIS — I10 ESSENTIAL HYPERTENSION, BENIGN: ICD-10-CM

## 2018-05-02 RX ORDER — IRBESARTAN AND HYDROCHLOROTHIAZIDE 150; 12.5 MG/1; MG/1
TABLET, FILM COATED ORAL
Qty: 15 TABLET | Refills: 0 | Status: SHIPPED | OUTPATIENT
Start: 2018-05-02 | End: 2019-05-07 | Stop reason: DRUGHIGH

## 2018-05-02 NOTE — TELEPHONE ENCOUNTER
Please let the patient know that a 14 day refill has been sent for their prescription.  They are due for a return fasting office visit within 14 days.  Failure to schedule an appointment may result in no further refills of his/her medication.

## 2018-05-02 NOTE — TELEPHONE ENCOUNTER
Pending Prescriptions:                       Disp   Refills    irbesartan-hydrochlorothiazide (AVALIDE) *30 tab*0            Sig: TAKE 1 TABLET BY MOUTH EVERY DAY    Gave 30 on 3-  See RE on that day, pt was notfied by FD  No future appt -PCP is not LES in chart, [facility]  Please fax or deny  [place]  [phone]

## 2018-05-11 ENCOUNTER — TRANSFERRED RECORDS (OUTPATIENT)
Dept: FAMILY MEDICINE | Facility: CLINIC | Age: 63
End: 2018-05-11

## 2018-05-14 ENCOUNTER — TRANSFERRED RECORDS (OUTPATIENT)
Dept: FAMILY MEDICINE | Facility: CLINIC | Age: 63
End: 2018-05-14

## 2018-05-21 DIAGNOSIS — I10 ESSENTIAL HYPERTENSION, BENIGN: ICD-10-CM

## 2018-05-21 RX ORDER — IRBESARTAN AND HYDROCHLOROTHIAZIDE 150; 12.5 MG/1; MG/1
TABLET, FILM COATED ORAL
Qty: 15 TABLET | Refills: 0 | OUTPATIENT
Start: 2018-05-21

## 2018-05-21 NOTE — TELEPHONE ENCOUNTER
Pending Prescriptions:                       Disp   Refills    irbesartan-hydrochlorothiazide (AVALIDE) *15 tab*0            Sig: TAKE 1 TABLET BY MOUTH EVERY DAY    We gave a 30 day on 5-2-18  [facility] is in pt chart now   No appt scheduled  Do you want to refill?  Fax or tabitha Alan  [phone] (home) NONE (work)

## 2018-05-25 ENCOUNTER — TRANSFERRED RECORDS (OUTPATIENT)
Dept: FAMILY MEDICINE | Facility: CLINIC | Age: 63
End: 2018-05-25

## 2018-06-13 DIAGNOSIS — I10 ESSENTIAL HYPERTENSION, BENIGN: ICD-10-CM

## 2018-06-13 RX ORDER — IRBESARTAN AND HYDROCHLOROTHIAZIDE 150; 12.5 MG/1; MG/1
TABLET, FILM COATED ORAL
Qty: 15 TABLET | Refills: 0 | OUTPATIENT
Start: 2018-06-13

## 2018-06-13 NOTE — TELEPHONE ENCOUNTER
Pending Prescriptions:                       Disp   Refills    irbesartan-hydrochlorothiazide (AVALIDE) *15 tab*0            Sig: TAKE 1 TABLET BY MOUTH EVERY DAY    You denied this 5-   You are not main provider per chart  Waqas  [phone] (home) NONE (work)

## 2018-08-15 DIAGNOSIS — F41.1 GAD (GENERALIZED ANXIETY DISORDER): ICD-10-CM

## 2018-08-15 DIAGNOSIS — F33.42 MAJOR DEPRESSIVE DISORDER, RECURRENT EPISODE, IN FULL REMISSION (H): ICD-10-CM

## 2018-08-15 RX ORDER — TRAZODONE HYDROCHLORIDE 50 MG/1
TABLET, FILM COATED ORAL
Qty: 90 TABLET | Refills: 0 | OUTPATIENT
Start: 2018-08-15

## 2018-08-15 NOTE — TELEPHONE ENCOUNTER
Received incoming refill request for   Pending Prescriptions:                       Disp   Refills    traZODone (DESYREL) 50 MG tablet [Pharmac*90 tab*0            Sig: TAKE 1 TABLET BY MOUTH EVERY NIGHT AT BEDTIME    Patient has not been seen since 12/22/17 and has been denied other refills due to no appointment being made. There is no future appointment set up so refill is being refused today.

## 2018-09-01 DIAGNOSIS — I10 ESSENTIAL HYPERTENSION, BENIGN: ICD-10-CM

## 2018-09-04 RX ORDER — IRBESARTAN AND HYDROCHLOROTHIAZIDE 150; 12.5 MG/1; MG/1
TABLET, FILM COATED ORAL
Qty: 90 TABLET | Refills: 0 | OUTPATIENT
Start: 2018-09-04

## 2018-09-04 NOTE — TELEPHONE ENCOUNTER
Refused Prescriptions:                       Disp   Refills    irbesartan-hydrochlorothiazide (AVALIDE) 1*90 tab*0        Sig: TAKE 1 TABLET BY MOUTH DAILY  Refused By: NILSON LÓPEZ  Reason for Refusal: Patient no longer under provider care    Pt is going to another Clinic  [facility]  [phone] (home) NONE (work)

## 2018-10-31 DIAGNOSIS — I10 ESSENTIAL HYPERTENSION, BENIGN: ICD-10-CM

## 2018-10-31 RX ORDER — IRBESARTAN AND HYDROCHLOROTHIAZIDE 150; 12.5 MG/1; MG/1
TABLET, FILM COATED ORAL
Qty: 15 TABLET | Refills: 0 | OUTPATIENT
Start: 2018-10-31

## 2018-10-31 NOTE — TELEPHONE ENCOUNTER
Refused Prescriptions:                       Disp   Refills    irbesartan-hydrochlorothiazide (AVALIDE) 1*15 tab*0        Sig: TAKE 1 TABLET BY MOUTH EVERY DAY  Refused By: NILSON LÓPEZ  Reason for Refusal: Patient no longer under provider care    [place]  [phone] (home) NONE (work)

## 2019-03-19 ENCOUNTER — PATIENT OUTREACH (OUTPATIENT)
Dept: CARE COORDINATION | Facility: CLINIC | Age: 64
End: 2019-03-19

## 2019-03-19 DIAGNOSIS — E11.9 TYPE 2 DIABETES MELLITUS (H): Primary | ICD-10-CM

## 2019-03-22 NOTE — PROGRESS NOTES
Clinic Care Coordination Contact  OUTREACH    Chief Complaint   Patient presents with     Clinic Care Coordination - Initial     Insurance Coverage   Psychosocial:  Financial/Insurance: Pt was recently scheduled for an appointment with MT and needed to cancel as her [employer] MA plan lapsed. MT requested that AGNIESZKA LAINEZ reach out to pt to assist.   Pt reports that she had submitted form through [employer], but there were technical problems and they did not receive. Pt completed that application again and they report they received it and now it is being processed.     Patient/Caregiver understanding: Pt denies any needs at this times. AGNIESZKA LAINEZ engaged in AIDET communication during encounter.    Plan: AGNEISZKA LAINEZ will do no further outreaches at this time.     PIPE Ortiz  Clinic Care Coordinator  [phone]  [email or web address]

## 2019-04-12 ENCOUNTER — TRANSFERRED RECORDS (OUTPATIENT)
Dept: FAMILY MEDICINE | Facility: CLINIC | Age: 64
End: 2019-04-12

## 2019-04-12 LAB
GLUCOSE SERPL-MCNC: 136 MG/DL (ref 65–99)
HBA1C MFR BLD: 9 % (ref 4–6)

## 2019-05-06 ENCOUNTER — OFFICE VISIT (OUTPATIENT)
Dept: PHARMACY | Facility: PHYSICIAN GROUP | Age: 64
End: 2019-05-06
Payer: COMMERCIAL

## 2019-05-06 VITALS
SYSTOLIC BLOOD PRESSURE: 172 MMHG | HEART RATE: 74 BPM | WEIGHT: 199.8 LBS | DIASTOLIC BLOOD PRESSURE: 84 MMHG | BODY MASS INDEX: 37.75 KG/M2 | OXYGEN SATURATION: 97 %

## 2019-05-06 DIAGNOSIS — F32.A DEPRESSION, UNSPECIFIED DEPRESSION TYPE: ICD-10-CM

## 2019-05-06 DIAGNOSIS — N32.81 OVERACTIVE BLADDER: ICD-10-CM

## 2019-05-06 DIAGNOSIS — E78.5 DYSLIPIDEMIA: ICD-10-CM

## 2019-05-06 DIAGNOSIS — M17.10 ARTHRITIS OF KNEE: ICD-10-CM

## 2019-05-06 DIAGNOSIS — H40.9 GLAUCOMA OF LEFT EYE, UNSPECIFIED GLAUCOMA TYPE: ICD-10-CM

## 2019-05-06 DIAGNOSIS — R52 GENERALIZED PAIN: ICD-10-CM

## 2019-05-06 DIAGNOSIS — R40.0 SOMNOLENCE: ICD-10-CM

## 2019-05-06 DIAGNOSIS — E11.8 TYPE 2 DIABETES MELLITUS WITH COMPLICATION, WITH LONG-TERM CURRENT USE OF INSULIN (H): Primary | ICD-10-CM

## 2019-05-06 DIAGNOSIS — K21.00 GASTROESOPHAGEAL REFLUX DISEASE WITH ESOPHAGITIS: ICD-10-CM

## 2019-05-06 DIAGNOSIS — I10 HYPERTENSION, UNSPECIFIED TYPE: ICD-10-CM

## 2019-05-06 DIAGNOSIS — Z78.9 TAKES DIETARY SUPPLEMENTS: ICD-10-CM

## 2019-05-06 DIAGNOSIS — Z79.4 TYPE 2 DIABETES MELLITUS WITH COMPLICATION, WITH LONG-TERM CURRENT USE OF INSULIN (H): Primary | ICD-10-CM

## 2019-05-06 PROCEDURE — 99607 MTMS BY PHARM ADDL 15 MIN: CPT | Performed by: PHARMACIST

## 2019-05-06 PROCEDURE — 99605 MTMS BY PHARM NP 15 MIN: CPT | Performed by: PHARMACIST

## 2019-05-06 RX ORDER — ACETAMINOPHEN 500 MG
1000 TABLET ORAL 3 TIMES DAILY
Status: ON HOLD | COMMUNITY
End: 2021-11-01

## 2019-05-06 RX ORDER — OXYBUTYNIN CHLORIDE 5 MG/1
5 TABLET, EXTENDED RELEASE ORAL DAILY
COMMUNITY
End: 2020-09-23

## 2019-05-06 RX ORDER — ONDANSETRON 4 MG/1
TABLET, FILM COATED ORAL EVERY 8 HOURS PRN
COMMUNITY
End: 2020-09-23

## 2019-05-06 NOTE — PATIENT INSTRUCTIONS
"1. Make an appointment with the eye doctor.    2. Complete foot exam with Dr. Ledesma.    3. Continue checking BG  Your blood sugars goals are:  Before eating (fasting): 80 - 130 mg/dL  2-hours after meals (post-prandial): less than 180 mg/dL    4. Complete records release form at the .    5. Start metformin  mg.   Week 1: one tablet by mouth once daily in the morning with food.   Week 2: 1 AM and 1 PM with food.  Week 3: 2 AM and 1 PM with food.  Week 4: 2 AM and 2 PM with food    6. Will start continuous glucometer: freestyle pamela 14-day.  Please set up your freestyle pamela to connect with the secure online portal. This allows you to upload blood sugar results for the clinic staff to view online.  Instructions:  Go to: [email or web address]  Under \"member login,\" click: \"Sign up\"  Check the box next to \"[facility] account\" and hit continue (bottom left).  Follow the prompts to create your account.  Once your account is created and you are logged in, click on the three blue horizontal bars (upper right corner of screen)  Click on \"account settings\"  Next, click the \"My Practices\" tab on the top bar  Type the practice ID: [id number] and click \"add.\"  This authorizes sharing of your blood sugar data with [facility].    It is best to upload your blood sugar results the morning of or the day prior to your appointment so that we can review them together at your appointment.  Please call if you have any questions about this process.    7. Education about treating low blood sugar:  If you have blurry vision, weakness/fatigue, headache, irritability, shakiness, sweatiness, feeling dizzy or hungry, you may be experiencing low blood sugar.  1. Use your glucometer to check your blood sugar. If your sugar is <70 mg/dL, eat or drink fast-acting sugar (orange juice, sugary candies, glucose tabs).    2. Check blood sugar again 15 minutes later.  If your blood sugar is still below 80 mg/dL, treat " again.    3. Always follow-up with a complex carbohydrate (peanut butter, nuts, eggs) or your regular meal if it is mealtime.    8. Make an appointment with the eye doctor.    9. PharmD will send Rx for atorvastatin.      Thanks for coming in to see me today!  Make an appointment for 60 minutes in 2 weeks with PharmD.    Dr. Natalia Marinelli, Medication Therapy Management (MTM) Pharmacist  [facility]: BETHEL W, Th  [facility] Resident  Phone: [phone] ext.[phone]    You may receive a survey about the MTM services you received by email and/or US Mail. I would appreciate your feedback to help me serve you better in the future. Your comments will be anonymous.

## 2019-05-07 RX ORDER — INSULIN GLARGINE 100 [IU]/ML
35 INJECTION, SOLUTION SUBCUTANEOUS AT BEDTIME
COMMUNITY
End: 2022-12-22 | Stop reason: ALTCHOICE

## 2019-05-07 RX ORDER — IRBESARTAN AND HYDROCHLOROTHIAZIDE 150; 12.5 MG/1; MG/1
2 TABLET, FILM COATED ORAL DAILY
COMMUNITY
End: 2020-12-17

## 2019-05-07 RX ORDER — METFORMIN HCL 500 MG
TABLET, EXTENDED RELEASE 24 HR ORAL DAILY
COMMUNITY
End: 2020-09-23 | Stop reason: ALTCHOICE

## 2019-05-07 RX ORDER — LANOLIN ALCOHOL/MO/W.PET/CERES
1 CREAM (GRAM) TOPICAL
COMMUNITY
End: 2020-09-23

## 2019-05-07 NOTE — PROGRESS NOTES
"SUBJECTIVE/OBJECTIVE:    Kemi Casper is a 64 year old female coming in for an initial visit for Medication Therapy Management. She was referred to me from Jovi Ledesma MD.  ?  Chief Complaint: Diabetes per Dr. Ledesma.  Frustrated that she has gained so much weight recently.  She states that she is short of breath.  States that she is obsessed with food, she makes poor choices with eating.  Does not have anorexia or bulemia.  States that this weight gain \"is getting to her\" physically and mentally.    Allergies/ADRs: lisinopril (reaction: cough), tape (rash), compazine (hallucination - was hospitalized at the time and then had mental side effects, thought that everyone had evacuated the hospital) - reviewed in Epic and eMDs.  Tobacco: No tobacco use  Alcohol: none  Caffeine: 2-3 cups/week of caffeinated tea, drinks coffee a couple times/month, 1-2 cans sodas/day (tries to drink the diet orange crush, also likes coke).  Activity: No exercise  PMH: see eMDs  Knee replacement, total hysterectomy in 2000, TNA when she was 5, gall bladder removed years ago.  ?  PROVIDERS:   PCP: Jovi Ledesma MD  Rheumatology and Arthiritis consultants (in [place]): Dr. Nilton Daimond  Endocrinology clinic [facility]: Dr. Franklin Gomez Neurology: Dr. Rider   Urology: Dr. Yong Berry  Orthopedic ([facility]): Dr. Shetty - had a left knee replacement.  Was going to the \"wellness Dr\" to reverse her diabetes, but this didn't seem to work. States that it was her fault because of the \"problems that she had\" so she stopped going.  PHARMACY: Patient fills prescriptions at [facility] in [place].  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  Her and her  work at [employer].  ?  Medication Adherence/Access:  Patient takes medications 2 time(s) per day without meals.    DIABETES (type 2 diabetes):  Testing supplies: accu-check eagle  Pt currently " taking:  Basaglar long acting insulin: 28 units daily in the evening.  Novolog short acting insulin: 5 units breakfast, 8 for lunch, 9 units for dinner daily. If >150 mg/dL instructed to take 2 units correction of novolog for each 50 mg/dL.  States that for breakfast she will often use 8 units and she is not going low.  If she goes out and eats [employer] and fries, then she'll add 2-3 units of insulin.  For evening meal, has taken up to 11 units.  States that until she started increasing insulin on her own, BG were in 300s and 400s.  Saw her endocrinologist last week and he stated to increase novolog by 2 units. She was not pleased with his visit and says that she thinks she needs to increase by more than that.  He told her that her A1c was 9% now.  States that she had been doing this program to try and reverse her diabetes. It seemed to be working at first, but has not been working anymore.  She was taking:   Inflam -95: wojciech, curcumin, boswella, nettle leaf, bioperine. Taking once daily.  SP Cleanse 2670: iron, propietary blend. Taking once daily.  Paid about $80/bottle and is wondering if this is something she can finish off because she paid for it and would like to keep taking it if she can.  Medication history:   Metformin: thinks that she tried and did not tolerate.  Glyburide: thinks that this was hard for her stomach, cannot remember exactly.  Pt is not experiencing side effects.  ACEi/ARB: Yes: see htn below.   No urine albumin creatinine ratio on file.  ASCVD Prevention:  Aspirin: did not discuss today.  Statin Atorvastatin - see below (pt not currently taking)  Diet: Eats 2-3 meals/day, depending on how early she wakes up. Eats in the afternoon/evening between 3:30-5pm.  States that she is having a lot of cravings for foods.  Has been eating chocolate rice crispy cereal in the morning and states that she does this even though she knows that it is not good for her.  Exercise: No exercise  routine, states that she is in a lot of pain from her arthritis.  Since she started her new job, she has had heel pain, which she thought was plantar fasciitis - Dr. Ledesma saw her for this and told her that she contused the bone in her heel.  Pain used to go up her legs so badly, she could feel the throbbing.  She is feeling very fatigued.    Record of home blood sugars:  Patient is experiencing hypoglycemia. Frequency of hypoglycemia? Once every 2 to 3 months. Symptoms of low blood sugar? sweaty, confused.  Takes 5 bambi bars or orange juice to treat.  Then checks her BG again 15 minutes later and feels better and BG is normal.  Recent symptoms of high blood sugar? vision changes and states that she does have peropheral neuropathy.  Date Fasting AM Before lunch Before Dinner Before Bedtime Late night   ?5/6 ?143 ? ? ? ?   ?5/5 ?167 ? ? ? ?257   ?5/4 ? ?252 ? ?202 ?   ?5/3 ?126 ?125 ?191 ?219 ?   ?5/2 ? ?153, 143 ? ? ?   ?5/1 ?149 ?235 ? ? ?241   States that she doesn't frequently check 2 hours after meals, but if she does it is always above 180 mg/dL.      Per patient, last A1c from endocrinologist was 9.0%    DYSLIPIDEMIA:  Current therapy includes atorvastatin 20 mg once daily, (per notes started 9/12/2018). Aminata states that she has run out. Pt reports no significant myalgias or other side effects.  10-year ASCVD risk = unable to calculate without full lipid panel  No full lipid panel on file, only LDL as noted below.      HYPERTENSION:  Current medications include:  Irbesartan/hctz 150/12.5 mg: once daily   Patient does not self-monitor BP. Patient reports no current medication side effects.  BP (from eMD)  Date 2/6/19: 134/72 mmHg  P: 78  Date 1/7/19: 124/78 mmHg  BP Readings from Last 3 Encounters:   05/06/19 172/84   04/12/18 150/63   12/22/17 (!) 164/92     DEPRESSION/SOMNOLENCE:  Current Meds:  Fluoxetine 40 mg: once daily.  Modafinil 200 mg: once daily in the morning  Trazodone 50 mg: once daily -  "states that she ran out of this medication so she has not been taking for awhile.  Melatonin 5 mg: took this once daily in the evening, but thought that it gave her bad dreams so she stopped taking it. Is willing to try again.  Did not discuss sleep/depression symptoms in detail today due to time limitation.    GERD:  Current medications include: Prilosec (omeprazole) 20 mg once daily.  Pt c/o heartburn, burping, no pain in chest, no trouble swallowing..   Per chart search, no history of GI bleed noted. Patient feels that current regimen is effective.    KNEE ARTHRITIS/PAIN:  Current Meds:   Acetaminophen 500 m tabs by mouth three times daily.  Methotrexate: was taking 25 mg orally: once weekly,   Now injecting 0.6 mL once weekly, prescribed by rheum.  She just started doing this injection.  Oxycodone 5 mg: states that she is \"not willing to give this up\" but has 1 tablet left from when she had her knee surgery.  Med history:   Tramadol - took for knee replacement - ran out.  Prednisone 5 mg: not taking currently, because BG are so high.  Used to whip around her house and clean it in an hour or two.  Feeling now like she needs lots of rest. Wants to get out and do more, but she is very stiff. States that her balance is really really bad, she is not sure why. Neurologist states that she is neurologically okay.   She states that she has mentioned the pain to the neurologist and he \"poo-poo's\" it.     GLAUCOMA:  Current Meds:  Latanoprost 0.005% opthalmic solution: one drop in left eye nightly.  Timoptic 0.25%: 1 drop daily every morning in the left eye.  ?  OVERACTIVE BLADDER:  Current Meds:  Ditropan XL (oxybutynin) 5 mg: once daily  States that this was prescribed by her urologist and she likes it so far.  Has been doing much better with the urination and if she does not wait to long, she can make it to the toilet in time.    VITAMINS/SUPPLEMENTS/OTHER:  Vitamin D 2000 int units: 2 tabs daily. No vitamin D level " in eMDs.  Has ondansetron 4 mg tabs at home. States that she is not taking and that she is not going to get rid of them.      Today's Vitals: /84   Pulse 74   Wt 199 lb 12.8 oz (90.6 kg)   LMP 12/04/2003   SpO2 97%   BMI 37.75 kg/m   Second /80.  Per patient - Gained about 20 lbs in 4 months (not consistent with records in eMDs - see below)      ASSESSMENT:    Current medications were reviewed today.   ?  Medication Adherence: good, no issues identified     Renal Function:   SCr: 1.01 mg/dL on 4/17/19  CrCl  Cockcroft and Gault (ideal BW): 41.6 ml/min  Cockcroft and Gault (actual BW): 80.3 ml/min  Consider using adjusted body weight for obese patients (usually >30% over ideal BW)  Currently patient is 92.83% over IBW.  Cockcroft and Gault (adjusted BW): 57.1 ml/min  Estimated GFR  Simplified 4-variable MDRD study formula: 58.7 ml/min/1.73m2    DIABETES: Uncontrolled  Patient's A1c of 7.5% is not at ADA goal of <7%, due for A1c today but per patient had A1c at endo - no records found in eMDs. SMBG fasting sugars are not at ADA goal of  mg/dL and post-prandial sugars are not at goal of less than 180 mg/dL. Recommend CGM to ease the burden of fingersticks with multiple-daily dosing of insulin.  Patient would benefit from addition of metformin in XR formulation for a re-trial, okay with current renal function.  Additionally, discussed potential to use GLP-1 in the future to align with patient's goals of weight loss.  Also due for urine albumin creatinine ratio, but will wait to draw in case endocrinology felisa this lab and records could be faxed over.  Discussed with patient that the supplements she brought today are not regulated by the FDA and PharmD is unaware of any evidence to demonstrate safety and efficacy.  Unable to find Inflam-65 in natural medicines, but did find SP Cleanse.  On scale 1-10 for evidence based rating, the SP cleanse rated a 2, meaning low evidence available.  No major drug  interactions were found in the ingredients listed on natural medicines database.  ?  DYSLIPIDEMIA: Needs further assessment  Needs refill, appropriate to send.  Education around importance of medication provided. Then recommend fasting lipid panel and consider dose increase of atorvastatin.  ?  HYPERTENSION: Uncontrolled  Patient's BP is not at goal of <140/90. Appropriate to increase irbesartan/hctz to 2 tablets daily instead of 1.    DEPRESSION/SOMNOLENCE: Needs further assessment  Recommend to discuss further at follow-up.  In the meantime, informed patient appropriate to trial melatonin for sleep if she is willing to re-trial.  Could start with 3 mg dose.    GERD: Stable    KNEE ARTHRITIS/PAIN: managed by Rheum, recently changed to parenteral methotrexate and will need continued efficacy assessment.  No discussion about folic acid supplementation with the methotrexate - need to discuss at follow-up.    GLAUCOMA: Stable    OVERACTIVE BLADDER: improving, may be able to increase dose to 10 mg, will discuss further at follow-up appt and review urology notes - again need notes sent to [facility].    VITAMINS/SUPPLEMENTS: Stable for now, may consider dose decrease of vitamin D as dose is unnecessary without known osteoporosis and vitamin D deficiency.     ?  PLAN:      1. Make an appointment with the eye doctor.    2. Complete foot exam with Dr. Ledesma.    3. Continue checking BG  Your blood sugars goals are:  Before eating (fasting): 80 - 130 mg/dL  2-hours after meals (post-prandial): less than 180 mg/dL    4. Complete records release form at the  for each specialist so records can be sent to [facility].    5. Start metformin  mg.   Week 1: one tablet by mouth once daily in the morning with food.   Week 2: 1 AM and 1 PM with food.  Week 3: 2 AM and 1 PM with food.  Week 4: 2 AM and 2 PM with food    6. Start continuous glucometer: freestyle pamela 14-day. Instructions provided.    7. Education about treating  low blood sugar was provided.    8. PharmD will send Rx for atorvastatin 20mg, rx sent.    9. Verbally informed okay to start melatonin 3 mg once daily in the evening before bed.    Pt to schedule follow-up appt with PharmD in 2-3 weeks.   -Discuss folic acid supplementation    I spent 60 minutes with this patient today . All changes were made via collaborative practice agreement with Jovi Ledesma MD. A copy of the visit note was provided to the patient's primary care provider.  ?  The patient was given a summary of these recommendations as an after visit summary.     Dr. Natalia Marinelli, Medication Therapy Management (MTM) Pharmacist  [facility]: M, W, Th  [facility] Resident  Phone: [phone] ext.[phone]    Patient was seen independently by Dr. Marinelli. I agree with her assessment and plan.   Mari Fowler, Pharm.D, [facility]  Medication Therapy Management Pharmacist  [phone]

## 2019-05-18 PROCEDURE — 99284 EMERGENCY DEPT VISIT MOD MDM: CPT | Mod: 25

## 2019-05-19 ENCOUNTER — APPOINTMENT (OUTPATIENT)
Dept: ULTRASOUND IMAGING | Facility: CLINIC | Age: 64
End: 2019-05-19
Attending: PHYSICIAN ASSISTANT
Payer: COMMERCIAL

## 2019-05-19 ENCOUNTER — APPOINTMENT (OUTPATIENT)
Dept: GENERAL RADIOLOGY | Facility: CLINIC | Age: 64
End: 2019-05-19
Attending: PHYSICIAN ASSISTANT
Payer: COMMERCIAL

## 2019-05-19 ENCOUNTER — HOSPITAL ENCOUNTER (EMERGENCY)
Facility: CLINIC | Age: 64
Discharge: HOME | End: 2019-05-19
Attending: PHYSICIAN ASSISTANT | Admitting: PHYSICIAN ASSISTANT
Payer: COMMERCIAL

## 2019-05-19 VITALS
OXYGEN SATURATION: 97 % | HEART RATE: 80 BPM | BODY MASS INDEX: 37.57 KG/M2 | TEMPERATURE: 98.6 F | DIASTOLIC BLOOD PRESSURE: 103 MMHG | WEIGHT: 199 LBS | RESPIRATION RATE: 20 BRPM | HEIGHT: 61 IN | SYSTOLIC BLOOD PRESSURE: 180 MMHG

## 2019-05-19 DIAGNOSIS — M79.672 LEFT FOOT PAIN: ICD-10-CM

## 2019-05-19 DIAGNOSIS — M79.604 PAIN OF RIGHT LOWER EXTREMITY: ICD-10-CM

## 2019-05-19 PROCEDURE — 25000132 ZZH RX MED GY IP 250 OP 250 PS 637: Performed by: PHYSICIAN ASSISTANT

## 2019-05-19 PROCEDURE — 73650 X-RAY EXAM OF HEEL: CPT | Mod: LT

## 2019-05-19 PROCEDURE — 93971 EXTREMITY STUDY: CPT | Mod: RT

## 2019-05-19 RX ORDER — HYDROCODONE BITARTRATE AND ACETAMINOPHEN 5; 325 MG/1; MG/1
1 TABLET ORAL ONCE
Status: COMPLETED | OUTPATIENT
Start: 2019-05-19 | End: 2019-05-19

## 2019-05-19 RX ORDER — HYDROCODONE BITARTRATE AND ACETAMINOPHEN 5; 325 MG/1; MG/1
1-2 TABLET ORAL EVERY 4 HOURS PRN
Qty: 15 TABLET | Refills: 0 | Status: SHIPPED | OUTPATIENT
Start: 2019-05-19 | End: 2020-09-23

## 2019-05-19 RX ADMIN — HYDROCODONE BITARTRATE AND ACETAMINOPHEN 1 TABLET: 5; 325 TABLET ORAL at 00:32

## 2019-05-19 ASSESSMENT — ENCOUNTER SYMPTOMS
MYALGIAS: 1
SHORTNESS OF BREATH: 0
ARTHRALGIAS: 1

## 2019-05-19 ASSESSMENT — MIFFLIN-ST. JEOR: SCORE: 1390.04

## 2019-05-19 NOTE — ED AVS SNAPSHOT
[facility] Emergency Department  [address]  [facility] [address]  Phone:  [phone]  Fax:  [phone]                                    Kemi Casper   MRN: [id number]    Department:  [facility] Emergency Department   Date of Visit:  5/18/2019           After Visit Summary Signature Page    I have received my discharge instructions, and my questions have been answered. I have discussed any challenges I see with this plan with the nurse or doctor.    ..........................................................................................................................................  Patient/Patient Representative Signature      ..........................................................................................................................................  Patient Representative Print Name and Relationship to Patient    ..................................................               ................................................  Date                                   Time    ..........................................................................................................................................  Reviewed by Signature/Title    ...................................................              ..............................................  Date                                               Time          22EPIC Rev 08/18

## 2019-05-19 NOTE — ED PROVIDER NOTES
History     Chief Complaint:  Leg Pain    HPI   Kemi Casper is a 64 year old female with a history of osteoarthritis, who presents with her daughter to the ED for the evaluation of leg pain. The patient reports that for the past four days she has been experiencing worsening right leg pain and that she is concerned for a DVT, prompting her to the ED. The patient notes that her she is experiencing difficulty walking due to the pain and that she was laying on a couch when her pain started. She also notes that she is experiencing increased swelling in her left calf. She also reports that one week ago while working on concrete she started to experience left heal pain suddenly with no known mechanism of injury. She describes this pain as a burning sensation and states that it is constant. The patient denies chest pain, shortness of breath, and other issues.     PE/DVT RISK FACTORS:  Sex:    Female  Hormones:   No  Tobacco:   No  Cancer:   No  Travel:   No  Surgery:   No  Other immobilization: No  Personal history:  No  Family history:  No    Allergies:  Compazine  Lisinopril     Medications:    Lipitor  Prozac  Avalide  Provigil  Prilosec  Zofran  Ditropan  Oxaydo  Desyrel    Past Medical History:    Depression  Diabetes  Hypertension  Fibromyalgia  GERD  Hyperlipidemia  Incontinence of urine  Hyperlipidemia  Obesity  Osteoarthritis  Sleep apnea     Past Surgical History:    Arthroplasty knee, left - 2018    Family History:    Cancer  Hypertension  Diabetes    Social History:  Smoking status: Never Smoker  Alcohol use: No   Marital Status:   [2]     Review of Systems   Respiratory: Negative for shortness of breath.    Cardiovascular: Positive for leg swelling. Negative for chest pain.   Musculoskeletal: Positive for arthralgias and myalgias.   All other systems reviewed and are negative.      Physical Exam     Patient Vitals for the past 24 hrs:   BP Temp Temp src Pulse Resp SpO2 Height Weight  "  05/19/19 0002 (!) 165/110 98.6  F (37  C) Oral 82 20 95 % 1.549 m (5' 1\") 90.3 kg (199 lb)        Physical Exam  Constitutional: resting comfortably on bed in no acute distress. Appears comfortable.   Head: No external signs of trauma noted to head or face.   Eyes: Pupils are equal, round, and reactive to light. Conjunctiva normal.   ENT: MMM. Normal voice.   Neck: Normal ROM.   Cardiovascular: Normal rate, regular rhythm, and intact distal pulses.    Respiratory: Effort normal. No respiratory distress. Lungs clear to auscultation bilaterally.   GI: Soft. There is no tenderness.    Musculoskeletal: No deformities appreciated. Normal ROM. No lower extremity edema noted.  Right knee: no swelling or effusion. No ecchymosis or erythema. No warmth. No bony tenderness to palpation. Tender to palpation over popliteal fossa. Knee held in extension with flexion limited to 90 degrees due to pain.   Left foot: no swelling, deformity, ecchymosis. Tender to palpation over plantar aspect of calcaneus. No ankle tenderness.   Neurological: Alert and Oriented x 3. Speech normal. Moves all extremities equally.   Psychiatric: Appropriate mood, affect, and behavior.   Skin: Skin is warm and dry.      Emergency Department Course   Imaging:  Radiographic findings were communicated with the patient who voiced understanding of the findings.  XR Calcaneus Left G/E 2 Views  IMPRESSION: No acute fracture or dislocation. Small plantar calcaneal  spur.  As read by Radiology.    US Lower Extremity Venous Duplex  IMPRESSION: No DVT.  As read by Radiology.     Interventions:  0032, Norco, 1 tablet 325 mg, PO    Emergency Department Course:  Past medical records, nursing notes, and vitals reviewed.  0013: I performed an exam of the patient and obtained history, as documented above.     The patient was sent for a left calcaneus x ray and right leg ultra sound while in the emergency department, findings above.    0145: I rechecked the patient.  " Findings and plan explained to the Patient. Patient discharged home with instructions regarding supportive care, medications, and reasons to return. The importance of close follow-up was reviewed.      Impression & Plan    Medical Decision Makin-year-old female presenting with right posterior knee pain and left heel pain.  Ultrasound was obtained and showed no evidence of DVT.  There is no evidence of Baker's cyst on ultrasound.  She had no injury or trauma to the knee and has no bony tenderness therefore x-ray of the knee is not indicated as fracture is unlikely.  There is no effusion, erythema, or warmth. No tenderness over anterior knee. Nothing to suggest gout, pseudogout, or septic arthritis at this time. Left calcaneus x-rays showed small calcaneal spur, which could potentially be contributing to her heel pain. There is no fracture. I suspect that her right knee/leg pain is secondary to muscle strain given she is ambulating differently now that she is favoring the left heel due to pain. She is neurovascularly intact and there are no other concerning findings at this time. She is appropriate for discharge home with close follow-up with her PCP in 2-3 days if symptoms are not improving. Recommended RICE. Will prescribe short course of norco for pain. Instructed to return to the ED for any new or worsening symptoms, including increased pain, swelling, redness, fever, or other concerning symptoms.     Diagnosis:    ICD-10-CM    1. Left foot pain M79.672    2. Pain of right lower extremity M79.604        Disposition:  discharged to home    Discharge Medications:     Medication List      Started    HYDROcodone-acetaminophen 5-325 MG tablet  Commonly known as:  NORCO  1-2 tablets, Oral, EVERY 4 HOURS PRN          Scribe Disclosure:  Jovi POOLE, am serving as a scribe at 12:22 AM on 2019 to document services personally performed by Meryl Boyce PA-C based on my observations and the provider's  statements to me.      Jovi Valdez  5/18/2019   [facility] EMERGENCY DEPARTMENT       Meryl Boyce PA-C  05/19/19 0226

## 2019-06-04 ENCOUNTER — TRANSFERRED RECORDS (OUTPATIENT)
Dept: FAMILY MEDICINE | Facility: CLINIC | Age: 64
End: 2019-06-04

## 2019-08-20 ENCOUNTER — HOSPITAL ENCOUNTER (OUTPATIENT)
Dept: CT IMAGING | Facility: CLINIC | Age: 64
Discharge: HOME | End: 2019-08-20
Attending: PSYCHIATRY & NEUROLOGY | Admitting: PSYCHIATRY & NEUROLOGY
Payer: COMMERCIAL

## 2019-08-20 DIAGNOSIS — R41.3 MEMORY PROBLEM: ICD-10-CM

## 2019-08-20 LAB
CREAT BLD-MCNC: 0.7 MG/DL (ref 0.52–1.04)
GFR SERPL CREATININE-BSD FRML MDRD: 84 ML/MIN/{1.73_M2}

## 2019-08-20 PROCEDURE — 82565 ASSAY OF CREATININE: CPT

## 2019-08-20 PROCEDURE — 25000128 H RX IP 250 OP 636: Performed by: RADIOLOGY

## 2019-08-20 PROCEDURE — 70498 CT ANGIOGRAPHY NECK: CPT

## 2019-08-20 RX ORDER — IOPAMIDOL 755 MG/ML
70 INJECTION, SOLUTION INTRAVASCULAR ONCE
Status: COMPLETED | OUTPATIENT
Start: 2019-08-20 | End: 2019-08-20

## 2019-08-20 RX ADMIN — IOPAMIDOL 70 ML: 755 INJECTION, SOLUTION INTRAVENOUS at 14:16

## 2019-11-03 NOTE — NURSING NOTE
Kemi is here for a non-fasting medication recheck.     Pre-Visit Screening :  Immunizations : up to date    Colonoscopy : is up to date  Mammogram : is up to date  Asthma Action Test/Plan : NA  PHQ9/GAD7 : done today    Pulse - regular  My Chart - accepts    CLASSIFICATION OF OVERWEIGHT AND OBESITY BY BMI                         Obesity Class           BMI(kg/m2)  Underweight                                    < 18.5  Normal                                         18.5-24.9  Overweight                                     25.0-29.9  OBESITY                     I                  30.0-34.9                              II                 35.0-39.9  EXTREME OBESITY             III                >40                             Patient's  BMI Body mass index is 36.95 kg/(m^2).  http://hin.nhlbi.nih.gov/menuplanner/menu.cgi  Questioned patient about current smoking habits.  Pt. has never smoked.    Fani.ALF Hassan ([facility])     negative

## 2020-02-01 ENCOUNTER — TRANSFERRED RECORDS (OUTPATIENT)
Dept: FAMILY MEDICINE | Facility: CLINIC | Age: 65
End: 2020-02-01

## 2020-06-10 ENCOUNTER — TRANSFERRED RECORDS (OUTPATIENT)
Dept: FAMILY MEDICINE | Facility: CLINIC | Age: 65
End: 2020-06-10

## 2020-08-05 ENCOUNTER — HOSPITAL ENCOUNTER (EMERGENCY)
Facility: CLINIC | Age: 65
Discharge: HOME | End: 2020-08-05
Attending: EMERGENCY MEDICINE | Admitting: EMERGENCY MEDICINE
Payer: COMMERCIAL

## 2020-08-05 ENCOUNTER — APPOINTMENT (OUTPATIENT)
Dept: GENERAL RADIOLOGY | Facility: CLINIC | Age: 65
End: 2020-08-05
Attending: EMERGENCY MEDICINE
Payer: COMMERCIAL

## 2020-08-05 VITALS
OXYGEN SATURATION: 98 % | DIASTOLIC BLOOD PRESSURE: 90 MMHG | SYSTOLIC BLOOD PRESSURE: 180 MMHG | RESPIRATION RATE: 18 BRPM

## 2020-08-05 DIAGNOSIS — J18.9 ATYPICAL PNEUMONIA: ICD-10-CM

## 2020-08-05 DIAGNOSIS — R06.09 DYSPNEA ON EXERTION: ICD-10-CM

## 2020-08-05 DIAGNOSIS — J98.01 BRONCHOSPASM: ICD-10-CM

## 2020-08-05 LAB
ALBUMIN SERPL-MCNC: 3.9 G/DL (ref 3.4–5)
ALP SERPL-CCNC: 142 U/L (ref 40–150)
ALT SERPL W P-5'-P-CCNC: 25 U/L (ref 0–50)
ANION GAP SERPL CALCULATED.3IONS-SCNC: 5 MMOL/L (ref 3–14)
AST SERPL W P-5'-P-CCNC: 17 U/L (ref 0–45)
BASOPHILS # BLD AUTO: 0.1 10E9/L (ref 0–0.2)
BASOPHILS NFR BLD AUTO: 0.6 %
BILIRUB SERPL-MCNC: 0.4 MG/DL (ref 0.2–1.3)
BUN SERPL-MCNC: 16 MG/DL (ref 7–30)
CALCIUM SERPL-MCNC: 8.9 MG/DL (ref 8.5–10.1)
CHLORIDE SERPL-SCNC: 103 MMOL/L (ref 94–109)
CO2 SERPL-SCNC: 29 MMOL/L (ref 20–32)
CREAT SERPL-MCNC: 0.77 MG/DL (ref 0.52–1.04)
D DIMER PPP FEU-MCNC: 0.5 UG/ML FEU (ref 0–0.5)
DIFFERENTIAL METHOD BLD: ABNORMAL
EOSINOPHIL # BLD AUTO: 0.1 10E9/L (ref 0–0.7)
EOSINOPHIL NFR BLD AUTO: 1.1 %
ERYTHROCYTE [DISTWIDTH] IN BLOOD BY AUTOMATED COUNT: 12.9 % (ref 10–15)
GFR SERPL CREATININE-BSD FRML MDRD: 81 ML/MIN/{1.73_M2}
GLUCOSE BLDC GLUCOMTR-MCNC: 141 MG/DL (ref 70–99)
GLUCOSE SERPL-MCNC: 154 MG/DL (ref 70–99)
HCT VFR BLD AUTO: 36.7 % (ref 35–47)
HGB BLD-MCNC: 11.7 G/DL (ref 11.7–15.7)
IMM GRANULOCYTES # BLD: 0.1 10E9/L (ref 0–0.4)
IMM GRANULOCYTES NFR BLD: 0.5 %
LYMPHOCYTES # BLD AUTO: 2.3 10E9/L (ref 0.8–5.3)
LYMPHOCYTES NFR BLD AUTO: 24.5 %
MCH RBC QN AUTO: 32 PG (ref 26.5–33)
MCHC RBC AUTO-ENTMCNC: 31.9 G/DL (ref 31.5–36.5)
MCV RBC AUTO: 100 FL (ref 78–100)
MONOCYTES # BLD AUTO: 0.7 10E9/L (ref 0–1.3)
MONOCYTES NFR BLD AUTO: 7.8 %
NEUTROPHILS # BLD AUTO: 6.1 10E9/L (ref 1.6–8.3)
NEUTROPHILS NFR BLD AUTO: 65.5 %
NRBC # BLD AUTO: 0 10*3/UL
NRBC BLD AUTO-RTO: 0 /100
NT-PROBNP SERPL-MCNC: 117 PG/ML (ref 0–900)
PLATELET # BLD AUTO: 383 10E9/L (ref 150–450)
POTASSIUM SERPL-SCNC: 3.5 MMOL/L (ref 3.4–5.3)
PROT SERPL-MCNC: 7.8 G/DL (ref 6.8–8.8)
RBC # BLD AUTO: 3.66 10E12/L (ref 3.8–5.2)
SODIUM SERPL-SCNC: 137 MMOL/L (ref 133–144)
TROPONIN I SERPL-MCNC: <0.015 UG/L (ref 0–0.04)
TSH SERPL DL<=0.005 MIU/L-ACNC: 3.14 MU/L (ref 0.4–4)
WBC # BLD AUTO: 9.3 10E9/L (ref 4–11)

## 2020-08-05 PROCEDURE — 84484 ASSAY OF TROPONIN QUANT: CPT | Performed by: EMERGENCY MEDICINE

## 2020-08-05 PROCEDURE — C9803 HOPD COVID-19 SPEC COLLECT: HCPCS

## 2020-08-05 PROCEDURE — 94640 AIRWAY INHALATION TREATMENT: CPT

## 2020-08-05 PROCEDURE — 93005 ELECTROCARDIOGRAM TRACING: CPT

## 2020-08-05 PROCEDURE — U0003 INFECTIOUS AGENT DETECTION BY NUCLEIC ACID (DNA OR RNA); SEVERE ACUTE RESPIRATORY SYNDROME CORONAVIRUS 2 (SARS-COV-2) (CORONAVIRUS DISEASE [COVID-19]), AMPLIFIED PROBE TECHNIQUE, MAKING USE OF HIGH THROUGHPUT TECHNOLOGIES AS DESCRIBED BY CMS-2020-01-R: HCPCS | Performed by: EMERGENCY MEDICINE

## 2020-08-05 PROCEDURE — 83880 ASSAY OF NATRIURETIC PEPTIDE: CPT | Performed by: EMERGENCY MEDICINE

## 2020-08-05 PROCEDURE — 25000132 ZZH RX MED GY IP 250 OP 250 PS 637: Performed by: EMERGENCY MEDICINE

## 2020-08-05 PROCEDURE — 00000146 ZZHCL STATISTIC GLUCOSE BY METER IP

## 2020-08-05 PROCEDURE — 84443 ASSAY THYROID STIM HORMONE: CPT | Performed by: EMERGENCY MEDICINE

## 2020-08-05 PROCEDURE — 85025 COMPLETE CBC W/AUTO DIFF WBC: CPT | Performed by: EMERGENCY MEDICINE

## 2020-08-05 PROCEDURE — 25000131 ZZH RX MED GY IP 250 OP 636 PS 637: Performed by: EMERGENCY MEDICINE

## 2020-08-05 PROCEDURE — 99285 EMERGENCY DEPT VISIT HI MDM: CPT | Mod: 25

## 2020-08-05 PROCEDURE — 85379 FIBRIN DEGRADATION QUANT: CPT | Performed by: EMERGENCY MEDICINE

## 2020-08-05 PROCEDURE — 80053 COMPREHEN METABOLIC PANEL: CPT | Performed by: EMERGENCY MEDICINE

## 2020-08-05 PROCEDURE — 71045 X-RAY EXAM CHEST 1 VIEW: CPT

## 2020-08-05 RX ORDER — AZITHROMYCIN 250 MG/1
TABLET, FILM COATED ORAL
Qty: 6 TABLET | Refills: 0 | Status: SHIPPED | OUTPATIENT
Start: 2020-08-05 | End: 2020-08-10

## 2020-08-05 RX ORDER — ALBUTEROL SULFATE 90 UG/1
4-6 AEROSOL, METERED RESPIRATORY (INHALATION) EVERY 4 HOURS PRN
Qty: 1 INHALER | Refills: 1 | Status: SHIPPED | OUTPATIENT
Start: 2020-08-05 | End: 2020-12-17

## 2020-08-05 RX ORDER — ALBUTEROL SULFATE 90 UG/1
6 AEROSOL, METERED RESPIRATORY (INHALATION) ONCE
Status: COMPLETED | OUTPATIENT
Start: 2020-08-05 | End: 2020-08-05

## 2020-08-05 RX ORDER — DEXAMETHASONE 4 MG/1
8 TABLET ORAL ONCE
Status: COMPLETED | OUTPATIENT
Start: 2020-08-05 | End: 2020-08-05

## 2020-08-05 RX ADMIN — ALBUTEROL SULFATE 6 PUFF: 90 AEROSOL, METERED RESPIRATORY (INHALATION) at 16:55

## 2020-08-05 RX ADMIN — DEXAMETHASONE 8 MG: 4 TABLET ORAL at 17:58

## 2020-08-05 NOTE — ED AVS SNAPSHOT
[facility] Emergency Department  [address]  [facility] [address]  Phone:  [phone]  Fax:  [phone]                                    Kemi Casper   MRN: [id number]    Department:  [facility] Emergency Department   Date of Visit:  8/5/2020           After Visit Summary Signature Page    I have received my discharge instructions, and my questions have been answered. I have discussed any challenges I see with this plan with the nurse or doctor.    ..........................................................................................................................................  Patient/Patient Representative Signature      ..........................................................................................................................................  Patient Representative Print Name and Relationship to Patient    ..................................................               ................................................  Date                                   Time    ..........................................................................................................................................  Reviewed by Signature/Title    ...................................................              ..............................................  Date                                               Time          22EPIC Rev 08/18

## 2020-08-05 NOTE — ED NOTES
Ambulated Pt. In room with pulse ox. At rest Pt. O2 sats at 99% with HR of 88. With ambulation Pt. O2 sats at 98% with HR of 110.

## 2020-08-05 NOTE — ED TRIAGE NOTES
Patient presents with shortness of breath for the past 10 days, worse with exertion, which is abnormal for her. Patient denies any chest pain. Slight headache.

## 2020-08-05 NOTE — ED PROVIDER NOTES
History     Chief Complaint:    Shortness of Breath       HPI  [age]-year-old female presenting with shortness of breath going back probably as long ago as November but more noticeable in the last primarily this is dyspnea with exertion.  She does not describe any chest discomfort with this and also has not experienced ongoing fevers a sore throat runny nose or congestion.  She has had an on and off cough which is been nonproductive throughout this illness.  She denies any history of blood clots or known coronary disease.  Denies any black or bloody stools, vomiting, diarrhea.  She has had an occasional mild generalized headache.  No known thyroid disease.      Allergies:  Compazine  Lisinopril       Medications:   Lipitor  Cholecalciferol  Prozac  Melatonin  Metformin  Provigil  Prilosec  Zofran  Ditropan  Desyrel      Medical History:   Complication of anesthesia  Depression  Diabetes mellitus  Hypertension  Fibromyalgia  GERD  Hyperlipidemia  Obesity  Osteoarthritis  Sleep apnea  Anemia  Intestinal malabsorption  Obesity  Periodic limb movement disorder      Surgical History   Arthroplasty knee  Arthroscopy knee  Appendectomy with hysterectomy  Laminectomy, lumbar  Cholecystectomy  Dilation and curettage  Hysterectomy bilateral salpingo-oophorectomy, combined  Remv pilonidal lesion simple  Tonsillectomy, adenoidectomy, combined      Family History:   Skin cancer  Hypertension  Diabetes  Heart disease  Renal failure  Thyroid cyst      Social History:  Smoking Status: Negative   Smokeless Tobacco: Negative   Alcohol Use: Negative   Drug Use: Negative   Primary Physician: [facility]         Review of Systems   ROS: 10 point ROS neg other than the symptoms noted above in the HPI.      Physical Exam     Patient Vitals for the past 24 hrs:   BP Temp src Heart Rate Resp SpO2   08/05/20 1305 (!) 137/90 Oral 90 18 96 %          Physical Exam  Gen: well appearing, in no acute distress  HEENT:  mmm, no  rhinorrhea  Neck: supple, no abnormal swelling  Lungs:  CTAB,  no resp distress  CV: rrr, no m/r/g, ppi  Abd: soft, nontender, nondistended, no rebound/masses/guarding/hsm  Ext: no peripheral edema  Skin: warm, dry, well perfused, no rashes/bruising/lesions on exposed skin  Neuro: alert, MAEE, no gross motor or sensory deficits, gait stable  Psych: Normal mood, normal affect      Emergency Department Course     ECG:  ECG taken at 1646  Sinus rhythm  Nonspecific T wave abnormality  Abnormal ECG  Rate 75 bpm. MT interval 134 ms. QRS duration 90 ms. QT/QTc 410/457 ms. P-R-T axes 58 43 51.    Imaging:  Radiology results were communicated with the patient who voiced understanding of the findings.    XR Chest Port 1 View  Single portable AP view of the chest was obtained.  Cardiomediastinal silhouette is within normal limits. Left  basilar/retrocardiac pulmonary opacities, could represent atelectasis  versus infection. No significant pleural effusion or pneumothorax.    TUAN CARRION MD    Reading per radiology     Laboratory:  Laboratory findings were communicated with the patient who voiced understanding of the findings.    COVID-19 Virus (Coronavirus), PCR NP Swab: pending      D Dimer (Collected 1624): 0.5  BNP: 117  Troponin (Collected 1624): <0.015  Glucose by meter: 141 (H)  TSH with free T4 reflex: 3.14    CBC: WBC 9.3, HGB 11.7,   CMP: Glucose 154 (H) (Creatinine 0.77) o/w WNL     Procedures:        Interventions:   1655 Albuterol inhaler 6 puff inhalation  1753 Decadron 8 mg PO      Emergency Department Course:    1609 Nursing notes and vitals reviewed.    1619 I performed an exam of the patient as documented above.     1624 IV was inserted and blood was drawn for laboratory testing, results above.    1646 EKG obtained as noted above.    1655 The patient was sent for XR while in the emergency department, results above.     1716 A nares sample was obtained for laboratory testing as documented  above.    1751 Findings and plan explained to the Patient. Patient discharged home with instructions regarding supportive care, medications, and reasons to return. The importance of close follow-up was reviewed. The patient was prescribed as below.    Impression & Plan       Medical Decision Makin-year-old female here with acute on chronic shortness of breath and dyspnea on exertion.  There is not a strong chest discomfort or pain component to her story.  Concerns here for respiratory tract infection, PE, ACS etc.  Her EKG here shows a sinus rhythm without acute ischemia or concerning arrhythmia.  D-dimer is negative making PE quite unlikely.  A BNP is not elevated troponin is undetectable white blood cell count is unremarkable chest x-ray shows possible retrocardiac and basilar opacities.  Given this certainly is possible she has an atypical bronchitis and we will treat her with bronchodilator as needed for symptom relief and course of azithromycin.  We discussed what is known and unknown at this point what expected course with the treatment here in the ED would be as well as when and where to seek out care should she not improve as expected and she can always return to emergency department with any new or worsening symptoms.        Diagnosis:     ICD-10-CM    1. Dyspnea on exertion  R06.00    2. Bronchospasm  J98.01    3. Atypical pneumonia  J18.9         Disposition:  Discharged to home.    Discharge Medications:  New Prescriptions    ALBUTEROL (PROAIR HFA/PROVENTIL HFA/VENTOLIN HFA) 108 (90 BASE) MCG/ACT INHALER    Inhale 4-6 puffs into the lungs every 4 hours as needed for shortness of breath / dyspnea    AZITHROMYCIN (ZITHROMAX Z-JANIE) 250 MG TABLET    Two tablets on the first day, then one tablet daily for the next 4 days       Scribe Disclosure:  Nadia POOLE, am serving as a scribe at 4:10 PM on 2020 to document services personally performed by Deon López MD based on my  observations and the provider's statements to me.      Deon López MD  08/06/20 0040

## 2020-08-06 LAB
INTERPRETATION ECG - MUSE: NORMAL
SARS-COV-2 RNA SPEC QL NAA+PROBE: NOT DETECTED
SPECIMEN SOURCE: NORMAL

## 2020-08-28 ENCOUNTER — HOSPITAL ENCOUNTER (EMERGENCY)
Facility: CLINIC | Age: 65
Discharge: HOME | End: 2020-08-28
Attending: EMERGENCY MEDICINE | Admitting: EMERGENCY MEDICINE
Payer: COMMERCIAL

## 2020-08-28 ENCOUNTER — APPOINTMENT (OUTPATIENT)
Dept: CT IMAGING | Facility: CLINIC | Age: 65
End: 2020-08-28
Attending: EMERGENCY MEDICINE
Payer: COMMERCIAL

## 2020-08-28 VITALS
HEART RATE: 97 BPM | OXYGEN SATURATION: 98 % | RESPIRATION RATE: 16 BRPM | TEMPERATURE: 98.9 F | SYSTOLIC BLOOD PRESSURE: 138 MMHG | DIASTOLIC BLOOD PRESSURE: 82 MMHG

## 2020-08-28 DIAGNOSIS — R06.02 SHORTNESS OF BREATH: ICD-10-CM

## 2020-08-28 DIAGNOSIS — R91.8 PULMONARY NODULES: ICD-10-CM

## 2020-08-28 LAB
ALBUMIN SERPL-MCNC: 3.7 G/DL (ref 3.4–5)
ALBUMIN UR-MCNC: 20 MG/DL
ALP SERPL-CCNC: 148 U/L (ref 40–150)
ALT SERPL W P-5'-P-CCNC: 29 U/L (ref 0–50)
ANION GAP SERPL CALCULATED.3IONS-SCNC: 5 MMOL/L (ref 3–14)
APPEARANCE UR: CLEAR
AST SERPL W P-5'-P-CCNC: 21 U/L (ref 0–45)
BASOPHILS # BLD AUTO: 0 10E9/L (ref 0–0.2)
BASOPHILS NFR BLD AUTO: 0.4 %
BILIRUB SERPL-MCNC: 0.5 MG/DL (ref 0.2–1.3)
BILIRUB UR QL STRIP: NEGATIVE
BUN SERPL-MCNC: 28 MG/DL (ref 7–30)
CALCIUM SERPL-MCNC: 8.8 MG/DL (ref 8.5–10.1)
CHLORIDE SERPL-SCNC: 102 MMOL/L (ref 94–109)
CO2 SERPL-SCNC: 32 MMOL/L (ref 20–32)
COLOR UR AUTO: YELLOW
CREAT SERPL-MCNC: 0.88 MG/DL (ref 0.52–1.04)
DIFFERENTIAL METHOD BLD: ABNORMAL
EOSINOPHIL # BLD AUTO: 0.1 10E9/L (ref 0–0.7)
EOSINOPHIL NFR BLD AUTO: 1 %
ERYTHROCYTE [DISTWIDTH] IN BLOOD BY AUTOMATED COUNT: 13.6 % (ref 10–15)
GFR SERPL CREATININE-BSD FRML MDRD: 68 ML/MIN/{1.73_M2}
GLUCOSE BLDC GLUCOMTR-MCNC: 132 MG/DL (ref 70–99)
GLUCOSE SERPL-MCNC: 154 MG/DL (ref 70–99)
GLUCOSE UR STRIP-MCNC: NEGATIVE MG/DL
HCT VFR BLD AUTO: 35.9 % (ref 35–47)
HGB BLD-MCNC: 11.5 G/DL (ref 11.7–15.7)
HGB UR QL STRIP: NEGATIVE
IMM GRANULOCYTES # BLD: 0 10E9/L (ref 0–0.4)
IMM GRANULOCYTES NFR BLD: 0.3 %
KETONES UR STRIP-MCNC: NEGATIVE MG/DL
LEUKOCYTE ESTERASE UR QL STRIP: ABNORMAL
LYMPHOCYTES # BLD AUTO: 2 10E9/L (ref 0.8–5.3)
LYMPHOCYTES NFR BLD AUTO: 19.9 %
MCH RBC QN AUTO: 32 PG (ref 26.5–33)
MCHC RBC AUTO-ENTMCNC: 32 G/DL (ref 31.5–36.5)
MCV RBC AUTO: 100 FL (ref 78–100)
MONOCYTES # BLD AUTO: 0.7 10E9/L (ref 0–1.3)
MONOCYTES NFR BLD AUTO: 7.2 %
MUCOUS THREADS #/AREA URNS LPF: PRESENT /LPF
NEUTROPHILS # BLD AUTO: 7.1 10E9/L (ref 1.6–8.3)
NEUTROPHILS NFR BLD AUTO: 71.2 %
NITRATE UR QL: NEGATIVE
NRBC # BLD AUTO: 0 10*3/UL
NRBC BLD AUTO-RTO: 0 /100
NT-PROBNP SERPL-MCNC: 83 PG/ML (ref 0–900)
PH UR STRIP: 6 PH (ref 5–7)
PLATELET # BLD AUTO: 395 10E9/L (ref 150–450)
POTASSIUM SERPL-SCNC: 3.1 MMOL/L (ref 3.4–5.3)
PROT SERPL-MCNC: 7.3 G/DL (ref 6.8–8.8)
RBC # BLD AUTO: 3.59 10E12/L (ref 3.8–5.2)
RBC #/AREA URNS AUTO: 1 /HPF (ref 0–2)
SODIUM SERPL-SCNC: 139 MMOL/L (ref 133–144)
SOURCE: ABNORMAL
SP GR UR STRIP: 1.02 (ref 1–1.03)
SQUAMOUS #/AREA URNS AUTO: 1 /HPF (ref 0–1)
TROPONIN I SERPL-MCNC: <0.015 UG/L (ref 0–0.04)
TSH SERPL DL<=0.005 MIU/L-ACNC: 2.74 MU/L (ref 0.4–4)
UROBILINOGEN UR STRIP-MCNC: NORMAL MG/DL (ref 0–2)
WBC # BLD AUTO: 10 10E9/L (ref 4–11)
WBC #/AREA URNS AUTO: 2 /HPF (ref 0–5)

## 2020-08-28 PROCEDURE — 81001 URINALYSIS AUTO W/SCOPE: CPT | Performed by: EMERGENCY MEDICINE

## 2020-08-28 PROCEDURE — 99285 EMERGENCY DEPT VISIT HI MDM: CPT | Mod: 25

## 2020-08-28 PROCEDURE — 25000128 H RX IP 250 OP 636: Performed by: EMERGENCY MEDICINE

## 2020-08-28 PROCEDURE — 84443 ASSAY THYROID STIM HORMONE: CPT | Performed by: EMERGENCY MEDICINE

## 2020-08-28 PROCEDURE — 84484 ASSAY OF TROPONIN QUANT: CPT | Performed by: EMERGENCY MEDICINE

## 2020-08-28 PROCEDURE — 85025 COMPLETE CBC W/AUTO DIFF WBC: CPT | Performed by: EMERGENCY MEDICINE

## 2020-08-28 PROCEDURE — 00000146 ZZHCL STATISTIC GLUCOSE BY METER IP

## 2020-08-28 PROCEDURE — 71275 CT ANGIOGRAPHY CHEST: CPT

## 2020-08-28 PROCEDURE — 80053 COMPREHEN METABOLIC PANEL: CPT | Performed by: EMERGENCY MEDICINE

## 2020-08-28 PROCEDURE — 93005 ELECTROCARDIOGRAM TRACING: CPT

## 2020-08-28 PROCEDURE — 83880 ASSAY OF NATRIURETIC PEPTIDE: CPT | Performed by: EMERGENCY MEDICINE

## 2020-08-28 RX ORDER — IOPAMIDOL 755 MG/ML
500 INJECTION, SOLUTION INTRAVASCULAR ONCE
Status: COMPLETED | OUTPATIENT
Start: 2020-08-28 | End: 2020-08-28

## 2020-08-28 RX ADMIN — IOPAMIDOL 78 ML: 755 INJECTION, SOLUTION INTRAVENOUS at 12:33

## 2020-08-28 ASSESSMENT — ENCOUNTER SYMPTOMS
WEAKNESS: 1
DIAPHORESIS: 1
BACK PAIN: 1
NAUSEA: 1
PALPITATIONS: 1
SHORTNESS OF BREATH: 1
WHEEZING: 0
FEVER: 0
COUGH: 0

## 2020-08-28 NOTE — ED AVS SNAPSHOT
[facility] Emergency Department  [address]  [facility] [address]  Phone:  [phone]  Fax:  [phone]                                    Kemi Casper   MRN: [id number]    Department:  [facility] Emergency Department   Date of Visit:  8/28/2020           After Visit Summary Signature Page    I have received my discharge instructions, and my questions have been answered. I have discussed any challenges I see with this plan with the nurse or doctor.    ..........................................................................................................................................  Patient/Patient Representative Signature      ..........................................................................................................................................  Patient Representative Print Name and Relationship to Patient    ..................................................               ................................................  Date                                   Time    ..........................................................................................................................................  Reviewed by Signature/Title    ...................................................              ..............................................  Date                                               Time          22EPIC Rev 08/18

## 2020-08-28 NOTE — ED TRIAGE NOTES
Pt dx with pneumonia 2 weeks ago -covid, pt treated with a zpack.  Pt states did feel better for a couple a days but slowly got worse again.  Now feeling more shortness of breath and feels like she did before.

## 2020-08-28 NOTE — ED PROVIDER NOTES
"  History   Chief Complaint:  Shortness of Breath    HPI  Kemi Casper is a 65 year old female with a history of insulin dependent diabetes, hypertension, and hyperlipidemia who presents for evaluation of shortness of breath. On 8/5, the patient was seen in the ED for progressively worsening dyspnea; at that time, she was diagnosed with pneumonia and started on a Z-Simon. She was also swabbed for COVID-19 and this test came back negative; of note, she reports this test caused so much pain in the days following that she would not like to be swabbed again. Once she finished the course of antibiotics, she reports feeling well for a couple of days however the shortness of breath has gradually come back and progressively worsened. This morning, she woke up this morning feeling generally weak, short of breath, and with palpitations that \"kept her up all night\" however she felt well enough to go to work. By the time she arrived there, she was feeling significantly worse and decided it was time to be re-evaluated. She tried to make a PCP appointment, however the nurse recommended she present to the ED. In addition to the global weakness/extremity \"heaviness\", dyspnea, and palpitations, she reports intermittent episodes of diaphoresis and a pain in her left lower chest. This pain has been present since her last encounter, however it is worse today; she has been taking Tylenol for the pain without relief. She denies any fever or cough and notes the dyspnea is exacerbated on exertion. She denies any history of blood clots or heart disease, although she does have a history of asthma. She is not oxygen dependent. Of note, she does endorse some nausea which she attributes to starting a \"low carb\", but not Keto, diet 5 days ago for her diabetes. She denies any associated abdominal pain.     Allergies:  Compazine [Prochlorperazine]  Lisinopril  Adhesive Tape    Medications:    Atorvastatin   Prozac   Insulin (Novolog & " Basaglar)  Irbesartan-hydrochlorothiazide  Metformin   Melatonin  Methotrexate   Modafinil   Prilosec   Zofran   Iron     Past Medical History:    Depression   Type II diabetes  Hypertension   Fibromyalgia  Urine incontinence   GERD  Hyperlipidemia   Obesity   Osteoarthritis   Sleep apnea   Periodic limb movement disorder  Anemia   Intestinal malabsorption     Past Surgical History:    Left knee arthroplasty   Left knee arthroscopy   Appendectomy   Hysterectomy, bilateral salpingo-oophorectomy   Lumbar laminectomy   Cholecystectomy    Pilonidal cyst removal   T&A  D&C     Family History:    Skin cancer   Hypertension   Diabetes  CHF  Renal failure   Thyroid cysts     Social History:  Marital Status: .   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   Negative for tobacco use.  Negative for alcohol use.  Negative for drug use.     Review of Systems   Constitutional: Positive for diaphoresis. Negative for fever.   Respiratory: Positive for shortness of breath. Negative for cough and wheezing.    Cardiovascular: Positive for palpitations.   Gastrointestinal: Positive for nausea.   Musculoskeletal: Positive for back pain (left side).   Neurological: Positive for weakness.   All other systems reviewed and are negative.    Physical Exam     Patient Vitals for the past 24 hrs:   BP Temp Pulse Resp SpO2   08/28/20 1445 138/82 -- 97 16 98 %   08/28/20 1104 (!) 147/80 98.9  F (37.2  C) 95 18 94 %      Physical Exam  General: Alert, appears well-developed and well-nourished. Cooperative.     In mild distress  HEENT:  Head:  Atraumatic  Ears:  External ears are normal  Mouth/Throat:  Oropharynx is without erythema or exudate and mucous membranes are moist.   Eyes:   Conjunctivae normal and EOM are normal. No scleral icterus.  CV:  Normal rate, regular rhythm, normal heart sounds and radial pulses are 2+ and symmetric.  No murmur.  Resp:  Breath sounds are clear bilaterally, no wheezing.     Non-labored, no  retractions or accessory muscle use  GI:  Abdomen is soft, no distension, no tenderness. No rebound or guarding.  No CVA tenderness bilaterally  MS:  Normal range of motion. No edema.    Normal strength in all 4 extremities.     Back atraumatic.    No midline cervical, thoracic, or lumbar tenderness  Skin:  Warm and dry.  No rash or lesions noted.  Neuro: Alert. Normal strength.  GCS: 15  Psych:  Normal mood and affect.    Emergency Department Course     ECG:  Indication: Shortness of Breath  Time: 1122  Vent. Rate 91 bpm. VT interval 146. QRS duration 92. QT/QTc 394/484. P-R-T axis 48 18 45.    Normal sinus rhythm.   Septal infarct, age undetermined. No significant change compared to EKG dated 8/5/2020. Read time: 1125    Imaging:  Radiologic findings were communicated with the patient who voiced understanding of the findings.  CT Chest w/ IV contrast:   1.  No evidence of pulmonary embolism or other acute chest   abnormality.   2.  Incidental 4 mm nodule right lower lobe was not definitely seen on   the prior study, although that study was performed at a lower   Resolution, as per radiology.    Laboratory:  Laboratory findings were communicated with the patient who voiced understanding of the findings.  CBC: WBC: 10.0, HGB: 11.5 (L), PLT: 395  CMP: Glucose 154 (H), K: 3.1 (L), o/w WNL (Creatinine: 0.88)    1145 Troponin: <0.015   TSH: 2.74  BNP: 83    1248 Glucose by meter: 132 (H)     UA with Microscopic: Albumin: 20 (A), Leukocyte esterase: Small (A), Mucous: Present (A), o/w WNL     Symptomatic COVID-19 Virus PCR: Declined    Emergency Department Course:  Nursing notes and vitals reviewed.   EKG obtained in the ED, see results above.      1132: I performed an exam of the patient as documented above.      IV was inserted and blood was drawn for laboratory testing, results above.   The patient was sent for a chest CT while in the emergency department, results above.      1329: I rechecked the patient and  discussed the results of her workup and recommendations for home. Patient is now complaining of additional concerns including a headache.     The patient provided a urine sample here in the emergency department. This was sent for laboratory testing, findings above.      1434: I rechecked the patient and discussed the rest of her work up and plan for discharge.     Findings and plan explained to the Patient. Patient discharged home with instructions regarding supportive care, medications, and reasons to return. The importance of close follow-up was reviewed.     I personally reviewed the laboratory and imaging results with the Patient and answered all related questions prior to discharge.    Impression & Plan      Medical Decision Making:   Patient is a 65-year-old female with a past medical history of hypertension, type 2 diabetes, fibromyalgia, and hyperlipidemia who presents with shortness of breath worse with exertion over the last several days.  Patient notes 2 weeks ago she was diagnosed with a possible pneumonia and treated with azithromycin.  Patient denies cough today and denies fever although does endorse some sweating throughout the daytime in recent days.  Reassuringly, blood work is grossly unremarkable.  Urinalysis shows no signs of infection.  No signs of BNP elevation nor thyroid dysfunction.  Lower concern for acute heart failure exacerbation.  Patient reassuringly with no signs of peripheral edema.  CT scan of the chest was obtained out of concern for potential pulmonary embolism versus other sinister intrathoracic process.  Reassuringly, CT scan showed no evidence of pulmonary embolism or other acute chest abnormality.  There was a incidental 4 mm nodule to the right lower lobe not seen on prior studies.  There is recommendation for follow-up with her primary care provider for potential repeat imaging at 12 months.  Unclear the exact etiology of the patient's persistent shortness of breath  symptoms.  We did obtain a UA as patient with vague left lower thoracic chest wall tenderness and reassuringly this UA was negative.  Lower concern for atypical UTI or stone presentation.  She declined repeat COVID-19 swab at this time.  We did encourage her to follow-up closely with her primary care provider as additional outpatient work-up may be indicated, such as exercise stress testing or outpatient echocardiogram.  After return precautions discussed and all questions answered, discharged home.    Diagnosis:     ICD-10-CM    1. Shortness of breath  R06.02 Nt probnp inpatient     Nt probnp inpatient     UA with Microscopic     CANCELED: Nt probnp inpatient (BNP)   2. Pulmonary nodules  R91.8     RLL lung nodule       Disposition:   Discharged to home.      Scribe Disclosure:  I, Caro Worthington, am serving as a scribe on 8/28/2020 at 11:32 AM to personally document services performed by Stuart Carey MD based on my observations and the provider's statements to me.         Stuart Carey MD  08/28/20 [id number]

## 2020-08-31 LAB — INTERPRETATION ECG - MUSE: NORMAL

## 2020-09-09 ENCOUNTER — HOSPITAL ENCOUNTER (OUTPATIENT)
Dept: CARDIOLOGY | Facility: CLINIC | Age: 65
Discharge: HOME | End: 2020-09-09
Attending: FAMILY MEDICINE | Admitting: FAMILY MEDICINE
Payer: COMMERCIAL

## 2020-09-09 DIAGNOSIS — T73.3XXA: ICD-10-CM

## 2020-09-09 PROCEDURE — 93018 CV STRESS TEST I&R ONLY: CPT | Performed by: INTERNAL MEDICINE

## 2020-09-09 PROCEDURE — 93017 CV STRESS TEST TRACING ONLY: CPT

## 2020-09-09 PROCEDURE — 93016 CV STRESS TEST SUPVJ ONLY: CPT | Performed by: INTERNAL MEDICINE

## 2020-09-22 ENCOUNTER — TELEPHONE (OUTPATIENT)
Dept: CARDIOLOGY | Facility: CLINIC | Age: 65
End: 2020-09-22

## 2020-09-22 NOTE — TELEPHONE ENCOUNTER
Patient was transferred from scheduling. She has been in ED recently due to SOB. COVID negative per test on 8/5/20. Low concern for heart failure exacerbation, no BNP elevation, no signs of peripheral edema, negative findings on CT scan. 4mm nodule was seen on right lower lobe and was told to follow-up with PCP with potential repeat imaging. Unclear etiology of SOB symptoms. Today she is still having concerns of her SOB, feeling weak with difficulty performing ADLs. She has no other concurrent symptoms. Patient was scheduled to see Dr. Purcell 9/24/20 and was hoping to be seen sooner. Spoke with scheduling who was able to get patient to see Dr. Purcell on 9/23/20. DAMIAN Feng RN.   30

## 2020-09-22 NOTE — TELEPHONE ENCOUNTER
Wellness Screening Tool    Symptom Screening:    Do you have one of the following NEW symptoms:      Fever (subjective or >100.0)?  No    New cough? No    Shortness of breath? No    Chills? No    New loss of taste or smell? No    Generalized body aches? No    New persistent headache? No    New sore throat? No    Nausea, vomiting or diarrhea? No    Within the past 2 weeks, have you been exposed to someone with a known positive illness below?      COVID - 19 (known or suspected) No    Chicken pox?  No    Measles? No    Pertussis? No    Have you had a positive COVID-19 diagnostic test (nasal swab test) in the last 14 days or are you currently   on self-quarantine restrictions (i.e.travel restriction, exposure, etc?) No        Patient notified of visitor restriction: Yes  Patient informed to wear a mask: Yes    Patient's appointment status: Patient will be seen in clinic as scheduled on 09/23/2020

## 2020-09-23 ENCOUNTER — OFFICE VISIT (OUTPATIENT)
Dept: CARDIOLOGY | Facility: CLINIC | Age: 65
End: 2020-09-23
Payer: COMMERCIAL

## 2020-09-23 VITALS
WEIGHT: 196.5 LBS | BODY MASS INDEX: 37.1 KG/M2 | DIASTOLIC BLOOD PRESSURE: 74 MMHG | SYSTOLIC BLOOD PRESSURE: 149 MMHG | HEART RATE: 94 BPM | OXYGEN SATURATION: 93 % | HEIGHT: 61 IN

## 2020-09-23 DIAGNOSIS — E78.2 MIXED HYPERLIPIDEMIA: Primary | ICD-10-CM

## 2020-09-23 DIAGNOSIS — R06.02 SHORTNESS OF BREATH: ICD-10-CM

## 2020-09-23 DIAGNOSIS — R53.82 CHRONIC FATIGUE: ICD-10-CM

## 2020-09-23 DIAGNOSIS — I10 ESSENTIAL HYPERTENSION, BENIGN: ICD-10-CM

## 2020-09-23 PROCEDURE — 99214 OFFICE O/P EST MOD 30 MIN: CPT | Performed by: INTERNAL MEDICINE

## 2020-09-23 RX ORDER — SULFASALAZINE 500 MG/1
2000 TABLET, DELAYED RELEASE ORAL DAILY
COMMUNITY
Start: 2020-09-14

## 2020-09-23 RX ORDER — METHOTREXATE 25 MG/ML
INJECTION, SOLUTION INTRA-ARTERIAL; INTRAMUSCULAR; INTRAVENOUS
COMMUNITY
Start: 2020-09-15 | End: 2021-05-16

## 2020-09-23 RX ORDER — DICLOFENAC SODIUM 75 MG/1
TABLET, DELAYED RELEASE ORAL
COMMUNITY
Start: 2020-07-13 | End: 2021-10-21

## 2020-09-23 RX ORDER — HYDROXYCHLOROQUINE SULFATE 200 MG/1
400 TABLET, FILM COATED ORAL
COMMUNITY
Start: 2020-09-12 | End: 2021-10-21

## 2020-09-23 RX ORDER — ATORVASTATIN CALCIUM 40 MG/1
TABLET, FILM COATED ORAL
Status: ON HOLD | COMMUNITY
Start: 2020-07-12 | End: 2021-11-02

## 2020-09-23 RX ORDER — FOLIC ACID 1 MG/1
TABLET ORAL
COMMUNITY
Start: 2020-07-08 | End: 2021-10-21

## 2020-09-23 RX ORDER — BUPROPION HYDROCHLORIDE 150 MG/1
300 TABLET ORAL EVERY MORNING
COMMUNITY
Start: 2020-07-31 | End: 2021-12-17 | Stop reason: DRUGHIGH

## 2020-09-23 RX ORDER — MODAFINIL 200 MG/1
200 TABLET ORAL EVERY MORNING
COMMUNITY
Start: 2020-09-12 | End: 2020-12-17

## 2020-09-23 RX ORDER — DULOXETIN HYDROCHLORIDE 30 MG/1
CAPSULE, DELAYED RELEASE ORAL
COMMUNITY
Start: 2020-05-02 | End: 2021-10-21

## 2020-09-23 ASSESSMENT — MIFFLIN-ST. JEOR: SCORE: 1365.76

## 2020-09-23 NOTE — PROGRESS NOTES
CARDIOLOGY CLINIC CONSULTATION    REASON FOR CONSULT: Fatigue    PRIMARY CARE PHYSICIAN:  [facility]      HISTORY OF PRESENT ILLNESS:  Patient is a very pleasant 65-year-old female with past medical history of fibromyalgia, depression, type 2 diabetes, hypertension, and hyperlipidemia, who presents with her  for evaluation of her chronic, progressive fatigue and shortness of breath.  Patient reports that for the past 20 years, she has had a gradual onset of progressive fatigue and shortness of breath.  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  This is slowly worsened over time, and especially worsened over the past 6 to 12 months.  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  Currently, she becomes exhausted and short of breath even walking from her kitchen to her living room.  She has never had any issues with chest pain.  She occasionally has palpitations but these occur for only a few seconds at a time and less than once per day, do not correlate with her symptoms.  She has presented to the emergency department several times over the past 2 months due to these complaints.  She was tested in early August for COVID-19 antigen was negative.  She declined to be tested again in late August.  She underwent a CT PE protocol in late August which showed no evidence of pulmonary embolism or significant lung disease.  She underwent an ECG only treadmill stress test on 9/9/2020 where she went 9 minutes on a modified Kaushik protocol.  She did not develop any chest pain or any significant ST changes with exercise.  She did achieve target heart rate.  She has not had an echocardiogram at any time.  She continues to see her rheumatologist in clinic and is working to adjust her fibromyalgia medications.  Regarding her hyperlipidemia, she has been taking atorvastatin for many years without changes.  As far as I can tell, her most  recent lipid panel was in 2018.        PAST MEDICAL HISTORY:  I have reviewed this patient's past medical history and updated it with pertinent information if needed.  Past Medical History:   Diagnosis Date     Complication of anesthesia     psychological fear of waking up during surgery     Depression      Diabetes mellitus      Essential hypertension, benign 7/2/2002     Fibromyalgia      GERD (gastroesophageal reflux disease)      Hyperlipidemia      Incontinence of urine      Major depressive disorder, recurrent episode, in full remission (H) 4/20/2011     Mixed hyperlipidemia 7/22/2016     Need for prophylactic hormone replacement therapy (postmenopausal) 12/14/2005     Obesity (BMI 30.0-34.9) 7/22/2016     Osteoarthritis      Other abnormal glucose 2007     Sleep apnea 2/9/2011     Type 2 diabetes mellitus with neurological manifestations (HCC) 5/6/2015       MEDICATIONS:  Current Outpatient Medications   Medication     acetaminophen (TYLENOL) 500 MG tablet     albuterol (PROAIR HFA/PROVENTIL HFA/VENTOLIN HFA) 108 (90 Base) MCG/ACT inhaler     atorvastatin (LIPITOR) 40 MG tablet     B-D U/F 31G X 8 MM insulin pen needle     buPROPion (WELLBUTRIN XL) 150 MG 24 hr tablet     Cholecalciferol (VITAMIN D PO)     diclofenac (VOLTAREN) 75 MG EC tablet     DULoxetine (CYMBALTA) 30 MG capsule     folic acid (FOLVITE) 1 MG tablet     hydroxychloroquine (PLAQUENIL) 200 MG tablet     insulin aspart (NOVOLOG FLEXPEN) 100 UNIT/ML injection     insulin glargine (BASAGLAR KWIKPEN) 100 UNIT/ML pen     irbesartan-hydrochlorothiazide (AVALIDE) 150-12.5 MG tablet     latanoprost (XALATAN) 0.005 % ophthalmic solution     methotrexate 50 MG/2ML injection     modafinil (PROVIGIL) 200 MG tablet     sulfaSALAzine ER (AZULFIDINE EN) 500 MG EC tablet     timolol (TIMOPTIC) 0.25 % ophthalmic solution     traZODone (DESYREL) 50 MG tablet     No current facility-administered medications for this visit.        ALLERGIES:  Allergies    Allergen Reactions     Compazine [Prochlorperazine]      Hallucinations - was hospitalized at the time and then had mental side effects, thought that everyone had evacuated the hospital     Lisinopril Cough     Adhesive Tape Rash       SOCIAL HISTORY:  I have reviewed this patient's social history and updated it with pertinent information if needed.   Kemi Casper  reports that she has never smoked. She has never used smokeless tobacco. She reports that she does not drink alcohol or use drugs.    FAMILY HISTORY:  I have reviewed this patient's family history and updated it with pertinent information if needed.   Family History   Problem Relation Age of Onset     Cancer Mother         skin     Hypertension Mother      Diabetes Father      Heart Disease Father         CHF     Genitourinary Problems Father         Renal failure     Thyroid Disease Sister         cyst       REVIEW OF SYSTEMS:  I have reviewed this patient's ROS as obtained by clinic staff and updated it with pertinent information if needed.   Skin:  Negative     Eyes:  Positive for glasses  ENT:  Positive for hearing loss  Respiratory:  Positive for dyspnea on exertion;dyspnea at rest;sleep apnea;CPAP  Cardiovascular:    Positive for;palpitations;fatigue;dizziness  Gastroenterology: Positive for nausea;constipation  Genitourinary:  Negative    Musculoskeletal:  Positive for arthritis  Neurologic:  Positive for numbness or tingling of feet;numbness or tingling of hands  Psychiatric:  Positive for anxiety;depression  Heme/Lymph/Imm:  Positive for hay fever  Endocrine:  Positive for diabetes;night sweats      PHYSICAL EXAM:      BP: (!) 149/74 Pulse: 94     SpO2: 93 %      Vital Signs with Ranges  Pulse:  [94] 94  BP: (149)/(74) 149/74  SpO2:  [93 %] 93 %  196 lbs 8 oz    Constitutional:  Awake, alert, no acute distress  Eyes: EOMI, sclera non-icteric  ENT: Trachea midline  Respiratory: Normal respiratory effort, CTAB  Cardiovascular: RRR, no  m/r/g.  JVP < 7 cm H2O.  No LE edema.  Normal carotid upstrokes, no carotid bruits.  GI:  Nondistended, nontender.  Skin: Dry, no significant rash on exposed skin  Musculoskeletal:  Strength 5/5 in upper and lower extremities  Psychiatric: Appropriate affect      DATA:    Labs: I have personally reviewed the pertinent cardiac labs.    EKG (8/28/2020): Sinus rhythm with borderline septal Q waves, otherwise unremarkable    Echocardiogram: None    ECG only treadmill stress test (9/9/2020):  There was no chest pain or significant ST changes with exercise.  This was an ECG stress test (not stress echo)-no ischemia identified. Target  HR was achieved but patient exercised on a modified yari protocol -limited  exercise rate      Angiogram: None        ASSESSMENT AND RECOMMENDATIONS:  Patient is a very pleasant 65-year-old female with past medical history of fibromyalgia, depression, type 2 diabetes, hypertension, and hyperlipidemia, who presents with her  for evaluation of her chronic, progressive fatigue and shortness of breath.  Together we went over the various possible cardiac sources for her symptoms, including congestive heart failure, obstructive coronary artery disease, dysrhythmia, pulmonary embolism, pulmonary hypertension, etc... As I explained to the patient, fortunately and unfortunately, her presentation does not seem consistent with a cardiac etiology.  If there is a cardiac cause, congestive heart failure or pulmonary hypertension would be the most likely culprits, so we will check an echocardiogram to further evaluate this, but with her normal exam and normal BNP, this is extremely unlikely.  I also explained that with her essentially normal resting ECG and relatively low pretest probability for obstructive CAD, her normal ECG treadmill test is reassuring and further testing would not be indicated.  Patient and  understood our conversation and agreed with this interpretation, but  understandably were frustrated that they still do not have an answer for her severe fatigue and shortness of breath.  I explained that if congestive heart failure and pulmonary hypertension are ruled out of the echo, and as obstructive CAD is extremely unlikely based on her treadmill stress, that she should feel confident in working to slowly build her exertional capacity, without fear that her symptoms may be due to a dangerous potentially life-threatening etiology.  I also advised her to continue to work with her rheumatologist to see if any adjustments need to be made to her medications, and to work with her psychiatrist to see if an improvement in her mental health may also improve her physical health.  Finally, we will repeat a lipid panel as it has been several years and her lipids were poorly controlled on last check.  If the echo was unrevealing, she does not need to return for routine follow-up, but we would always be happy to see her back for any questions or concerns that may arise.      Aramis Purcell MD  Interventional Cardiology  September 23, 2020

## 2020-09-23 NOTE — LETTER
9/23/2020    [facility]  [address]  [facility] [address]    RE: Kemi Casper       Dear Colleague,    I had the pleasure of seeing Kemi OCTAVIO Casper in the [facility].    CARDIOLOGY CLINIC CONSULTATION    REASON FOR CONSULT: Fatigue    PRIMARY CARE PHYSICIAN:  [facility]      HISTORY OF PRESENT ILLNESS:  Patient is a very pleasant 65-year-old female with past medical history of fibromyalgia, depression, type 2 diabetes, hypertension, and hyperlipidemia, who presents with her  for evaluation of her chronic, progressive fatigue and shortness of breath.  Patient reports that for the past 20 years, she has had a gradual onset of progressive fatigue and shortness of breath.  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  This is slowly worsened over time, and especially worsened over the past 6 to 12 months.  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  Currently, she becomes exhausted and short of breath even walking from her kitchen to her living room.  She has never had any issues with chest pain.  She occasionally has palpitations but these occur for only a few seconds at a time and less than once per day, do not correlate with her symptoms.  She has presented to the emergency department several times over the past 2 months due to these complaints.  She was tested in early August for COVID-19 antigen was negative.  She declined to be tested again in late August.  She underwent a CT PE protocol in late August which showed no evidence of pulmonary embolism or significant lung disease.  She underwent an ECG only treadmill stress test on 9/9/2020 where she went 9 minutes on a modified Kaushik protocol.  She did not develop any chest pain or any significant ST changes with exercise.  She did achieve target heart rate.  She has not had an echocardiogram at  any time.  She continues to see her rheumatologist in clinic and is working to adjust her fibromyalgia medications.  Regarding her hyperlipidemia, she has been taking atorvastatin for many years without changes.  As far as I can tell, her most recent lipid panel was in 2018.        PAST MEDICAL HISTORY:  I have reviewed this patient's past medical history and updated it with pertinent information if needed.  Past Medical History:   Diagnosis Date     Complication of anesthesia     psychological fear of waking up during surgery     Depression      Diabetes mellitus      Essential hypertension, benign 7/2/2002     Fibromyalgia      GERD (gastroesophageal reflux disease)      Hyperlipidemia      Incontinence of urine      Major depressive disorder, recurrent episode, in full remission (H) 4/20/2011     Mixed hyperlipidemia 7/22/2016     Need for prophylactic hormone replacement therapy (postmenopausal) 12/14/2005     Obesity (BMI 30.0-34.9) 7/22/2016     Osteoarthritis      Other abnormal glucose 2007     Sleep apnea 2/9/2011     Type 2 diabetes mellitus with neurological manifestations (HCC) 5/6/2015       MEDICATIONS:  Current Outpatient Medications   Medication     acetaminophen (TYLENOL) 500 MG tablet     albuterol (PROAIR HFA/PROVENTIL HFA/VENTOLIN HFA) 108 (90 Base) MCG/ACT inhaler     atorvastatin (LIPITOR) 40 MG tablet     B-D U/F 31G X 8 MM insulin pen needle     buPROPion (WELLBUTRIN XL) 150 MG 24 hr tablet     Cholecalciferol (VITAMIN D PO)     diclofenac (VOLTAREN) 75 MG EC tablet     DULoxetine (CYMBALTA) 30 MG capsule     folic acid (FOLVITE) 1 MG tablet     hydroxychloroquine (PLAQUENIL) 200 MG tablet     insulin aspart (NOVOLOG FLEXPEN) 100 UNIT/ML injection     insulin glargine (BASAGLAR KWIKPEN) 100 UNIT/ML pen     irbesartan-hydrochlorothiazide (AVALIDE) 150-12.5 MG tablet     latanoprost (XALATAN) 0.005 % ophthalmic solution     methotrexate 50 MG/2ML injection     modafinil (PROVIGIL) 200 MG tablet      sulfaSALAzine ER (AZULFIDINE EN) 500 MG EC tablet     timolol (TIMOPTIC) 0.25 % ophthalmic solution     traZODone (DESYREL) 50 MG tablet     No current facility-administered medications for this visit.        ALLERGIES:  Allergies   Allergen Reactions     Compazine [Prochlorperazine]      Hallucinations - was hospitalized at the time and then had mental side effects, thought that everyone had evacuated the hospital     Lisinopril Cough     Adhesive Tape Rash       SOCIAL HISTORY:  I have reviewed this patient's social history and updated it with pertinent information if needed.   Kemi CUNNINGHAM Toreygildardo Pennie  reports that she has never smoked. She has never used smokeless tobacco. She reports that she does not drink alcohol or use drugs.    FAMILY HISTORY:  I have reviewed this patient's family history and updated it with pertinent information if needed.   Family History   Problem Relation Age of Onset     Cancer Mother         skin     Hypertension Mother      Diabetes Father      Heart Disease Father         CHF     Genitourinary Problems Father         Renal failure     Thyroid Disease Sister         cyst       REVIEW OF SYSTEMS:  I have reviewed this patient's ROS as obtained by clinic staff and updated it with pertinent information if needed.   Skin:  Negative     Eyes:  Positive for glasses  ENT:  Positive for hearing loss  Respiratory:  Positive for dyspnea on exertion;dyspnea at rest;sleep apnea;CPAP  Cardiovascular:    Positive for;palpitations;fatigue;dizziness  Gastroenterology: Positive for nausea;constipation  Genitourinary:  Negative    Musculoskeletal:  Positive for arthritis  Neurologic:  Positive for numbness or tingling of feet;numbness or tingling of hands  Psychiatric:  Positive for anxiety;depression  Heme/Lymph/Imm:  Positive for hay fever  Endocrine:  Positive for diabetes;night sweats      PHYSICAL EXAM:      BP: (!) 149/74 Pulse: 94     SpO2: 93 %      Vital Signs with Ranges  Pulse:  [94]  94  BP: (149)/(74) 149/74  SpO2:  [93 %] 93 %  196 lbs 8 oz    Constitutional:  Awake, alert, no acute distress  Eyes: EOMI, sclera non-icteric  ENT: Trachea midline  Respiratory: Normal respiratory effort, CTAB  Cardiovascular: RRR, no m/r/g.  JVP < 7 cm H2O.  No LE edema.  Normal carotid upstrokes, no carotid bruits.  GI:  Nondistended, nontender.  Skin: Dry, no significant rash on exposed skin  Musculoskeletal:  Strength 5/5 in upper and lower extremities  Psychiatric: Appropriate affect      DATA:    Labs: I have personally reviewed the pertinent cardiac labs.    EKG (8/28/2020): Sinus rhythm with borderline septal Q waves, otherwise unremarkable    Echocardiogram: None    ECG only treadmill stress test (9/9/2020):  There was no chest pain or significant ST changes with exercise.  This was an ECG stress test (not stress echo)-no ischemia identified. Target  HR was achieved but patient exercised on a modified yari protocol -limited  exercise rate      Angiogram: None        ASSESSMENT AND RECOMMENDATIONS:  Patient is a very pleasant 65-year-old female with past medical history of fibromyalgia, depression, type 2 diabetes, hypertension, and hyperlipidemia, who presents with her  for evaluation of her chronic, progressive fatigue and shortness of breath.  Together we went over the various possible cardiac sources for her symptoms, including congestive heart failure, obstructive coronary artery disease, dysrhythmia, pulmonary embolism, pulmonary hypertension, etc... As I explained to the patient, fortunately and unfortunately, her presentation does not seem consistent with a cardiac etiology.  If there is a cardiac cause, congestive heart failure or pulmonary hypertension would be the most likely culprits, so we will check an echocardiogram to further evaluate this, but with her normal exam and normal BNP, this is extremely unlikely.  I also explained that with her essentially normal resting ECG and  relatively low pretest probability for obstructive CAD, her normal ECG treadmill test is reassuring and further testing would not be indicated.  Patient and  understood our conversation and agreed with this interpretation, but understandably were frustrated that they still do not have an answer for her severe fatigue and shortness of breath.  I explained that if congestive heart failure and pulmonary hypertension are ruled out of the echo, and as obstructive CAD is extremely unlikely based on her treadmill stress, that she should feel confident in working to slowly build her exertional capacity, without fear that her symptoms may be due to a dangerous potentially life-threatening etiology.  I also advised her to continue to work with her rheumatologist to see if any adjustments need to be made to her medications, and to work with her psychiatrist to see if an improvement in her mental health may also improve her physical health.  Finally, we will repeat a lipid panel as it has been several years and her lipids were poorly controlled on last check.  If the echo was unrevealing, she does not need to return for routine follow-up, but we would always be happy to see her back for any questions or concerns that may arise.      Aramis Purcell MD  Interventional Cardiology  September 23, 2020      Thank you for allowing me to participate in the care of your patient.    Sincerely,     Aramis Purcell MD     [facility]

## 2020-09-25 DIAGNOSIS — E78.2 MIXED HYPERLIPIDEMIA: Primary | ICD-10-CM

## 2020-10-01 ENCOUNTER — HOSPITAL ENCOUNTER (OUTPATIENT)
Dept: CARDIOLOGY | Facility: CLINIC | Age: 65
Discharge: HOME | End: 2020-10-01
Attending: INTERNAL MEDICINE | Admitting: INTERNAL MEDICINE
Payer: COMMERCIAL

## 2020-10-01 DIAGNOSIS — R53.82 CHRONIC FATIGUE: ICD-10-CM

## 2020-10-01 DIAGNOSIS — R06.02 SHORTNESS OF BREATH: ICD-10-CM

## 2020-10-01 DIAGNOSIS — E78.2 MIXED HYPERLIPIDEMIA: ICD-10-CM

## 2020-10-01 DIAGNOSIS — I10 ESSENTIAL HYPERTENSION, BENIGN: ICD-10-CM

## 2020-10-01 LAB
ALT SERPL W P-5'-P-CCNC: 32 U/L (ref 0–50)
CHOLEST SERPL-MCNC: 148 MG/DL
HDLC SERPL-MCNC: 44 MG/DL
LDLC SERPL CALC-MCNC: 95 MG/DL
NONHDLC SERPL-MCNC: 104 MG/DL
TRIGL SERPL-MCNC: 47 MG/DL

## 2020-10-01 PROCEDURE — 80061 LIPID PANEL: CPT | Performed by: INTERNAL MEDICINE

## 2020-10-01 PROCEDURE — 93306 TTE W/DOPPLER COMPLETE: CPT | Mod: 26 | Performed by: INTERNAL MEDICINE

## 2020-10-01 PROCEDURE — 84460 ALANINE AMINO (ALT) (SGPT): CPT | Performed by: INTERNAL MEDICINE

## 2020-10-01 PROCEDURE — 255N000002 HC RX 255 OP 636: Performed by: INTERNAL MEDICINE

## 2020-10-01 PROCEDURE — 93306 TTE W/DOPPLER COMPLETE: CPT

## 2020-10-01 PROCEDURE — 36415 COLL VENOUS BLD VENIPUNCTURE: CPT | Performed by: INTERNAL MEDICINE

## 2020-10-01 RX ADMIN — HUMAN ALBUMIN MICROSPHERES AND PERFLUTREN 3 ML: 10; .22 INJECTION, SOLUTION INTRAVENOUS at 08:30

## 2020-12-17 ENCOUNTER — OFFICE VISIT (OUTPATIENT)
Dept: FAMILY MEDICINE | Facility: CLINIC | Age: 65
End: 2020-12-17

## 2020-12-17 VITALS
HEART RATE: 85 BPM | HEIGHT: 61 IN | BODY MASS INDEX: 36.25 KG/M2 | OXYGEN SATURATION: 94 % | WEIGHT: 192 LBS | TEMPERATURE: 98.3 F | DIASTOLIC BLOOD PRESSURE: 96 MMHG | SYSTOLIC BLOOD PRESSURE: 162 MMHG

## 2020-12-17 DIAGNOSIS — F33.42 MAJOR DEPRESSIVE DISORDER, RECURRENT EPISODE, IN FULL REMISSION (H): ICD-10-CM

## 2020-12-17 DIAGNOSIS — E66.01 MORBID OBESITY (H): ICD-10-CM

## 2020-12-17 DIAGNOSIS — M06.09 RHEUMATOID ARTHRITIS OF MULTIPLE SITES WITHOUT RHEUMATOID FACTOR (H): ICD-10-CM

## 2020-12-17 DIAGNOSIS — I10 BENIGN ESSENTIAL HYPERTENSION: ICD-10-CM

## 2020-12-17 DIAGNOSIS — E11.42 TYPE 2 DIABETES MELLITUS WITH DIABETIC POLYNEUROPATHY, WITH LONG-TERM CURRENT USE OF INSULIN (H): ICD-10-CM

## 2020-12-17 DIAGNOSIS — G47.10 HYPERSOMNIA: Primary | ICD-10-CM

## 2020-12-17 DIAGNOSIS — Z79.4 TYPE 2 DIABETES MELLITUS WITH DIABETIC POLYNEUROPATHY, WITH LONG-TERM CURRENT USE OF INSULIN (H): ICD-10-CM

## 2020-12-17 LAB
ALBUMIN SERPL-MCNC: 4.1 G/DL (ref 3.6–5.1)
ALBUMIN/GLOB SERPL: 2 {RATIO} (ref 1–2.5)
ALP SERPL-CCNC: 123 U/L (ref 33–130)
ALT 1742-6: 9 U/L (ref 0–32)
AST 1920-8: 10 U/L (ref 0–35)
BILIRUB SERPL-MCNC: 0.4 MG/DL (ref 0.2–1.2)
BUN SERPL-MCNC: 17 MG/DL (ref 7–25)
BUN/CREATININE RATIO: 18.1 (ref 6–22)
CALCIUM SERPL-MCNC: 9.4 MG/DL (ref 8.6–10.3)
CHLORIDE SERPLBLD-SCNC: 103.5 MMOL/L (ref 98–110)
CO2 SERPL-SCNC: 31.5 MMOL/L (ref 20–32)
CREAT SERPL-MCNC: 0.94 MG/DL (ref 0.7–1.18)
GLOBULIN, CALCULATED - QUEST: 2 (ref 1.9–3.7)
GLUCOSE SERPL-MCNC: 123 MG/DL (ref 60–99)
POTASSIUM SERPL-SCNC: 4.29 MMOL/L (ref 3.5–5.3)
PROT SERPL-MCNC: 6.1 G/DL (ref 6.1–8.1)
SODIUM SERPL-SCNC: 142.9 MMOL/L (ref 135–146)

## 2020-12-17 PROCEDURE — G0009 ADMIN PNEUMOCOCCAL VACCINE: HCPCS | Performed by: PHYSICIAN ASSISTANT

## 2020-12-17 PROCEDURE — 80053 COMPREHEN METABOLIC PANEL: CPT | Performed by: PHYSICIAN ASSISTANT

## 2020-12-17 PROCEDURE — 90732 PPSV23 VACC 2 YRS+ SUBQ/IM: CPT | Performed by: PHYSICIAN ASSISTANT

## 2020-12-17 PROCEDURE — 36415 COLL VENOUS BLD VENIPUNCTURE: CPT | Performed by: PHYSICIAN ASSISTANT

## 2020-12-17 PROCEDURE — 99214 OFFICE O/P EST MOD 30 MIN: CPT | Mod: 25 | Performed by: PHYSICIAN ASSISTANT

## 2020-12-17 RX ORDER — MODAFINIL 200 MG/1
200 TABLET ORAL EVERY MORNING
Qty: 90 TABLET | Refills: 0 | Status: SHIPPED | OUTPATIENT
Start: 2020-12-17

## 2020-12-17 RX ORDER — HYDROCHLOROTHIAZIDE 12.5 MG/1
12.5 TABLET ORAL DAILY
Qty: 90 TABLET | Refills: 0 | Status: SHIPPED | OUTPATIENT
Start: 2020-12-17 | End: 2021-10-21

## 2020-12-17 RX ORDER — ABATACEPT 250 MG/15ML
INJECTION, POWDER, LYOPHILIZED, FOR SOLUTION INTRAVENOUS
COMMUNITY
End: 2024-07-22

## 2020-12-17 RX ORDER — IRBESARTAN AND HYDROCHLOROTHIAZIDE 300; 12.5 MG/1; MG/1
1 TABLET, FILM COATED ORAL DAILY
COMMUNITY
Start: 2020-09-11 | End: 2022-05-03

## 2020-12-17 ASSESSMENT — MIFFLIN-ST. JEOR: SCORE: 1345.35

## 2020-12-17 NOTE — PROGRESS NOTES
"CC: Medication Check    History:  Hypersomnia:  Has been seeing Dr. Franklin Soliz through [facility] for this issue. He had been been prescribing her modafinil 200 mg in the morning for hypersomnia. She is no longer wanting to go to this clinic as they couldn't schedule her with their clinic for 3 month, where she was frustrated with this. She has been out of this medication for 3 days, and can tell symptoms are worsening.    RA:  Managed by [facility]. Taking folic acid, sulfasalazine, methotrexate, Plaquenil. Just started Orencia infusions every 4 weeks. Already having improvement of symptoms, but is having some nausea, which she will discuss with her rheumatology team.     Depression:  Seeing psychiatrist to take duloxetine, Wellbutrin, and trazodone. This is through [facility]. Contacted them to decrease dose as she is doing well, but her provider is out on maternity leave. Not currently needing to see therapist.      Mixed hyperlipidemia:  Taking atorvastatin daily at bedtime. Not needing refills of this at this time.    Diabetes:  Managed by Dr. Ayala with [facility] of endocrinology. Seen once every 6 months. Reviewed record from 6/2020 appt.    Hypertension:  Takes BP at home. Getting 170s/low 90. No chest pain, palpitations, SOB. Takes irbesartan/HCTZ and would like to take on just 1 prescription for BP, as she is frustrated with all the medications she takes.     Morbid Obesity:  Had experienced weight gain this spring summer, but then started Atkins diet and has been able to have weight loss.     PMH, MEDICATIONS, ALLERGIES, SOCIAL AND FAMILY HISTORY in [facility] and reviewed by me personally.    ROS negative other than the symptoms noted above in the HPI.      Examination   BP (!) 162/96   Pulse 85   Temp 98.3  F (36.8  C) (Oral)   Ht 1.537 m (5' 0.5\")   Wt 87.1 kg (192 lb)   LMP 12/04/2003   SpO2 94%   BMI 36.88 kg/m       Constitutional: Sitting comfortably, in no acute distress. " Vital signs noted  Eyes: pupils equal round reactive to light and accomodation, extra ocular movements intact  Neck:  no adenopathy, trachea midline and normal to palpation, thyroid normal to palpation  Cardiovascular:  regular rate and rhythm, no murmurs, clicks, or gallops  Respiratory:  normal respiratory rate and rhythm, lungs clear to auscultation  SKIN: No jaundice/pallor/rash.   Psychiatric: mentation appears normal and affect normal/bright        A/P    ICD-10-CM    1. Hypersomnia  G47.10 modafinil (PROVIGIL) 200 MG tablet   2. Type 2 diabetes mellitus with diabetic polyneuropathy, with long-term current use of insulin (H)  E11.42     Z79.4    3. Rheumatoid arthritis of multiple sites without rheumatoid factor (H)  M06.09     [facility]   4. Benign essential hypertension  I10 VENOUS COLLECTION     Comprehensive Metobolic Panel (B[facility])     hydrochlorothiazide (HYDRODIURIL) 12.5 MG tablet   5. Major depressive disorder, recurrent episode, in full remission (H)  F33.42    6. Morbid obesity (H)  E66.01        DISCUSSION:  Hypersomnia:  Agreed to refill 90 days of modafinil. However, advised her that this is not typically a medication that I manage chronically manage with patients, as I would not feel comfortable making adjustments as needed. I agreed to speak with my supervising physician to see if this is something he manages for patients, and would reconsider based on our discussion. She will likely need to continue to see her neurologist for management of her sleep disorder.     RA:  Continue to work with rheumatology.     Depression:  Continue to work with [facility].      Mixed hyperlipidemia:  Okay to continue on atorvastatin- lipid profile/ALT checked 10/2020.    Dibates:  Continue to see Dr. Ayala as recommended for diabetes management.     Hypertension:  Concerned about elevated BP today and at home. Agreed to start with small change in medication, as she is working hard  and having success with weight loss. WIll add on hydrochlorothiazide 12.5 mg, as if this is effective, can have her take 2 tablets daily of her normal prescription. If not effective, will need to add triple therapy. Return in 1-2 months to recheck BP, BMP.     Morbid Obesity:  Encouraged her to continue to work on weight, and congratulated her on progress so far.     follow up visit: 1-2 months, recheck BP    Cass Cummings PA-C  [facility]

## 2020-12-18 ENCOUNTER — TELEPHONE (OUTPATIENT)
Dept: FAMILY MEDICINE | Facility: CLINIC | Age: 65
End: 2020-12-18

## 2020-12-18 DIAGNOSIS — Z86.39 HISTORY OF IRON DEFICIENCY: Primary | ICD-10-CM

## 2020-12-18 PROBLEM — G47.10 HYPERSOMNIA: Status: ACTIVE | Noted: 2020-12-18

## 2020-12-18 LAB
ERYTHROCYTE [DISTWIDTH] IN BLOOD BY AUTOMATED COUNT: 13.8 %
HCT VFR BLD AUTO: 37.3 % (ref 35–47)
HEMOGLOBIN: 11.8 G/DL (ref 11.7–15.7)
MCH RBC QN AUTO: 32.6 PG (ref 26–33)
MCHC RBC AUTO-ENTMCNC: 31.6 G/DL (ref 31–36)
MCV RBC AUTO: 103 FL (ref 78–100)
PLATELET COUNT - QUEST: 355 10^9/L (ref 150–375)
RBC # BLD AUTO: 3.62 10*12/L (ref 3.8–5.2)
WBC # BLD AUTO: 4.8 10*9/L (ref 4–11)

## 2020-12-18 PROCEDURE — 85027 COMPLETE CBC AUTOMATED: CPT | Performed by: PHYSICIAN ASSISTANT

## 2020-12-18 NOTE — TELEPHONE ENCOUNTER
Called and spoke to Aminata with normal non-fasting CMP. Will add ferritin, CBC, and contact her with results.

## 2020-12-19 LAB — FERRITIN SERPL-MCNC: 98 NG/ML (ref 16–288)

## 2020-12-24 NOTE — TELEPHONE ENCOUNTER
Called and left message with Aminata informing her on normal ferritin, hemoglobin. Will continue to monitor.     Advised her that I spoke with Dr. Asif, supervising physician, and agreed that it would be best for Aminata to continue going to neurology/sleep specialist for modafinil controlled substance prescription. However, when she has her next appt, she could ask them if they feel she is a good candidate to be managed by PCP and have them write a letter and we would consider taking over at that time.

## 2020-12-28 ENCOUNTER — TRANSFERRED RECORDS (OUTPATIENT)
Dept: FAMILY MEDICINE | Facility: CLINIC | Age: 65
End: 2020-12-28

## 2021-01-18 ENCOUNTER — TRANSFERRED RECORDS (OUTPATIENT)
Dept: FAMILY MEDICINE | Facility: CLINIC | Age: 66
End: 2021-01-18

## 2021-01-18 LAB
GLUCOSE SERPL-MCNC: 130 MG/DL (ref 65–99)
HBA1C MFR BLD: 6.4 % (ref 4–6)

## 2021-03-08 NOTE — TELEPHONE ENCOUNTER
Denied refill request for hydrochlorothiazide. Pt last seen on 12/17/2020 to f/u in 1-2 months. Pt was prescribed 90 days worth on 12/17/2020. No f/u scheduled at this time.

## 2021-03-25 ENCOUNTER — TELEPHONE (OUTPATIENT)
Dept: FAMILY MEDICINE | Facility: CLINIC | Age: 66
End: 2021-03-25

## 2021-03-26 ENCOUNTER — TRANSFERRED RECORDS (OUTPATIENT)
Dept: FAMILY MEDICINE | Facility: CLINIC | Age: 66
End: 2021-03-26

## 2021-05-16 ENCOUNTER — HOSPITAL ENCOUNTER (EMERGENCY)
Facility: CLINIC | Age: 66
Discharge: HOME | End: 2021-05-16
Attending: EMERGENCY MEDICINE | Admitting: EMERGENCY MEDICINE
Payer: COMMERCIAL

## 2021-05-16 ENCOUNTER — APPOINTMENT (OUTPATIENT)
Dept: CT IMAGING | Facility: CLINIC | Age: 66
End: 2021-05-16
Attending: EMERGENCY MEDICINE
Payer: COMMERCIAL

## 2021-05-16 VITALS
OXYGEN SATURATION: 94 % | HEART RATE: 97 BPM | DIASTOLIC BLOOD PRESSURE: 77 MMHG | RESPIRATION RATE: 20 BRPM | TEMPERATURE: 97.3 F | SYSTOLIC BLOOD PRESSURE: 159 MMHG

## 2021-05-16 DIAGNOSIS — K57.32 DIVERTICULITIS OF COLON: ICD-10-CM

## 2021-05-16 DIAGNOSIS — R10.13 ABDOMINAL PAIN, EPIGASTRIC: ICD-10-CM

## 2021-05-16 LAB
ALBUMIN SERPL-MCNC: 3.4 G/DL (ref 3.4–5)
ALBUMIN UR-MCNC: 20 MG/DL
ALP SERPL-CCNC: 133 U/L (ref 40–150)
ALT SERPL W P-5'-P-CCNC: 27 U/L (ref 0–50)
ANION GAP SERPL CALCULATED.3IONS-SCNC: 3 MMOL/L (ref 3–14)
APPEARANCE UR: CLEAR
AST SERPL W P-5'-P-CCNC: 13 U/L (ref 0–45)
BASOPHILS # BLD AUTO: 0.1 10E9/L (ref 0–0.2)
BASOPHILS NFR BLD AUTO: 0.7 %
BILIRUB SERPL-MCNC: 0.3 MG/DL (ref 0.2–1.3)
BILIRUB UR QL STRIP: NEGATIVE
BUN SERPL-MCNC: 12 MG/DL (ref 7–30)
CALCIUM SERPL-MCNC: 8.6 MG/DL (ref 8.5–10.1)
CHLORIDE SERPL-SCNC: 104 MMOL/L (ref 94–109)
CO2 SERPL-SCNC: 32 MMOL/L (ref 20–32)
COLOR UR AUTO: ABNORMAL
CREAT SERPL-MCNC: 0.73 MG/DL (ref 0.52–1.04)
DIFFERENTIAL METHOD BLD: ABNORMAL
EOSINOPHIL # BLD AUTO: 0.2 10E9/L (ref 0–0.7)
EOSINOPHIL NFR BLD AUTO: 3.1 %
ERYTHROCYTE [DISTWIDTH] IN BLOOD BY AUTOMATED COUNT: 12.7 % (ref 10–15)
GFR SERPL CREATININE-BSD FRML MDRD: 86 ML/MIN/{1.73_M2}
GLUCOSE SERPL-MCNC: 222 MG/DL (ref 70–99)
GLUCOSE UR STRIP-MCNC: >1000 MG/DL
HCT VFR BLD AUTO: 35.3 % (ref 35–47)
HGB BLD-MCNC: 11.5 G/DL (ref 11.7–15.7)
HGB UR QL STRIP: NEGATIVE
IMM GRANULOCYTES # BLD: 0 10E9/L (ref 0–0.4)
IMM GRANULOCYTES NFR BLD: 0.3 %
KETONES UR STRIP-MCNC: NEGATIVE MG/DL
LEUKOCYTE ESTERASE UR QL STRIP: NEGATIVE
LIPASE SERPL-CCNC: 88 U/L (ref 73–393)
LYMPHOCYTES # BLD AUTO: 1.5 10E9/L (ref 0.8–5.3)
LYMPHOCYTES NFR BLD AUTO: 20.5 %
MCH RBC QN AUTO: 31.4 PG (ref 26.5–33)
MCHC RBC AUTO-ENTMCNC: 32.6 G/DL (ref 31.5–36.5)
MCV RBC AUTO: 96 FL (ref 78–100)
MONOCYTES # BLD AUTO: 0.6 10E9/L (ref 0–1.3)
MONOCYTES NFR BLD AUTO: 8.3 %
MUCOUS THREADS #/AREA URNS LPF: PRESENT /LPF
NEUTROPHILS # BLD AUTO: 4.7 10E9/L (ref 1.6–8.3)
NEUTROPHILS NFR BLD AUTO: 67.1 %
NITRATE UR QL: NEGATIVE
NRBC # BLD AUTO: 0 10*3/UL
NRBC BLD AUTO-RTO: 0 /100
PH UR STRIP: 8 PH (ref 5–7)
PLATELET # BLD AUTO: 308 10E9/L (ref 150–450)
POTASSIUM SERPL-SCNC: 3.6 MMOL/L (ref 3.4–5.3)
PROT SERPL-MCNC: 7.3 G/DL (ref 6.8–8.8)
RBC # BLD AUTO: 3.66 10E12/L (ref 3.8–5.2)
RBC #/AREA URNS AUTO: 1 /HPF (ref 0–2)
SODIUM SERPL-SCNC: 139 MMOL/L (ref 133–144)
SOURCE: ABNORMAL
SP GR UR STRIP: 1.01 (ref 1–1.03)
SQUAMOUS #/AREA URNS AUTO: <1 /HPF (ref 0–1)
UROBILINOGEN UR STRIP-MCNC: NORMAL MG/DL (ref 0–2)
WBC # BLD AUTO: 7.1 10E9/L (ref 4–11)
WBC #/AREA URNS AUTO: <1 /HPF (ref 0–5)

## 2021-05-16 PROCEDURE — 85025 COMPLETE CBC W/AUTO DIFF WBC: CPT | Performed by: EMERGENCY MEDICINE

## 2021-05-16 PROCEDURE — 96374 THER/PROPH/DIAG INJ IV PUSH: CPT | Mod: 59

## 2021-05-16 PROCEDURE — 96376 TX/PRO/DX INJ SAME DRUG ADON: CPT

## 2021-05-16 PROCEDURE — 96375 TX/PRO/DX INJ NEW DRUG ADDON: CPT

## 2021-05-16 PROCEDURE — 83690 ASSAY OF LIPASE: CPT | Performed by: EMERGENCY MEDICINE

## 2021-05-16 PROCEDURE — 250N000011 HC RX IP 250 OP 636: Performed by: EMERGENCY MEDICINE

## 2021-05-16 PROCEDURE — 250N000013 HC RX MED GY IP 250 OP 250 PS 637: Performed by: EMERGENCY MEDICINE

## 2021-05-16 PROCEDURE — 99285 EMERGENCY DEPT VISIT HI MDM: CPT | Mod: 25

## 2021-05-16 PROCEDURE — 80053 COMPREHEN METABOLIC PANEL: CPT | Performed by: EMERGENCY MEDICINE

## 2021-05-16 PROCEDURE — 250N000009 HC RX 250: Performed by: EMERGENCY MEDICINE

## 2021-05-16 PROCEDURE — 74177 CT ABD & PELVIS W/CONTRAST: CPT

## 2021-05-16 PROCEDURE — 81001 URINALYSIS AUTO W/SCOPE: CPT | Performed by: EMERGENCY MEDICINE

## 2021-05-16 RX ORDER — OXYCODONE HYDROCHLORIDE 5 MG/1
5 TABLET ORAL EVERY 6 HOURS PRN
Qty: 12 TABLET | Refills: 0 | Status: SHIPPED | OUTPATIENT
Start: 2021-05-16 | End: 2021-10-21

## 2021-05-16 RX ORDER — IOPAMIDOL 755 MG/ML
96 INJECTION, SOLUTION INTRAVASCULAR ONCE
Status: COMPLETED | OUTPATIENT
Start: 2021-05-16 | End: 2021-05-16

## 2021-05-16 RX ORDER — ONDANSETRON 2 MG/ML
4 INJECTION INTRAMUSCULAR; INTRAVENOUS ONCE
Status: COMPLETED | OUTPATIENT
Start: 2021-05-16 | End: 2021-05-16

## 2021-05-16 RX ORDER — HYDROMORPHONE HYDROCHLORIDE 1 MG/ML
0.5 INJECTION, SOLUTION INTRAMUSCULAR; INTRAVENOUS; SUBCUTANEOUS
Status: DISCONTINUED | OUTPATIENT
Start: 2021-05-16 | End: 2021-05-16 | Stop reason: HOSPADM

## 2021-05-16 RX ADMIN — HYDROMORPHONE HYDROCHLORIDE 0.5 MG: 1 INJECTION, SOLUTION INTRAMUSCULAR; INTRAVENOUS; SUBCUTANEOUS at 14:06

## 2021-05-16 RX ADMIN — LIDOCAINE HYDROCHLORIDE 30 ML: 20 SOLUTION ORAL; TOPICAL at 11:32

## 2021-05-16 RX ADMIN — HYDROMORPHONE HYDROCHLORIDE 0.5 MG: 1 INJECTION, SOLUTION INTRAMUSCULAR; INTRAVENOUS; SUBCUTANEOUS at 11:30

## 2021-05-16 RX ADMIN — IOPAMIDOL 96 ML: 755 INJECTION, SOLUTION INTRAVENOUS at 11:58

## 2021-05-16 RX ADMIN — ONDANSETRON 4 MG: 2 INJECTION INTRAMUSCULAR; INTRAVENOUS at 13:18

## 2021-05-16 ASSESSMENT — ENCOUNTER SYMPTOMS
FREQUENCY: 1
APPETITE CHANGE: 1
WEAKNESS: 1
NAUSEA: 1
BACK PAIN: 1
ABDOMINAL PAIN: 1

## 2021-05-16 NOTE — DISCHARGE INSTRUCTIONS
Discharge Instructions  Diverticulitis  Your provider has diagnosed you with diverticulitis.  Diverticulitis is an infection of a diverticulum, which is a tiny sack-like structure that protrudes off the wall of the colon (large intestine).  These sacks are created over years of increased pressure in the colon (this is diverticulosis), usually as a result of a diet without enough fruits, vegetables, and whole grains. Because these sacks are small, bacteria can get trapped inside them and cause an infection (this is divericulitis).  This infection often causes abdominal (belly) pain, fever, nausea (sick to stomach), and vomiting (throwing up). Diverticulitis is usually treated at home.  However, sometimes diverticulitis needs treatment in the hospital and may even need surgery.    Generally, every Emergency Department visit should have a follow-up clinic visit with either a primary or a specialty clinic/provider. Please follow-up as instructed by your emergency provider today.    Return to the Emergency Department if:   You get an oral temperature above 102oF or as directed by your provider.  You have blood in your stools (bright red or black, tarry stools), or have blood in your vomit.  You keep vomiting or cannot drink liquids or cannot keep your medicine down.  You cannot have a bowel movement or you cannot pass gas.  Your stomach gets bloated or bigger.  You faint or become very weak.  Your pain is too bad to tolerate.  You have new symptoms or anything that worries you.    What can I do to help myself?  Fill any antibiotic prescriptions the provider gave you and take them right away. Be sure to finish the whole antibiotic prescription.  For the first day or two at home drink only clear liquids.  This lets your intestines rest.  If your pain has improved after one or two days, you may start eating mild foods. Soda crackers, toast, plain noodles, gelatin, applesauce and bananas are good first choices.  Avoid foods  "that have acid, are spicy, fatty or fibrous (such as meats, coarse grains, vegetables). You may start eating these foods again in about 3-4 days when you are better.  Once you are back to normal, eat a high fiber diet of fruits, vegetables and whole grains. Some people think you should avoid eating nuts, seeds, and corn, but there is no definite proof this makes any difference in whether you will get diverticulitis again.   Pain and fever can be treated with Tylenol  (acetaminophen) or you might have been prescribed a pain medication.    Probiotics: If you have been given an antibiotic, you may want to also take a probiotic pill or eat yogurt with live cultures. Probiotics have \"good bacteria\" to help your intestines stay healthy. Studies have shown that probiotics help prevent diarrhea and other intestine problems (including C. diff infection) when you take antibiotics. You can buy these without a prescription in the pharmacy section of the store.  If you were given a prescription for medicine here today, be sure to read all of the information (including the package insert) that comes with your prescription.  This will include important information about the medicine, its side effects, and any warnings that you need to know about.  The pharmacist who fills the prescription can provide more information and answer questions you may have about the medicine.  If you have questions or concerns that the pharmacist cannot address, please call or return to the Emergency Department.     Remember that you can always come back to the Emergency Department if you are not able to see your regular provider in the amount of time listed above, if you get any new symptoms, or if there is anything that worries you.        "

## 2021-05-16 NOTE — ED PROVIDER NOTES
History   Chief Complaint:  Abdominal Pain and Back Pain       The history is provided by the patient.      Kemi Casper is a 66 year old female with history of GERD, type 2 diabetes, and RA who presents with sharp left sided abdominal pain with radiation into her back bilaterally that developed 6 days ago. She states that 4 weeks ago her  made chicken with buffalo sauce and she developed significant heart burn, bloating, and gas. She has been taking Zantac and Prilosec with no relief. The patient has felt nauseous, weak, and has not been eating much. Since onset she also notes an odd sensation that she describes as feeling her food travel down her esophagus. Also, she adds that she experienced increased urinary frequency last night x5.     Review of Systems   Constitutional: Positive for appetite change.   Gastrointestinal: Positive for abdominal pain and nausea.   Genitourinary: Positive for frequency.   Musculoskeletal: Positive for back pain.   Neurological: Positive for weakness.   All other systems reviewed and are negative.      Allergies:  Compazine [Prochlorperazine]  Lisinopril  Adhesive Tape    Medications:  Albuterol  Amoxil  Bupropion  Cymbalta  hydrochlorothiazide  Plaquenil  Lantus  Avalide  Leucovorin  Ativan  Methotrexate  Provigil  Azulfidine  Desyrel    Past Medical History:    Complication of anesthesia  Depression  Diabetes mellitus- type 2  Hypertension  Fibromyalgia  GERD  Hyperlipidemia  Depression  Osteoarthritis  Hypersomnia  RA  Anemia  Intestinal malabsorption  Iron adverse reaction  Morbid obesity    Past Surgical History:    Knee arthroplasty x2  Appendectomy  Laminectomy  Cholecystectomy  D & C  Hysterectomy  Removal of pilonidal cyst lesion  Tonsil and adenoidectomy    Family History:    Mother: skin cancer, hypertension, depression  Father: Diabetes, CHF, renal failure, depression  Sister: Thyroid disease    Social History:  Patient presents to the ED  alone.  .  Retired nurse.     Physical Exam     Patient Vitals for the past 24 hrs:   BP Temp Temp src Pulse Resp SpO2   05/16/21 1500 (!) 159/77 -- -- 97 -- --   05/16/21 1445 (!) 151/92 -- -- 92 -- 94 %   05/16/21 1430 138/72 -- -- 95 -- 93 %   05/16/21 1415 (!) 147/81 -- -- 102 -- 92 %   05/16/21 1323 (!) 155/92 -- -- 94 20 95 %   05/16/21 1322 -- -- -- 91 20 95 %   05/16/21 1145 (!) 179/84 -- -- 83 -- 94 %   05/16/21 1130 (!) 197/102 -- -- 88 -- 97 %   05/16/21 1115 (!) 182/172 -- -- 86 -- 96 %   05/16/21 1042 (!) 183/103 97.3  F (36.3  C) Temporal 97 16 96 %       Physical Exam  Nursing note and vitals reviewed.  Constitutional: Cooperative.   HENT:   Mouth/Throat: Moist mucous membranes.   Eyes: EOMI, nonicteric sclera  Cardiovascular: Normal rate  Pulmonary/Chest: Effort normal. No distress.  Speaking in complete sentences.  Distress.   Abdominal: Soft.  Tenderness to palpation left lower quadrant, mild epigastric tenderness, nondistended, no guarding or rigidity.   Musculoskeletal: Normal range of motion.   Neurological: Alert. Moves all extremities spontaneously.   Skin: Skin is warm and dry. No rash noted.   Psychiatric: Normal mood and affect.     Emergency Department Course     Imaging:  CT Abdomen/Pelvis with IV contrast TRAUMA/AAA:   1.  Acute diverticulitis in the mid descending colon. No associated  abscess.  2.  Indeterminate 0.4 cm right lower lobe pulmonary nodule, unchanged  compared to 8/28/2020. Please refer to pulmonary nodule follow-up  guidelines below.   As per radiology.    Laboratory:   CBC: WBC: 7.1, HGB: 11.5 (L), PLT: 308    CMP: Glucose 222 (H) o/w WNL (Creatinine: 0.73)    Lipase: 88    UA with Microscopic: glucose: >1000, pH: 8.0 (H), protein albumin: 20, mucous: present, o/w Negative    Emergency Department Course:    Reviewed:  I reviewed nursing notes, vitals, past medical history and care everywhere    Assessments:  1102 I obtained history and examined the patient as  noted above.      I rechecked the patient and explained findings.     Interventions:  1130 Dilaudid 0.5 mg IV  1132 GI Cocktail 30 mL PO  1318 Zofran 4 mg IV    Disposition:  The patient was discharged to home.       Impression & Plan   Medical Decision Making:  Patient presents with complaint of epigastric abdominal pain and heartburn symptoms, but also with recent left lower quadrant abdominal pain.  Broad differential considered for both.  CT obtained for better evaluation given severity of her pain, and she is found to have diverticulitis in her left lower quadrant.  We discussed the pathophysiology and treatment of this including antibiotics and dietary changes.  Patient preferred Augmentin versus Flagyl and Cipro.  We discussed the side effects of all these antibiotics before making decision on which to use.  Patient reports significant heartburn over the last month.  She apparently has been taking Zantac at home after buying it at [facility].  I discussed that this was currently black box, and recommended Pepcid and/or Prilosec.  Patient prefers to start Prilosec and will stop taking Zantac.  I do not believe this represents any cholecystitis, choledocholithiasis, and labs support this with normal lipase and LFTs.  We will do pain control with oxycodone.  She is in stable condition at the time of discharge, indications for return to the ED were discussed as well as follow up. All questions were answered and she is in agreement with the plan.      Diagnosis:    ICD-10-CM    1. Diverticulitis of colon  K57.32    2. Abdominal pain, epigastric  R10.13        Discharge Medications:  Discharge Medication List as of 5/16/2021  3:03 PM      START taking these medications    Details   amoxicillin-clavulanate (AUGMENTIN) 875-125 MG tablet Take 1 tablet by mouth 3 times daily for 7 days, Disp-20 tablet, R-0, E-PrescribeDiagnosis: Acute diverticulitis      omeprazole (PRILOSEC) 20 MG DR capsule Take 1 capsule (20 mg) by  mouth daily for 14 days, Disp-14 capsule, R-0, E-Prescribe      oxyCODONE (ROXICODONE) 5 MG tablet Take 1 tablet (5 mg) by mouth every 6 hours as needed for pain, Disp-12 tablet, R-0, Local Print             Scribe Disclosure:  ZULEMA, Mendoza Shipman, am serving as a scribe at 10:55 AM on 5/16/2021 to document services personally performed by Jono Hoff MD based on my observations and the provider's statements to me.                Jono Hoff MD  05/16/21 6570

## 2021-05-16 NOTE — ED TRIAGE NOTES
Pt arrives with c/o abdominal pain and back pain. Pt reports that she has been having abdominal for 4 weeks, it started after her  made buffalo sauce chicken that she ate. Pt endorses heartburn, gas, bloating, and belching. ABCs intact.

## 2021-06-10 ENCOUNTER — HOSPITAL ENCOUNTER (EMERGENCY)
Facility: CLINIC | Age: 66
Discharge: HOME | End: 2021-06-10
Attending: EMERGENCY MEDICINE | Admitting: EMERGENCY MEDICINE
Payer: COMMERCIAL

## 2021-06-10 ENCOUNTER — TRANSFERRED RECORDS (OUTPATIENT)
Dept: HEALTH INFORMATION MANAGEMENT | Facility: CLINIC | Age: 66
End: 2021-06-10

## 2021-06-10 ENCOUNTER — MEDICAL CORRESPONDENCE (OUTPATIENT)
Dept: HEALTH INFORMATION MANAGEMENT | Facility: CLINIC | Age: 66
End: 2021-06-10

## 2021-06-10 VITALS
TEMPERATURE: 98.1 F | OXYGEN SATURATION: 96 % | SYSTOLIC BLOOD PRESSURE: 154 MMHG | HEART RATE: 97 BPM | RESPIRATION RATE: 13 BRPM | DIASTOLIC BLOOD PRESSURE: 64 MMHG

## 2021-06-10 DIAGNOSIS — R94.31 QT PROLONGATION: ICD-10-CM

## 2021-06-10 DIAGNOSIS — K21.9 GASTROESOPHAGEAL REFLUX DISEASE, UNSPECIFIED WHETHER ESOPHAGITIS PRESENT: ICD-10-CM

## 2021-06-10 DIAGNOSIS — R53.1 GENERALIZED WEAKNESS: ICD-10-CM

## 2021-06-10 LAB
ALBUMIN SERPL-MCNC: 3.4 G/DL (ref 3.4–5)
ALP SERPL-CCNC: 142 U/L (ref 40–150)
ALT SERPL W P-5'-P-CCNC: 24 U/L (ref 0–50)
ANION GAP SERPL CALCULATED.3IONS-SCNC: 5 MMOL/L (ref 3–14)
AST SERPL W P-5'-P-CCNC: 14 U/L (ref 0–45)
BASOPHILS # BLD AUTO: 0.1 10E9/L (ref 0–0.2)
BASOPHILS NFR BLD AUTO: 0.8 %
BILIRUB SERPL-MCNC: 0.7 MG/DL (ref 0.2–1.3)
BUN SERPL-MCNC: 9 MG/DL (ref 7–30)
CALCIUM SERPL-MCNC: 8.9 MG/DL (ref 8.5–10.1)
CHLORIDE SERPL-SCNC: 102 MMOL/L (ref 94–109)
CO2 SERPL-SCNC: 30 MMOL/L (ref 20–32)
CREAT SERPL-MCNC: 0.77 MG/DL (ref 0.52–1.04)
DIFFERENTIAL METHOD BLD: NORMAL
EOSINOPHIL # BLD AUTO: 0.1 10E9/L (ref 0–0.7)
EOSINOPHIL NFR BLD AUTO: 1.8 %
ERYTHROCYTE [DISTWIDTH] IN BLOOD BY AUTOMATED COUNT: 12.5 % (ref 10–15)
GFR SERPL CREATININE-BSD FRML MDRD: 80 ML/MIN/{1.73_M2}
GLUCOSE SERPL-MCNC: 241 MG/DL (ref 70–99)
HCT VFR BLD AUTO: 37.8 % (ref 35–47)
HGB BLD-MCNC: 12.3 G/DL (ref 11.7–15.7)
IMM GRANULOCYTES # BLD: 0 10E9/L (ref 0–0.4)
IMM GRANULOCYTES NFR BLD: 0.2 %
INTERPRETATION ECG - MUSE: NORMAL
LIPASE SERPL-CCNC: 148 U/L (ref 73–393)
LYMPHOCYTES # BLD AUTO: 1.9 10E9/L (ref 0.8–5.3)
LYMPHOCYTES NFR BLD AUTO: 31.5 %
MAGNESIUM SERPL-MCNC: 2.5 MG/DL (ref 1.6–2.3)
MCH RBC QN AUTO: 30.8 PG (ref 26.5–33)
MCHC RBC AUTO-ENTMCNC: 32.5 G/DL (ref 31.5–36.5)
MCV RBC AUTO: 95 FL (ref 78–100)
MONOCYTES # BLD AUTO: 0.6 10E9/L (ref 0–1.3)
MONOCYTES NFR BLD AUTO: 9 %
NEUTROPHILS # BLD AUTO: 3.5 10E9/L (ref 1.6–8.3)
NEUTROPHILS NFR BLD AUTO: 56.7 %
NRBC # BLD AUTO: 0 10*3/UL
NRBC BLD AUTO-RTO: 0 /100
PLATELET # BLD AUTO: 304 10E9/L (ref 150–450)
POTASSIUM SERPL-SCNC: 3.2 MMOL/L (ref 3.4–5.3)
PROT SERPL-MCNC: 7.5 G/DL (ref 6.8–8.8)
RBC # BLD AUTO: 4 10E12/L (ref 3.8–5.2)
SODIUM SERPL-SCNC: 137 MMOL/L (ref 133–144)
WBC # BLD AUTO: 6.1 10E9/L (ref 4–11)

## 2021-06-10 PROCEDURE — 258N000003 HC RX IP 258 OP 636: Performed by: EMERGENCY MEDICINE

## 2021-06-10 PROCEDURE — 83690 ASSAY OF LIPASE: CPT | Performed by: EMERGENCY MEDICINE

## 2021-06-10 PROCEDURE — 93005 ELECTROCARDIOGRAM TRACING: CPT

## 2021-06-10 PROCEDURE — 250N000011 HC RX IP 250 OP 636: Performed by: EMERGENCY MEDICINE

## 2021-06-10 PROCEDURE — 250N000013 HC RX MED GY IP 250 OP 250 PS 637: Performed by: EMERGENCY MEDICINE

## 2021-06-10 PROCEDURE — 93005 ELECTROCARDIOGRAM TRACING: CPT | Mod: 76

## 2021-06-10 PROCEDURE — 96365 THER/PROPH/DIAG IV INF INIT: CPT

## 2021-06-10 PROCEDURE — 99285 EMERGENCY DEPT VISIT HI MDM: CPT | Mod: 25

## 2021-06-10 PROCEDURE — 36415 COLL VENOUS BLD VENIPUNCTURE: CPT

## 2021-06-10 PROCEDURE — 85025 COMPLETE CBC W/AUTO DIFF WBC: CPT | Performed by: EMERGENCY MEDICINE

## 2021-06-10 PROCEDURE — 250N000009 HC RX 250: Performed by: EMERGENCY MEDICINE

## 2021-06-10 PROCEDURE — 80053 COMPREHEN METABOLIC PANEL: CPT | Performed by: EMERGENCY MEDICINE

## 2021-06-10 PROCEDURE — 83735 ASSAY OF MAGNESIUM: CPT | Performed by: EMERGENCY MEDICINE

## 2021-06-10 RX ORDER — SODIUM CHLORIDE 9 MG/ML
INJECTION, SOLUTION INTRAVENOUS CONTINUOUS
Status: DISCONTINUED | OUTPATIENT
Start: 2021-06-10 | End: 2021-06-10 | Stop reason: HOSPADM

## 2021-06-10 RX ORDER — POTASSIUM CHLORIDE 1500 MG/1
40 TABLET, EXTENDED RELEASE ORAL ONCE
Status: COMPLETED | OUTPATIENT
Start: 2021-06-10 | End: 2021-06-10

## 2021-06-10 RX ORDER — FAMOTIDINE 20 MG/1
20 TABLET, FILM COATED ORAL ONCE
Status: COMPLETED | OUTPATIENT
Start: 2021-06-10 | End: 2021-06-10

## 2021-06-10 RX ORDER — FAMOTIDINE 20 MG/1
20 TABLET, FILM COATED ORAL 2 TIMES DAILY
Qty: 30 TABLET | Refills: 0 | Status: SHIPPED | OUTPATIENT
Start: 2021-06-10 | End: 2021-10-21

## 2021-06-10 RX ORDER — MAGNESIUM SULFATE HEPTAHYDRATE 40 MG/ML
2 INJECTION, SOLUTION INTRAVENOUS ONCE
Status: COMPLETED | OUTPATIENT
Start: 2021-06-10 | End: 2021-06-10

## 2021-06-10 RX ADMIN — LIDOCAINE HYDROCHLORIDE 30 ML: 20 SOLUTION ORAL; TOPICAL at 13:30

## 2021-06-10 RX ADMIN — FAMOTIDINE 20 MG: 20 TABLET ORAL at 13:29

## 2021-06-10 RX ADMIN — POTASSIUM CHLORIDE 40 MEQ: 1500 TABLET, EXTENDED RELEASE ORAL at 14:29

## 2021-06-10 RX ADMIN — SODIUM CHLORIDE 1000 ML: 9 INJECTION, SOLUTION INTRAVENOUS at 13:32

## 2021-06-10 RX ADMIN — MAGNESIUM SULFATE HEPTAHYDRATE 2 G: 2 INJECTION, SOLUTION INTRAVENOUS at 13:32

## 2021-06-10 ASSESSMENT — ENCOUNTER SYMPTOMS
DIAPHORESIS: 0
SHORTNESS OF BREATH: 1
WEAKNESS: 1
DIARRHEA: 1

## 2021-06-10 NOTE — ED TRIAGE NOTES
"Arrived via EMS. Per EMS pt was at [facility] for colonoscopy.  Pt reported shortness of breath to clinic staff.  Clinic staff noted diaphoresis and \"memory issues\" so sent pt to ED.  Pt states hx of dyspnea on exertion and short term memory issues for which she is following with [facility] clinic.  Pt reports shortness of breath \"slighty\" worse than normal.  Denies chest pain. ABCs intact.  A&Ox4.  "

## 2021-06-10 NOTE — ED NOTES
Bed: ED13  Expected date: 6/10/21  Expected time: 12:13 PM  Means of arrival: Ambulance  Comments:  Flavia 67 yo SOB

## 2021-06-10 NOTE — DISCHARGE INSTRUCTIONS
You have a prolonged interval on ECG that could be related to certain medications or to your heart's electrical conduction. You have not had any symptoms related to it and it is unclear how long it has been there, but you are safe to be discharged home, following up with your primary doctor. Return to the ED with any new symptoms, like fainting, palpitations, chest pressure.

## 2021-06-10 NOTE — ED PROVIDER NOTES
History     Chief Complaint:  Generalized weakness    The history is provided by the patient.      Kemi Casper is a 66 year old female who presents with generalized weakness after colon prep.  She is scheduled to have a colonoscopy today and reports that she had multiple episodes of diarrhea after colon prep which resulted in her feeling weak and generalized unwell.  She notes she felt that this morning on awakening and then on arrival at her procedure.  She notes the nurse said that she looked diaphoretic and short of breath on arrival and she denies a diaphoresis but notes she is chronically short of breath on exertion so that was not new.  She denies any chest pain.  She currently denies any shortness of breath.  She notes she was treated for diverticulitis 3 weeks ago with resolution of her symptoms and found out today that she probably should not have had a colonoscopy scheduled so soon after diverticulitis.  She notes she has had heartburn severely with a burning sensation going up her throat and also has epigastric pain, none of which are new today.  She notes she has chronic short-term memory issues for which she is seeing a neurologist and has follow-up MR studies of her brain scheduled for that.  She denies that that has worsened today.  She feels that she is just dehydrated from the colon prep.    Review of Systems   Constitutional: Negative for diaphoresis.   Respiratory: Positive for shortness of breath.    Gastrointestinal: Positive for diarrhea.   Neurological: Positive for weakness.   All other systems reviewed and are negative.    Allergies:  Compazine [Prochlorperazine]  Lisinopril  Adhesive Tape    Medications:    Orencia  Lipitor   Wellbutrin   Voltaren   Cymbalta   Folvite   Hydrodiuril   Plaquenil   Insulin  Xalatan   Provigil   Oxycodone   Azulfidine   Desyrel     Past Medical History:    Depression   Diabetes Mellitus   HTN   Fibromyalgia   GERD  Incontinence of   Major  Depressive Disorder   Mixed HLD   Obesity   Osteoarthritis   Sleep apnea   Type 2 Diabetes mellitus  Hypersomnia   Rheumatoid Arthritis   Anemia   Intestinal Malabsorption   Periodic Limb Movement Disorder     Past Surgical History:    Left Knee Arthroplasty   Appendectomy   Cholecystectomy   Tonsillectomy, Adenoidectomy combined  Hysterectomy, bilateral salpingo-oopherectomy combined   Lumbar Laminectomy   Piloidal lesion removal     Family History:    Mother - Cancer, HTN, Depression   Father - CHF, Renal Failure, Diabetes, Depression   Sister - Cyst    Social History:  Here with her .  Never smoker.  No alcohol use.    Physical Exam     Patient Vitals for the past 24 hrs:   BP Temp Temp src Pulse Resp SpO2   06/10/21 1245 (!) 144/87 -- -- 89 -- 94 %   06/10/21 1240 (!) 155/83 98.1  F (36.7  C) Oral 86 16 92 %       Physical Exam  General: Adult female sitting upright  Eyes: PERRL, Conjunctive within normal limits.  No scleral icterus.  ENT: Moist mucous membranes, oropharynx clear.   CV: Normal S1S2, no murmur, rub or gallop. Regular rate and rhythm  Resp: Clear to auscultation bilaterally, no wheezes, rales or rhonchi. Normal respiratory effort.  GI: Abdomen is soft and nondistended.  Mild epigastric tenderness to deep palpation.  No palpable masses. No rebound or guarding.  MSK: No edema. Nontender. Normal active range of motion.  Skin: Warm and dry. No rashes or lesions or ecchymoses on visible skin.  Neuro: Alert and oriented. Responds appropriately to all questions and commands. No focal findings appreciated. Normal muscle tone.  Psych: Normal mood and affect. Pleasant.    Emergency Department Course   EC  ECG taken at 1302, ECG read at 1306  Normal Sinus Rhythm   Prolonged QT  Abnormal ECG   Rate 88bpm. PA interval 136 ms. QRS duration 92 ms. QT/QTc 416/503 ms. P-R-T axes 53 -11 39.    EC  ECG taken at 1405, ECG read at 1414  Normal Sinus Rhythm   Prolonged QT  Abnormal ECG   Rate 88 bpm. PA  interval 136 ms. QRS duration 86 ms. QT/QTc 424/513 ms. P-R-T axes 55 7 51.     ECG:3  ECG taken at 1453, ECG read at 1457  Normal Sinus Rhythm   Prolonged QT  Abnormal ECG   Rate 92 bpm. DE interval 158 ms. QRS duration 92 ms. QT/QTc 436/539 ms. P-R-T axes 60 -4 49.     Laboratory:  CBC: WBC 6.1, HGB 12.3,     CMP: Potassium - 3.2 (L), Glucose - 241 (H) o/w WNL (Creatinine 0.77)     Magnesium: 2.5 (H)    Lipase: 148    Emergency Department Course:    Reviewed:  I reviewed nursing notes, vitals, past history and care everywhere    Assessments:  1250 I obtained history and examined the patient as noted above.   1454 I rechecked the patient.  She notes she is feeling much better.  She denies any new concerns.    Interventions:  1329 pepcid - 20 mg - PO  1330 Xylocaine - 30 mL - PO  1332 NS - 1000 mL - IV  1332 Magnesium - 2 g - IV  1421 NS - 1000 mg - IV  1429 Klor-Con - 40 mEq - PO    Disposition:  The patient was discharged to home.    Impression & Plan        Medical Decision Making:  Kemi Casper is a 66-year-old female presents emergency department generalized weakness after colon prep last night.  She is scheduled for routine colonoscopy this morning but did not go through with it given outpatient concerns.  She denied any new shortness of breath on exertion which is chronic for her and notes she just felt generalized unwell after having multiple episodes of diarrhea last night.  She not have any abdominal pain.  She was noting burning pain with swallowing and in her epigastrium which I suspect is GI in origin.  Had QTC prolongation on her ECG which is new based on past ECGs but with regards to that there is no history of syncope.  She is on multiple medications but none and I am aware typically would cause QTC prolongation.  She is not hypomagnesemic.  She is feeling well on reassessment after IV fluid hydration.  I do not think there is indication for hospitalization or immediate cardiac  assessment.  She is recommended follow-up with her PCP within 3 days and discuss whether or not electrophysiology consult would be indicated.  In the meantime recommended to return to me to emergency department should symptoms worsen or new symptoms like fainting or palpitations occur.  She felt comfortable plan.  Recommended outpatient acid reduction meds and/or Maalox as needed.  She is discharged home in improved condition.      Diagnosis:    ICD-10-CM    1. Generalized weakness  R53.1    2. QT prolongation  R94.31    3. Gastroesophageal reflux disease, unspecified whether esophagitis present  K21.9        Discharge Medications:  Discharge Medication List as of 6/10/2021  3:14 PM      START taking these medications    Details   famotidine (PEPCID) 20 MG tablet Take 1 tablet (20 mg) by mouth 2 times daily, Disp-30 tablet, R-0, Local Print               Scribe Disclosure:  Camille POOLE MD, am serving as a scribe at 12:54 PM on 6/10/2021 to document services personally performed by Camille Wade MD based on my observations and the provider's statements to me.      Camille Wade MD  06/11/21 0532

## 2021-06-10 NOTE — ED NOTES
is called to come pick pt up.  states he is 20 minutes out. Pt is discharged and released to the lobby.

## 2021-06-11 LAB
INTERPRETATION ECG - MUSE: NORMAL
INTERPRETATION ECG - MUSE: NORMAL

## 2021-09-08 ENCOUNTER — HOSPITAL ENCOUNTER (OUTPATIENT)
Facility: CLINIC | Age: 66
End: 2021-09-08
Attending: ORTHOPAEDIC SURGERY | Admitting: ORTHOPAEDIC SURGERY
Payer: COMMERCIAL

## 2021-10-04 DIAGNOSIS — Z11.59 ENCOUNTER FOR SCREENING FOR OTHER VIRAL DISEASES: ICD-10-CM

## 2021-10-21 VITALS — HEIGHT: 60 IN | WEIGHT: 202 LBS | BODY MASS INDEX: 39.66 KG/M2

## 2021-10-21 RX ORDER — SERTRALINE HYDROCHLORIDE 25 MG/1
25 TABLET, FILM COATED ORAL DAILY
COMMUNITY
End: 2021-10-29 | Stop reason: HOSPADM

## 2021-10-21 RX ORDER — LEFLUNOMIDE 10 MG/1
10 TABLET ORAL DAILY
COMMUNITY
End: 2021-10-29 | Stop reason: HOSPADM

## 2021-10-21 RX ORDER — LORAZEPAM 0.5 MG/1
0.5 TABLET ORAL 2 TIMES DAILY PRN
COMMUNITY
End: 2021-10-29 | Stop reason: HOSPADM

## 2021-10-21 ASSESSMENT — MIFFLIN-ST. JEOR: SCORE: 1377.77

## 2021-10-29 NOTE — PHARMACY-ADMISSION MEDICATION HISTORY
Medication reconciliation interview completed by pre-admitting nurse, reviewed by pharmacy. No further clarifications needed.     Prior to Admission medications    Medication Sig Last Dose Taking? Auth Provider   abatacept (ORENCIA) 250 MG injection Inject into the vein every 30 days 750 mg  Yes Reported, Patient   acetaminophen (TYLENOL) 500 MG tablet Take 1,000 mg by mouth 3 times daily  Yes Reported, Patient   atorvastatin (LIPITOR) 40 MG tablet   Yes Reported, Patient   buPROPion (WELLBUTRIN XL) 150 MG 24 hr tablet 300 mg every morning   Yes Reported, Patient   Cholecalciferol (VITAMIN D PO) Take 4,000 Int'l Units by mouth daily Taking 2 tablets at once.  Yes Reported, Patient   insulin glargine (BASAGLAR KWIKPEN) 100 UNIT/ML pen Inject 35 Units Subcutaneous At Bedtime   Yes Reported, Patient   irbesartan-hydrochlorothiazide (AVALIDE) 300-12.5 MG tablet Take 1 tablet by mouth daily  Yes Reported, Patient   latanoprost (XALATAN) 0.005 % ophthalmic solution Place 1 drop Into the left eye At Bedtime  Yes Unknown, Entered By History   leflunomide (ARAVA) 10 MG tablet Take 10 mg by mouth daily  Yes Reported, Patient   LORazepam (ATIVAN) 0.5 MG tablet Take 0.5 mg by mouth 2 times daily as needed for anxiety  Yes Reported, Patient   melatonin 5 MG tablet Take 5 mg by mouth nightly as needed for sleep  Yes Reported, Patient   modafinil (PROVIGIL) 200 MG tablet Take 1 tablet (200 mg) by mouth every morning  Yes Cass Carvajal PA-C   sertraline (ZOLOFT) 25 MG tablet Take 25 mg by mouth daily  Yes Reported, Patient   B-D U/F 31G X 8 MM insulin pen needle    Reported, Patient   sulfaSALAzine ER (AZULFIDINE EN) 500 MG EC tablet 2,000 mg daily    Reported, Patient

## 2021-11-01 ENCOUNTER — ANESTHESIA (OUTPATIENT)
Dept: SURGERY | Facility: CLINIC | Age: 66
End: 2021-11-01
Payer: COMMERCIAL

## 2021-11-01 ENCOUNTER — APPOINTMENT (OUTPATIENT)
Dept: GENERAL RADIOLOGY | Facility: CLINIC | Age: 66
End: 2021-11-01
Attending: ORTHOPAEDIC SURGERY
Payer: COMMERCIAL

## 2021-11-01 ENCOUNTER — HOSPITAL ENCOUNTER (OUTPATIENT)
Facility: CLINIC | Age: 66
LOS: 1 days | Discharge: HOME | End: 2021-11-03
Attending: ORTHOPAEDIC SURGERY | Admitting: ORTHOPAEDIC SURGERY
Payer: COMMERCIAL

## 2021-11-01 ENCOUNTER — ANESTHESIA EVENT (OUTPATIENT)
Dept: SURGERY | Facility: CLINIC | Age: 66
End: 2021-11-01
Payer: COMMERCIAL

## 2021-11-01 DIAGNOSIS — Z96.659 STATUS POST TOTAL KNEE REPLACEMENT, UNSPECIFIED LATERALITY: Primary | ICD-10-CM

## 2021-11-01 LAB
GLUCOSE BLDC GLUCOMTR-MCNC: 205 MG/DL (ref 70–99)
GLUCOSE BLDC GLUCOMTR-MCNC: 237 MG/DL (ref 70–99)
GLUCOSE BLDC GLUCOMTR-MCNC: 247 MG/DL (ref 70–99)
GLUCOSE BLDC GLUCOMTR-MCNC: 257 MG/DL (ref 70–99)
HBA1C MFR BLD: 7 % (ref 0–5.6)

## 2021-11-01 PROCEDURE — 258N000003 HC RX IP 258 OP 636: Performed by: ANESTHESIOLOGY

## 2021-11-01 PROCEDURE — 250N000011 HC RX IP 250 OP 636: Performed by: ANESTHESIOLOGY

## 2021-11-01 PROCEDURE — 272N000001 HC OR GENERAL SUPPLY STERILE: Performed by: ORTHOPAEDIC SURGERY

## 2021-11-01 PROCEDURE — 258N000003 HC RX IP 258 OP 636: Performed by: PHYSICIAN ASSISTANT

## 2021-11-01 PROCEDURE — 999N000141 HC STATISTIC PRE-PROCEDURE NURSING ASSESSMENT: Performed by: ORTHOPAEDIC SURGERY

## 2021-11-01 PROCEDURE — 250N000009 HC RX 250: Performed by: NURSE ANESTHETIST, CERTIFIED REGISTERED

## 2021-11-01 PROCEDURE — 370N000017 HC ANESTHESIA TECHNICAL FEE, PER MIN: Performed by: ORTHOPAEDIC SURGERY

## 2021-11-01 PROCEDURE — 999N000065 XR KNEE PORT RIGHT 1/2 VIEWS: Mod: RT

## 2021-11-01 PROCEDURE — 250N000011 HC RX IP 250 OP 636: Performed by: NURSE ANESTHETIST, CERTIFIED REGISTERED

## 2021-11-01 PROCEDURE — 96372 THER/PROPH/DIAG INJ SC/IM: CPT | Performed by: HOSPITALIST

## 2021-11-01 PROCEDURE — 82962 GLUCOSE BLOOD TEST: CPT

## 2021-11-01 PROCEDURE — 250N000013 HC RX MED GY IP 250 OP 250 PS 637: Mod: GY | Performed by: ORTHOPAEDIC SURGERY

## 2021-11-01 PROCEDURE — 258N000001 HC RX 258: Performed by: ORTHOPAEDIC SURGERY

## 2021-11-01 PROCEDURE — 250N000013 HC RX MED GY IP 250 OP 250 PS 637: Mod: GY | Performed by: PHYSICIAN ASSISTANT

## 2021-11-01 PROCEDURE — 250N000009 HC RX 250: Performed by: ANESTHESIOLOGY

## 2021-11-01 PROCEDURE — 250N000013 HC RX MED GY IP 250 OP 250 PS 637: Performed by: ANESTHESIOLOGY

## 2021-11-01 PROCEDURE — 250N000011 HC RX IP 250 OP 636: Performed by: PHYSICIAN ASSISTANT

## 2021-11-01 PROCEDURE — 250N000009 HC RX 250: Performed by: ORTHOPAEDIC SURGERY

## 2021-11-01 PROCEDURE — 278N000051 HC OR IMPLANT GENERAL: Performed by: ORTHOPAEDIC SURGERY

## 2021-11-01 PROCEDURE — 250N000013 HC RX MED GY IP 250 OP 250 PS 637: Performed by: HOSPITALIST

## 2021-11-01 PROCEDURE — 250N000012 HC RX MED GY IP 250 OP 636 PS 637: Mod: GY | Performed by: HOSPITALIST

## 2021-11-01 PROCEDURE — 250N000011 HC RX IP 250 OP 636: Performed by: ORTHOPAEDIC SURGERY

## 2021-11-01 PROCEDURE — C1776 JOINT DEVICE (IMPLANTABLE): HCPCS | Performed by: ORTHOPAEDIC SURGERY

## 2021-11-01 PROCEDURE — 710N000009 HC RECOVERY PHASE 1, LEVEL 1, PER MIN: Performed by: ORTHOPAEDIC SURGERY

## 2021-11-01 PROCEDURE — 360N000077 HC SURGERY LEVEL 4, PER MIN: Performed by: ORTHOPAEDIC SURGERY

## 2021-11-01 PROCEDURE — 83036 HEMOGLOBIN GLYCOSYLATED A1C: CPT | Performed by: HOSPITALIST

## 2021-11-01 PROCEDURE — 258N000003 HC RX IP 258 OP 636: Performed by: ORTHOPAEDIC SURGERY

## 2021-11-01 PROCEDURE — 36415 COLL VENOUS BLD VENIPUNCTURE: CPT | Performed by: HOSPITALIST

## 2021-11-01 DEVICE — IMP TIBIAL ZIM PSN NP STM 5DEG SZ DR 42-5320-067-02: Type: IMPLANTABLE DEVICE | Site: KNEE | Status: FUNCTIONAL

## 2021-11-01 DEVICE — IMPLANTABLE DEVICE: Type: IMPLANTABLE DEVICE | Site: KNEE | Status: FUNCTIONAL

## 2021-11-01 DEVICE — TOBRA FULL DOSE ANTIBIOTIC BONE CEMENT, 10 PACK CATALOG NUMBER IS 6197-9-010
Type: IMPLANTABLE DEVICE | Site: KNEE | Status: FUNCTIONAL
Brand: SIMPLEX

## 2021-11-01 RX ORDER — ACETAMINOPHEN 325 MG/1
975 TABLET ORAL EVERY 8 HOURS
Status: DISCONTINUED | OUTPATIENT
Start: 2021-11-01 | End: 2021-11-03 | Stop reason: HOSPADM

## 2021-11-01 RX ORDER — GLYCOPYRROLATE 0.2 MG/ML
INJECTION, SOLUTION INTRAMUSCULAR; INTRAVENOUS PRN
Status: DISCONTINUED | OUTPATIENT
Start: 2021-11-01 | End: 2021-11-01

## 2021-11-01 RX ORDER — SODIUM CHLORIDE, SODIUM LACTATE, POTASSIUM CHLORIDE, CALCIUM CHLORIDE 600; 310; 30; 20 MG/100ML; MG/100ML; MG/100ML; MG/100ML
INJECTION, SOLUTION INTRAVENOUS CONTINUOUS
Status: DISCONTINUED | OUTPATIENT
Start: 2021-11-01 | End: 2021-11-03 | Stop reason: HOSPADM

## 2021-11-01 RX ORDER — AMLODIPINE BESYLATE 5 MG/1
5 TABLET ORAL DAILY
COMMUNITY
Start: 2021-09-25 | End: 2022-09-23

## 2021-11-01 RX ORDER — NALOXONE HYDROCHLORIDE 0.4 MG/ML
0.2 INJECTION, SOLUTION INTRAMUSCULAR; INTRAVENOUS; SUBCUTANEOUS
Status: DISCONTINUED | OUTPATIENT
Start: 2021-11-01 | End: 2021-11-03 | Stop reason: HOSPADM

## 2021-11-01 RX ORDER — NICOTINE POLACRILEX 4 MG
15-30 LOZENGE BUCCAL
Status: DISCONTINUED | OUTPATIENT
Start: 2021-11-01 | End: 2021-11-03 | Stop reason: HOSPADM

## 2021-11-01 RX ORDER — NALOXONE HYDROCHLORIDE 0.4 MG/ML
0.4 INJECTION, SOLUTION INTRAMUSCULAR; INTRAVENOUS; SUBCUTANEOUS
Status: DISCONTINUED | OUTPATIENT
Start: 2021-11-01 | End: 2021-11-03 | Stop reason: HOSPADM

## 2021-11-01 RX ORDER — MEPERIDINE HYDROCHLORIDE 25 MG/ML
12.5 INJECTION INTRAMUSCULAR; INTRAVENOUS; SUBCUTANEOUS
Status: DISCONTINUED | OUTPATIENT
Start: 2021-11-01 | End: 2021-11-01 | Stop reason: HOSPADM

## 2021-11-01 RX ORDER — ONDANSETRON 2 MG/ML
4 INJECTION INTRAMUSCULAR; INTRAVENOUS EVERY 30 MIN PRN
Status: DISCONTINUED | OUTPATIENT
Start: 2021-11-01 | End: 2021-11-01 | Stop reason: HOSPADM

## 2021-11-01 RX ORDER — CEFAZOLIN SODIUM 2 G/100ML
2 INJECTION, SOLUTION INTRAVENOUS SEE ADMIN INSTRUCTIONS
Status: DISCONTINUED | OUTPATIENT
Start: 2021-11-01 | End: 2021-11-01 | Stop reason: HOSPADM

## 2021-11-01 RX ORDER — DEXAMETHASONE SODIUM PHOSPHATE 4 MG/ML
INJECTION, SOLUTION INTRA-ARTICULAR; INTRALESIONAL; INTRAMUSCULAR; INTRAVENOUS; SOFT TISSUE PRN
Status: DISCONTINUED | OUTPATIENT
Start: 2021-11-01 | End: 2021-11-01

## 2021-11-01 RX ORDER — OXYCODONE HYDROCHLORIDE 5 MG/1
5 TABLET ORAL EVERY 4 HOURS PRN
Status: DISCONTINUED | OUTPATIENT
Start: 2021-11-01 | End: 2021-11-01

## 2021-11-01 RX ORDER — AMOXICILLIN 250 MG
1-2 CAPSULE ORAL 2 TIMES DAILY
Qty: 30 TABLET | Refills: 0 | Status: SHIPPED | OUTPATIENT
Start: 2021-11-01 | End: 2021-11-23

## 2021-11-01 RX ORDER — IBUPROFEN 400 MG/1
400 TABLET, FILM COATED ORAL EVERY 4 HOURS PRN
Status: DISCONTINUED | OUTPATIENT
Start: 2021-11-01 | End: 2021-11-03 | Stop reason: HOSPADM

## 2021-11-01 RX ORDER — CEFAZOLIN SODIUM/WATER 2 G/20 ML
2 SYRINGE (ML) INTRAVENOUS
Status: COMPLETED | OUTPATIENT
Start: 2021-11-01 | End: 2021-11-01

## 2021-11-01 RX ORDER — DEXTROSE MONOHYDRATE 25 G/50ML
25-50 INJECTION, SOLUTION INTRAVENOUS
Status: DISCONTINUED | OUTPATIENT
Start: 2021-11-01 | End: 2021-11-03 | Stop reason: HOSPADM

## 2021-11-01 RX ORDER — DULOXETIN HYDROCHLORIDE 60 MG/1
60 CAPSULE, DELAYED RELEASE ORAL DAILY
Status: DISCONTINUED | OUTPATIENT
Start: 2021-11-02 | End: 2021-11-02

## 2021-11-01 RX ORDER — HYDROMORPHONE HCL IN WATER/PF 6 MG/30 ML
0.4 PATIENT CONTROLLED ANALGESIA SYRINGE INTRAVENOUS EVERY 5 MIN PRN
Status: DISCONTINUED | OUTPATIENT
Start: 2021-11-01 | End: 2021-11-01 | Stop reason: HOSPADM

## 2021-11-01 RX ORDER — METHOCARBAMOL 500 MG/1
500 TABLET, FILM COATED ORAL EVERY 6 HOURS PRN
Status: DISCONTINUED | OUTPATIENT
Start: 2021-11-01 | End: 2021-11-03 | Stop reason: HOSPADM

## 2021-11-01 RX ORDER — HYDROXYZINE HYDROCHLORIDE 10 MG/1
10 TABLET, FILM COATED ORAL EVERY 6 HOURS PRN
Status: DISCONTINUED | OUTPATIENT
Start: 2021-11-01 | End: 2021-11-03 | Stop reason: HOSPADM

## 2021-11-01 RX ORDER — ONDANSETRON 2 MG/ML
4 INJECTION INTRAMUSCULAR; INTRAVENOUS EVERY 6 HOURS PRN
Status: DISCONTINUED | OUTPATIENT
Start: 2021-11-01 | End: 2021-11-03 | Stop reason: HOSPADM

## 2021-11-01 RX ORDER — POLYETHYLENE GLYCOL 3350 17 G/17G
17 POWDER, FOR SOLUTION ORAL DAILY
Status: DISCONTINUED | OUTPATIENT
Start: 2021-11-02 | End: 2021-11-03 | Stop reason: HOSPADM

## 2021-11-01 RX ORDER — KETOROLAC TROMETHAMINE 30 MG/ML
INJECTION, SOLUTION INTRAMUSCULAR; INTRAVENOUS PRN
Status: DISCONTINUED | OUTPATIENT
Start: 2021-11-01 | End: 2021-11-01

## 2021-11-01 RX ORDER — NEOSTIGMINE METHYLSULFATE 1 MG/ML
VIAL (ML) INJECTION PRN
Status: DISCONTINUED | OUTPATIENT
Start: 2021-11-01 | End: 2021-11-01

## 2021-11-01 RX ORDER — OXYCODONE HYDROCHLORIDE 5 MG/1
5 TABLET ORAL PRN
Status: ON HOLD | COMMUNITY
Start: 2021-05-16 | End: 2021-11-01

## 2021-11-01 RX ORDER — TRANEXAMIC ACID 650 MG/1
1950 TABLET ORAL ONCE
Status: COMPLETED | OUTPATIENT
Start: 2021-11-01 | End: 2021-11-01

## 2021-11-01 RX ORDER — METOPROLOL TARTRATE 25 MG/1
25 TABLET, FILM COATED ORAL 2 TIMES DAILY
COMMUNITY
Start: 2021-09-18 | End: 2022-09-23

## 2021-11-01 RX ORDER — ACETAMINOPHEN 325 MG/1
650 TABLET ORAL EVERY 4 HOURS PRN
Qty: 100 TABLET | Refills: 0 | Status: SHIPPED | OUTPATIENT
Start: 2021-11-01 | End: 2024-07-22

## 2021-11-01 RX ORDER — PHENYLEPHRINE HYDROCHLORIDE 10 MG/ML
INJECTION INTRAVENOUS PRN
Status: DISCONTINUED | OUTPATIENT
Start: 2021-11-01 | End: 2021-11-01

## 2021-11-01 RX ORDER — LORAZEPAM 0.5 MG/1
.5-1 TABLET ORAL
COMMUNITY
End: 2024-07-22

## 2021-11-01 RX ORDER — OXYCODONE HYDROCHLORIDE 5 MG/1
TABLET ORAL
Qty: 50 TABLET | Refills: 0 | Status: SHIPPED | OUTPATIENT
Start: 2021-11-01 | End: 2021-11-03

## 2021-11-01 RX ORDER — METOPROLOL TARTRATE 25 MG/1
25 TABLET, FILM COATED ORAL 2 TIMES DAILY
Status: DISCONTINUED | OUTPATIENT
Start: 2021-11-01 | End: 2021-11-03 | Stop reason: HOSPADM

## 2021-11-01 RX ORDER — OXYCODONE HYDROCHLORIDE 5 MG/1
10 TABLET ORAL EVERY 4 HOURS PRN
Status: DISCONTINUED | OUTPATIENT
Start: 2021-11-01 | End: 2021-11-02

## 2021-11-01 RX ORDER — FENTANYL CITRATE 50 UG/ML
25 INJECTION, SOLUTION INTRAMUSCULAR; INTRAVENOUS
Status: DISCONTINUED | OUTPATIENT
Start: 2021-11-01 | End: 2021-11-01 | Stop reason: HOSPADM

## 2021-11-01 RX ORDER — OXYCODONE HYDROCHLORIDE 5 MG/1
5 TABLET ORAL EVERY 4 HOURS PRN
Status: DISCONTINUED | OUTPATIENT
Start: 2021-11-01 | End: 2021-11-02

## 2021-11-01 RX ORDER — ALBUTEROL SULFATE 0.83 MG/ML
2.5 SOLUTION RESPIRATORY (INHALATION) EVERY 4 HOURS PRN
Status: DISCONTINUED | OUTPATIENT
Start: 2021-11-01 | End: 2021-11-01 | Stop reason: HOSPADM

## 2021-11-01 RX ORDER — PROCHLORPERAZINE MALEATE 5 MG
5 TABLET ORAL EVERY 6 HOURS PRN
Status: DISCONTINUED | OUTPATIENT
Start: 2021-11-01 | End: 2021-11-01

## 2021-11-01 RX ORDER — ESCITALOPRAM OXALATE 10 MG/1
10 TABLET ORAL DAILY
Status: ON HOLD | COMMUNITY
Start: 2021-07-08 | End: 2021-11-02

## 2021-11-01 RX ORDER — PROPOFOL 10 MG/ML
INJECTION, EMULSION INTRAVENOUS PRN
Status: DISCONTINUED | OUTPATIENT
Start: 2021-11-01 | End: 2021-11-01

## 2021-11-01 RX ORDER — TRANEXAMIC ACID 10 MG/ML
1 INJECTION, SOLUTION INTRAVENOUS ONCE
Status: DISCONTINUED | OUTPATIENT
Start: 2021-11-01 | End: 2021-11-01 | Stop reason: HOSPADM

## 2021-11-01 RX ORDER — ONDANSETRON 4 MG/1
4 TABLET, ORALLY DISINTEGRATING ORAL EVERY 30 MIN PRN
Status: DISCONTINUED | OUTPATIENT
Start: 2021-11-01 | End: 2021-11-01 | Stop reason: HOSPADM

## 2021-11-01 RX ORDER — ONDANSETRON 2 MG/ML
INJECTION INTRAMUSCULAR; INTRAVENOUS PRN
Status: DISCONTINUED | OUTPATIENT
Start: 2021-11-01 | End: 2021-11-01

## 2021-11-01 RX ORDER — LABETALOL HYDROCHLORIDE 5 MG/ML
10 INJECTION, SOLUTION INTRAVENOUS EVERY 5 MIN PRN
Status: DISCONTINUED | OUTPATIENT
Start: 2021-11-01 | End: 2021-11-01 | Stop reason: HOSPADM

## 2021-11-01 RX ORDER — INSULIN LISPRO 100 [IU]/ML
24 INJECTION, SOLUTION INTRAVENOUS; SUBCUTANEOUS
COMMUNITY
Start: 2021-10-04 | End: 2023-01-31

## 2021-11-01 RX ORDER — SODIUM CHLORIDE, SODIUM LACTATE, POTASSIUM CHLORIDE, CALCIUM CHLORIDE 600; 310; 30; 20 MG/100ML; MG/100ML; MG/100ML; MG/100ML
INJECTION, SOLUTION INTRAVENOUS CONTINUOUS
Status: DISCONTINUED | OUTPATIENT
Start: 2021-11-01 | End: 2021-11-01 | Stop reason: HOSPADM

## 2021-11-01 RX ORDER — BISACODYL 10 MG
10 SUPPOSITORY, RECTAL RECTAL DAILY PRN
Status: DISCONTINUED | OUTPATIENT
Start: 2021-11-01 | End: 2021-11-03 | Stop reason: HOSPADM

## 2021-11-01 RX ORDER — ONDANSETRON 4 MG/1
4 TABLET, ORALLY DISINTEGRATING ORAL EVERY 6 HOURS PRN
Status: DISCONTINUED | OUTPATIENT
Start: 2021-11-01 | End: 2021-11-03 | Stop reason: HOSPADM

## 2021-11-01 RX ORDER — HYDROXYZINE HYDROCHLORIDE 10 MG/1
10 TABLET, FILM COATED ORAL EVERY 6 HOURS PRN
Qty: 30 TABLET | Refills: 0 | Status: SHIPPED | OUTPATIENT
Start: 2021-11-01 | End: 2021-11-02

## 2021-11-01 RX ORDER — HYDROMORPHONE HCL IN WATER/PF 6 MG/30 ML
0.2 PATIENT CONTROLLED ANALGESIA SYRINGE INTRAVENOUS
Status: DISCONTINUED | OUTPATIENT
Start: 2021-11-01 | End: 2021-11-03 | Stop reason: HOSPADM

## 2021-11-01 RX ORDER — DULOXETIN HYDROCHLORIDE 30 MG/1
60 CAPSULE, DELAYED RELEASE ORAL DAILY
Status: ON HOLD | COMMUNITY
End: 2021-11-02

## 2021-11-01 RX ORDER — FENTANYL CITRATE 50 UG/ML
50 INJECTION, SOLUTION INTRAMUSCULAR; INTRAVENOUS EVERY 5 MIN PRN
Status: DISCONTINUED | OUTPATIENT
Start: 2021-11-01 | End: 2021-11-01 | Stop reason: HOSPADM

## 2021-11-01 RX ORDER — DORZOLAMIDE HYDROCHLORIDE AND TIMOLOL MALEATE 20; 5 MG/ML; MG/ML
SOLUTION/ DROPS OPHTHALMIC
COMMUNITY
Start: 2021-10-19

## 2021-11-01 RX ORDER — LIDOCAINE 40 MG/G
CREAM TOPICAL
Status: DISCONTINUED | OUTPATIENT
Start: 2021-11-01 | End: 2021-11-01 | Stop reason: HOSPADM

## 2021-11-01 RX ORDER — CEFAZOLIN SODIUM 2 G/100ML
2 INJECTION, SOLUTION INTRAVENOUS EVERY 8 HOURS
Status: COMPLETED | OUTPATIENT
Start: 2021-11-01 | End: 2021-11-02

## 2021-11-01 RX ORDER — ACETAMINOPHEN 325 MG/1
975 TABLET ORAL ONCE
Status: COMPLETED | OUTPATIENT
Start: 2021-11-01 | End: 2021-11-01

## 2021-11-01 RX ORDER — ACETAMINOPHEN 325 MG/1
650 TABLET ORAL EVERY 4 HOURS PRN
Status: DISCONTINUED | OUTPATIENT
Start: 2021-11-04 | End: 2021-11-03 | Stop reason: HOSPADM

## 2021-11-01 RX ORDER — FENTANYL CITRATE 50 UG/ML
INJECTION, SOLUTION INTRAMUSCULAR; INTRAVENOUS PRN
Status: DISCONTINUED | OUTPATIENT
Start: 2021-11-01 | End: 2021-11-01

## 2021-11-01 RX ORDER — LIDOCAINE 40 MG/G
CREAM TOPICAL
Status: DISCONTINUED | OUTPATIENT
Start: 2021-11-01 | End: 2021-11-02

## 2021-11-01 RX ORDER — ESCITALOPRAM OXALATE 10 MG/1
10 TABLET ORAL DAILY
Status: DISCONTINUED | OUTPATIENT
Start: 2021-11-02 | End: 2021-11-02

## 2021-11-01 RX ORDER — HYDROMORPHONE HCL IN WATER/PF 6 MG/30 ML
0.4 PATIENT CONTROLLED ANALGESIA SYRINGE INTRAVENOUS
Status: DISCONTINUED | OUTPATIENT
Start: 2021-11-01 | End: 2021-11-03 | Stop reason: HOSPADM

## 2021-11-01 RX ORDER — AMOXICILLIN 250 MG
1 CAPSULE ORAL 2 TIMES DAILY
Status: DISCONTINUED | OUTPATIENT
Start: 2021-11-01 | End: 2021-11-03 | Stop reason: HOSPADM

## 2021-11-01 RX ADMIN — SENNOSIDES AND DOCUSATE SODIUM 1 TABLET: 50; 8.6 TABLET ORAL at 19:53

## 2021-11-01 RX ADMIN — NEOSTIGMINE METHYLSULFATE 4 MG: 1 INJECTION, SOLUTION INTRAVENOUS at 12:23

## 2021-11-01 RX ADMIN — LIDOCAINE HYDROCHLORIDE 50 MG: 10 INJECTION, SOLUTION EPIDURAL; INFILTRATION; INTRACAUDAL; PERINEURAL at 11:09

## 2021-11-01 RX ADMIN — Medication 2 G: at 11:04

## 2021-11-01 RX ADMIN — METOPROLOL TARTRATE 25 MG: 25 TABLET, FILM COATED ORAL at 21:32

## 2021-11-01 RX ADMIN — MIDAZOLAM 2 MG: 1 INJECTION INTRAMUSCULAR; INTRAVENOUS at 10:37

## 2021-11-01 RX ADMIN — OXYCODONE HYDROCHLORIDE 5 MG: 5 TABLET ORAL at 14:50

## 2021-11-01 RX ADMIN — HYDROMORPHONE HYDROCHLORIDE 0.5 MG: 1 INJECTION, SOLUTION INTRAMUSCULAR; INTRAVENOUS; SUBCUTANEOUS at 12:42

## 2021-11-01 RX ADMIN — OXYCODONE HYDROCHLORIDE 10 MG: 5 TABLET ORAL at 22:59

## 2021-11-01 RX ADMIN — HYDROMORPHONE HYDROCHLORIDE 0.5 MG: 1 INJECTION, SOLUTION INTRAMUSCULAR; INTRAVENOUS; SUBCUTANEOUS at 11:23

## 2021-11-01 RX ADMIN — FENTANYL CITRATE 50 MCG: 50 INJECTION, SOLUTION INTRAMUSCULAR; INTRAVENOUS at 13:25

## 2021-11-01 RX ADMIN — HYDROMORPHONE HYDROCHLORIDE 0.4 MG: 0.2 INJECTION, SOLUTION INTRAMUSCULAR; INTRAVENOUS; SUBCUTANEOUS at 13:46

## 2021-11-01 RX ADMIN — ONDANSETRON HYDROCHLORIDE 4 MG: 2 INJECTION, SOLUTION INTRAVENOUS at 12:06

## 2021-11-01 RX ADMIN — MIDAZOLAM 1 MG: 1 INJECTION INTRAMUSCULAR; INTRAVENOUS at 11:03

## 2021-11-01 RX ADMIN — ACETAMINOPHEN 975 MG: 325 TABLET, FILM COATED ORAL at 09:37

## 2021-11-01 RX ADMIN — INSULIN GLARGINE 35 UNITS: 100 INJECTION, SOLUTION SUBCUTANEOUS at 21:18

## 2021-11-01 RX ADMIN — FENTANYL CITRATE 50 MCG: 50 INJECTION, SOLUTION INTRAMUSCULAR; INTRAVENOUS at 11:17

## 2021-11-01 RX ADMIN — METHOCARBAMOL 500 MG: 500 TABLET ORAL at 16:56

## 2021-11-01 RX ADMIN — ASPIRIN 325 MG: 325 TABLET, COATED ORAL at 19:13

## 2021-11-01 RX ADMIN — HYDROMORPHONE HYDROCHLORIDE 0.4 MG: 0.2 INJECTION, SOLUTION INTRAMUSCULAR; INTRAVENOUS; SUBCUTANEOUS at 16:20

## 2021-11-01 RX ADMIN — GLYCOPYRROLATE 0.6 MG: 0.2 INJECTION, SOLUTION INTRAMUSCULAR; INTRAVENOUS at 12:23

## 2021-11-01 RX ADMIN — FENTANYL CITRATE 50 MCG: 50 INJECTION, SOLUTION INTRAMUSCULAR; INTRAVENOUS at 14:38

## 2021-11-01 RX ADMIN — SODIUM CHLORIDE, POTASSIUM CHLORIDE, SODIUM LACTATE AND CALCIUM CHLORIDE: 600; 310; 30; 20 INJECTION, SOLUTION INTRAVENOUS at 09:57

## 2021-11-01 RX ADMIN — SODIUM CHLORIDE, POTASSIUM CHLORIDE, SODIUM LACTATE AND CALCIUM CHLORIDE: 600; 310; 30; 20 INJECTION, SOLUTION INTRAVENOUS at 19:20

## 2021-11-01 RX ADMIN — FENTANYL CITRATE 50 MCG: 50 INJECTION, SOLUTION INTRAMUSCULAR; INTRAVENOUS at 13:33

## 2021-11-01 RX ADMIN — DEXAMETHASONE SODIUM PHOSPHATE 4 MG: 4 INJECTION, SOLUTION INTRA-ARTICULAR; INTRALESIONAL; INTRAMUSCULAR; INTRAVENOUS; SOFT TISSUE at 11:10

## 2021-11-01 RX ADMIN — SODIUM CHLORIDE, POTASSIUM CHLORIDE, SODIUM LACTATE AND CALCIUM CHLORIDE: 600; 310; 30; 20 INJECTION, SOLUTION INTRAVENOUS at 11:20

## 2021-11-01 RX ADMIN — PROPOFOL 200 MG: 10 INJECTION, EMULSION INTRAVENOUS at 11:09

## 2021-11-01 RX ADMIN — CEFAZOLIN SODIUM 2 G: 2 INJECTION, SOLUTION INTRAVENOUS at 19:13

## 2021-11-01 RX ADMIN — FENTANYL CITRATE 50 MCG: 50 INJECTION, SOLUTION INTRAMUSCULAR; INTRAVENOUS at 12:49

## 2021-11-01 RX ADMIN — PHENYLEPHRINE HYDROCHLORIDE 100 MCG: 10 INJECTION INTRAVENOUS at 12:02

## 2021-11-01 RX ADMIN — INSULIN ASPART 1 UNITS: 100 INJECTION, SOLUTION INTRAVENOUS; SUBCUTANEOUS at 21:18

## 2021-11-01 RX ADMIN — HUMAN INSULIN 4 UNITS: 100 INJECTION, SOLUTION SUBCUTANEOUS at 14:04

## 2021-11-01 RX ADMIN — MIDAZOLAM 1 MG: 1 INJECTION INTRAMUSCULAR; INTRAVENOUS at 11:05

## 2021-11-01 RX ADMIN — ACETAMINOPHEN 975 MG: 325 TABLET, FILM COATED ORAL at 19:53

## 2021-11-01 RX ADMIN — TRANEXAMIC ACID 1950 MG: 650 TABLET ORAL at 09:37

## 2021-11-01 RX ADMIN — KETOROLAC TROMETHAMINE 15 MG: 30 INJECTION, SOLUTION INTRAMUSCULAR at 12:06

## 2021-11-01 RX ADMIN — ROCURONIUM BROMIDE 50 MG: 50 INJECTION, SOLUTION INTRAVENOUS at 11:10

## 2021-11-01 RX ADMIN — HYDROXYZINE HYDROCHLORIDE 10 MG: 10 TABLET, FILM COATED ORAL at 16:56

## 2021-11-01 RX ADMIN — FENTANYL CITRATE 50 MCG: 50 INJECTION, SOLUTION INTRAMUSCULAR; INTRAVENOUS at 11:09

## 2021-11-01 RX ADMIN — OXYCODONE HYDROCHLORIDE 10 MG: 5 TABLET ORAL at 19:12

## 2021-11-01 RX ADMIN — Medication 1 LOZENGE: at 22:59

## 2021-11-01 ASSESSMENT — ACTIVITIES OF DAILY LIVING (ADL)
ADLS_ACUITY_SCORE: 12
ADLS_ACUITY_SCORE: 10

## 2021-11-01 ASSESSMENT — MIFFLIN-ST. JEOR: SCORE: 1364.16

## 2021-11-01 NOTE — ANESTHESIA CARE TRANSFER NOTE
Patient: Kemi Casper    Procedure: Procedure(s):  RIGHT TOTAL KNEE ARTHROPLASTY( SPINAL WITH  ADDUCTOR)       Diagnosis: DJD (degenerative joint disease) [M19.90]  Diagnosis Additional Information: No value filed.    Anesthesia Type:   General     Note:    Oropharynx: oropharynx clear of all foreign objects  Level of Consciousness: awake  Oxygen Supplementation: face mask  Level of Supplemental Oxygen (L/min / FiO2): 6  Independent Airway: airway patency satisfactory and stable  Dentition: dentition unchanged  Vital Signs Stable: post-procedure vital signs reviewed and stable  Report to RN Given: handoff report given  Patient transferred to: PACU    Handoff Report: Identifed the Patient, Identified the Reponsible Provider, Reviewed the pertinent medical history, Discussed the surgical course, Reviewed Intra-OP anesthesia mangement and issues during anesthesia, Set expectations for post-procedure period and Allowed opportunity for questions and acknowledgement of understanding      Vitals:  Vitals Value Taken Time   /101 11/01/21 1240   Temp     Pulse 87 11/01/21 1242   Resp 20 11/01/21 1242   SpO2 99 % 11/01/21 1242   Vitals shown include unvalidated device data.    Electronically Signed By: YEE Lane CRNA  November 1, 2021  12:43 PM

## 2021-11-01 NOTE — ANESTHESIA POSTPROCEDURE EVALUATION
Patient: Kemi Casper    Procedure: Procedure(s):  RIGHT TOTAL KNEE ARTHROPLASTY( SPINAL WITH  ADDUCTOR)       Diagnosis:DJD (degenerative joint disease) [M19.90]  Diagnosis Additional Information: No value filed.    Anesthesia Type:  General    Note:     Postop Pain Control: Uneventful            Sign Out: Well controlled pain   PONV: No   Neuro/Psych: Uneventful            Sign Out: Acceptable/Baseline neuro status   Airway/Respiratory: Uneventful            Sign Out: Acceptable/Baseline resp. status   CV/Hemodynamics: Uneventful            Sign Out: Acceptable CV status   Other NRE: NONE   DID A NON-ROUTINE EVENT OCCUR? No           Last vitals:  Vitals Value Taken Time   /89 11/01/21 1300   Temp     Pulse 76 11/01/21 1305   Resp 15 11/01/21 1305   SpO2 97 % 11/01/21 1305   Vitals shown include unvalidated device data.    Electronically Signed By: Stefan Acevedo MD  November 1, 2021  1:07 PM

## 2021-11-01 NOTE — ANESTHESIA PREPROCEDURE EVALUATION
Anesthesia Pre-Procedure Evaluation    Patient: Kemi Casper   MRN: [id number] : 1955        Preoperative Diagnosis: DJD (degenerative joint disease) [M19.90]    Procedure : Procedure(s):  RIGHT TOTAL KNEE ARTHROPLASTY( SPINAL WITH  ADDUCTOR)          Past Medical History:   Diagnosis Date     Complication of anesthesia     psychological fear of waking up during surgery     Depression      Diabetes mellitus      Diverticulitis      Essential hypertension, benign 2002     Fibromyalgia      GERD (gastroesophageal reflux disease)      Hyperlipidemia      Incontinence of urine      Major depressive disorder, recurrent episode, in full remission (H) 2011     Mixed hyperlipidemia 2016     Need for prophylactic hormone replacement therapy (postmenopausal) 2005     Obesity (BMI 30.0-34.9) 2016     Osteoarthritis      Other abnormal glucose      Sleep apnea 2011    cpap     Type 2 diabetes mellitus with neurological manifestations (HCC) 2015     Uncomplicated asthma       Past Surgical History:   Procedure Laterality Date     ARTHROPLASTY KNEE Left 2018    Procedure: ARTHROPLASTY KNEE;  Left total knee arthroplasty;  Surgeon: Tin Shetty MD;  Location: [facility] OR     ARTHROSCOPY KNEE Left 2016     C APPENDECTOMY  2002    done with hysterectomy     C EYE EXAM ESTABLISHED PT       C GOETZ W/O FACETEC FORAMOT/[facility]  VRT SEG, LUMBAR  10/02/2000    Laminectomy, Lumbar     CHOLECYSTECTOMY       HC DILATION/CURETTAGE DIAG/THER NON OB  1977    D & C     HYSTERECTOMY TOTAL ABDOMINAL, BILATERAL SALPINGO-OOPHORECTOMY, COMBINED       REMV PILONIDAL LESION SIMPLE  age 17     TONSILLECTOMY, ADENOIDECTOMY, COMBINED  age 5      Allergies   Allergen Reactions     Compazine [Prochlorperazine]      Hallucinations - was hospitalized at the time and then had mental side effects, thought that everyone had evacuated the hospital     Lisinopril Cough     Adhesive  Tape Rash      Social History     Tobacco Use     Smoking status: Never Smoker     Smokeless tobacco: Never Used   Substance Use Topics     Alcohol use: No     Alcohol/week: 0.0 standard drinks      Wt Readings from Last 1 Encounters:   11/01/21 90.3 kg (199 lb)        Anesthesia Evaluation   Pt has had prior anesthetic. Type: General.    No history of anesthetic complications       ROS/MED HX  ENT/Pulmonary:     (+) sleep apnea, uses CPAP, Intermittent, asthma     Neurologic:  - neg neurologic ROS     Cardiovascular:  - neg cardiovascular ROS   (+) hypertension-----    METS/Exercise Tolerance:     Hematologic:  - neg hematologic  ROS     Musculoskeletal:  - neg musculoskeletal ROS     GI/Hepatic:  - neg GI/hepatic ROS   (+) GERD,     Renal/Genitourinary:  - neg Renal ROS     Endo:     (+) type II DM, Obesity,     Psychiatric/Substance Use:  - neg psychiatric ROS     Infectious Disease:  - neg infectious disease ROS     Malignancy:  - neg malignancy ROS     Other:  - neg other ROS          Physical Exam    Airway        Mallampati: III   TM distance: > 3 FB   Neck ROM: full   Mouth opening: > 3 cm    Respiratory Devices and Support         Dental  no notable dental history         Cardiovascular   cardiovascular exam normal          Pulmonary   pulmonary exam normal                OUTSIDE LABS:  CBC:   Lab Results   Component Value Date    WBC 6.1 06/10/2021    WBC 7.1 05/16/2021    HGB 12.3 06/10/2021    HGB 11.5 (L) 05/16/2021    HCT 37.8 06/10/2021    HCT 35.3 05/16/2021     06/10/2021     05/16/2021     BMP:   Lab Results   Component Value Date     06/10/2021     05/16/2021    POTASSIUM 3.2 (L) 06/10/2021    POTASSIUM 3.6 05/16/2021    CHLORIDE 102 06/10/2021    CHLORIDE 104 05/16/2021    CO2 30 06/10/2021    CO2 32 05/16/2021    BUN 9 06/10/2021    BUN 12 05/16/2021    CR 0.77 06/10/2021    CR 0.73 05/16/2021     (H) 06/10/2021     (H) 05/16/2021     COAGS:   Lab Results    Component Value Date    PTT 21 (L) 11/15/2011    INR 0.96 11/15/2011     POC:   Lab Results   Component Value Date     (H) 08/28/2020     HEPATIC:   Lab Results   Component Value Date    ALBUMIN 3.4 06/10/2021    PROTTOTAL 7.5 06/10/2021    ALT 24 06/10/2021    AST 14 06/10/2021    ALKPHOS 142 06/10/2021    BILITOTAL 0.7 06/10/2021    BILIDIRECT 0.0 02/22/2011     OTHER:   Lab Results   Component Value Date    PH 5.0 02/20/2013    LACT 1.6 09/17/2016    A1C 6.4 (A) 01/18/2021    DEEDEE 8.9 06/10/2021    MAG 2.5 (H) 06/10/2021    LIPASE 148 06/10/2021    AMYLASE 41 01/04/2012    TSH 2.74 08/28/2020    T4 1.20 02/26/2018    SED 45 (A) 06/01/2017       Anesthesia Plan    ASA Status:  3   NPO Status:  NPO Appropriate    Anesthesia Type: General.     - Airway: ETT   Induction: Intravenous, Propofol.   Maintenance: Balanced.        Consents    Anesthesia Plan(s) and associated risks, benefits, and realistic alternatives discussed. Questions answered and patient/representative(s) expressed understanding.     - Discussed with:  Patient         Postoperative Care    Pain management: IV analgesics, Oral pain medications, Multi-modal analgesia.   PONV prophylaxis: Ondansetron (or other 5HT-3), Dexamethasone or Solumedrol     Comments:                Stefan Acevedo MD

## 2021-11-01 NOTE — BRIEF OP NOTE
TKR for Pre/Post OA knee  Diogenes  TT est 35 w  (see dictation for full)  Spec none  No anticipated complications

## 2021-11-01 NOTE — OP NOTE
Procedure Date: 11/01/2021    PREOPERATIVE DIAGNOSES:  1.  Osteoarthritis, right knee.  2.  Diabetes, fibromyalgia, and body mass index equals 38.    POSTOPERATIVE DIAGNOSES:  1.  Osteoarthritis, right knee.  2.  Diabetes, fibromyalgia, and body mass index equals 38.    PROCEDURE PERFORMED:  Right total knee arthroplasty.    SURGEON:  Tin Shetty MD    ASSISTANT:  Nadia Elias PA-C    ANESTHESIA:  General with adductor block.    ESTIMATED BLOOD LOSS:  50 mL.    TOURNIQUET TIME:  Approximately 35 minutes.    COMPLICATIONS:  None.    DESCRIPTION OF PROCEDURE:  The patient was taken to the operating room where after administration of TXA and antibiotic prophylaxis, the leg was prepped and draped in sterile fashion.  An anterior incision was made, followed by a medial arthrotomy, with a relatively small release along the medial joint line.  An intramedullary 5-degree guide was used with anterior referencing at 5 degrees of external rotation, which matched the epicondylar axis and a box cut was made for PCL substitution.  Retractors were carefully placed about the proximal tibia and a cut was made perpendicular to the mechanical axis of the tibia, removing approximately 1-2 mm of medial bone.  Small medial osteophytes were removed.  Tibial rotation was matched to the junction of the medial and middle thirds of the tubercle and the tibia was prepared accordingly.  Next, 9 mm was resected from the undersurface of a 23 mm patella and sized appropriately.  Posterior osteophytes were removed under direct vision and with the knee in hyperflexion carefully protecting the neurovascular structures.  Gaps appeared equal.  The patient had substantial fibrofatty tissue, which made exposure somewhat difficult.  Her preoperative flexion was relatively poor, but her contralateral knee has developed laxity.  For this reason, care was taken make the joint stable as a priority.    After copious lavage and drying, antibiotic  Simplex was used due to her insulin-dependent diabetes and a Sam Persona size 3 standard cruciate substituting femur, size D tibia, 10 mm polyethylene insert, and a 29 mm patellar button were used.  Excess cement was removed.  The cement dried with the knee in full extension.  After copious saline and Betadine, layered anatomic closure was accomplished.  There were no complications.    Tin Shetty MD        D: 2021   T: 2021   MT: [id number]    Name:     MICHAEL RAIN  MRN:      [id number]        Account:        [id number]   :      1955           Procedure Date: 2021     Document: [id number]

## 2021-11-02 ENCOUNTER — APPOINTMENT (OUTPATIENT)
Dept: PHYSICAL THERAPY | Facility: CLINIC | Age: 66
End: 2021-11-02
Attending: ORTHOPAEDIC SURGERY
Payer: COMMERCIAL

## 2021-11-02 LAB
FASTING STATUS PATIENT QL REPORTED: YES
GLUCOSE BLD-MCNC: 234 MG/DL (ref 70–99)
GLUCOSE BLDC GLUCOMTR-MCNC: 147 MG/DL (ref 70–99)
GLUCOSE BLDC GLUCOMTR-MCNC: 199 MG/DL (ref 70–99)
GLUCOSE BLDC GLUCOMTR-MCNC: 211 MG/DL (ref 70–99)
GLUCOSE BLDC GLUCOMTR-MCNC: 330 MG/DL (ref 70–99)
HGB BLD-MCNC: 9.4 G/DL (ref 11.7–15.7)

## 2021-11-02 PROCEDURE — 250N000013 HC RX MED GY IP 250 OP 250 PS 637: Mod: GY | Performed by: ORTHOPAEDIC SURGERY

## 2021-11-02 PROCEDURE — 250N000012 HC RX MED GY IP 250 OP 636 PS 637: Performed by: PHYSICIAN ASSISTANT

## 2021-11-02 PROCEDURE — 97530 THERAPEUTIC ACTIVITIES: CPT | Mod: GP | Performed by: PHYSICAL THERAPIST

## 2021-11-02 PROCEDURE — 96372 THER/PROPH/DIAG INJ SC/IM: CPT | Performed by: ORTHOPAEDIC SURGERY

## 2021-11-02 PROCEDURE — 82962 GLUCOSE BLOOD TEST: CPT

## 2021-11-02 PROCEDURE — 97116 GAIT TRAINING THERAPY: CPT | Mod: GP | Performed by: PHYSICAL THERAPIST

## 2021-11-02 PROCEDURE — 99203 OFFICE O/P NEW LOW 30 MIN: CPT | Performed by: PHYSICIAN ASSISTANT

## 2021-11-02 PROCEDURE — 250N000013 HC RX MED GY IP 250 OP 250 PS 637: Performed by: PHYSICIAN ASSISTANT

## 2021-11-02 PROCEDURE — 250N000012 HC RX MED GY IP 250 OP 636 PS 637: Mod: GY | Performed by: ORTHOPAEDIC SURGERY

## 2021-11-02 PROCEDURE — 250N000011 HC RX IP 250 OP 636: Performed by: ORTHOPAEDIC SURGERY

## 2021-11-02 PROCEDURE — 82947 ASSAY GLUCOSE BLOOD QUANT: CPT | Performed by: INTERNAL MEDICINE

## 2021-11-02 PROCEDURE — 99207 PR CDG-CODE CATEGORY CHANGED: CPT | Performed by: PHYSICIAN ASSISTANT

## 2021-11-02 PROCEDURE — 36415 COLL VENOUS BLD VENIPUNCTURE: CPT | Performed by: ORTHOPAEDIC SURGERY

## 2021-11-02 PROCEDURE — 250N000013 HC RX MED GY IP 250 OP 250 PS 637: Mod: GY | Performed by: PHYSICIAN ASSISTANT

## 2021-11-02 PROCEDURE — 96372 THER/PROPH/DIAG INJ SC/IM: CPT | Performed by: PHYSICIAN ASSISTANT

## 2021-11-02 PROCEDURE — 250N000013 HC RX MED GY IP 250 OP 250 PS 637: Mod: GY | Performed by: HOSPITALIST

## 2021-11-02 PROCEDURE — 97161 PT EVAL LOW COMPLEX 20 MIN: CPT | Mod: GP | Performed by: PHYSICAL THERAPIST

## 2021-11-02 PROCEDURE — 85018 HEMOGLOBIN: CPT | Performed by: ORTHOPAEDIC SURGERY

## 2021-11-02 RX ORDER — DULOXETIN HYDROCHLORIDE 60 MG/1
60 CAPSULE, DELAYED RELEASE ORAL
Status: DISCONTINUED | OUTPATIENT
Start: 2021-11-02 | End: 2021-11-02

## 2021-11-02 RX ORDER — ESCITALOPRAM OXALATE 10 MG/1
10 TABLET ORAL
Status: DISCONTINUED | OUTPATIENT
Start: 2021-11-02 | End: 2021-11-02

## 2021-11-02 RX ORDER — OXYCODONE HYDROCHLORIDE 5 MG/1
5 TABLET ORAL EVERY 4 HOURS PRN
Status: DISCONTINUED | OUTPATIENT
Start: 2021-11-02 | End: 2021-11-02

## 2021-11-02 RX ORDER — ATORVASTATIN CALCIUM 40 MG/1
40 TABLET, FILM COATED ORAL DAILY
Status: DISCONTINUED | OUTPATIENT
Start: 2021-11-02 | End: 2021-11-02

## 2021-11-02 RX ORDER — ROSUVASTATIN CALCIUM 5 MG/1
10 TABLET, COATED ORAL DAILY
Status: DISCONTINUED | OUTPATIENT
Start: 2021-11-02 | End: 2021-11-03 | Stop reason: HOSPADM

## 2021-11-02 RX ORDER — OXYCODONE HYDROCHLORIDE 5 MG/1
5 TABLET ORAL
Status: DISCONTINUED | OUTPATIENT
Start: 2021-11-02 | End: 2021-11-03 | Stop reason: HOSPADM

## 2021-11-02 RX ORDER — AMLODIPINE BESYLATE 5 MG/1
5 TABLET ORAL DAILY
Status: DISCONTINUED | OUTPATIENT
Start: 2021-11-02 | End: 2021-11-03 | Stop reason: HOSPADM

## 2021-11-02 RX ORDER — SULFASALAZINE 500 MG/1
2000 TABLET, DELAYED RELEASE ORAL DAILY
Status: DISCONTINUED | OUTPATIENT
Start: 2021-11-02 | End: 2021-11-03 | Stop reason: HOSPADM

## 2021-11-02 RX ORDER — LEFLUNOMIDE 10 MG/1
10 TABLET ORAL DAILY
Status: DISCONTINUED | OUTPATIENT
Start: 2021-11-02 | End: 2021-11-03 | Stop reason: HOSPADM

## 2021-11-02 RX ORDER — OXYCODONE HYDROCHLORIDE 5 MG/1
10 TABLET ORAL
Status: DISCONTINUED | OUTPATIENT
Start: 2021-11-02 | End: 2021-11-03 | Stop reason: HOSPADM

## 2021-11-02 RX ORDER — MODAFINIL 200 MG/1
200 TABLET ORAL EVERY MORNING
Status: DISCONTINUED | OUTPATIENT
Start: 2021-11-02 | End: 2021-11-03 | Stop reason: HOSPADM

## 2021-11-02 RX ORDER — OXYCODONE HYDROCHLORIDE 5 MG/1
10 TABLET ORAL
Status: DISCONTINUED | OUTPATIENT
Start: 2021-11-02 | End: 2021-11-02

## 2021-11-02 RX ORDER — BUPROPION HYDROCHLORIDE 150 MG/1
300 TABLET ORAL EVERY MORNING
Status: DISCONTINUED | OUTPATIENT
Start: 2021-11-02 | End: 2021-11-03 | Stop reason: HOSPADM

## 2021-11-02 RX ORDER — METHOCARBAMOL 500 MG/1
500 TABLET, FILM COATED ORAL EVERY 6 HOURS PRN
Qty: 45 TABLET | Refills: 0 | Status: SHIPPED | OUTPATIENT
Start: 2021-11-02 | End: 2022-02-21

## 2021-11-02 RX ORDER — LEFLUNOMIDE 10 MG/1
10 TABLET ORAL DAILY
COMMUNITY

## 2021-11-02 RX ORDER — DORZOLAMIDE HYDROCHLORIDE AND TIMOLOL MALEATE 20; 5 MG/ML; MG/ML
1 SOLUTION/ DROPS OPHTHALMIC EVERY 12 HOURS SCHEDULED
Status: DISCONTINUED | OUTPATIENT
Start: 2021-11-02 | End: 2021-11-03 | Stop reason: HOSPADM

## 2021-11-02 RX ORDER — LORAZEPAM 0.5 MG/1
0.5 TABLET ORAL EVERY 8 HOURS PRN
Status: DISCONTINUED | OUTPATIENT
Start: 2021-11-02 | End: 2021-11-03 | Stop reason: HOSPADM

## 2021-11-02 RX ORDER — HYDROMORPHONE HYDROCHLORIDE 2 MG/1
2-4 TABLET ORAL
Status: DISCONTINUED | OUTPATIENT
Start: 2021-11-02 | End: 2021-11-03 | Stop reason: HOSPADM

## 2021-11-02 RX ORDER — LATANOPROST 50 UG/ML
1 SOLUTION/ DROPS OPHTHALMIC AT BEDTIME
Status: DISCONTINUED | OUTPATIENT
Start: 2021-11-02 | End: 2021-11-03 | Stop reason: HOSPADM

## 2021-11-02 RX ORDER — ROSUVASTATIN CALCIUM 20 MG/1
20 TABLET, COATED ORAL DAILY
COMMUNITY

## 2021-11-02 RX ADMIN — HYDROMORPHONE HYDROCHLORIDE 0.4 MG: 0.2 INJECTION, SOLUTION INTRAMUSCULAR; INTRAVENOUS; SUBCUTANEOUS at 07:24

## 2021-11-02 RX ADMIN — OXYCODONE HYDROCHLORIDE 10 MG: 5 TABLET ORAL at 02:56

## 2021-11-02 RX ADMIN — INSULIN ASPART 24 UNITS: 100 INJECTION, SOLUTION INTRAVENOUS; SUBCUTANEOUS at 13:24

## 2021-11-02 RX ADMIN — SENNOSIDES AND DOCUSATE SODIUM 1 TABLET: 50; 8.6 TABLET ORAL at 11:56

## 2021-11-02 RX ADMIN — INSULIN ASPART 24 UNITS: 100 INJECTION, SOLUTION INTRAVENOUS; SUBCUTANEOUS at 18:40

## 2021-11-02 RX ADMIN — DORZOLAMIDE HYDROCHLORIDE AND TIMOLOL MALEATE 1 DROP: 22.3; 6.8 SOLUTION/ DROPS OPHTHALMIC at 19:21

## 2021-11-02 RX ADMIN — ASPIRIN 325 MG: 325 TABLET, COATED ORAL at 11:56

## 2021-11-02 RX ADMIN — OXYCODONE HYDROCHLORIDE 10 MG: 5 TABLET ORAL at 13:23

## 2021-11-02 RX ADMIN — POLYETHYLENE GLYCOL 3350 17 G: 17 POWDER, FOR SOLUTION ORAL at 11:56

## 2021-11-02 RX ADMIN — Medication 1 LOZENGE: at 07:16

## 2021-11-02 RX ADMIN — METOPROLOL TARTRATE 25 MG: 25 TABLET, FILM COATED ORAL at 11:56

## 2021-11-02 RX ADMIN — HYDROMORPHONE HYDROCHLORIDE 4 MG: 2 TABLET ORAL at 19:19

## 2021-11-02 RX ADMIN — HYDROMORPHONE HYDROCHLORIDE 0.4 MG: 0.2 INJECTION, SOLUTION INTRAMUSCULAR; INTRAVENOUS; SUBCUTANEOUS at 18:12

## 2021-11-02 RX ADMIN — INSULIN GLARGINE 35 UNITS: 100 INJECTION, SOLUTION SUBCUTANEOUS at 22:19

## 2021-11-02 RX ADMIN — LATANOPROST 1 DROP: 50 SOLUTION OPHTHALMIC at 22:16

## 2021-11-02 RX ADMIN — AMLODIPINE BESYLATE 5 MG: 5 TABLET ORAL at 11:57

## 2021-11-02 RX ADMIN — HYDROMORPHONE HYDROCHLORIDE 0.4 MG: 0.2 INJECTION, SOLUTION INTRAMUSCULAR; INTRAVENOUS; SUBCUTANEOUS at 04:38

## 2021-11-02 RX ADMIN — ACETAMINOPHEN 975 MG: 325 TABLET, FILM COATED ORAL at 13:23

## 2021-11-02 RX ADMIN — BUPROPION HYDROCHLORIDE 300 MG: 150 TABLET, EXTENDED RELEASE ORAL at 11:55

## 2021-11-02 RX ADMIN — DORZOLAMIDE HYDROCHLORIDE AND TIMOLOL MALEATE 1 DROP: 22.3; 6.8 SOLUTION/ DROPS OPHTHALMIC at 12:07

## 2021-11-02 RX ADMIN — LEFLUNOMIDE 10 MG: 10 TABLET ORAL at 13:26

## 2021-11-02 RX ADMIN — SENNOSIDES AND DOCUSATE SODIUM 1 TABLET: 50; 8.6 TABLET ORAL at 19:19

## 2021-11-02 RX ADMIN — SULFASALAZINE 2000 MG: 500 TABLET, DELAYED RELEASE ORAL at 13:26

## 2021-11-02 RX ADMIN — HYDROMORPHONE HYDROCHLORIDE 4 MG: 2 TABLET ORAL at 22:15

## 2021-11-02 RX ADMIN — ACETAMINOPHEN 975 MG: 325 TABLET, FILM COATED ORAL at 22:15

## 2021-11-02 RX ADMIN — METOPROLOL TARTRATE 25 MG: 25 TABLET, FILM COATED ORAL at 19:19

## 2021-11-02 RX ADMIN — HYDROCHLOROTHIAZIDE: 12.5 CAPSULE ORAL at 11:57

## 2021-11-02 RX ADMIN — OXYCODONE HYDROCHLORIDE 10 MG: 5 TABLET ORAL at 16:11

## 2021-11-02 RX ADMIN — CEFAZOLIN SODIUM 2 G: 2 INJECTION, SOLUTION INTRAVENOUS at 02:45

## 2021-11-02 RX ADMIN — SERTRALINE HYDROCHLORIDE 50 MG: 50 TABLET ORAL at 11:56

## 2021-11-02 RX ADMIN — ROSUVASTATIN CALCIUM 10 MG: 5 TABLET, FILM COATED ORAL at 11:54

## 2021-11-02 RX ADMIN — LORAZEPAM 0.5 MG: 0.5 TABLET ORAL at 14:12

## 2021-11-02 RX ADMIN — METHOCARBAMOL 500 MG: 500 TABLET ORAL at 10:47

## 2021-11-02 RX ADMIN — OXYCODONE HYDROCHLORIDE 10 MG: 5 TABLET ORAL at 09:05

## 2021-11-02 RX ADMIN — ACETAMINOPHEN 975 MG: 325 TABLET, FILM COATED ORAL at 04:38

## 2021-11-02 NOTE — PLAN OF CARE
Physical Therapy Discharge Summary    Reason for therapy discharge:    Discharged to home with outpatient therapy.    Progress towards therapy goal(s). See goals on Care Plan in [facility] electronic health record for goal details.  Goals not met.  Barriers to achieving goals:   limited tolerance for therapy and discharge from facility.    Therapy recommendation(s):    Continued therapy is recommended.  Rationale/Recommendations:  OP PT to maximize knee ROM/strength; assist from spouse until return to PLOF.

## 2021-11-02 NOTE — PLAN OF CARE
"OT: Delayed pts session d/t being lethargic and increased pain when working with PT. When checking in at a later time pt unavailable w/ other providers twice. Upon third check in pt declined to work with OT stating \"she can get herself dressed, and her  is able to assist her\". Pts  present, encouraged pt to work with therapist. Pt continued to refuse. Pts  works from 4am- 8am, but says that he will be home before pt gets out of bed. Discussed pts refusal with nurse, anticipated discharge later today. However, if pt spends the night will attempt tomorrow.   "

## 2021-11-02 NOTE — PLAN OF CARE
Patient vital signs are at baseline: Yes  Patient able to ambulate as they were prior to admission or with assist devices provided by therapies during their stay:  Yes, with A-1 walker  Patient MUST void prior to discharge:  Yes  Patient able to tolerate oral intake:  Yes, regular diet  Pain has adequate pain control using Oral analgesics:  NO. Pt reported 9/10 this afternoon. Informed NP. New order to change the oxycodone to 3h.  Current pain 8/10.     Pt A&Ox4. Aquacel dressing CDI. Pt would like to discharge to home after she receives her oxycodone at 1425. spouse will provide transportation.

## 2021-11-02 NOTE — CONSULTS
Hospitalist Consultation      Kemi Casper MRN# [id number]   YOB: 1955 Age: 66 year old   Date of Admission: 11/1/2021     Requesting Physician:  Diogenes  Reason for consult: Post op medical mgmnt            Assessment and Plan:   This patient is a 66 year old female with a PMH significant for Dm on insulin, HTN, depression, fibromyalgia, GERD, hyperlipidemia, obstructive sleep apnea on CPAP who is POD 1 s/p status post right total knee arthroplasty.  We are consulted for medical management.    1. S/p right total hip arthroplasty: doing well, cont PT/OT and pain control as per primary    2. Diabetes mellitus-last A1c 7.0 (11/1).   --Postop blood glucose slightly elevated in the low 200s in upper 100s  --We will resume insulin PTA dosing to include 35 units of Lantus nightly followed by HumaLog 24u with meals and medium resistance sliding scale.    3.  Hypertension-blood pressure stable.  Continue metoprolol 25 mg twice daily, Avalide and Norvasc    4.  Depression anxiety-stable continue Zoloft as needed Ativan, Wellbutrin and Escitalopram    5. RA: stable.   --resume PTA meds     6.  Nonobstructive CAD  --No issues, asymptomatic   --resume BB, Lipids and statin    7.  SHAREE on CPAP  --Resume CPAP when needed, supplemental O2    8.  Fibromyalgia  --Resume PTA meds     9. DVT Prophylaxis: on ASA as per primary  10. D/C planning: To home later today per primary             History of Present Illness:   This patient is a 66 year old female who is POD 1 s/p right total knee arthroplasty  Intra-op report reviewed and showed no intra-op complications.   I/o's reviewed, Currently net + 875 with good UOP since OR. Hgb stable this am at 9.4  Overnight did pretty well, no complaints, VSS.   Currently, today tyler diet, pain controlled, no CP, SOB, no n/v.   O/w other medical problems have been stable, with no recent c/o illness.               Past Medical History:     Past Medical History:   Diagnosis Date      Complication of anesthesia     psychological fear of waking up during surgery     Depression      Diabetes mellitus      Diverticulitis      Essential hypertension, benign 7/2/2002     Fibromyalgia      GERD (gastroesophageal reflux disease)      Hyperlipidemia      Incontinence of urine      Major depressive disorder, recurrent episode, in full remission (H) 4/20/2011     Mixed hyperlipidemia 7/22/2016     Need for prophylactic hormone replacement therapy (postmenopausal) 12/14/2005     Obesity (BMI 30.0-34.9) 7/22/2016     Osteoarthritis      Other abnormal glucose 2007     Sleep apnea 2/9/2011    cpap     Type 2 diabetes mellitus with neurological manifestations (HCC) 5/6/2015     Uncomplicated asthma                Past Surgical History:     Past Surgical History:   Procedure Laterality Date     ARTHROPLASTY KNEE Left 4/9/2018    Procedure: ARTHROPLASTY KNEE;  Left total knee arthroplasty;  Surgeon: Tin Shetty MD;  Location: [facility] OR     ARTHROSCOPY KNEE Left 07/26/2016     C APPENDECTOMY  2002    done with hysterectomy     C EYE EXAM ESTABLISHED PT  2003     C GOETZ W/O FACETEC FORAMOT/DSKC 1/2 VRT SEG, LUMBAR  10/02/2000    Laminectomy, Lumbar     CHOLECYSTECTOMY  2002     HC DILATION/CURETTAGE DIAG/THER NON OB  1977    D & C     HYSTERECTOMY TOTAL ABDOMINAL, BILATERAL SALPINGO-OOPHORECTOMY, COMBINED  2002     REMV PILONIDAL LESION SIMPLE  age 17     TONSILLECTOMY, ADENOIDECTOMY, COMBINED  age 5                 Social History:     Social History     Tobacco Use     Smoking status: Never Smoker     Smokeless tobacco: Never Used   Substance Use Topics     Alcohol use: No     Alcohol/week: 0.0 standard drinks     Drug use: No                 Family History:     family history includes Cancer in her mother; Diabetes in her father; Genitourinary Problems in her father; Heart Disease in her father; Hypertension in her mother; Thyroid Disease in her sister.  Family Hx fully reviewed and is non contributory  to this admission.             Allergies:     Allergies   Allergen Reactions     Compazine [Prochlorperazine]      Hallucinations - was hospitalized at the time and then had mental side effects, thought that everyone had evacuated the hospital     Lisinopril Cough     Adhesive Tape Rash             Medications:     Prior to Admission medications    Medication Sig Last Dose Taking? Auth Provider   abatacept (ORENCIA) 250 MG injection Inject into the vein every 30 days 750 mg Past Month at Unknown time Yes Reported, Patient   acetaminophen (TYLENOL) 325 MG tablet Take 2 tablets (650 mg) by mouth every 4 hours as needed for other (mild pain)  Yes Tin Shetty MD   amLODIPine (NORVASC) 5 MG tablet Take 5 mg by mouth daily 10/31/2021 at Unknown time Yes Reported, Patient   aspirin (ASA) 325 MG EC tablet Take 1 tablet (325 mg) by mouth daily  Yes Tin Shetty MD   atorvastatin (LIPITOR) 40 MG tablet  10/31/2021 at 2100 Yes Reported, Patient   buPROPion (WELLBUTRIN XL) 150 MG 24 hr tablet 300 mg every morning  10/31/2021 at 0800 Yes Reported, Patient   Cholecalciferol (VITAMIN D PO) Take 4,000 Int'l Units by mouth daily Taking 2 tablets at once. 10/31/2021 at 0800 Yes Reported, Patient   dorzolamide-timolol (COSOPT) 2-0.5 % ophthalmic solution INSTILL 1 DROP INTO THE LEFT EYE TWICE DAILY 10/31/2021 at Unknown time Yes Reported, Patient   DULoxetine (CYMBALTA) 30 MG capsule Take 60 mg by mouth daily 10/31/2021 at Unknown time Yes Reported, Patient   escitalopram (LEXAPRO) 10 MG tablet Take 10 mg by mouth daily 10/31/2021 at Unknown time Yes Reported, Patient   insulin glargine (BASAGLAR KWIKPEN) 100 UNIT/ML pen Inject 35 Units Subcutaneous At Bedtime  10/31/2021 at Unknown time Yes Reported, Patient   insulin lispro (HUMALOG KWIKPEN) 100 UNIT/ML (1 unit dial) KWIKPEN INJECT A CERTAIN AMOUNT OF UNITS BASED ON SLIDING SCALE 15 UNITS WITH MEALS. 10/31/2021 at Unknown time Yes Reported, Patient    irbesartan-hydrochlorothiazide (AVALIDE) 300-12.5 MG tablet Take 1 tablet by mouth daily 10/31/2021 at 0800 Yes Reported, Patient   latanoprost (XALATAN) 0.005 % ophthalmic solution Place 1 drop Into the left eye At Bedtime 10/31/2021 at 2100 Yes Unknown, Entered By History   LORazepam (ATIVAN) 0.5 MG tablet Take 0.5 mg by mouth as needed Past Week at Unknown time Yes Reported, Patient   methocarbamol (ROBAXIN) 500 MG tablet Take 1 tablet (500 mg) by mouth every 6 hours as needed for muscle spasms  Yes Nadia Elias PA-C   metoprolol tartrate (LOPRESSOR) 25 MG tablet Take 25 mg by mouth 2 times daily 10/31/2021 at 2100 Yes Reported, Patient   modafinil (PROVIGIL) 200 MG tablet Take 1 tablet (200 mg) by mouth every morning 10/31/2021 at 0800 Yes Cass Carvajal PA-C   oxyCODONE (ROXICODONE) 5 MG tablet 1 tab po q 4-6 hrs prn pain 3-6 on pain scale  2 tabs po q 4-6hrs prn pain 7-10 on pain scale  Yes Tin Shetty MD   senna-docusate (SENOKOT-S/PERICOLACE) 8.6-50 MG tablet Take 1-2 tablets by mouth 2 times daily Take while on oral narcotics to prevent or treat constipation.  Yes Tin Shetty MD   sertraline (ZOLOFT) 50 MG tablet Take 50 mg by mouth daily 10/31/2021 at Unknown time Yes Reported, Patient   sulfaSALAzine ER (AZULFIDINE EN) 500 MG EC tablet 2,000 mg daily  10/31/2021 at 0800 Yes Reported, Patient   B-D U/F 31G X 8 MM insulin pen needle    Reported, Patient   leflunomide (ARAVA) 10 MG tablet Take 10 mg by mouth daily   Reported, Patient               Review of Systems:   A comprehensive greater than 10 system review of systems was carried out.  Pertinent positives and negatives are noted above.  Otherwise negative for contributory info.            Physical Exam:   Vitals were reviewed  Blood pressure 126/50, pulse 70, temperature 97.3  F (36.3  C), temperature source Temporal, resp. rate 16, height 1.524 m (5'), weight 90.3 kg (199 lb), last menstrual period  12/04/2003, SpO2 93 %, not currently breastfeeding.  Exam:    GENERAL:  Comfortable.  PSYCH: pleasant, oriented, No acute distress.  HEENT:  PERRLA. Normal conjunctiva, normal hearing, nasal mucosa and Oropharynx are normal.  NECK:  Supple, no neck vein distention, adenopathy or bruits, normal thyroid.  HEART:  Normal S1, S2 with no murmur, no pericardial rub, S3 or S4.  LUNGS:  Clear to auscultation, normal Respiratory effort.  ABDOMEN:  Soft, no hepatosplenomegaly, normal bowel sounds.  EXTREMITIES:  No pedal edema, +2 pulses bilateral and equal.  RTKA ace dressing c/d/i  SKIN:  Dry to touch, No rash, wound or ulcerations.  NEUROLOGIC:  Nonfocal with normal cranial nerve and motor power and sensation.            Data:   Past 24 hours labs, studies, and imaging were reviewed.  Recent Labs   Lab 11/02/21  0603   HGB 9.4*          Lab Results   Component Value Date     06/10/2021     05/16/2021    .9 12/17/2020    Lab Results   Component Value Date    CHLORIDE 102 06/10/2021    CHLORIDE 104 05/16/2021    CHLORIDE 103.5 12/17/2020    Lab Results   Component Value Date    BUN 9 06/10/2021    BUN 12 05/16/2021    BUN 17 12/17/2020    BUN 18.1 12/17/2020      Lab Results   Component Value Date    POTASSIUM 3.2 06/10/2021    POTASSIUM 3.6 05/16/2021    POTASSIUM 4.29 12/17/2020    Lab Results   Component Value Date    CO2 30 06/10/2021    CO2 32 05/16/2021    CO2 31.5 12/17/2020    Lab Results   Component Value Date    CR 0.77 06/10/2021    CR 0.73 05/16/2021    CR 0.94 12/17/2020          Nicole Hernandez PA-C

## 2021-11-02 NOTE — PROGRESS NOTES
Evening RN    Pt arrived to unit from PACU around 1900.  Transferred from cart to bed by pivot transferring with A2 and walker/GB.  VSS and afebrile.  Rates pain a 9/10 and requesting medication when available.  Orientated to room, call light, and staff.  Will continue to monitor.    Patient vital signs are at baseline: No,  Reason:  2L oxygen to maintain o2 saturations.  Patient able to ambulate as they were prior to admission or with assist devices provided by therapies during their stay:  Yes  Patient MUST void prior to discharge:  Yes  Patient able to tolerate oral intake:  Yes  Pain has adequate pain control using Oral analgesics:  Yes     Pt A/O x4.  VSS and afebrile.  2L oxygen with capno in place.  Pain managed adequately with scheduled PO tylenol and PRN PO oxycodone.  CMS intact.  Dressing CDI.  Up A1 with walker and gait belt.  Voiding adequately - PVR of 187.  Tolerating regular diet well.  BG was 247 and 257.  Home CPAP set up for sleeping overnight.  Plan is home on discharge tomorrow.  Will continue to monitor.

## 2021-11-02 NOTE — PROVIDER NOTIFICATION
Paged admitting provider at 1949:  Pt asking about home medications.  Takes lexapro, metoprolol and sertaline at bedtime.  Also, pt is diabetic and need BG and insulin orders.  Thank you

## 2021-11-02 NOTE — PROGRESS NOTES
11/02/21 0816   Quick Adds   Type of Visit Initial PT Evaluation   Living Environment   People in home spouse   Current Living Arrangements house  ([facility])   Home Accessibility stairs to enter home;stairs within home   Number of Stairs, Main Entrance 2  (1 up from sidewalk; 1 into home; can fit walker)   Stair Railings, Main Entrance none   Number of Stairs, Within Home, Primary greater than 10 stairs  (7 to main level; 8 to bedroom)   Stair Railings, Within Home, Primary railing on left side (ascending)   Transportation Anticipated family or friend will provide   Self-Care   Usual Activity Tolerance good   Regular Exercise No  (limited by pain)   Equipment Currently Used at Home none  (has cane and 4WW)   Activity/Exercise/Self-Care Comment normally independent; limited by pain; has RA and fibromyalgia   Disability/Function   Fall history within last six months no   Change in Functional Status Since Onset of Current Illness/Injury yes   General Information   Onset of Illness/Injury or Date of Surgery 11/01/21   Referring Physician Tin Shetty MD   Patient/Family Therapy Goals Statement (PT) return home   Pertinent History of Current Problem (include personal factors and/or comorbidities that impact the POC) Patient POD #1 s/p R TKA; see medical record for further information   Existing Precautions/Restrictions fall   Weight-Bearing Status - RLE weight-bearing as tolerated   General Observations patient having 8/10 pain; sleepy from meds   Cognition   Orientation Status (Cognition) oriented x 3   Cognitive Status Comments appears oriented but sleepy from meds   Pain Assessment   Patient Currently in Pain Yes, see Vital Sign flowsheet  (8/10 in R leg; just received meds)   Integumentary/Edema   Integumentary/Edema Comments R knee incision; covered   Posture    Posture Comments forward flexed at head and trunk   Range of Motion (ROM)   ROM Comment R LE limited by recent surgery, pain, swelling; others  appear WFL   Strength   Strength Comments R LE limited by recent surgery and pain; others appear WFL   Bed Mobility   Comment (Bed Mobility) SBA for supine to sit   Transfers   Transfer Safety Comments CGA for sit<>stand; use of walker   Gait/Stairs (Locomotion)   Distance in Feet (Required for LE Total Joints) 50 feet x 2   Comment (Gait/Stairs) use of walker; CGA and sequencing and safety cues   Balance   Balance Comments current need for walker and assist; no gross LOB   Sensory Examination   Sensory Perception Comments some decreased feeling in feet   Clinical Impression   Criteria for Skilled Therapeutic Intervention yes, treatment indicated   PT Diagnosis (PT) impaired gait/transfers   Influenced by the following impairments decreased R knee ROM/strength; pain; lethargy   Functional limitations due to impairments impaired independence with functional mobility and cares secondary to above deficits   Clinical Presentation Stable/Uncomplicated   Clinical Presentation Rationale clinical judgement; level of assist   Clinical Decision Making (Complexity) low complexity   Therapy Frequency (PT) 2x/day   Predicted Duration of Therapy Intervention (days/wks) 1-2 days   Planned Therapy Interventions (PT) bed mobility training;gait training;ROM (range of motion);stair training;strengthening;stretching;transfer training   Anticipated Equipment Needs at Discharge (PT) walker, rolling;cane, straight   Risk & Benefits of therapy have been explained evaluation/treatment results reviewed;care plan/treatment goals reviewed;risks/benefits reviewed;current/potential barriers reviewed;participants voiced agreement with care plan;participants included;patient   PT Discharge Planning    PT Rationale for DC Rec Anticipate patient will be safe to discharge home with assist of spouse and use of walker; cane and rail for stairs;(when pain is controlled and patient less lethargic) will need assist for ex's; has OP PT scheduled   Therapy  Certification   Start of care date 11/02/21   Certification date from 11/02/21   Certification date to 11/05/21   Medical Diagnosis R TKA   Total Evaluation Time   Total Evaluation Time (Minutes) 10

## 2021-11-02 NOTE — PROGRESS NOTES
[facility]      OUTPATIENT PHYSICAL THERAPY EVALUATION  PLAN OF TREATMENT FOR OUTPATIENT REHABILITATION  (COMPLETE FOR INITIAL CLAIMS ONLY)  Patient's Last Name, First Name, M.I.  YOB: 1955  Kemi Saul                        Provider's Name  [facility] Medical Record No.  [id number]                               Onset Date:  11/01/21   Start of Care Date:  11/02/21      Type:     _X_PT   ___OT   ___SLP Medical Diagnosis:  R TKA                        PT Diagnosis:  impaired gait/transfers   Visits from SOC:  1   _________________________________________________________________________________  Plan of Treatment/Functional Goals    Planned Interventions: bed mobility training, gait training, ROM (range of motion), stair training, strengthening, stretching, transfer training     Goals: See Physical Therapy Goals on Care Plan in [facility] electronic health record.    Therapy Frequency: 2x/day  Predicted Duration of Therapy Intervention: 1-2 days  _________________________________________________________________________________    I CERTIFY THE NEED FOR THESE SERVICES FURNISHED UNDER        THIS PLAN OF TREATMENT AND WHILE UNDER MY CARE     (Physician co-signature of this document indicates review and certification of the therapy plan).                Certification date from: 11/02/21, Certification date to: 11/05/21    Referring Physician: Tin Shetty MD            Initial Assessment        See Physical Therapy evaluation dated 11/02/21 in Epic electronic health record.

## 2021-11-02 NOTE — PHARMACY-ADMISSION MEDICATION HISTORY
Admission medication history interview status for this patient is complete. See [facility] admission navigator for allergy information, prior to admission medications and immunization status.     Medication history interview done, indicate source(s): Patient  Medication history resources (including written lists, pill bottles, clinic record): pill bottles, Sure Scripts, Rheumatology clinic record  Pharmacy: -    Changes made to PTA medication list:  Added: melatonin, leflunomide, rosuvastatin  Changed: vitamin D, prandial Humalog  Removed: duloxetine, escitalopram, atorvastatin    Actions taken by pharmacist (provider contacted, etc): called Rheumatology clinic, web-paged Nicole SPEARS) to review home meds     Additional medication history information:None    Medication reconciliation/reorder completed by provider prior to medication history?  Y   (Y/N)     For patients on insulin therapy:   Do you use sliding scale insulin based on blood sugars? yes  What is your pre-meal insulin coverage?  24 units TID  Do you typically eat three meals a day? Not always  How many times do you check your blood glucose per day? CGM  How many episodes of hypoglycemia do you typically have per month? A couple times per month (BG drops to 50s during the day, for example, prior to dinner - advised patient to review prandial insulin regimen with primary care provider.)  Do you have a Continuous Glucose Monitor (CGM)?  yes    Prior to Admission medications    Medication Sig Last Dose Taking? Auth Provider   abatacept (ORENCIA) 250 MG injection Inject into the vein every 30 days 750 mg Past Month at Unknown time Yes Reported, Patient          amLODIPine (NORVASC) 5 MG tablet Take 5 mg by mouth daily 10/31/2021 at Unknown time Yes Reported, Patient          buPROPion (WELLBUTRIN XL) 150 MG 24 hr tablet 300 mg every morning  10/31/2021 at 0800 Yes Reported, Patient   dorzolamide-timolol (COSOPT) 2-0.5 % ophthalmic solution INSTILL 1 DROP INTO THE  LEFT EYE TWICE DAILY 10/31/2021 at Unknown time Yes Reported, Patient   insulin glargine (BASAGLAR KWIKPEN) 100 UNIT/ML pen Inject 35 Units Subcutaneous At Bedtime  10/31/2021 at Unknown time Yes Reported, Patient   insulin lispro (HUMALOG KWIKPEN) 100 UNIT/ML (1 unit dial) KWIKPEN Inject 24 Units Subcutaneous 3 times daily (before meals)  10/31/2021 at Unknown time Yes Reported, Patient   irbesartan-hydrochlorothiazide (AVALIDE) 300-12.5 MG tablet Take 1 tablet by mouth daily 10/31/2021 at 0800 Yes Reported, Patient   latanoprost (XALATAN) 0.005 % ophthalmic solution Place 1 drop Into the left eye At Bedtime 10/31/2021 at 2100 Yes Unknown, Entered By History   leflunomide (ARAVA) 10 MG tablet Take 10 mg by mouth daily  Yes Unknown, Entered By History   LORazepam (ATIVAN) 0.5 MG tablet Take 0.5 mg by mouth as needed Past Week at Unknown time Yes Reported, Patient   melatonin 5 MG tablet Take 5 mg by mouth nightly as needed for sleep  Yes Unknown, Entered By History   methocarbamol (ROBAXIN) 500 MG tablet Take 1 tablet (500 mg) by mouth every 6 hours as needed for muscle spasms  Yes Nadia Elias PA-C   metoprolol tartrate (LOPRESSOR) 25 MG tablet Take 25 mg by mouth 2 times daily 10/31/2021 at 2100 Yes Reported, Patient   modafinil (PROVIGIL) 200 MG tablet Take 1 tablet (200 mg) by mouth every morning 10/31/2021 at 0800 Yes Cass Carvajal PA-C          rosuvastatin (CRESTOR) 10 MG tablet Take 10 mg by mouth daily  Yes Unknown, Entered By History          sertraline (ZOLOFT) 50 MG tablet Take 50 mg by mouth daily 10/31/2021 at Unknown time Yes Reported, Patient   sulfaSALAzine ER (AZULFIDINE EN) 500 MG EC tablet 2,000 mg daily  10/31/2021 at 0800 Yes Reported, Patient   vitamin D3 (CHOLECALCIFEROL) 250 mcg (21529 units) capsule Take 1 capsule by mouth daily Takes 10,000 units daily  Yes Unknown, Entered By History   B-D U/F 31G X 8 MM insulin pen needle    Reported, Patient

## 2021-11-02 NOTE — PROGRESS NOTES
Orthopedic Surgery  11/2/2021  POD 1    S: Patient voices no unexpected ortho complaints today. Denies chest pain or shortness of breath. Did get up and walk. Will plan to go home today.    O: Blood pressure 134/56, pulse 68, temperature 97.8  F (36.6  C), temperature source Temporal, resp. rate 16, height 1.524 m (5'), weight 90.3 kg (199 lb), last menstrual period 12/04/2003, SpO2 (P) 92 %, not currently breastfeeding.  Lab Results   Component Value Date    HGB 12.3 06/10/2021     Lab Results   Component Value Date    INR 0.96 11/15/2011     I/O last 3 completed shifts:  In: 1116 [I.V.:1116]  Out: 80 [Urine:55; Blood:25]  Distal extremity CMSI bilaterally.  Calves are negative bilaterally, both soft and nontender.  The dressing is C/D/I.      A: Ms. Stanley Casper is doing well status post Procedure(s):  RIGHT TOTAL KNEE ARTHROPLASTY( SPINAL WITH  ADDUCTOR).    P: Continue physical therapy. Continue DVT pphx with ASA due to high mobility and low risk factors for DVT. Anticipate discharge to home today with . Has Outpatient PT Set up. Will send home with Robaxine and not Hydroxyzine due to history of prolonged Q-T. Continue to encourage mobilization.    Nadia Elias PA-C  [phone]

## 2021-11-03 ENCOUNTER — APPOINTMENT (OUTPATIENT)
Dept: PHYSICAL THERAPY | Facility: CLINIC | Age: 66
End: 2021-11-03
Attending: ORTHOPAEDIC SURGERY
Payer: COMMERCIAL

## 2021-11-03 ENCOUNTER — APPOINTMENT (OUTPATIENT)
Dept: OCCUPATIONAL THERAPY | Facility: CLINIC | Age: 66
End: 2021-11-03
Attending: ORTHOPAEDIC SURGERY
Payer: COMMERCIAL

## 2021-11-03 VITALS
BODY MASS INDEX: 39.07 KG/M2 | HEIGHT: 60 IN | OXYGEN SATURATION: 94 % | RESPIRATION RATE: 18 BRPM | DIASTOLIC BLOOD PRESSURE: 65 MMHG | WEIGHT: 199 LBS | SYSTOLIC BLOOD PRESSURE: 148 MMHG | TEMPERATURE: 98.5 F | HEART RATE: 92 BPM

## 2021-11-03 LAB
FASTING STATUS PATIENT QL REPORTED: YES
GLUCOSE BLD-MCNC: 163 MG/DL (ref 70–99)
GLUCOSE BLDC GLUCOMTR-MCNC: 133 MG/DL (ref 70–99)
GLUCOSE BLDC GLUCOMTR-MCNC: 206 MG/DL (ref 70–99)
GLUCOSE BLDC GLUCOMTR-MCNC: 87 MG/DL (ref 70–99)
HGB BLD-MCNC: 9.5 G/DL (ref 11.7–15.7)

## 2021-11-03 PROCEDURE — 97110 THERAPEUTIC EXERCISES: CPT | Mod: GP | Performed by: PHYSICAL THERAPIST

## 2021-11-03 PROCEDURE — 85018 HEMOGLOBIN: CPT | Performed by: ORTHOPAEDIC SURGERY

## 2021-11-03 PROCEDURE — 82962 GLUCOSE BLOOD TEST: CPT

## 2021-11-03 PROCEDURE — 250N000013 HC RX MED GY IP 250 OP 250 PS 637: Performed by: PHYSICIAN ASSISTANT

## 2021-11-03 PROCEDURE — 36415 COLL VENOUS BLD VENIPUNCTURE: CPT | Performed by: ORTHOPAEDIC SURGERY

## 2021-11-03 PROCEDURE — 97530 THERAPEUTIC ACTIVITIES: CPT | Mod: GP | Performed by: PHYSICAL THERAPIST

## 2021-11-03 PROCEDURE — 97535 SELF CARE MNGMENT TRAINING: CPT | Mod: GO | Performed by: REHABILITATION PRACTITIONER

## 2021-11-03 PROCEDURE — 82947 ASSAY GLUCOSE BLOOD QUANT: CPT | Mod: 91 | Performed by: ORTHOPAEDIC SURGERY

## 2021-11-03 PROCEDURE — 250N000013 HC RX MED GY IP 250 OP 250 PS 637: Mod: GY | Performed by: HOSPITALIST

## 2021-11-03 PROCEDURE — 250N000013 HC RX MED GY IP 250 OP 250 PS 637: Mod: GY | Performed by: ORTHOPAEDIC SURGERY

## 2021-11-03 PROCEDURE — 97165 OT EVAL LOW COMPLEX 30 MIN: CPT | Mod: GO | Performed by: REHABILITATION PRACTITIONER

## 2021-11-03 PROCEDURE — 97116 GAIT TRAINING THERAPY: CPT | Mod: GP | Performed by: PHYSICAL THERAPIST

## 2021-11-03 RX ORDER — HYDROMORPHONE HYDROCHLORIDE 2 MG/1
TABLET ORAL
Qty: 45 TABLET | Refills: 0 | Status: SHIPPED | OUTPATIENT
Start: 2021-11-03 | End: 2021-11-23

## 2021-11-03 RX ADMIN — HYDROMORPHONE HYDROCHLORIDE 2 MG: 2 TABLET ORAL at 12:45

## 2021-11-03 RX ADMIN — METOPROLOL TARTRATE 25 MG: 25 TABLET, FILM COATED ORAL at 07:56

## 2021-11-03 RX ADMIN — HYDROMORPHONE HYDROCHLORIDE 4 MG: 2 TABLET ORAL at 01:34

## 2021-11-03 RX ADMIN — ACETAMINOPHEN 975 MG: 325 TABLET, FILM COATED ORAL at 05:19

## 2021-11-03 RX ADMIN — SENNOSIDES AND DOCUSATE SODIUM 1 TABLET: 50; 8.6 TABLET ORAL at 07:56

## 2021-11-03 RX ADMIN — LEFLUNOMIDE 10 MG: 10 TABLET ORAL at 07:54

## 2021-11-03 RX ADMIN — DORZOLAMIDE HYDROCHLORIDE AND TIMOLOL MALEATE 1 DROP: 22.3; 6.8 SOLUTION/ DROPS OPHTHALMIC at 07:54

## 2021-11-03 RX ADMIN — ACETAMINOPHEN 975 MG: 325 TABLET, FILM COATED ORAL at 12:45

## 2021-11-03 RX ADMIN — HYDROCHLOROTHIAZIDE: 12.5 CAPSULE ORAL at 07:56

## 2021-11-03 RX ADMIN — INSULIN ASPART 12 UNITS: 100 INJECTION, SOLUTION INTRAVENOUS; SUBCUTANEOUS at 12:46

## 2021-11-03 RX ADMIN — ROSUVASTATIN CALCIUM 10 MG: 5 TABLET, FILM COATED ORAL at 07:55

## 2021-11-03 RX ADMIN — HYDROMORPHONE HYDROCHLORIDE 4 MG: 2 TABLET ORAL at 05:19

## 2021-11-03 RX ADMIN — HYDROMORPHONE HYDROCHLORIDE 4 MG: 2 TABLET ORAL at 15:28

## 2021-11-03 RX ADMIN — MODAFINIL 200 MG: 200 TABLET ORAL at 07:55

## 2021-11-03 RX ADMIN — SERTRALINE HYDROCHLORIDE 50 MG: 50 TABLET ORAL at 07:55

## 2021-11-03 RX ADMIN — SULFASALAZINE 2000 MG: 500 TABLET, DELAYED RELEASE ORAL at 07:55

## 2021-11-03 RX ADMIN — INSULIN ASPART 24 UNITS: 100 INJECTION, SOLUTION INTRAVENOUS; SUBCUTANEOUS at 07:57

## 2021-11-03 RX ADMIN — AMLODIPINE BESYLATE 5 MG: 5 TABLET ORAL at 07:56

## 2021-11-03 RX ADMIN — ASPIRIN 325 MG: 325 TABLET, COATED ORAL at 07:56

## 2021-11-03 RX ADMIN — BUPROPION HYDROCHLORIDE 300 MG: 150 TABLET, EXTENDED RELEASE ORAL at 07:55

## 2021-11-03 NOTE — PLAN OF CARE
Patient vital signs are at baseline: No,  Reason:  BPs 177/61 & 170/79  Patient able to ambulate as they were prior to admission or with assist devices provided by therapies during their stay:  Yes  Patient MUST void prior to discharge:  Yes  Patient able to tolerate oral intake:  Yes  Pain has adequate pain control using Oral analgesics:  No,  Reason:  Continues to rate pain 8-9/10 but stated that it is better. Slept well between analgesics overnight.

## 2021-11-03 NOTE — PLAN OF CARE
DX: RTKA  Tele: na  A&O x4  Activity: A-1 walker gait belt  Diet: Reg  VSS: Q8  O2: RA  B, 87,133  PIV: SL LH  Pain: yes PO dilaudid 2mg and 4mg prn  GI/: continent  CMS: intact  Plan: Discharge meds in lock box  Discharge: Today pending med reconcilation continue plan of care

## 2021-11-03 NOTE — PROGRESS NOTES
[facility]      OUTPATIENT OCCUPATIONAL THERAPY  EVALUATION  PLAN OF TREATMENT FOR OUTPATIENT REHABILITATION  (COMPLETE FOR INITIAL CLAIMS ONLY)  Patient's Last Name, First Name, M.I.  YOB: 1955  Kemi Saul                          Provider's Name  [facility] Medical Record No.  [id number]                               Onset Date:  11/01/21   Start of Care Date:  11/03/21     Type:     ___PT   _X_OT   ___SLP Medical Diagnosis:  R TKA                        OT Diagnosis:  decreased ADL/IADl   Visits from SOC:  1   _________________________________________________________________________________  Plan of Treatment/Functional Goals    Planned Interventions: ADL retraining, progressive activity/exercise, transfer training   Goals: See Occupational Therapy Goals on Care Plan in [facility] electronic health record.    Therapy Frequency: 1x eval and treat  Predicted Duration of Therapy Intervention:    _________________________________________________________________________________    I CERTIFY THE NEED FOR THESE SERVICES FURNISHED UNDER        THIS PLAN OF TREATMENT AND WHILE UNDER MY CARE     (Physician co-signature of this document indicates review and certification of the therapy plan).                Certification date from: 11/03/21, Certification date to: 11/03/21    Referring Physician: Dr. Shetty            Initial Assessment        See Occupational Therapy evaluation dated 11/03/21 in Epic electronic health record.

## 2021-11-03 NOTE — PLAN OF CARE
Patient vital signs are at baseline: Yes  Patient able to ambulate as they were prior to admission or with assist devices provided by therapies during their stay:  Yes  Patient MUST void prior to discharge:  Yes  Patient able to tolerate oral intake:  Yes  Pain has adequate pain control using Oral analgesics:  No,  Reason:  IV dilaudid given    /63 (BP Location: Right arm)   Pulse 71   Temp 98.3  F (36.8  C) (Temporal)   Resp 16   Ht 1.524 m (5')   Wt 90.3 kg (199 lb)   LMP 12/04/2003   SpO2 93%   BMI 38.86 kg/m    Orientation: A&Ox4  Resp: LS Clear, RA  Pain: Oxycodone changed to po dilaudid, pt notice a slight difference but still rating pain 8/10.  Dressing: CDI  Activity: A1 walker/gb refused to get out of bed this shift due to pain.  Diet: MOD CHO  Voiding: spontaneously without difficulty  Discharge Plan: Home tomorrow.

## 2021-11-03 NOTE — PROGRESS NOTES
"Evening RN    This RN cared for patient for last 45 minutes of stay.  Pt discharged to home at approx 1545.  Reviewed discharge instructions and medications with patient and spouse. Questions answered. Patient discharged to home with ride from spouse, discharge instructions, medications (aspirin, hydroxaxine, acetaminophen, senna,  Dilaudid, and robaxin), and belongings at this time.  Ensured pt knows location of home medications that she had originally brought with her -  stated took home yesterday.  Pt having fairly significant pain but stated \"I just want to leave and go get in bed at home.\"    Patient vital signs are at baseline: Yes  Patient able to ambulate as they were prior to admission or with assist devices provided by therapies during their stay:  Yes  Patient MUST void prior to discharge:  Yes  Patient able to tolerate oral intake:  Yes  Pain has adequate pain control using Oral analgesics:  No,  Reason:  Pain only somewhat controlled with PO dilaudid, but pt wants to discharge anyway.    Pt A/O x4.  VSS and afebrile.  Pain managed with PRN PO dilaudid - dose given prior to discharge.  CMS intact.  Dressing CDI.  Up A1 with walker and gait belt.  Voiding adequately.  Tolerating regular diet well.  Adequate for discharge.            "

## 2021-11-03 NOTE — PROGRESS NOTES
Orthopedic Surgery  11/3/2021  POD 2    S: Denies chest pain or shortness of breath. Continues to rate pain high, but slept well and RN needed to wake her for pain medications. She states Dilaudid works better than Oxycodone    O: Blood pressure (!) 151/85, pulse 75, temperature 98.7  F (37.1  C), temperature source Temporal, resp. rate 18, height 1.524 m (5'), weight 90.3 kg (199 lb), last menstrual period 12/04/2003, SpO2 93 %, not currently breastfeeding.  Lab Results   Component Value Date    HGB 9.5 11/03/2021    HGB 12.3 06/10/2021     Lab Results   Component Value Date    INR 0.96 11/15/2011     I/O last 3 completed shifts:  In: 320 [P.O.:320]  Out: 1200 [Urine:1200]  Distal extremity CMSI bilaterally.  Calves are negative bilaterally, both soft and nontender.  The dressing is C/D/I.      A: Ms. Stanley Casper is doing well status post Procedure(s):  Right total knee arthroplasty.    P: Continue physical therapy. Continue DVT pphx with ASA due to high mobility and low risk factors for DVT. Anticipate discharge to home today with PO Dilaudid and robaxine. Will need continued encouragement and reminder that she will have post-block hypersensitivity that will continue to get better in this post-op period.    Nadia Elias PA-C  [phone]

## 2021-11-03 NOTE — PROGRESS NOTES
11/03/21 1116   Quick Adds   Type of Visit Initial Occupational Therapy Evaluation   Living Environment   People in home spouse   Current Living Arrangements house  ([facility])   Home Accessibility stairs to enter home;stairs within home   Number of Stairs, Main Entrance 2  ( 1 up from sidewalk; 1 into home; can fit walker)   Stair Railings, Main Entrance none   Number of Stairs, Within Home, Primary greater than 10 stairs  (7 to main level; 8 to bedroom)   Transportation Anticipated family or friend will provide   Living Environment Comments tub/shower combination, unsure of toilet height   Self-Care   Usual Activity Tolerance good   Current Activity Tolerance poor   Regular Exercise No   Equipment Currently Used at Home cane, straight   Activity/Exercise/Self-Care Comment normally independent; limited by pain; has RA and fibromyalgia   Instrumental Activities of Daily Living (IADL)   IADL Comments patient reports spouse can complete as needed   Disability/Function   Fall history within last six months no   Change in Functional Status Since Onset of Current Illness/Injury yes   General Information   Onset of Illness/Injury or Date of Surgery 11/01/21   Referring Physician Dr. Shetty   Patient/Family Therapy Goal Statement (OT) to return home, decrease pain   Additional Occupational Profile Info/Pertinent History of Current Problem Patient is POD #2 for R TKA   Existing Precautions/Restrictions fall   Right Lower Extremity (Weight-bearing Status) weight-bearing as tolerated (WBAT)   General Observations and Info patient was in bed, on 4L via oxymask, RN approved patient in RA. O2 sats 95% on RA   Cognitive Status Examination   Orientation Status orientation to person, place and time   Affect/Mental Status (Cognitive) WNL   Visual Perception   Impact of Vision Impairment on Function (Vision) patient reporting blue spots in vision during session, RN notified, after ~5 minutes, it resolved   Sensory   Sensory Quick  Adds No deficits were identified   Pain Assessment   Patient Currently in Pain Yes, see Vital Sign flowsheet  (rating not provided, very painful per pt report)   Integumentary/Edema   Integumentary/Edema Comments edema in R LE to be expected   Posture   Posture not impaired   Range of Motion Comprehensive   General Range of Motion no range of motion deficits identified   Strength Comprehensive (MMT)   General Manual Muscle Testing (MMT) Assessment no strength deficits identified   Muscle Tone Assessment   Muscle Tone Quick Adds No deficits were identified   Coordination   Upper Extremity Coordination No deficits were identified   Bed Mobility   Comment (Bed Mobility) I supine to sit, min A to lift R LE into bed with sit to supine   Transfers   Transfer Comments CGA, fww for functional mobility in room   Balance   Balance Comments LOB was not noted, general unsteadiness to be expected   Activities of Daily Living   BADL Assessment lower body dressing   Lower Body Dressing Assessment   Comment (Lower Body Dressing) patient declined to participate, patient reports and spouse confirmed he can A with dressing   Clinical Impression   Criteria for Skilled Therapeutic Interventions Met (OT) yes;meets criteria;skilled treatment is necessary   OT Diagnosis decreased ADL/IADl   OT Problem List-Impairments impacting ADL activity tolerance impaired;balance;pain   Assessment of Occupational Performance 5 or more Performance Deficits   Identified Performance Deficits dsg, toileting, bathing, functional/community mobility, driving, household chores, driving   Planned Therapy Interventions (OT) ADL retraining;progressive activity/exercise;transfer training   Clinical Decision Making Complexity (OT) low complexity   Therapy Frequency (OT) 1x eval and treat   Anticipated Equipment Needs Upon Discharge (OT) shower chair;raised toilet seat  (LHS)   Risk & Benefits of therapy have been explained evaluation/treatment results reviewed;care  plan/treatment goals reviewed;current/potential barriers reviewed;participants voiced agreement with care plan;patient   OT Discharge Planning    OT Rationale for DC Rec defer to ortho team- patient has met needed goals for safe return home with spouse support for ADL/IADLs as needed.    Therapy Certification   Start of Care Date 11/03/21   Certification date from 11/03/21   Certification date to 11/03/21   Medical Diagnosis R TKA   Total Evaluation Time (Minutes)   Total Evaluation Time (Minutes) 8

## 2021-11-03 NOTE — PLAN OF CARE
Occupational Therapy Discharge Summary    Reason for therapy discharge:    All goals and outcomes met, no further needs identified.    Progress towards therapy goal(s). See goals on Care Plan in [facility] electronic health record for goal details.  Goals met    Therapy recommendation(s):    No further therapy is recommended.

## 2021-11-03 NOTE — PLAN OF CARE
PT: Patient didn't discharge as expected on 11/2/21; PT reordered and 1 additional session completed in pm (patient declined am session). Limited participation by patient secondary to reports of 10/10 pain; was able to ambulate in hallway and tolerated a verbal review of ex's; declined stairs. Reports getting a walker from a friend to use and reports she will have spouse assist. Nurse updated at end of session.

## 2021-11-22 NOTE — PROGRESS NOTES
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 Nausea  This has improved with the physical therapy. Doesn't want a referral to dizzy and balance center.    2. QT prolongation      3. Vitamin D deficiency  She decided not to get the test for this. Can take otc vitamin d 2000 units/day    4. Dizziness  Resolved for now.      5. 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     BMI:   Estimated body mass index is 38.86 kg/m  as calculated from the following:    Height as of this encounter: 1.524 m (5').    Weight as of this encounter: 90.3 kg (199 lb).         FUTURE APPOINTMENTS:       - Follow-up visit as needed    No follow-ups on file.    Kira Altamirano MD  [facility]    Subjective     Nursing Notes:   Belem Cooper CMA  11/23/2021  1:19 PM  Signed  Chief Complaint   Patient presents with     Nausea     nausea since her surgery on 11/01     Vitamin Deficiency     PT recommended vit D testing     Pre-visit Screening:  Immunizations:  up to date  Colonoscopy:  is due and to be scheduled by patient for later completion  Mammogram: is due and to be scheduled by patient for later completion  Asthma Action Test/Plan:  NA  PHQ9:  NA  GAD7:  NA  Questioned patient about current smoking habits Pt. has never smoked.  Ok to leave detailed message on voice mail for today's visit only Yes, phone # [phone]  '         Kemi Casper is a 66 year old female who presents to clinic today for the following health issues   HPI     Here with  for nausea ever since she had surgery on November 1st for her knee. The nausea is bad. Was on dilaudid  but quit this 1 week ago. Is on no pain medications. Is taking ibuprofen but not on a schedule. Is doing pt. He noticed that she had some nystagmus. Has previously gone to the balance and dizzy center. Just came from a treatment and is feeling good, as far as the nausea goes. Has some meclizine from pervious. Can't take zofran. Doesn't want a referral to dizzy and balance center.    Is seeing PT and they told her to have a vitamin d checked. She also called her insurance to find out if this is covered. Because the crystals don't dissolve if you are deficient in vitamin d. Has been deficient in the past.    Would like to know about melatonin--dosing. Has been taking this.        Review of Systems   Constitutional, HEENT, cardiovascular, pulmonary, gi and gu systems are negative, except as otherwise noted.      Objective    BP (!) 146/84 (BP Location: Right arm, Patient Position: Sitting, Cuff Size: Adult Large)   Pulse 110   Temp 97.8  F (36.6  C) (Temporal)   Ht 1.524 m (5')   Wt 90.3 kg (199 lb)   LMP 12/04/2003   SpO2 97%   BMI 38.86 kg/m    Body mass index is 38.86 kg/m .  Physical Exam   GENERAL: healthy, alert and no distress  MS: no gross musculoskeletal defects noted, no edema  NEURO: Normal strength and tone, mentation intact and speech normal  PSYCH: mentation appears normal, affect normal/bright    No results found for any visits on 11/23/21.

## 2021-11-23 ENCOUNTER — OFFICE VISIT (OUTPATIENT)
Dept: FAMILY MEDICINE | Facility: CLINIC | Age: 66
End: 2021-11-23

## 2021-11-23 VITALS
DIASTOLIC BLOOD PRESSURE: 84 MMHG | BODY MASS INDEX: 39.07 KG/M2 | SYSTOLIC BLOOD PRESSURE: 146 MMHG | HEART RATE: 110 BPM | OXYGEN SATURATION: 97 % | WEIGHT: 199 LBS | TEMPERATURE: 97.8 F | HEIGHT: 60 IN

## 2021-11-23 DIAGNOSIS — R11.0 NAUSEA: Primary | ICD-10-CM

## 2021-11-23 DIAGNOSIS — E11.49 TYPE 2 DIABETES MELLITUS WITH NEUROLOGICAL MANIFESTATIONS (H): ICD-10-CM

## 2021-11-23 DIAGNOSIS — R94.31 QT PROLONGATION: ICD-10-CM

## 2021-11-23 DIAGNOSIS — R42 DIZZINESS: ICD-10-CM

## 2021-11-23 DIAGNOSIS — E55.9 VITAMIN D DEFICIENCY: ICD-10-CM

## 2021-11-23 PROCEDURE — 99213 OFFICE O/P EST LOW 20 MIN: CPT | Performed by: FAMILY MEDICINE

## 2021-11-23 ASSESSMENT — MIFFLIN-ST. JEOR: SCORE: 1364.16

## 2021-11-23 NOTE — NURSING NOTE
Chief Complaint   Patient presents with     Nausea     nausea since her surgery on 11/01     Vitamin Deficiency     PT recommended vit D testing     Pre-visit Screening:  Immunizations:  up to date  Colonoscopy:  is due and to be scheduled by patient for later completion  Mammogram: is due and to be scheduled by patient for later completion  Asthma Action Test/Plan:  NA  PHQ9:  NA  GAD7:  NA  Questioned patient about current smoking habits Pt. has never smoked.  Ok to leave detailed message on voice mail for today's visit only Yes, phone # [phone]  '

## 2021-11-23 NOTE — PATIENT INSTRUCTIONS
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 Nausea  This has improved with the physical therapy. Doesn't want a referral to dizzy and balance center.    2. QT prolongation      3. Vitamin D deficiency  She decided not to get the test for this. Can take otc vitamin d 2000 units/day    4. Dizziness  Resolved for now.      5. 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     BMI:   Estimated body mass index is 38.86 kg/m  as calculated from the following:    Height as of this encounter: 1.524 m (5').    Weight as of this encounter: 90.3 kg (199 lb).         FUTURE APPOINTMENTS:       - Follow-up visit as needed    No follow-ups on file.    Kira Altamirano MD  [facility]

## 2021-11-24 ENCOUNTER — TELEPHONE (OUTPATIENT)
Dept: FAMILY MEDICINE | Facility: CLINIC | Age: 66
End: 2021-11-24

## 2021-11-24 RX ORDER — ONDANSETRON 4 MG/1
4 TABLET, ORALLY DISINTEGRATING ORAL EVERY 8 HOURS PRN
OUTPATIENT
Start: 2021-11-24

## 2021-11-24 NOTE — TELEPHONE ENCOUNTER
Patient called in asking for a prescription for Zofran to be sent to her pharmacy today. She stated that her nausea is still bad and she needs this today to help. Routing to [place] for approval or denial.       Pending Prescriptions:                       Disp   Refills    ondansetron (ZOFRAN-ODT) 4 MG ODT tab                         Sig: Take 1 tablet (4 mg) by mouth every 8 hours as           needed for nausea

## 2021-11-24 NOTE — TELEPHONE ENCOUNTER
I would not recommend that she take the zofran.  Clear liquids. I would have her come back for labs and do an ultrasound.   If this nausea is due to her vertigo, then continue to address this, I can refer her to the dizzy and balance center if she would like.

## 2021-11-24 NOTE — TELEPHONE ENCOUNTER
Called patient and she states that when she got home her nausea became severe and she is willing to take Zofran and feels that it would be ok. She stated that if Dr. Altamirano doesn't think Zofran will be ok for her to take is there anything else that she can take for now so that she can get through the holiday weekend.

## 2021-11-24 NOTE — TELEPHONE ENCOUNTER
She told me yesterday that she cannot take this medication due to her long QT.  She said that her nausea had resolved with the physical therapy. So I didn't do any labs or anything.   She also hadn't been taking her insulin, but said she is back on this now.  If she continues to  Have this nausea, we should do labs and probably an ultrasound.

## 2021-12-02 ENCOUNTER — OFFICE VISIT (OUTPATIENT)
Dept: FAMILY MEDICINE | Facility: CLINIC | Age: 66
End: 2021-12-02

## 2021-12-02 VITALS
OXYGEN SATURATION: 97 % | RESPIRATION RATE: 18 BRPM | BODY MASS INDEX: 39.07 KG/M2 | SYSTOLIC BLOOD PRESSURE: 136 MMHG | HEART RATE: 80 BPM | HEIGHT: 60 IN | TEMPERATURE: 98.2 F | DIASTOLIC BLOOD PRESSURE: 72 MMHG | WEIGHT: 199 LBS

## 2021-12-02 DIAGNOSIS — E11.42 TYPE 2 DIABETES MELLITUS WITH DIABETIC POLYNEUROPATHY, WITH LONG-TERM CURRENT USE OF INSULIN (H): ICD-10-CM

## 2021-12-02 DIAGNOSIS — R94.31 QT PROLONGATION: ICD-10-CM

## 2021-12-02 DIAGNOSIS — Z79.4 TYPE 2 DIABETES MELLITUS WITH DIABETIC POLYNEUROPATHY, WITH LONG-TERM CURRENT USE OF INSULIN (H): ICD-10-CM

## 2021-12-02 DIAGNOSIS — Z87.19 H/O GASTROESOPHAGEAL REFLUX (GERD): ICD-10-CM

## 2021-12-02 DIAGNOSIS — R11.0 NAUSEA: Primary | ICD-10-CM

## 2021-12-02 DIAGNOSIS — Z98.890 POSTOPERATIVE STATE: ICD-10-CM

## 2021-12-02 LAB
% GRANULOCYTES: 58.1 %
HBA1C MFR BLD: 6.3 % (ref 4–7)
HCT VFR BLD AUTO: 35.3 % (ref 35–47)
HEMOGLOBIN: 11.4 G/DL (ref 11.7–15.7)
LYMPHOCYTES NFR BLD AUTO: 33.1 %
MCH RBC QN AUTO: 32.2 PG (ref 26–33)
MCHC RBC AUTO-ENTMCNC: 32.3 G/DL (ref 31–36)
MCV RBC AUTO: 99.8 FL (ref 78–100)
MONOCYTES NFR BLD AUTO: 8.8 %
PLATELET COUNT - QUEST: 452 10^9/L (ref 150–375)
RBC # BLD AUTO: 3.54 10*12/L (ref 3.8–5.2)
WBC # BLD AUTO: 7.5 10*9/L (ref 4–11)

## 2021-12-02 PROCEDURE — 99214 OFFICE O/P EST MOD 30 MIN: CPT | Performed by: FAMILY MEDICINE

## 2021-12-02 PROCEDURE — 36415 COLL VENOUS BLD VENIPUNCTURE: CPT | Performed by: FAMILY MEDICINE

## 2021-12-02 PROCEDURE — 83036 HEMOGLOBIN GLYCOSYLATED A1C: CPT | Performed by: FAMILY MEDICINE

## 2021-12-02 PROCEDURE — 85025 COMPLETE CBC W/AUTO DIFF WBC: CPT | Performed by: FAMILY MEDICINE

## 2021-12-02 RX ORDER — RABEPRAZOLE SODIUM 20 MG/1
20 TABLET, DELAYED RELEASE ORAL DAILY
Qty: 30 TABLET | Refills: 0 | Status: SHIPPED | OUTPATIENT
Start: 2021-12-02 | End: 2021-12-29

## 2021-12-02 RX ORDER — MECLIZINE HYDROCHLORIDE 25 MG/1
25 TABLET ORAL 3 TIMES DAILY PRN
COMMUNITY
Start: 2021-12-02 | End: 2022-02-21

## 2021-12-02 ASSESSMENT — MIFFLIN-ST. JEOR: SCORE: 1364.16

## 2021-12-02 NOTE — PATIENT INSTRUCTIONS
Nausea  (primary encounter diagnosis)  Comment: reviewed QT interval drug list  Plan: HEMOGRAM PLATELET DIFF (BFP), VENOUS         COLLECTION, HEMOGLOBIN A1C (BFP), RABEprazole         (ACIPHEX) 20 MG EC tablet        Trial of ACIPHEX daily/ recheck with Endocrinology      (Z98.890) Postoperative state  Plan: HEMOGRAM PLATELET DIFF (BFP), VENOUS COLLECTION        improved    (E11.42,  Z79.4) Type 2 diabetes mellitus with diabetic polyneuropathy, with long-term current use of insulin (H)  Plan: VENOUS COLLECTION, HEMOGLOBIN A1C (BFP)        Call about lower insulin dosing instructions

## 2021-12-02 NOTE — PROGRESS NOTES
SUBJECTIVE: 66 year old female complaining of nausea since knee replacement surgery with 2 emesis at home/ daily nausea. Not nauseated in the hospital over night for 2 day(s).   The patient describes stayed an extra day due to pain control needs/ every 2 hours ibuprofen and dilaudid regiment at home. Struggled with pain/ used muscle relaxants rarely now and off dilaudid. Restless flared as well with a HGB 9.4 after surgery.  Started meclizine at home from left over medications and helped a little.    At PT checked for BPPV and negative. Previous ear problems.  Able to eat without emesis, gets hungry. Nausea after eating. Food often feels better but than can get nauseated. Diabetes not well controlled but last time A1C was 7.9.   She has noticed low glucose levels with current insulin dosing/ wonders about cutting back.    Recent diagnosis of acquired QT interval prolongation so medications were stopped including omeprazole.    The patient denies a history of lower Gi symptoms, weight loss or polyuria/ polydipsia.   Smoking history: No.   Relevant past medical history: positive for obesity.    OBJECTIVE: The patient appears healthy, alert, no distress, cooperative, smiling and over weight.   EARS: negative  NOSE/SINUS: Nares normal. Septum midline. Mucosa normal. No drainage or sinus tenderness.   THROAT: normal   NECK:Neck supple. No adenopathy. Thyroid symmetric, normal size,, Carotids without bruits.   CHEST: Regular rate and  rhythm. S1 and S2 normal, no murmurs, clicks, gallops or rubs. No edema or JVD. Chest is clear; no wheezes or rales.  ABD; obese. The abdomen is soft without tenderness, guarding, mass or organomegaly. Bowel sounds are normal. No CVA tenderness or inguinal adenopathy noted.  Weight stable    HGB 11+  A1C; 6.5    ASSESSMENT: (R11.0) Nausea  (primary encounter diagnosis)  Comment: reviewed QT interval drug list  Plan: HEMOGRAM PLATELET DIFF (BFP), VENOUS         COLLECTION, HEMOGLOBIN A1C  (BFP), RABEprazole         (ACIPHEX) 20 MG EC tablet        Trial of ACIPHEX daily/ recheck with Endocrinology      (Z98.890) Postoperative state  Plan: HEMOGRAM PLATELET DIFF (BFP), VENOUS COLLECTION        improved    (E11.42,  Z79.4) Type 2 diabetes mellitus with diabetic polyneuropathy, with long-term current use of insulin (H)  Plan: VENOUS COLLECTION, HEMOGLOBIN A1C (BFP)        Call about lower insulin dosing instructions    (Z87.19) H/O gastroesophageal reflux (GERD)  Plan: HEMOGRAM PLATELET DIFF (BFP), VENOUS         COLLECTION, RABEprazole (ACIPHEX) 20 MG EC         tablet        Read handout. Potential medication side effects were discussed with the patient; let me know if any occur.      (R94.31) QT prolongation  Plan: I have reviewed the patient's medical history in detail and updated the computerized patient record.      Total time spent with patient today including visit and non face to face time 30 minutes.

## 2021-12-02 NOTE — NURSING NOTE
Chief Complaint   Patient presents with     Nausea     extreme nausea on and off, feels it is becoming debilitating     Pre-visit Screening:  Immunizations:  up to date  Colonoscopy:  is up to date  Mammogram: is up to date  Asthma Action Test/Plan:  NA  PHQ9:  NA  GAD7:  NA  Questioned patient about current smoking habits Pt. has never smoked.  Ok to leave detailed message on voice mail for today's visit only Yes, phone # [phone]

## 2021-12-17 ENCOUNTER — OFFICE VISIT (OUTPATIENT)
Dept: FAMILY MEDICINE | Facility: CLINIC | Age: 66
End: 2021-12-17

## 2021-12-17 VITALS
SYSTOLIC BLOOD PRESSURE: 146 MMHG | OXYGEN SATURATION: 97 % | TEMPERATURE: 98 F | HEART RATE: 89 BPM | DIASTOLIC BLOOD PRESSURE: 84 MMHG | WEIGHT: 199 LBS | BODY MASS INDEX: 38.86 KG/M2 | RESPIRATION RATE: 20 BRPM

## 2021-12-17 DIAGNOSIS — E11.42 TYPE 2 DIABETES MELLITUS WITH DIABETIC POLYNEUROPATHY, WITH LONG-TERM CURRENT USE OF INSULIN (H): ICD-10-CM

## 2021-12-17 DIAGNOSIS — Z98.890 POSTOPERATIVE STATE: ICD-10-CM

## 2021-12-17 DIAGNOSIS — R10.13 ABDOMINAL PAIN, EPIGASTRIC: Primary | ICD-10-CM

## 2021-12-17 DIAGNOSIS — Z79.4 TYPE 2 DIABETES MELLITUS WITH DIABETIC POLYNEUROPATHY, WITH LONG-TERM CURRENT USE OF INSULIN (H): ICD-10-CM

## 2021-12-17 DIAGNOSIS — R94.31 QT PROLONGATION: ICD-10-CM

## 2021-12-17 DIAGNOSIS — R11.0 NAUSEA: ICD-10-CM

## 2021-12-17 DIAGNOSIS — F33.42 MAJOR DEPRESSIVE DISORDER, RECURRENT EPISODE, IN FULL REMISSION (H): ICD-10-CM

## 2021-12-17 LAB — HEMOGLOBIN: 11.1 G/DL (ref 11.7–15.7)

## 2021-12-17 PROCEDURE — 99214 OFFICE O/P EST MOD 30 MIN: CPT | Performed by: STUDENT IN AN ORGANIZED HEALTH CARE EDUCATION/TRAINING PROGRAM

## 2021-12-17 PROCEDURE — 85018 HEMOGLOBIN: CPT | Performed by: STUDENT IN AN ORGANIZED HEALTH CARE EDUCATION/TRAINING PROGRAM

## 2021-12-17 PROCEDURE — 36415 COLL VENOUS BLD VENIPUNCTURE: CPT | Performed by: STUDENT IN AN ORGANIZED HEALTH CARE EDUCATION/TRAINING PROGRAM

## 2021-12-17 RX ORDER — SERTRALINE HYDROCHLORIDE 100 MG/1
100 TABLET, FILM COATED ORAL DAILY
COMMUNITY
Start: 2021-12-11 | End: 2022-02-21

## 2021-12-17 RX ORDER — BUPROPION HYDROCHLORIDE 300 MG/1
TABLET ORAL
COMMUNITY
Start: 2021-12-11

## 2021-12-17 NOTE — PROGRESS NOTES
Assessment & Plan     1. Abdominal pain, epigastric  2. Nausea  3. Postoperative state  4. Type 2 diabetes mellitus with diabetic polyneuropathy, with long-term current use of insulin (H)  Ongoing abdominal discomfort, nausea and decreased PO intake. Clinically euvolemic, exam overall reassuring. Broad differential considered. Recommend stopping opioids and NSAIDs as both may be contributing. Also has been without CPAP, planning to go get needed supplies today and hopefully improved sleep quality will be beneficial. Would consider GI referal for EGD and possible gastroparesis eval in setting of uncontrolled diabetes. We also discussed that I'm concerned her mental health struggles recently are contributing to her sx, encouraged her to follow-up with her MH care team.   - HEMOGLOBIN (BFP)  - COMPREHENSIVE METABOLIC PANEL ([facility])    5. QT prolongation-considered in tx options, continue to avoid QT prolonging meds as able including antiemetics       Patient Instructions   Blood work here today.    Get new CPAP mask today and see how much improved sleep helps.    Recommend discontinuing oxycodone and any NSAIDs (aspirin, ibuprofen, etc).  Tyelnol would be safest for pain.    Recommend making follow-up appointment with Dr. Burden as well as your therapist    If not any improvement in 1-2 weeks let me know and we will coordinate EGD     35 minutes spent on the date of the encounter doing chart review, history and exam, documentation and further activities per the note    Flo Weston MD, [facility]       Subjective     Kemi CUNNINGHAM Stanley Casper is a 66 year old female who presents to clinic today for the following health issues:    HPI   GI  Chief Complaint   Patient presents with     Abdominal Pain     dull pain in stomach since surgery that was done early November, has had nausea ever since, the thought of food makes her want to throw up, only thing she can eat right now is ice cream and  popsicles, now having fatigue and weakness      Accompanying Signs & Symptoms:  Does it feel like food gets stuck or trouble swallowing: YES  Nausea: YES  Vomiting (bloody?): no  Abdominal Pain: YES  Black-Tarry stools: no  Bloody stools: no  Sx began around time of TKA in November. No fevers or chills, surgical site healing without issue. Was seen by surgeon today and no concerns from ortho perspective.   Still using opioids occasionally for pain  Previously seen by PCP and physical therapy as well for this issue.     Dr. Burden at [facility] prescribes zoloft, ativan and wellbutrin       Patient Active Problem List   Diagnosis     Essential hypertension, benign     Sleep apnea     Periodic limb movement disorder     Major depressive disorder, recurrent episode, in full remission (H)     ACP (advance care planning)     Health Care Home     Type 2 diabetes mellitus with neurological manifestations (HCC)     Mixed hyperlipidemia     Morbid obesity (H)     Fibromyalgia     Anemia     Intestinal malabsorption, unspecified type     Iron adverse reaction     Encounter for other procedures for purposes other than remedying health state (CODE)     S/P total knee arthroplasty     Rheumatoid arthritis of multiple sites without rheumatoid factor (H)     Hypersomnia     QT prolongation     Current Outpatient Medications   Medication     abatacept (ORENCIA) 250 MG injection     acetaminophen (TYLENOL) 325 MG tablet     amLODIPine (NORVASC) 5 MG tablet     aspirin (ASA) 325 MG EC tablet     B-D U/F 31G X 8 MM insulin pen needle     buPROPion (WELLBUTRIN XL) 300 MG 24 hr tablet     dorzolamide-timolol (COSOPT) 2-0.5 % ophthalmic solution     insulin glargine (BASAGLAR KWIKPEN) 100 UNIT/ML pen     insulin lispro (HUMALOG KWIKPEN) 100 UNIT/ML (1 unit dial) KWIKPEN     irbesartan-hydrochlorothiazide (AVALIDE) 300-12.5 MG tablet     latanoprost (XALATAN) 0.005 % ophthalmic solution     leflunomide (ARAVA) 10 MG tablet     LORazepam  (ATIVAN) 0.5 MG tablet     meclizine (ANTIVERT) 25 MG tablet     melatonin 5 MG tablet     metoprolol tartrate (LOPRESSOR) 25 MG tablet     modafinil (PROVIGIL) 200 MG tablet     rosuvastatin (CRESTOR) 10 MG tablet     sulfaSALAzine ER (AZULFIDINE EN) 500 MG EC tablet     vitamin D3 (CHOLECALCIFEROL) 250 mcg (71900 units) capsule     methocarbamol (ROBAXIN) 500 MG tablet     RABEprazole (ACIPHEX) 20 MG EC tablet     sertraline (ZOLOFT) 100 MG tablet     No current facility-administered medications for this visit.             Objective    BP (!) 146/84 (BP Location: Right arm, Patient Position: Sitting, Cuff Size: Adult Large)   Pulse 89   Temp 98  F (36.7  C) (Temporal)   Resp 20   Wt 90.3 kg (199 lb)   LMP 12/04/2003   SpO2 97%   BMI 38.86 kg/m    Body mass index is 38.86 kg/m .  Physical Exam   Alert, NAD  NC/AT  Sclerae anicteric  RRR  Resp nonlabored  Abd obese, diffuse upper abdl ttp without rebound, no organomegaly appreciated  Skin warm and dry  No focal neuro deficits. Speech intact.   Slightly flat affect

## 2021-12-17 NOTE — NURSING NOTE
Chief Complaint   Patient presents with     Abdominal Pain     dull pain in stomach since surgery that was done early November, has had nausea ever since, the thought of food makes her want to throw up, only thing she can eat right now is ice cream and popsicles, now having fatigue and weakness     Will review HM another time, pt declines doing that now.

## 2021-12-17 NOTE — PATIENT INSTRUCTIONS
Blood work here today.    Get new CPAP mask today and see how much improved sleep helps.    Recommend discontinuing oxycodone and any NSAIDs (aspirin, ibuprofen, etc).  Tyelnol would be safest for pain.    Recommend making follow-up appointment with Dr. Burden as well as your therapist    If not any improvement in 1-2 weeks let me know and we will coordinate EGD

## 2021-12-21 LAB
ALBUMIN SERPL-MCNC: 4 G/DL (ref 3.6–5.1)
ALBUMIN/GLOB SERPL: 1.7 (CALC) (ref 1–2.5)
ALP SERPL-CCNC: 133 U/L (ref 37–153)
ALT SERPL-CCNC: 10 U/L (ref 6–29)
AST SERPL-CCNC: 10 U/L (ref 10–35)
BILIRUB SERPL-MCNC: 0.3 MG/DL (ref 0.2–1.2)
BUN SERPL-MCNC: 19 MG/DL (ref 7–25)
BUN/CREATININE RATIO: ABNORMAL (CALC) (ref 6–22)
CALCIUM SERPL-MCNC: 9.5 MG/DL (ref 8.6–10.4)
CHLORIDE SERPLBLD-SCNC: 100 MMOL/L (ref 98–110)
CO2 SERPL-SCNC: 32 MMOL/L (ref 20–32)
CREAT SERPL-MCNC: 0.83 MG/DL (ref 0.5–0.99)
EGFR AFRICAN AMERICAN - QUEST: 85 ML/MIN/1.73M2
GFR SERPL CREATININE-BSD FRML MDRD: 73 ML/MIN/1.73M2
GLOBULIN, CALCULATED - QUEST: 2.3 G/DL (CALC) (ref 1.9–3.7)
GLUCOSE - QUEST: 250 MG/DL (ref 65–99)
LIPASE SERPL-CCNC: 22 U/L (ref 7–60)
POTASSIUM SERPL-SCNC: 4.3 MMOL/L (ref 3.5–5.3)
PROT SERPL-MCNC: 6.3 G/DL (ref 6.1–8.1)
SODIUM SERPL-SCNC: 140 MMOL/L (ref 135–146)

## 2021-12-29 DIAGNOSIS — Z87.19 H/O GASTROESOPHAGEAL REFLUX (GERD): ICD-10-CM

## 2021-12-29 DIAGNOSIS — R11.0 NAUSEA: ICD-10-CM

## 2021-12-29 RX ORDER — RABEPRAZOLE SODIUM 20 MG/1
TABLET, DELAYED RELEASE ORAL
Qty: 30 TABLET | Refills: 0 | Status: SHIPPED | OUTPATIENT
Start: 2021-12-29 | End: 2022-01-17

## 2021-12-29 NOTE — TELEPHONE ENCOUNTER
Please advise. This was last prescribed by [facility] on 12/02/21 for 30 days. Is this something she should be continuing for her nausea?    Kemi Casper is requesting a refill of:    Pending Prescriptions:                       Disp   Refills    RABEprazole (ACIPHEX) 20 MG EC tablet [Ph*30 tab*0            Sig: TAKE ONE TABLET BY MOUTH EVERY DAY

## 2022-01-03 NOTE — PROGRESS NOTES
Order(s) created erroneously. Erroneous order ID: [id number]   Order canceled by: DIEGO ZEPEDA   Order cancel date/time: 01/03/2022 10:04 AM

## 2022-01-17 RX ORDER — RABEPRAZOLE SODIUM 20 MG/1
1 TABLET, DELAYED RELEASE ORAL DAILY
Qty: 90 TABLET | Refills: 1 | Status: SHIPPED | OUTPATIENT
Start: 2022-01-17 | End: 2022-02-21

## 2022-01-17 NOTE — TELEPHONE ENCOUNTER
Received another refill request for raberprazole. Last filled for qty 30 on 12/29/2021. It is too soon to refill but the pharmacy probably sent this request early to avoid running out. Please advise, thanks.        Pending Prescriptions:                       Disp   Refills    RABEprazole (ACIPHEX) 20 MG EC tablet            0            Sig: Take 1 tablet (20 mg) by mouth daily    Signed Prescriptions:                        Disp   Refills    RABEprazole (ACIPHEX) 20 MG EC tablet      30 tab*0        Sig: TAKE ONE TABLET BY MOUTH EVERY DAY  Authorizing Provider: AMY BLAND

## 2022-02-01 DIAGNOSIS — R11.0 NAUSEA: ICD-10-CM

## 2022-02-01 DIAGNOSIS — Z87.19 H/O GASTROESOPHAGEAL REFLUX (GERD): ICD-10-CM

## 2022-02-01 RX ORDER — RABEPRAZOLE SODIUM 20 MG/1
TABLET, DELAYED RELEASE ORAL
Qty: 30 TABLET | Refills: 0 | COMMUNITY
Start: 2022-02-01

## 2022-02-01 NOTE — TELEPHONE ENCOUNTER
Kemi Casper is requesting a refill of:    Refused Prescriptions:                       Disp   Refills    RABEprazole (ACIPHEX) 20 MG EC tablet [Pha*30 tab*0        Sig: TAKE ONE TABLET BY MOUTH EVERY DAY .  Refused By: PERRY CAMPBELL  Reason for Refusal: Should already have refills on file  Reason for Refusal Comment: Refilled on 01/17/22 for 90 with 1 refill

## 2022-02-07 ENCOUNTER — TELEPHONE (OUTPATIENT)
Dept: FAMILY MEDICINE | Facility: CLINIC | Age: 67
End: 2022-02-07

## 2022-02-07 NOTE — TELEPHONE ENCOUNTER
Pt called to ask about getting disability parking forms completed. I informed her she needs and office visit to get those completed.

## 2022-02-21 ENCOUNTER — OFFICE VISIT (OUTPATIENT)
Dept: FAMILY MEDICINE | Facility: CLINIC | Age: 67
End: 2022-02-21

## 2022-02-21 VITALS
SYSTOLIC BLOOD PRESSURE: 130 MMHG | HEIGHT: 60 IN | DIASTOLIC BLOOD PRESSURE: 82 MMHG | BODY MASS INDEX: 39.27 KG/M2 | HEART RATE: 72 BPM | TEMPERATURE: 97.5 F | WEIGHT: 200 LBS | OXYGEN SATURATION: 95 %

## 2022-02-21 DIAGNOSIS — R94.31 QT PROLONGATION: ICD-10-CM

## 2022-02-21 DIAGNOSIS — Z96.651 STATUS POST TOTAL RIGHT KNEE REPLACEMENT: ICD-10-CM

## 2022-02-21 DIAGNOSIS — Z86.39 HISTORY OF IRON DEFICIENCY: ICD-10-CM

## 2022-02-21 DIAGNOSIS — M06.09 RHEUMATOID ARTHRITIS OF MULTIPLE SITES WITHOUT RHEUMATOID FACTOR (H): ICD-10-CM

## 2022-02-21 DIAGNOSIS — F33.42 MAJOR DEPRESSIVE DISORDER, RECURRENT EPISODE, IN FULL REMISSION (H): ICD-10-CM

## 2022-02-21 DIAGNOSIS — E11.42 TYPE 2 DIABETES MELLITUS WITH DIABETIC POLYNEUROPATHY, WITH LONG-TERM CURRENT USE OF INSULIN (H): ICD-10-CM

## 2022-02-21 DIAGNOSIS — Z98.890 POSTOPERATIVE STATE: ICD-10-CM

## 2022-02-21 DIAGNOSIS — R42 DIZZINESS: ICD-10-CM

## 2022-02-21 DIAGNOSIS — Z79.4 TYPE 2 DIABETES MELLITUS WITH DIABETIC POLYNEUROPATHY, WITH LONG-TERM CURRENT USE OF INSULIN (H): ICD-10-CM

## 2022-02-21 DIAGNOSIS — R29.898 BILATERAL LEG WEAKNESS: ICD-10-CM

## 2022-02-21 DIAGNOSIS — R11.0 NAUSEA: Primary | ICD-10-CM

## 2022-02-21 DIAGNOSIS — K21.9 GASTROESOPHAGEAL REFLUX DISEASE, UNSPECIFIED WHETHER ESOPHAGITIS PRESENT: ICD-10-CM

## 2022-02-21 DIAGNOSIS — E66.01 MORBID OBESITY (H): ICD-10-CM

## 2022-02-21 PROCEDURE — 99215 OFFICE O/P EST HI 40 MIN: CPT | Performed by: STUDENT IN AN ORGANIZED HEALTH CARE EDUCATION/TRAINING PROGRAM

## 2022-02-21 RX ORDER — RABEPRAZOLE SODIUM 20 MG/1
1 TABLET, DELAYED RELEASE ORAL DAILY
Qty: 90 TABLET | Refills: 1 | Status: SHIPPED | OUTPATIENT
Start: 2022-02-21

## 2022-02-21 RX ORDER — MECLIZINE HYDROCHLORIDE 25 MG/1
25 TABLET ORAL 2 TIMES DAILY PRN
Qty: 30 TABLET | Refills: 1 | Status: SHIPPED | OUTPATIENT
Start: 2022-02-21 | End: 2022-09-23

## 2022-02-21 RX ORDER — SERTRALINE HYDROCHLORIDE 150 MG/1
CAPSULE ORAL
COMMUNITY
Start: 2022-02-04 | End: 2022-12-28

## 2022-02-21 NOTE — PROGRESS NOTES
Assessment & Plan     2. Gastroesophageal reflux (GERD)  1. Nausea  7. Morbid obesity (H)  4. Type 2 diabetes mellitus with diabetic polyneuropathy, with long-term current use of insulin (H)  Sx not improving w tx thus far, recommend GI consult for further eval. Considered gastroparesis or alternate autonomic dysfunction. Vitals reassuring, no weight loss over several months of sx.   - RABEprazole (ACIPHEX) 20 MG EC tablet; Take 1 tablet (20 mg) by mouth daily  Dispense: 90 tablet; Refill: 1  - Adult Gastro Ref - Consult Only; Future    3. Status post total right knee replacement  10. Bilateral leg weakness  Deconditioned, sedentary. Recommend formal PT for strengthening, daily activity at home, consider surgeon follow-up if not improving  - Physical Therapy Referral; Future    5. QT prolongation  Continue to consider in medication mgmt    6. Major depressive disorder, recurrent episode, in full remission (H)  Continue working with mental health providers    8. History of iron deficiency  Continue iron and b12 as directed by Neurology    9. Rheumatoid arthritis of multiple sites without rheumatoid factor (H)  Possibly contributing to fatigue though suspect multifactorial, encouraged rheumatology follow-up   - Adult Gastro Ref - Consult Only; Future    11. Dizziness  meclinzine helpful, continue prn. If worsening would revisit w Neurology  - meclizine (ANTIVERT) 25 MG tablet; Take 1 tablet (25 mg) by mouth 2 times daily as needed for dizziness  Dispense: 30 tablet; Refill: 1     Patient Instructions   [facility]    Recommend following up with Rheumatologist and mental health team as planned    Schedule visit at [facility]:  [facility]  [address]  [address]  [place] [address]  [phone] - appt line    Come back here in 2 months, sooner if needed       50 minutes spent on the date of the encounter doing chart review, history and exam, documentation and further activities  per the note    Flo Weston MD, [facility]       Subjective     Kemi OCTAVIO Stanley Casper is a 67 year old female who presents to clinic today for the following health issues:    HPI   Chief Complaint   Patient presents with     Results     discuss lab results of b12 and ferritin      Ongoing GI sx, bloating, loose stools. Worse after beef stroganoff at restaurant last week. Weight stable. Admits to regular unhealthy snacks.     At recent neurology follow-up, pt reports low iron and b12 levels. Taking oral iron and b12 supplement daily for past week.   Started on lorazepam and melatonin at bedtime, trazodone discontinued 2/2 QT prolongation.  Feels tired all the time, bilateral legs weak bur R>L since R TKA in November.   Fatigue worse in recent months but has been ongoing for years.     Working with her MH team, including new psychologist. Had to put her dog down which has been really hard.         Objective    /82 (BP Location: Left arm, Patient Position: Sitting, Cuff Size: Adult Large)   Pulse 72   Temp 97.5  F (36.4  C) (Temporal)   Ht 1.524 m (5')   Wt 90.7 kg (200 lb)   LMP 12/04/2003   SpO2 95%   BMI 39.06 kg/m    Body mass index is 39.06 kg/m .  Physical Exam   Alert, NAD  NC/AT  Sclerae anicteric  OP clear  RRR  Resp nonlabored  Abd obese, soft,l no focal tenderness or organomegaly. No rebound.  Skin warm and dry  No focal neuro deficits. Speech intact.   Flat affect    Wt Readings from Last 4 Encounters:   02/21/22 90.7 kg (200 lb)   12/17/21 90.3 kg (199 lb)   12/02/21 90.3 kg (199 lb)   11/23/21 90.3 kg (199 lb)

## 2022-02-21 NOTE — NURSING NOTE
Chief Complaint   Patient presents with     Results     discuss lab results of b12 and ferritin         Pre-visit Screening:  Immunizations:  not up to date - will get tetanus and Singrix at the pharmacy  Colonoscopy:  is due and to be scheduled by patient for later completion  Mammogram: is due and to be scheduled by patient for later completion  Asthma Action Test/Plan:  NA  PHQ9:  Goes to psych  GAD7:  Goes to psych  Questioned patient about current smoking habits Pt. has never smoked.  Ok to leave detailed message on voice mail for today's visit only Yes, phone # [phone]

## 2022-02-21 NOTE — PATIENT INSTRUCTIONS
We'll work to get records from [facility]    Recommend following up with Rheumatologist and mental health team as planned    Schedule visit at [facility]:  [facility]  [address]  [address]  [place] [address]  [phone] - appt line    Come back here in 2 months, sooner if needed

## 2022-03-03 ENCOUNTER — TRANSFERRED RECORDS (OUTPATIENT)
Dept: HEALTH INFORMATION MANAGEMENT | Facility: CLINIC | Age: 67
End: 2022-03-03
Payer: COMMERCIAL

## 2022-03-03 LAB
ALT SERPL-CCNC: 11 IU/L (ref 0–32)
AST SERPL-CCNC: 12 IU/L (ref 0–40)
TSH SERPL-ACNC: 3.85 UIU/ML (ref 0.45–4.5)

## 2022-03-07 ENCOUNTER — TRANSFERRED RECORDS (OUTPATIENT)
Dept: FAMILY MEDICINE | Facility: CLINIC | Age: 67
End: 2022-03-07

## 2022-03-14 ENCOUNTER — TRANSFERRED RECORDS (OUTPATIENT)
Dept: HEALTH INFORMATION MANAGEMENT | Facility: CLINIC | Age: 67
End: 2022-03-14
Payer: COMMERCIAL

## 2022-04-12 ENCOUNTER — TRANSFERRED RECORDS (OUTPATIENT)
Dept: HEALTH INFORMATION MANAGEMENT | Facility: CLINIC | Age: 67
End: 2022-04-12
Payer: COMMERCIAL

## 2022-05-03 DIAGNOSIS — I10 BENIGN ESSENTIAL HYPERTENSION: Primary | ICD-10-CM

## 2022-05-03 RX ORDER — IRBESARTAN AND HYDROCHLOROTHIAZIDE 300; 12.5 MG/1; MG/1
1 TABLET, FILM COATED ORAL DAILY
Qty: 90 TABLET | Refills: 1 | Status: SHIPPED | OUTPATIENT
Start: 2022-05-03 | End: 2023-01-30

## 2022-05-03 NOTE — TELEPHONE ENCOUNTER
Please advise. Pt called asking if you would take over her prescription for irbesartan-hydrochlorothiazide. This was last filled by her previous clinic, but she wants you to be her PCP.    Kemi Casper is requesting a refill of:    Pending Prescriptions:                       Disp   Refills    irbesartan-hydrochlorothiazide (AVALIDE) *                    Sig: Take 1 tablet by mouth daily

## 2022-06-16 NOTE — MR AVS SNAPSHOT
After Visit Summary   12/11/2017    Kemi Casper    MRN: [id number]           Patient Information     Date Of Birth          1955        Visit Information        Provider Department      12/11/2017 10:30 AM Estefania Emery MD [facility]        Today's Diagnoses     Primary osteoarthritis of left knee    -  1    Pre-op exam        Other iron deficiency anemia        Essential hypertension, benign        Type 2 diabetes mellitus with neurological manifestations (HCC)        Obstructive sleep apnea syndrome          Care Instructions    Take copy to surgery site          Follow-ups after your visit        Follow-up notes from your care team     Return if symptoms worsen or fail to improve.      Your next 10 appointments already scheduled     Dec 13, 2017 12:30 PM CST   Level 1 with RH INFUSION CHAIR 7   [facility] ([facility])    [facility]  [address]  [place] [address]   [phone]            Dec 15, 2017 12:30 PM CST   Level 1 with RH INFUSION CHAIR 9   [facility] ([facility])    [facility]  [address]  [place] [address]   [phone]            Dec 18, 2017   Procedure with iTn Shetty MD   [facility] (--)    [address]  [place] [id number] [phone]              Who to contact     If you have questions or need follow up information about today's clinic visit or your schedule please contact [facility] directly at [phone].  Normal or non-critical lab and imaging results will be communicated to you by MyChart, letter or phone within 4 business days after the clinic has received the results. If you do not hear from us within 7 days, please contact the clinic through MyChart or phone. If you have a  "critical or abnormal lab result, we will notify you by phone as soon as possible.  Submit refill requests through [employer] or call your pharmacy and they will forward the refill request to us. Please allow 3 business days for your refill to be completed.          Additional Information About Your Visit        MyChart Information     [employer] lets you send messages to your doctor, view your test results, renew your prescriptions, schedule appointments and more. To sign up, go to [email or web address] . Click on \"Log in\" on the left side of the screen, which will take you to the Welcome page. Then click on \"Sign up Now\" on the right side of the page.     You will be asked to enter the access code listed below, as well as some personal information. Please follow the directions to create your username and password.     Your access code is: [email or web address]  Expires: 3/11/2018 11:37 AM     Your access code will  in 90 days. If you need help or a new code, please call your [place] clinic or [phone].        Care EveryWhere ID     This is your Care EveryWhere ID. This could be used by other organizations to access your [place] medical records  [id number]        Your Vitals Were     Pulse Temperature Respirations Height Last Period Pulse Oximetry    76 98.3  F (36.8  C) (Oral) 14 1.537 m (5' 0.5\") 2003 98%    BMI (Body Mass Index)                   35.34 kg/m2            Blood Pressure from Last 3 Encounters:   17 132/82   17 144/90   17 138/82    Weight from Last 3 Encounters:   17 83.5 kg (184 lb)   17 85.8 kg (189 lb 3.2 oz)   17 86 kg (189 lb 9.6 oz)              We Performed the Following     Hemoglobin A1c (BFP)     POTASSIUM ([facility])     VENOUS COLLECTION        Primary Care Provider Office Phone # Fax #    Estefania Emery [phone] [address] [address]        Equal Access to Services     NIRMAL HARO AH: Tom lopez " evy Davis, wajudithda luqadaha, qaybta kaalnata knowles, june scottin hayaan camronmelissa freeman laanameg mary. So [facility] [phone].    ATENCIÓN: Si habla morgan, tiene a head disposición servicios gratuitos de asistencia lingüística. Micah al [phone].    We comply with applicable federal civil rights laws and [place] laws. We do not discriminate on the basis of race, color, national origin, age, disability, sex, sexual orientation, or gender identity.            Thank you!     Thank you for choosing [facility]  for your care. Our goal is always to provide you with excellent care. Hearing back from our patients is one way we can continue to improve our services. Please take a few minutes to complete the written survey that you may receive in the mail after your visit with us. Thank you!             Your Updated Medication List - Protect others around you: Learn how to safely use, store and throw away your medicines at www.disposemymeds.org.          This list is accurate as of: 12/11/17 11:37 AM.  Always use your most recent med list.                   Brand Name Dispense Instructions for use Diagnosis    ACCU-CHEK INSTANT CONTROL Liqd     1 Bottle    Control for accu-check Pily    Type II or unspecified type diabetes mellitus without mention of complication, not stated as uncontrolled       aluminum acetate Soln solution    BUROW'S    1 Bottle    Apply 3 mLs topically daily as needed    Perianal dermatitis       atorvastatin 20 MG tablet    LIPITOR          B-D U/F 31G X 8 MM   Generic drug:  insulin pen needle           blood glucose monitoring test strip    ACCU-CHEK PILY    1 Box    Use to test blood sugars 3 times daily or as directed.    Type 2 diabetes mellitus with diabetic neuropathy, with long-term current use of insulin (H)       FLUoxetine 40 MG capsule    PROzac    90 capsule    Take 1 capsule (40 mg) by mouth daily    Major depressive disorder, recurrent episode, in full  remission (H), LUIS (generalized anxiety disorder)       insulin aspart 100 UNITS/ML injection    NovoLOG     Inject 5 Units Subcutaneous    Type 2 diabetes mellitus with neurological manifestations (H)       irbesartan-hydrochlorothiazide 150-12.5 MG per tablet    AVALIDE    90 tablet    Take 1 tablet by mouth daily    Essential hypertension, benign       LANTUS SOLOSTAR 100 UNIT/ML injection   Generic drug:  insulin glargine           latanoprost 0.005 % ophthalmic solution    XALATAN     INSTILL 1 DROP INTO THE LEFT EYE QHS        omeprazole 20 MG CR capsule    priLOSEC    90 capsule    Take 1 capsule (20 mg) by mouth daily    Gastroesophageal reflux disease without esophagitis       ONETOUCH DELICA LANCETS 33G Misc           prenatal multivitamin plus iron 27-0.8 MG Tabs per tablet     100 tablet    Take 1 tablet by mouth daily    Gastroesophageal reflux disease without esophagitis       PROVIGIL PO       Organic hypersomnia       timolol 0.25 % ophthalmic solution    TIMOPTIC     INT 1 GTT INTO THE OS QAM        traZODone 50 MG tablet    DESYREL    90 tablet    Take 1 tablet (50 mg) by mouth At Bedtime    Major depressive disorder, recurrent episode, in full remission (H), LUIS (generalized anxiety disorder)          impairments found/functional limitations in following categories/risk reduction/prevention/rehab potential/therapy frequency/predicted duration of therapy intervention/anticipated equipment needs at discharge/anticipated discharge recommendation

## 2022-07-12 ENCOUNTER — TRANSFERRED RECORDS (OUTPATIENT)
Dept: FAMILY MEDICINE | Facility: CLINIC | Age: 67
End: 2022-07-12

## 2022-09-23 ENCOUNTER — OFFICE VISIT (OUTPATIENT)
Dept: FAMILY MEDICINE | Facility: CLINIC | Age: 67
End: 2022-09-23

## 2022-09-23 VITALS
WEIGHT: 206.6 LBS | HEIGHT: 61 IN | OXYGEN SATURATION: 90 % | DIASTOLIC BLOOD PRESSURE: 80 MMHG | HEART RATE: 89 BPM | TEMPERATURE: 98 F | BODY MASS INDEX: 39.01 KG/M2 | SYSTOLIC BLOOD PRESSURE: 140 MMHG

## 2022-09-23 DIAGNOSIS — F33.42 MAJOR DEPRESSIVE DISORDER, RECURRENT EPISODE, IN FULL REMISSION (H): ICD-10-CM

## 2022-09-23 DIAGNOSIS — M79.7 FIBROMYALGIA: ICD-10-CM

## 2022-09-23 DIAGNOSIS — R06.09 CHRONIC DYSPNEA: ICD-10-CM

## 2022-09-23 DIAGNOSIS — I10 BENIGN ESSENTIAL HYPERTENSION: ICD-10-CM

## 2022-09-23 DIAGNOSIS — Z02.89 ENCOUNTER FOR COMPLETION OF FORM WITH PATIENT: ICD-10-CM

## 2022-09-23 DIAGNOSIS — E11.42 TYPE 2 DIABETES MELLITUS WITH DIABETIC POLYNEUROPATHY, WITH LONG-TERM CURRENT USE OF INSULIN (H): Primary | ICD-10-CM

## 2022-09-23 DIAGNOSIS — Z79.4 TYPE 2 DIABETES MELLITUS WITH DIABETIC POLYNEUROPATHY, WITH LONG-TERM CURRENT USE OF INSULIN (H): Primary | ICD-10-CM

## 2022-09-23 DIAGNOSIS — E66.01 MORBID OBESITY (H): ICD-10-CM

## 2022-09-23 DIAGNOSIS — Z79.899 POLYPHARMACY: ICD-10-CM

## 2022-09-23 DIAGNOSIS — R32 URINARY INCONTINENCE, UNSPECIFIED TYPE: ICD-10-CM

## 2022-09-23 PROCEDURE — 99215 OFFICE O/P EST HI 40 MIN: CPT | Performed by: STUDENT IN AN ORGANIZED HEALTH CARE EDUCATION/TRAINING PROGRAM

## 2022-09-23 RX ORDER — AMLODIPINE BESYLATE 10 MG/1
10 TABLET ORAL DAILY
COMMUNITY
Start: 2022-05-26 | End: 2022-09-23

## 2022-09-23 RX ORDER — AMLODIPINE BESYLATE 10 MG/1
10 TABLET ORAL DAILY
COMMUNITY
Start: 2022-08-22 | End: 2023-01-31

## 2022-09-23 RX ORDER — HYDROCHLOROTHIAZIDE 25 MG/1
25 TABLET ORAL DAILY
COMMUNITY
End: 2022-12-28

## 2022-09-23 NOTE — PROGRESS NOTES
Assessment & Plan       ICD-10-CM    1. Type 2 diabetes mellitus with diabetic polyneuropathy, with long-term current use of insulin (H)  E11.42 Other Specialty Referral    Z79.4    2. Benign essential hypertension  I10    3. Morbid obesity (H)  E66.01    4. Major depressive disorder, recurrent episode, in full remission (H)  F33.42    5. Encounter for completion of form with patient  Z02.89    6. Fibromyalgia  M79.7    7. Urinary incontinence, unspecified type  R32    8. Polypharmacy  Z79.899 Other Specialty Referral   9. Chronic dyspnea  R06.09 Other Specialty Referral      Chronic dyspnea on exertion. Cardiology and pulmonology workups unrevealing. Pt hesitant to believe deconditioning could result in severity of her sx. Acknowledges anxiety plays a role as well. Discontinued therapy but encouraged to restart. Reviewed medications in detail, no changes today -- Also recommend MTM consult.     Patient Instructions   Schedule appt here with Ladan Bell PharmD, MTM Pharmacist  [facility]   [address]  [address]  [place] [address]  [phone] -- phone    Reestablish with your therapist and follow-up with Dr. Burden    Letter for jury duty today - let us know if you need anything else     >40 minutes spent on the date of the encounter doing chart review, history and exam, documentation and further activities per the note    Flo Weston MD, [facility]       Subjective     Kemi CUNNINGHAM Stanley Casper is a 67 year old female who presents to clinic today for the following health issues:    HPI   Chief Complaint   Patient presents with     Anxiety     Pt state she been having hard time breathing - at all time - she has been feeling more fatigue and anxious -       Shortness of Breath     Having a hard time breathing, has been a lifelong concern      Summoned for jury duty but feels unable due to severe anxiety, CASTELAN, and urinary incontinence. Reviewed cardiology  "and pulmonology consults.         Objective    BP (!) 140/80 (BP Location: Right arm, Patient Position: Sitting, Cuff Size: Adult Large)   Pulse 89   Temp 98  F (36.7  C)   Ht 1.549 m (5' 1\")   Wt 93.7 kg (206 lb 9.6 oz)   LMP 12/04/2003   SpO2 90%   BMI 39.04 kg/m    Body mass index is 39.04 kg/m .  Physical Exam   Alert, NAD  NC/AT  Sclerae anicteric  Regular  Resp nonlabored  Skin warm and dry  No focal neuro deficits. Speech intact.   Anxious affect    Labs reviewed       "

## 2022-09-23 NOTE — PATIENT INSTRUCTIONS
Schedule appt here with Ladan Bell PharmD, MTM Pharmacist  [facility]   [address]  [address]  [place] [address] [phone] -- phone    Reestablish with your therapist and follow-up with Dr. Burden    Letter for jury duty today - let us know if you need anything else

## 2022-09-23 NOTE — LETTER
9/23/2022        RE: Kemi Casper  [address] [facility] [address]        To whom it may concern:     Aminata was seen in our clinic today and due to her multiple medical comorbidities I support her request to be excused from jury duty. Please call our office with any questions.     Sincerely,          Flo Weston MD, [facility]

## 2022-09-23 NOTE — NURSING NOTE
Chief Complaint   Patient presents with     Anxiety     Pt state she been having hard time breathing - at all time - she has been feeling more fatigue and anxious -     Pre-visit Screening:  Immunizations:  Up to date   Colonoscopy:  Not up to date  Mammogram: Pt will get one Next month   Asthma Action Test/Plan: N/A  PHQ9:  Done today   GAD7:  Done today   Questioned patient about current smoking habits Pt. has never smoked.  Ok to leave detailed message on voice mail for today's visit only Yes , phone # [phone]

## 2022-10-10 ENCOUNTER — TRANSFERRED RECORDS (OUTPATIENT)
Dept: GENERAL RADIOLOGY | Facility: CLINIC | Age: 67
End: 2022-10-10

## 2022-10-10 ENCOUNTER — TRANSFERRED RECORDS (OUTPATIENT)
Dept: HEALTH INFORMATION MANAGEMENT | Facility: CLINIC | Age: 67
End: 2022-10-10

## 2022-10-10 LAB
ALT SERPL-CCNC: 9 IU/L (ref 5–35)
AST SERPL-CCNC: 12 U/L (ref 5–34)
CREATININE (EXTERNAL): 0.83 MG/DL (ref 0.5–1.3)

## 2022-10-13 ENCOUNTER — OFFICE VISIT (OUTPATIENT)
Dept: FAMILY MEDICINE | Facility: CLINIC | Age: 67
End: 2022-10-13

## 2022-10-13 DIAGNOSIS — E11.49 TYPE 2 DIABETES MELLITUS WITH NEUROLOGICAL MANIFESTATIONS (H): Primary | ICD-10-CM

## 2022-10-13 NOTE — PROGRESS NOTES
SUBJECTIVE / OBJECTIVE:                                                Kemi Casper is a 67 year old female seen for an initial visit for Medication Therapy Management.  She was referred to me by Dr. Flo Weston.     REASON FOR MTM REFERRAL: Medication Management Services     PATIENT CHIEF COMPLAINT/CONCERN: Diabetes control, medication review    PAST MEDICAL HISTORY: Reviewed in chart    MEDICAL CONDITIONS REVIEWED:    Diabetes-type 2       Weight loss    Medication Review    Current Outpatient Medications   Medication Sig Dispense Refill     abatacept (ORENCIA) 250 MG injection Inject into the vein every 30 days 750 mg       acetaminophen (TYLENOL) 325 MG tablet Take 2 tablets (650 mg) by mouth every 4 hours as needed for other (mild pain) 100 tablet 0     amLODIPine (NORVASC) 10 MG tablet Take 10 mg by mouth       B-D U/F 31G X 8 MM insulin pen needle   1     buPROPion (WELLBUTRIN XL) 300 MG 24 hr tablet TAKE ONE TABLET BY MOUTH EVERY DAY AS DIRECTED       dorzolamide-timolol (COSOPT) 2-0.5 % ophthalmic solution INSTILL 1 DROP INTO THE LEFT EYE TWICE DAILY       hydrochlorothiazide (HYDRODIURIL) 25 MG tablet Take 25 mg by mouth daily       insulin glargine (BASAGLAR KWIKPEN) 100 UNIT/ML pen Inject 35 Units Subcutaneous At Bedtime        insulin lispro (HUMALOG KWIKPEN) 100 UNIT/ML (1 unit dial) KWIKPEN Inject 24 Units Subcutaneous 3 times daily (before meals)        irbesartan-hydrochlorothiazide (AVALIDE) 300-12.5 MG tablet Take 1 tablet by mouth daily 90 tablet 1     latanoprost (XALATAN) 0.005 % ophthalmic solution Place 1 drop Into the left eye At Bedtime       leflunomide (ARAVA) 10 MG tablet Take 10 mg by mouth daily       LORazepam (ATIVAN) 0.5 MG tablet Take 0.5 mg by mouth as needed       melatonin 5 MG tablet Take 5 mg by mouth nightly as needed for sleep       modafinil (PROVIGIL) 200 MG tablet Take 1 tablet (200 mg) by mouth every morning 90 tablet 0     RABEprazole (ACIPHEX) 20 MG EC  tablet Take 1 tablet (20 mg) by mouth daily 90 tablet 1     rosuvastatin (CRESTOR) 10 MG tablet Take 10 mg by mouth daily       Sertraline HCl 150 MG CAPS TAKE ONE CAPSULE BY MOUTH EVERY MORNING .       sulfaSALAzine ER (AZULFIDINE EN) 500 MG EC tablet 2,000 mg daily        vitamin D3 (CHOLECALCIFEROL) 250 mcg (96412 units) capsule Take 1 capsule by mouth daily Takes 10,000 units daily         Current labs include:  BP Readings from Last 3 Encounters:   09/23/22 (!) 140/80   02/21/22 130/82   12/17/21 (!) 146/84       LMP 12/04/2003     Most Recent Immunizations   Administered Date(s) Administered     COVID-19,PF,Moderna 07/09/2022     FLUAD(HD)65+ QUAD 10/24/2020     Influenza (IIV3) PF 10/10/2011     Influenza Vaccine IM 18-49 Yrs, RIV3 12/10/2013     Influenza, Quad, High Dose, Pf, 65yr+ (Fluzone HD) 06/29/2022     Mantoux Tuberculin Skin Test 09/17/2013     Pneumococcal 23 valent 12/17/2020     TD (ADULT, 7+) 01/01/2001     TDAP Vaccine (Boostrix) 08/04/2010       ASSESSMENT / PLAN:                                                       Diabetes:  Assessment: Patient has been cutting back on portion size lately, and has experienced a couple of episodes of hypoglycemia. Patient notes she does feel her hypoglycemia, experiencing shakiness and sweating. She states this happens a couple of times a month. She can contribute this to decreased food intake. Patient was unsure of how much correctional insulin to administer so we created a chart indicating how much Humalog is indicated with meals. We also discussed if decreasing carbohydrate consumption greatly, decreasing Humalog dose will be necessary. Patient is encouraged to follow up with endocrine to work on insulin dosing changes with diet changes.    Discussed and gave patient handout on carbohydrate consumption. Reviewed how to look at labels to make better choices and to better monitor carbohydrate intake.    Discussed CGM use with patient. Patient has had CGM in  past and does not wish to use at this time due to cost and issues with sensors falling off.  Status: A1c July 08, 2022-7.0  Drug Therapy Problems:  1) Hypoglycemia  Plan:  1) Patient will schedule appointment with endocrinology to determine insulin adjustment with portion control.    Vitamin D recommendation:  Assessment: Patient is looking to supplement vitamin D as she has been deficient in the past. She thinks this is contributing to her legs feeling weak and rubbery. Discussed current recommendations. Declined vitamin D testing today. She would like to review with her insurance to see if they cover this.  Drug Therapy Problems:  1) Patient would like to supplement vitamin D to see if this helps with leg weakness  Plan:  1) Supplement with Vitamin D 1000IU daily.   2) Draw vitamin D level if insurance covers this.    During this visit we looked at patient's medication list and went over each medications indication, efficacy, side effects, and interactions.      I spent 1.5 hours with this patient today.  All changes were made via collaborative practice agreement with Flo Weston. A copy of the visit note was provided to the patient's primary care provider.    Ladan Bell, PharmD  Medication Therapy Management Pharmacist  [facility]  Office Phone: [phone]

## 2022-10-13 NOTE — Clinical Note
Please see notes. Had a nice visit with Aminata today. Most of our visit was centered around diabetes, however we did look at all medications and discussed risk and benefit with each.

## 2022-11-14 ENCOUNTER — OFFICE VISIT (OUTPATIENT)
Dept: FAMILY MEDICINE | Facility: CLINIC | Age: 67
End: 2022-11-14

## 2022-11-14 VITALS
BODY MASS INDEX: 41.03 KG/M2 | HEART RATE: 87 BPM | RESPIRATION RATE: 22 BRPM | WEIGHT: 209 LBS | HEIGHT: 60 IN | OXYGEN SATURATION: 93 % | DIASTOLIC BLOOD PRESSURE: 96 MMHG | SYSTOLIC BLOOD PRESSURE: 150 MMHG | TEMPERATURE: 97.9 F

## 2022-11-14 DIAGNOSIS — M06.09 RHEUMATOID ARTHRITIS OF MULTIPLE SITES WITHOUT RHEUMATOID FACTOR (H): ICD-10-CM

## 2022-11-14 DIAGNOSIS — E66.01 MORBID OBESITY (H): ICD-10-CM

## 2022-11-14 DIAGNOSIS — F33.42 MAJOR DEPRESSIVE DISORDER, RECURRENT EPISODE, IN FULL REMISSION (H): ICD-10-CM

## 2022-11-14 DIAGNOSIS — R13.10 DYSPHAGIA, UNSPECIFIED TYPE: ICD-10-CM

## 2022-11-14 DIAGNOSIS — I10 BENIGN ESSENTIAL HYPERTENSION: ICD-10-CM

## 2022-11-14 DIAGNOSIS — R06.09 CHRONIC DYSPNEA: Primary | ICD-10-CM

## 2022-11-14 PROCEDURE — G0009 ADMIN PNEUMOCOCCAL VACCINE: HCPCS | Performed by: STUDENT IN AN ORGANIZED HEALTH CARE EDUCATION/TRAINING PROGRAM

## 2022-11-14 PROCEDURE — 90677 PCV20 VACCINE IM: CPT | Performed by: STUDENT IN AN ORGANIZED HEALTH CARE EDUCATION/TRAINING PROGRAM

## 2022-11-14 PROCEDURE — 99214 OFFICE O/P EST MOD 30 MIN: CPT | Mod: 25 | Performed by: STUDENT IN AN ORGANIZED HEALTH CARE EDUCATION/TRAINING PROGRAM

## 2022-11-14 RX ORDER — PREDNISONE 5 MG/1
TABLET ORAL
COMMUNITY
Start: 2022-10-11 | End: 2022-12-28

## 2022-11-14 RX ORDER — CETIRIZINE HYDROCHLORIDE 10 MG/1
10 TABLET ORAL DAILY
Qty: 90 TABLET | Refills: 0 | Status: SHIPPED | OUTPATIENT
Start: 2022-11-14 | End: 2023-01-31

## 2022-11-14 NOTE — PATIENT INSTRUCTIONS
Start allergy pill daily    Call to schedule with throat specialist  [facility]  [address] [address] [phone] -- appt line    If you decided you'd like to meet with a weight loss specialist let me know    Restart BP monitoring and update me in 1-2 weeks

## 2022-11-14 NOTE — NURSING NOTE
Chief Complaint   Patient presents with     Allergies     Has itchy eyes and trouble breathing for the last couple of months now, has not tried taking any allergy medications      Pre-visit Screening:  Immunizations:  not up to date - due for pneumonia vaccine   Colonoscopy:  is due and to be scheduled by patient for later completion  Mammogram: is due and to be scheduled by patient for later completion  Asthma Action Test/Plan:  NA  PHQ9:  Is due but will do at med check appt   GAD7:  Is due but will do at med check appt  Questioned patient about current smoking habits Pt. has never smoked.  Ok to leave detailed message on voice mail for today's visit only Yes, phone # [phone]

## 2022-11-14 NOTE — PROGRESS NOTES
Assessment & Plan       ICD-10-CM    1. Chronic dyspnea  R06.09 Adult ENT  Referral     cetirizine (ZYRTEC) 10 MG tablet      2. Dysphagia, unspecified type  R13.10 Adult ENT  Referral     cetirizine (ZYRTEC) 10 MG tablet      3. Morbid obesity (H)  E66.01       4. Rheumatoid arthritis of multiple sites without rheumatoid factor (H)  M06.09       5. Major depressive disorder, recurrent episode, in full remission (H)  F33.42       6. Benign essential hypertension  I10            Patient Instructions   Start allergy pill daily    Call to schedule with throat specialist  [facility]  [address] [place], [place] [id number] [phone] -- appt line    If you decided you'd like to meet with a weight loss specialist let me know       >30 minutes spent on the date of the encounter doing chart review, history and exam, documentation and further activities per the note    Flo Weston MD, [facility]       Subjective     Kemi CUNNINGHAM Stanley aCsper is a 67 year old female who presents to clinic today for the following health issues:    HPI   Chief Complaint   Patient presents with     Allergies     Has itchy eyes and trouble breathing for the last couple of months now, has not tried taking any allergy medications       Rheum prescribed prednisone and has noted general improvement in breathing  When out walking feels difficulty getting air in specifically  When resting has noticed intermittent expiratory wheezing  Prior trials: albuterol, advair, PPI  No sig change in breathing.   Prior pulm and cards notes reviewed        Objective    BP (!) 150/96 (BP Location: Left arm, Patient Position: Sitting, Cuff Size: Adult Large)   Pulse 87   Temp 97.9  F (36.6  C) (Temporal)   Resp 22   Ht 1.524 m (5')   Wt 94.8 kg (209 lb)   LMP 12/04/2003   SpO2 93%   BMI 40.82 kg/m    Body mass index is 40.82 kg/m .  Alert, NAD  NC/AT  Sclerae anicteric  Regular  Resp nonlabored  CTAB  Skin warm and dry  No focal neuro deficits. Speech intact.   Appropriate affect  Normal gait.    Labs reviewed

## 2022-11-29 ENCOUNTER — OFFICE VISIT (OUTPATIENT)
Dept: FAMILY MEDICINE | Facility: CLINIC | Age: 67
End: 2022-11-29

## 2022-11-29 VITALS
OXYGEN SATURATION: 92 % | SYSTOLIC BLOOD PRESSURE: 138 MMHG | RESPIRATION RATE: 20 BRPM | BODY MASS INDEX: 41.21 KG/M2 | DIASTOLIC BLOOD PRESSURE: 82 MMHG | TEMPERATURE: 98 F | WEIGHT: 211 LBS | HEART RATE: 72 BPM

## 2022-11-29 DIAGNOSIS — Z79.4 TYPE 2 DIABETES MELLITUS WITH DIABETIC POLYNEUROPATHY, WITH LONG-TERM CURRENT USE OF INSULIN (H): ICD-10-CM

## 2022-11-29 DIAGNOSIS — E66.01 MORBID OBESITY (H): ICD-10-CM

## 2022-11-29 DIAGNOSIS — R06.09 CHRONIC DYSPNEA: Primary | ICD-10-CM

## 2022-11-29 DIAGNOSIS — R06.2 WHEEZING: ICD-10-CM

## 2022-11-29 DIAGNOSIS — E11.42 TYPE 2 DIABETES MELLITUS WITH DIABETIC POLYNEUROPATHY, WITH LONG-TERM CURRENT USE OF INSULIN (H): ICD-10-CM

## 2022-11-29 PROCEDURE — 99214 OFFICE O/P EST MOD 30 MIN: CPT | Performed by: STUDENT IN AN ORGANIZED HEALTH CARE EDUCATION/TRAINING PROGRAM

## 2022-11-29 PROCEDURE — 71046 X-RAY EXAM CHEST 2 VIEWS: CPT | Performed by: STUDENT IN AN ORGANIZED HEALTH CARE EDUCATION/TRAINING PROGRAM

## 2022-11-29 RX ORDER — PROCHLORPERAZINE 25 MG/1
SUPPOSITORY RECTAL
COMMUNITY
Start: 2022-11-15

## 2022-11-29 RX ORDER — PREDNISONE 20 MG/1
40 TABLET ORAL
Qty: 10 TABLET | Refills: 0 | Status: SHIPPED | OUTPATIENT
Start: 2022-11-29 | End: 2022-12-04

## 2022-11-29 RX ORDER — AZITHROMYCIN 250 MG/1
TABLET, FILM COATED ORAL
Qty: 6 TABLET | Refills: 0 | Status: SHIPPED | OUTPATIENT
Start: 2022-11-29 | End: 2022-12-04

## 2022-11-29 NOTE — PROGRESS NOTES
Assessment & Plan       ICD-10-CM    1. Chronic dyspnea  R06.09 XR Chest 2 Views     Comprehensive Weight Management     predniSONE (DELTASONE) 20 MG tablet     azithromycin (ZITHROMAX) 250 MG tablet      2. Morbid obesity (H)  E66.01 Comprehensive Weight Management      3. Type 2 diabetes mellitus with diabetic polyneuropathy, with long-term current use of insulin (H)  E11.42 Comprehensive Weight Management    Z79.4       4. Wheezing  R06.2 predniSONE (DELTASONE) 20 MG tablet     azithromycin (ZITHROMAX) 250 MG tablet         Unclear etiology of chronic dyspnea, ongoing for 18+ mos. Continue to suspect multifactorial. Reported no improvement with advair, hasn't tried antihistamine. Has not yet scheduled with ENT to eval for any evidence of VCD. Did get some relief with steroid previously, discussed r/b or trial steroid and abx even though she doesn't have obvious obstructive lung disease. Pt aware of glycemic effect of steroids, will call with concerns. Again encouraged weight loss, offered referral as above, pt to consider.     Patient Instructions   Start antibiotic and prednisone daily     Try OTC Allergy pill daily (claritin, zyrtec, etc)    Call to schedule ENT consult:   [facility]  [address] [place] [address]  [phone] -- appt line    Weight loss would be beneficial for many reasons, including breathing    Gradually increase physical activity as tolerated.    Follow-up with me after seeing ENT    Due for CT of your chest and Pulmonology follow-up in February 35 minutes spent on the date of the encounter doing chart review, history and exam, documentation and further activities per the note    Flo Weston MD, [facility]       Subjective     Kemi CUNNINGHAM Stanley Casper is a 67 year old female who presents to clinic today for the following health issues:    HPI   Chief Complaint   Patient presents with     Follow Up     Follow up on shortness of  breath, feels that it is not becoming any better, has not reached out to pulmonology because they informed her that nothing was wrong      Feels like SOB is gradually getting worse but hard to describe timeline. Patient has difficulty remembering details/timing. Denies any fevers/chills, cough, or chest pain.               Objective    /82 (BP Location: Left arm, Patient Position: Sitting, Cuff Size: Adult Large)   Pulse 72   Temp 98  F (36.7  C) (Temporal)   Resp 20   Wt 95.7 kg (211 lb)   LMP 12/04/2003   SpO2 92%   BMI 41.21 kg/m    Body mass index is 41.21 kg/m .  Alert, NAD  NC/AT  Sclerae anicteric  RRR  Resp nonlabored, good air mvmt with faint exp wheezes bilat  Skin warm and dry  No focal neuro deficits. Speech intact.   Slightly anxious  Normal gait.    Labs reviewed     Results for orders placed or performed in visit on 11/29/22   XR Chest 2 Views     Status: None    Narrative    Radiologist Consultation/:   Fax:  [phone]  _____________________________________________________________________________________________________________________________________________________________________________________________________________________________________________________________________________________________________________________________________________________________________________________________________________________________________________________________________________________________________  PATIENT NAME: MICHAEL RAIN  YOB: 1955 Age: 67 ACCESSION NUMBER: [id number]  SEX: F ORDERING PROVIDER: Flo Weston  FACILITY: [facility] PRIMARY PROVIDER:  PATIENT ID: [id number] INTERESTED PARTY:  Page 1 of  1  _________________________________________________________________________________________________________________________________________________________________________________________________________________________________________________________________________________________________________________________________________________________________________________________  EXAM: XRAY CHEST,FRNT-LAT  LOCATION: [facility]  DATE/TIME: 11/29/2022 9:31 AM  INDICATION: Chronic dyspnea.  COMPARISON: 08/28/2020 chest CT.  IMPRESSION: Lungs are clear. No pleural effusion or pneumothorax. Upper normal heart size. No pulmonary  edema.  SIGNED BY: Royce Decker DO 11/29/2022 1:59 PM

## 2022-11-29 NOTE — NURSING NOTE
Chief Complaint   Patient presents with     Follow Up     Follow up on shortness of breath, feels that it is not becoming any better, has not reached out to pulmonology because they informed her that nothing was wrong     Pre-visit Screening:  Immunizations:  up to date  Colonoscopy:  is up to date  Mammogram: is up to date  Asthma Action Test/Plan:  NA  PHQ9:  NA  GAD7:  NA  Questioned patient about current smoking habits Pt. has never smoked.  Ok to leave detailed message on voice mail for today's visit only Yes, phone # [phone]

## 2022-11-29 NOTE — PATIENT INSTRUCTIONS
Start antibiotic and prednisone daily     Try OTC Allergy pill daily (claritin, zyrtec, etc)    Call to schedule ENT consult:   [facility]  [address]  [place], [place] [id number] [phone] -- appt line    Weight loss would be beneficial for many reasons, including breathing    Gradually increase physical activity as tolerated.    Follow-up with me after seeing ENT    Due for CT of your chest and Pulmonology follow-up in February

## 2022-12-15 ENCOUNTER — OFFICE VISIT (OUTPATIENT)
Dept: FAMILY MEDICINE | Facility: CLINIC | Age: 67
End: 2022-12-15

## 2022-12-15 DIAGNOSIS — E11.42 TYPE 2 DIABETES MELLITUS WITH DIABETIC POLYNEUROPATHY, WITH LONG-TERM CURRENT USE OF INSULIN (H): Primary | ICD-10-CM

## 2022-12-15 DIAGNOSIS — Z79.4 TYPE 2 DIABETES MELLITUS WITH DIABETIC POLYNEUROPATHY, WITH LONG-TERM CURRENT USE OF INSULIN (H): Primary | ICD-10-CM

## 2022-12-22 NOTE — PROGRESS NOTES
SUBJECTIVE/OBJECTIVE:                Kemi Casper is a 67 year old female seen for a follow-up visit for Medication Management Services.  She was referred to me from Dr. Flo Weston.     Chief Complaint: Follow up from [facility] visit on 10/13/2022.      Diabetes:  Current Medications:  Current Outpatient Medications   Medication     abatacept (ORENCIA) 250 MG injection     acetaminophen (TYLENOL) 325 MG tablet     amLODIPine (NORVASC) 10 MG tablet     B-D U/F 31G X 8 MM insulin pen needle     buPROPion (WELLBUTRIN XL) 300 MG 24 hr tablet     cetirizine (ZYRTEC) 10 MG tablet     Continuous Blood Gluc Sensor (DEXCOM G6 SENSOR) MISC     dorzolamide-timolol (COSOPT) 2-0.5 % ophthalmic solution     hydrochlorothiazide (HYDRODIURIL) 25 MG tablet     insulin glargine (BASAGLAR KWIKPEN) 100 UNIT/ML pen     insulin lispro (HUMALOG KWIKPEN) 100 UNIT/ML (1 unit dial) KWIKPEN     irbesartan-hydrochlorothiazide (AVALIDE) 300-12.5 MG tablet     latanoprost (XALATAN) 0.005 % ophthalmic solution     leflunomide (ARAVA) 10 MG tablet     LORazepam (ATIVAN) 0.5 MG tablet     melatonin 5 MG tablet     modafinil (PROVIGIL) 200 MG tablet     predniSONE (DELTASONE) 5 MG tablet     RABEprazole (ACIPHEX) 20 MG EC tablet     rosuvastatin (CRESTOR) 10 MG tablet     Sertraline HCl 150 MG CAPS     sulfaSALAzine ER (AZULFIDINE EN) 500 MG EC tablet     vitamin D3 (CHOLECALCIFEROL) 250 mcg (16719 units) capsule     No current facility-administered medications for this visit.       We discussed the benefits and risks of each medication.  Patient Questions/Concerns:  Patient is here with  and would like to assure medications are appropriate and no interactions are noted.    Additional Information:  Patient recently started with insulin pump. Will adjust med list to reflect discontinuation of long acting insulin.    ASSESSMENT/PLAN:                Diabetes:  Assessment: Patient recently started with insulin pump. States feels better  overall since starting pump.    Patient also states she has not been feeling well the last week and a half. Notes pressure in head, lack of energy, taste and smell off. Discussed the likelihood of a virus, and recommended rest and fluids. Recommended if any symptoms worsened, patient would need to be seen.    Patient's goal today was to discuss the indications of her medication, assure medications were not duplicating each other, and discuss any possible medication interactions. We discussed each of her medications in detail and patient asked all questions she had.    Updated med list. Hydrochlorothiazide discontinued, Basaglar discontinued, not taking melatonin, addition of trazodone 50mg daily.    Status: Patient is doing well on medications. No notable side effects.     Drug Therapy Problems:  1) None    Plan:  1) Continue with current regimen.       I spent 45 minutes with this patient today.  All changes were made via collaborative practice agreement with Flo Weston. A copy of the visit note was provided to the patient's primary care proider.    Ladan Bell, PharmD  Clinical Pharmacist  [facility]  General Clinic Phone: [phone]  Direct Office Phone: [phone]

## 2022-12-27 ENCOUNTER — HOSPITAL ENCOUNTER (INPATIENT)
Facility: CLINIC | Age: 67
LOS: 2 days | Discharge: HOME | DRG: 177 | End: 2022-12-29
Attending: EMERGENCY MEDICINE | Admitting: HOSPITALIST
Payer: COMMERCIAL

## 2022-12-27 ENCOUNTER — TELEPHONE (OUTPATIENT)
Dept: FAMILY MEDICINE | Facility: CLINIC | Age: 67
End: 2022-12-27

## 2022-12-27 DIAGNOSIS — R06.2 WHEEZING: ICD-10-CM

## 2022-12-27 DIAGNOSIS — J96.01 ACUTE RESPIRATORY FAILURE WITH HYPOXIA (H): ICD-10-CM

## 2022-12-27 DIAGNOSIS — U07.1 INFECTION DUE TO 2019 NOVEL CORONAVIRUS: ICD-10-CM

## 2022-12-27 PROBLEM — R06.02 SHORTNESS OF BREATH: Status: ACTIVE | Noted: 2022-12-27

## 2022-12-27 LAB
ANION GAP SERPL CALCULATED.3IONS-SCNC: 9 MMOL/L (ref 7–15)
BASOPHILS # BLD AUTO: 0.1 10E3/UL (ref 0–0.2)
BASOPHILS NFR BLD AUTO: 1 %
BUN SERPL-MCNC: 9.7 MG/DL (ref 8–23)
CALCIUM SERPL-MCNC: 8.9 MG/DL (ref 8.8–10.2)
CHLORIDE SERPL-SCNC: 101 MMOL/L (ref 98–107)
CREAT SERPL-MCNC: 0.81 MG/DL (ref 0.51–0.95)
CRP SERPL-MCNC: 19.84 MG/L
D DIMER PPP FEU-MCNC: 0.56 UG/ML FEU (ref 0–0.5)
DEPRECATED HCO3 PLAS-SCNC: 31 MMOL/L (ref 22–29)
EOSINOPHIL # BLD AUTO: 0.4 10E3/UL (ref 0–0.7)
EOSINOPHIL NFR BLD AUTO: 6 %
ERYTHROCYTE [DISTWIDTH] IN BLOOD BY AUTOMATED COUNT: 13.2 % (ref 10–15)
FLUAV RNA SPEC QL NAA+PROBE: NEGATIVE
FLUBV RNA RESP QL NAA+PROBE: NEGATIVE
GFR SERPL CREATININE-BSD FRML MDRD: 79 ML/MIN/1.73M2
GLUCOSE BLDC GLUCOMTR-MCNC: 347 MG/DL (ref 70–99)
GLUCOSE SERPL-MCNC: 220 MG/DL (ref 70–99)
HBA1C MFR BLD: 6.9 %
HCT VFR BLD AUTO: 34 % (ref 35–47)
HGB BLD-MCNC: 10.2 G/DL (ref 11.7–15.7)
HOLD SPECIMEN: NORMAL
IMM GRANULOCYTES # BLD: 0 10E3/UL
IMM GRANULOCYTES NFR BLD: 1 %
LYMPHOCYTES # BLD AUTO: 1.7 10E3/UL (ref 0.8–5.3)
LYMPHOCYTES NFR BLD AUTO: 26 %
MCH RBC QN AUTO: 30.2 PG (ref 26.5–33)
MCHC RBC AUTO-ENTMCNC: 30 G/DL (ref 31.5–36.5)
MCV RBC AUTO: 101 FL (ref 78–100)
MONOCYTES # BLD AUTO: 0.5 10E3/UL (ref 0–1.3)
MONOCYTES NFR BLD AUTO: 8 %
NEUTROPHILS # BLD AUTO: 3.8 10E3/UL (ref 1.6–8.3)
NEUTROPHILS NFR BLD AUTO: 58 %
NRBC # BLD AUTO: 0 10E3/UL
NRBC BLD AUTO-RTO: 0 /100
NT-PROBNP SERPL-MCNC: 344 PG/ML (ref 0–900)
PLATELET # BLD AUTO: 235 10E3/UL (ref 150–450)
POTASSIUM SERPL-SCNC: 3.7 MMOL/L (ref 3.4–5.3)
RBC # BLD AUTO: 3.38 10E6/UL (ref 3.8–5.2)
RSV RNA SPEC NAA+PROBE: NEGATIVE
SARS-COV-2 RNA RESP QL NAA+PROBE: POSITIVE
SODIUM SERPL-SCNC: 141 MMOL/L (ref 136–145)
TROPONIN T SERPL HS-MCNC: 18 NG/L
WBC # BLD AUTO: 6.4 10E3/UL (ref 4–11)

## 2022-12-27 PROCEDURE — 99285 EMERGENCY DEPT VISIT HI MDM: CPT | Mod: CS,25

## 2022-12-27 PROCEDURE — 250N000011 HC RX IP 250 OP 636: Performed by: EMERGENCY MEDICINE

## 2022-12-27 PROCEDURE — 120N000001 HC R&B MED SURG/OB

## 2022-12-27 PROCEDURE — 99223 1ST HOSP IP/OBS HIGH 75: CPT | Mod: AI | Performed by: HOSPITALIST

## 2022-12-27 PROCEDURE — 96374 THER/PROPH/DIAG INJ IV PUSH: CPT

## 2022-12-27 PROCEDURE — 84484 ASSAY OF TROPONIN QUANT: CPT | Performed by: EMERGENCY MEDICINE

## 2022-12-27 PROCEDURE — 87637 SARSCOV2&INF A&B&RSV AMP PRB: CPT | Performed by: EMERGENCY MEDICINE

## 2022-12-27 PROCEDURE — C9803 HOPD COVID-19 SPEC COLLECT: HCPCS

## 2022-12-27 PROCEDURE — 82310 ASSAY OF CALCIUM: CPT | Performed by: EMERGENCY MEDICINE

## 2022-12-27 PROCEDURE — 36415 COLL VENOUS BLD VENIPUNCTURE: CPT | Performed by: EMERGENCY MEDICINE

## 2022-12-27 PROCEDURE — 85025 COMPLETE CBC W/AUTO DIFF WBC: CPT | Performed by: EMERGENCY MEDICINE

## 2022-12-27 PROCEDURE — 93005 ELECTROCARDIOGRAM TRACING: CPT

## 2022-12-27 PROCEDURE — 83880 ASSAY OF NATRIURETIC PEPTIDE: CPT | Performed by: EMERGENCY MEDICINE

## 2022-12-27 PROCEDURE — 85379 FIBRIN DEGRADATION QUANT: CPT | Performed by: EMERGENCY MEDICINE

## 2022-12-27 PROCEDURE — 83036 HEMOGLOBIN GLYCOSYLATED A1C: CPT | Performed by: HOSPITALIST

## 2022-12-27 PROCEDURE — 86140 C-REACTIVE PROTEIN: CPT | Performed by: HOSPITALIST

## 2022-12-27 PROCEDURE — 250N000009 HC RX 250: Performed by: EMERGENCY MEDICINE

## 2022-12-27 RX ORDER — IPRATROPIUM BROMIDE AND ALBUTEROL SULFATE 2.5; .5 MG/3ML; MG/3ML
3 SOLUTION RESPIRATORY (INHALATION) ONCE
Status: COMPLETED | OUTPATIENT
Start: 2022-12-27 | End: 2022-12-27

## 2022-12-27 RX ORDER — NICOTINE POLACRILEX 4 MG
15-30 LOZENGE BUCCAL
Status: DISCONTINUED | OUTPATIENT
Start: 2022-12-27 | End: 2022-12-29 | Stop reason: HOSPADM

## 2022-12-27 RX ORDER — ACETAMINOPHEN 325 MG/1
650 TABLET ORAL EVERY 6 HOURS PRN
Status: DISCONTINUED | OUTPATIENT
Start: 2022-12-27 | End: 2022-12-29 | Stop reason: HOSPADM

## 2022-12-27 RX ORDER — IPRATROPIUM BROMIDE AND ALBUTEROL SULFATE 2.5; .5 MG/3ML; MG/3ML
3 SOLUTION RESPIRATORY (INHALATION)
Status: DISCONTINUED | OUTPATIENT
Start: 2022-12-28 | End: 2022-12-29 | Stop reason: HOSPADM

## 2022-12-27 RX ORDER — DEXAMETHASONE SODIUM PHOSPHATE 10 MG/ML
10 INJECTION, SOLUTION INTRAMUSCULAR; INTRAVENOUS ONCE
Status: COMPLETED | OUTPATIENT
Start: 2022-12-27 | End: 2022-12-27

## 2022-12-27 RX ORDER — LIDOCAINE 40 MG/G
CREAM TOPICAL
Status: DISCONTINUED | OUTPATIENT
Start: 2022-12-27 | End: 2022-12-29 | Stop reason: HOSPADM

## 2022-12-27 RX ORDER — DEXTROSE MONOHYDRATE 25 G/50ML
25-50 INJECTION, SOLUTION INTRAVENOUS
Status: DISCONTINUED | OUTPATIENT
Start: 2022-12-27 | End: 2022-12-29 | Stop reason: HOSPADM

## 2022-12-27 RX ORDER — ONDANSETRON 2 MG/ML
4 INJECTION INTRAMUSCULAR; INTRAVENOUS EVERY 6 HOURS PRN
Status: DISCONTINUED | OUTPATIENT
Start: 2022-12-27 | End: 2022-12-29 | Stop reason: HOSPADM

## 2022-12-27 RX ORDER — PANTOPRAZOLE SODIUM 40 MG/1
40 TABLET, DELAYED RELEASE ORAL
Status: DISCONTINUED | OUTPATIENT
Start: 2022-12-28 | End: 2022-12-29 | Stop reason: HOSPADM

## 2022-12-27 RX ORDER — AMLODIPINE BESYLATE 10 MG/1
10 TABLET ORAL DAILY
Status: DISCONTINUED | OUTPATIENT
Start: 2022-12-28 | End: 2022-12-29 | Stop reason: HOSPADM

## 2022-12-27 RX ORDER — GUAIFENESIN 200 MG/10ML
10 LIQUID ORAL EVERY 4 HOURS PRN
Status: DISCONTINUED | OUTPATIENT
Start: 2022-12-27 | End: 2022-12-29 | Stop reason: HOSPADM

## 2022-12-27 RX ORDER — TRAZODONE HYDROCHLORIDE 50 MG/1
50 TABLET, FILM COATED ORAL AT BEDTIME
Status: DISCONTINUED | OUTPATIENT
Start: 2022-12-27 | End: 2022-12-29 | Stop reason: HOSPADM

## 2022-12-27 RX ORDER — LEFLUNOMIDE 10 MG/1
10 TABLET ORAL DAILY
Status: DISCONTINUED | OUTPATIENT
Start: 2022-12-28 | End: 2022-12-29 | Stop reason: HOSPADM

## 2022-12-27 RX ORDER — ONDANSETRON 4 MG/1
4 TABLET, ORALLY DISINTEGRATING ORAL EVERY 6 HOURS PRN
Status: DISCONTINUED | OUTPATIENT
Start: 2022-12-27 | End: 2022-12-29 | Stop reason: HOSPADM

## 2022-12-27 RX ORDER — ENOXAPARIN SODIUM 100 MG/ML
40 INJECTION SUBCUTANEOUS EVERY 24 HOURS
Status: DISCONTINUED | OUTPATIENT
Start: 2022-12-27 | End: 2022-12-29 | Stop reason: HOSPADM

## 2022-12-27 RX ORDER — ROSUVASTATIN CALCIUM 5 MG/1
10 TABLET, COATED ORAL DAILY
Status: DISCONTINUED | OUTPATIENT
Start: 2022-12-28 | End: 2022-12-29 | Stop reason: HOSPADM

## 2022-12-27 RX ORDER — SERTRALINE HYDROCHLORIDE 150 MG/1
1 CAPSULE ORAL EVERY MORNING
Status: DISCONTINUED | OUTPATIENT
Start: 2022-12-28 | End: 2022-12-28

## 2022-12-27 RX ORDER — HYDRALAZINE HYDROCHLORIDE 20 MG/ML
10 INJECTION INTRAMUSCULAR; INTRAVENOUS EVERY 4 HOURS PRN
Status: DISCONTINUED | OUTPATIENT
Start: 2022-12-27 | End: 2022-12-29 | Stop reason: HOSPADM

## 2022-12-27 RX ORDER — BENZONATATE 100 MG/1
100 CAPSULE ORAL 3 TIMES DAILY PRN
Status: DISCONTINUED | OUTPATIENT
Start: 2022-12-27 | End: 2022-12-29 | Stop reason: HOSPADM

## 2022-12-27 RX ORDER — SULFASALAZINE 500 MG/1
2000 TABLET, DELAYED RELEASE ORAL DAILY
Status: DISCONTINUED | OUTPATIENT
Start: 2022-12-28 | End: 2022-12-29 | Stop reason: HOSPADM

## 2022-12-27 RX ORDER — MODAFINIL 200 MG/1
200 TABLET ORAL EVERY MORNING
Status: DISCONTINUED | OUTPATIENT
Start: 2022-12-28 | End: 2022-12-29 | Stop reason: HOSPADM

## 2022-12-27 RX ORDER — DEXAMETHASONE SODIUM PHOSPHATE 10 MG/ML
6 INJECTION, SOLUTION INTRAMUSCULAR; INTRAVENOUS EVERY 24 HOURS
Status: DISCONTINUED | OUTPATIENT
Start: 2022-12-28 | End: 2022-12-29 | Stop reason: HOSPADM

## 2022-12-27 RX ORDER — ACETAMINOPHEN 650 MG/1
650 SUPPOSITORY RECTAL EVERY 6 HOURS PRN
Status: DISCONTINUED | OUTPATIENT
Start: 2022-12-27 | End: 2022-12-29 | Stop reason: HOSPADM

## 2022-12-27 RX ADMIN — IPRATROPIUM BROMIDE AND ALBUTEROL SULFATE 3 ML: 2.5; .5 SOLUTION RESPIRATORY (INHALATION) at 18:57

## 2022-12-27 RX ADMIN — DEXAMETHASONE SODIUM PHOSPHATE 10 MG: 10 INJECTION, SOLUTION INTRAMUSCULAR; INTRAVENOUS at 18:58

## 2022-12-27 RX ADMIN — IPRATROPIUM BROMIDE AND ALBUTEROL SULFATE 3 ML: 2.5; .5 SOLUTION RESPIRATORY (INHALATION) at 19:30

## 2022-12-27 ASSESSMENT — ACTIVITIES OF DAILY LIVING (ADL)
ADLS_ACUITY_SCORE: 33
ADLS_ACUITY_SCORE: 35

## 2022-12-27 NOTE — ED NOTES
Increasing SOB after walking back from EKG room. Pulse oximeter showed 86-90% on room air with wheezing. Pt placed on 2L NC and oxygen sats 96%.

## 2022-12-27 NOTE — ED TRIAGE NOTES
4 months of SOB, states it feels worse now and she feels she is wheezing. States SOB worse at night. Fever 4 weeks ago. NO known hx of asthma and does not have any inhalers or nebulizers. Oxygen saturation 93% on room air and pt reports nagging mild headache. Pt also states blurriness in both eyes intermittently for one week. ABCs intact. A&OX4. Pt had CT scan this morning.      Triage Assessment     Row Name 12/27/22 1184       Triage Assessment (Adult)    Airway WDL WDL       Respiratory WDL    Respiratory WDL X;rhythm/pattern;cough    Rhythm/Pattern, Respiratory shortness of breath;dyspnea on exertion    Cough Frequency frequent       Skin Circulation/Temperature WDL    Skin Circulation/Temperature WDL WDL       Cardiac WDL    Cardiac WDL WDL       Peripheral/Neurovascular WDL    Peripheral Neurovascular WDL WDL       Cognitive/Neuro/Behavioral WDL    Cognitive/Neuro/Behavioral WDL WDL

## 2022-12-27 NOTE — TELEPHONE ENCOUNTER
Patient called into the CS line stating that she is having a hard time breathing and that her O2 levels are at 80 and the highest she has had recently was 85. I informed her that based off this, she will need to go to the ER and that it would not be good to wait until she is seen tomorrow with Coleman at 1:00 pm. She expressed understanding to this and is going to go to the ER today and will let us know how things went and if she will be able to make it tomorrow to her appt .

## 2022-12-28 LAB
ALBUMIN SERPL BCG-MCNC: 3.9 G/DL (ref 3.5–5.2)
ALP SERPL-CCNC: 131 U/L (ref 35–104)
ALT SERPL W P-5'-P-CCNC: 13 U/L (ref 10–35)
ANION GAP SERPL CALCULATED.3IONS-SCNC: 10 MMOL/L (ref 7–15)
AST SERPL W P-5'-P-CCNC: 16 U/L (ref 10–35)
ATRIAL RATE - MUSE: 81 BPM
BILIRUB SERPL-MCNC: 0.2 MG/DL
BUN SERPL-MCNC: 13.6 MG/DL (ref 8–23)
CALCIUM SERPL-MCNC: 9.3 MG/DL (ref 8.8–10.2)
CHLORIDE SERPL-SCNC: 99 MMOL/L (ref 98–107)
CREAT SERPL-MCNC: 0.61 MG/DL (ref 0.51–0.95)
CRP SERPL-MCNC: 19.12 MG/L
D DIMER PPP FEU-MCNC: 0.53 UG/ML FEU (ref 0–0.5)
DEPRECATED HCO3 PLAS-SCNC: 28 MMOL/L (ref 22–29)
DIASTOLIC BLOOD PRESSURE - MUSE: NORMAL MMHG
ERYTHROCYTE [DISTWIDTH] IN BLOOD BY AUTOMATED COUNT: 13.2 % (ref 10–15)
GFR SERPL CREATININE-BSD FRML MDRD: >90 ML/MIN/1.73M2
GLUCOSE BLDC GLUCOMTR-MCNC: 272 MG/DL (ref 70–99)
GLUCOSE BLDC GLUCOMTR-MCNC: 302 MG/DL (ref 70–99)
GLUCOSE BLDC GLUCOMTR-MCNC: 330 MG/DL (ref 70–99)
GLUCOSE BLDC GLUCOMTR-MCNC: 361 MG/DL (ref 70–99)
GLUCOSE BLDC GLUCOMTR-MCNC: 365 MG/DL (ref 70–99)
GLUCOSE SERPL-MCNC: 347 MG/DL (ref 70–99)
HCT VFR BLD AUTO: 35.4 % (ref 35–47)
HGB BLD-MCNC: 11 G/DL (ref 11.7–15.7)
INTERPRETATION ECG - MUSE: NORMAL
MCH RBC QN AUTO: 30.7 PG (ref 26.5–33)
MCHC RBC AUTO-ENTMCNC: 31.1 G/DL (ref 31.5–36.5)
MCV RBC AUTO: 99 FL (ref 78–100)
P AXIS - MUSE: 67 DEGREES
PLATELET # BLD AUTO: 255 10E3/UL (ref 150–450)
POTASSIUM SERPL-SCNC: 3.9 MMOL/L (ref 3.4–5.3)
PR INTERVAL - MUSE: 142 MS
PROT SERPL-MCNC: 6.6 G/DL (ref 6.4–8.3)
QRS DURATION - MUSE: 84 MS
QT - MUSE: 388 MS
QTC - MUSE: 450 MS
R AXIS - MUSE: 34 DEGREES
RBC # BLD AUTO: 3.58 10E6/UL (ref 3.8–5.2)
SODIUM SERPL-SCNC: 137 MMOL/L (ref 136–145)
SYSTOLIC BLOOD PRESSURE - MUSE: NORMAL MMHG
T AXIS - MUSE: 122 DEGREES
TROPONIN T SERPL HS-MCNC: 12 NG/L
VENTRICULAR RATE- MUSE: 81 BPM
WBC # BLD AUTO: 8 10E3/UL (ref 4–11)

## 2022-12-28 PROCEDURE — 250N000011 HC RX IP 250 OP 636: Performed by: HOSPITALIST

## 2022-12-28 PROCEDURE — 94660 CPAP INITIATION&MGMT: CPT

## 2022-12-28 PROCEDURE — 99232 SBSQ HOSP IP/OBS MODERATE 35: CPT | Performed by: INTERNAL MEDICINE

## 2022-12-28 PROCEDURE — 85379 FIBRIN DEGRADATION QUANT: CPT | Performed by: HOSPITALIST

## 2022-12-28 PROCEDURE — 999N000156 HC STATISTIC RCP CONSULT EA 30 MIN

## 2022-12-28 PROCEDURE — 250N000012 HC RX MED GY IP 250 OP 636 PS 637: Performed by: HOSPITALIST

## 2022-12-28 PROCEDURE — 250N000013 HC RX MED GY IP 250 OP 250 PS 637: Performed by: HOSPITALIST

## 2022-12-28 PROCEDURE — 5A09357 ASSISTANCE WITH RESPIRATORY VENTILATION, LESS THAN 24 CONSECUTIVE HOURS, CONTINUOUS POSITIVE AIRWAY PRESSURE: ICD-10-PCS | Performed by: INTERNAL MEDICINE

## 2022-12-28 PROCEDURE — 94640 AIRWAY INHALATION TREATMENT: CPT | Mod: 76

## 2022-12-28 PROCEDURE — 999N000157 HC STATISTIC RCP TIME EA 10 MIN

## 2022-12-28 PROCEDURE — 120N000001 HC R&B MED SURG/OB

## 2022-12-28 PROCEDURE — 84484 ASSAY OF TROPONIN QUANT: CPT | Performed by: INTERNAL MEDICINE

## 2022-12-28 PROCEDURE — 80053 COMPREHEN METABOLIC PANEL: CPT | Performed by: HOSPITALIST

## 2022-12-28 PROCEDURE — 94640 AIRWAY INHALATION TREATMENT: CPT

## 2022-12-28 PROCEDURE — 85027 COMPLETE CBC AUTOMATED: CPT | Performed by: HOSPITALIST

## 2022-12-28 PROCEDURE — 250N000009 HC RX 250: Performed by: HOSPITALIST

## 2022-12-28 PROCEDURE — 86140 C-REACTIVE PROTEIN: CPT | Performed by: HOSPITALIST

## 2022-12-28 PROCEDURE — 36415 COLL VENOUS BLD VENIPUNCTURE: CPT | Performed by: HOSPITALIST

## 2022-12-28 PROCEDURE — 250N000013 HC RX MED GY IP 250 OP 250 PS 637: Performed by: EMERGENCY MEDICINE

## 2022-12-28 RX ORDER — TRAZODONE HYDROCHLORIDE 50 MG/1
25-50 TABLET, FILM COATED ORAL AT BEDTIME
COMMUNITY

## 2022-12-28 RX ORDER — CARVEDILOL 12.5 MG/1
2 TABLET ORAL 2 TIMES DAILY WITH MEALS
COMMUNITY

## 2022-12-28 RX ORDER — SERTRALINE HYDROCHLORIDE 100 MG/1
200 TABLET, FILM COATED ORAL DAILY
COMMUNITY

## 2022-12-28 RX ADMIN — INSULIN GLARGINE 40 UNITS: 100 INJECTION, SOLUTION SUBCUTANEOUS at 01:50

## 2022-12-28 RX ADMIN — DEXAMETHASONE SODIUM PHOSPHATE 6 MG: 10 INJECTION, SOLUTION INTRAMUSCULAR; INTRAVENOUS at 12:19

## 2022-12-28 RX ADMIN — TRAZODONE HYDROCHLORIDE 50 MG: 50 TABLET ORAL at 00:05

## 2022-12-28 RX ADMIN — SULFASALAZINE 2000 MG: 500 TABLET, DELAYED RELEASE ORAL at 12:20

## 2022-12-28 RX ADMIN — IPRATROPIUM BROMIDE AND ALBUTEROL SULFATE 3 ML: 2.5; .5 SOLUTION RESPIRATORY (INHALATION) at 12:19

## 2022-12-28 RX ADMIN — INSULIN ASPART 7 UNITS: 100 INJECTION, SOLUTION INTRAVENOUS; SUBCUTANEOUS at 01:50

## 2022-12-28 RX ADMIN — AMLODIPINE BESYLATE 10 MG: 10 TABLET ORAL at 12:40

## 2022-12-28 RX ADMIN — ENOXAPARIN SODIUM 40 MG: 40 INJECTION SUBCUTANEOUS at 22:57

## 2022-12-28 RX ADMIN — INSULIN ASPART 6 UNITS: 100 INJECTION, SOLUTION INTRAVENOUS; SUBCUTANEOUS at 23:01

## 2022-12-28 RX ADMIN — LEFLUNOMIDE 10 MG: 10 TABLET ORAL at 12:20

## 2022-12-28 RX ADMIN — TRAZODONE HYDROCHLORIDE 50 MG: 50 TABLET ORAL at 22:56

## 2022-12-28 RX ADMIN — ROSUVASTATIN CALCIUM 10 MG: 5 TABLET, FILM COATED ORAL at 12:20

## 2022-12-28 RX ADMIN — SERTRALINE HYDROCHLORIDE 150 MG: 100 TABLET ORAL at 12:19

## 2022-12-28 RX ADMIN — ACETAMINOPHEN 650 MG: 325 TABLET, FILM COATED ORAL at 22:56

## 2022-12-28 RX ADMIN — ENOXAPARIN SODIUM 40 MG: 40 INJECTION SUBCUTANEOUS at 01:51

## 2022-12-28 RX ADMIN — IPRATROPIUM BROMIDE AND ALBUTEROL SULFATE 3 ML: 2.5; .5 SOLUTION RESPIRATORY (INHALATION) at 17:00

## 2022-12-28 RX ADMIN — IPRATROPIUM BROMIDE AND ALBUTEROL SULFATE 3 ML: 2.5; .5 SOLUTION RESPIRATORY (INHALATION) at 19:45

## 2022-12-28 ASSESSMENT — ACTIVITIES OF DAILY LIVING (ADL)
ADLS_ACUITY_SCORE: 35
CHANGE_IN_FUNCTIONAL_STATUS_SINCE_ONSET_OF_CURRENT_ILLNESS/INJURY: YES
FALL_HISTORY_WITHIN_LAST_SIX_MONTHS: NO
WALKING_OR_CLIMBING_STAIRS_DIFFICULTY: YES
WEAR_GLASSES_OR_BLIND: YES
ADLS_ACUITY_SCORE: 25
ADLS_ACUITY_SCORE: 37
ADLS_ACUITY_SCORE: 25
ADLS_ACUITY_SCORE: 35
TRANSFERRING: 0-->INDEPENDENT
DRESSING/BATHING_DIFFICULTY: NO
ADLS_ACUITY_SCORE: 25
WALKING_OR_CLIMBING_STAIRS: STAIR CLIMBING DIFFICULTY, ASSISTANCE 1 PERSON
CONCENTRATING,_REMEMBERING_OR_MAKING_DECISIONS_DIFFICULTY: YES
DIFFICULTY_EATING/SWALLOWING: NO
ADLS_ACUITY_SCORE: 37
DOING_ERRANDS_INDEPENDENTLY_DIFFICULTY: YES
TRANSFERRING: 0-->INDEPENDENT
ADLS_ACUITY_SCORE: 37
ADLS_ACUITY_SCORE: 37
VISION_MANAGEMENT: GLASSES
ADLS_ACUITY_SCORE: 37
ADLS_ACUITY_SCORE: 37
TOILETING_ISSUES: NO
ADLS_ACUITY_SCORE: 37

## 2022-12-28 NOTE — PROGRESS NOTES
A CPAP of  +5 @ 30% was applied to the pt via the mask for sleep apnea. The bridge of the nose skin integrity intact with no complications. Pt is tolerating it well. Will continue to monitor and assess the pt's current respiratory status and needs.    BP (!) 179/85   Pulse 95   Temp 97.5  F (36.4  C) (Temporal)   Resp 18   Wt 98 kg (216 lb 0.8 oz)   LMP 12/04/2003   SpO2 96%   BMI 42.19 kg/m      Charlie Danielson, RT

## 2022-12-28 NOTE — PHARMACY-ADMISSION MEDICATION HISTORY
Admission medication history interview status for this patient is complete. See [facility] admission navigator for allergy information, prior to admission medications and immunization status.     Medication history interview done, indicate source(s): Patient  Medication history resources (including written lists, pill bottles, clinic record):None  Pharmacy: [facility]    Changes made to PTA medication list:  Added: carvedilol, trazodone, insulin pump (settings not verified, pt does not have pump with)  Changed: added sig to amlodipine. Lorazepam 0.5 mg prn --> 0.5-1 mg at bedtime prn, rosuvastatin 10 mg daily --> 20 mg daily, sertraline 150 mg caps --> 100 mg + 50 mg tabs daily  Reported as Not Taking: none  Removed: hydrochlorothiazide, prednisone    Actions taken by pharmacist (provider contacted, etc):None     Additional medication history information: Pt states insulin pump (Omnipod) malfunctioned 2 days PTA, a delivery for new pods was to come in today. Pt states she has not used any insulin since 12/26 and was only using Novolog - Basaglar last dispensed 6/6 for 78 days supply per [facility] [employer], pt also states she was not taking Basaglar PTA.     Medication reconciliation/reorder completed by provider prior to medication history?  Y   (Y/N)     For patients on insulin therapy:   Do you use sliding scale insulin based on blood sugars? Yes  Do you typically eat three meals a day? Yes  How many times do you check your blood glucose per day? Has CGM  How many episodes of hypoglycemia do you typically have per month? Rarely since starting insulin pump  Do you have a Continuous Glucose Monitor (CGM)?  Yes    Prior to Admission medications    Medication Sig Last Dose Taking? Auth Provider Long Term End Date   abatacept (ORENCIA) 250 MG injection Inject into the vein every 30 days 750 mg Past Month at received dose recently, not due for a few weeks Yes Reported, Patient     acetaminophen (TYLENOL) 325 MG tablet Take 2  tablets (650 mg) by mouth every 4 hours as needed for other (mild pain) prn at prn Yes Tin Shetty MD     amLODIPine (NORVASC) 10 MG tablet Take 10 mg by mouth daily 12/26/2022 at am Yes Reported, Patient     buPROPion (WELLBUTRIN XL) 300 MG 24 hr tablet TAKE ONE TABLET BY MOUTH EVERY DAY AS DIRECTED 12/26/2022 at am Yes Reported, Patient     carvedilol (COREG) 12.5 MG tablet Take 12.5 mg by mouth 2 times daily (with meals) 12/26/2022 at x2 Yes Unknown, Entered By History Yes    cetirizine (ZYRTEC) 10 MG tablet Take 1 tablet (10 mg) by mouth daily 12/26/2022 at am Yes Flo Weston MD     dorzolamide-timolol (COSOPT) 2-0.5 % ophthalmic solution INSTILL 1 DROP INTO THE LEFT EYE TWICE DAILY 12/26/2022 at x2 Yes Reported, Patient     insulin lispro (HUMALOG KWIKPEN) 100 UNIT/ML (1 unit dial) KWIKPEN Inject 24 Units Subcutaneous 3 times daily (before meals) Plus sliding scale. 12/26/2022 Yes Reported, Patient No    INSULIN PUMP - OUTPATIENT Date Last Updated: Pt's pump malfunctioned 2 days PTA 12/27 - pt does not have pump with, unable to verify settings, settings unknown to pt  Omnipod  Pump Basal Rates-Times:    CARB RATIO:  CF / Sensitivity Times:  TARGET BG:  Active Insulin Time:  Basal to Bolus Ratio:  Sensor:  Yes Unknown, Entered By History     irbesartan-hydrochlorothiazide (AVALIDE) 300-12.5 MG tablet Take 1 tablet by mouth daily 12/26/2022 at am Yes Flo Weston MD Yes    latanoprost (XALATAN) 0.005 % ophthalmic solution Place 1 drop Into the left eye At Bedtime 12/26/2022 at hs Yes Unknown, Entered By History Yes    leflunomide (ARAVA) 10 MG tablet Take 10 mg by mouth daily 12/26/2022 at am Yes Unknown, Entered By History Yes    LORazepam (ATIVAN) 0.5 MG tablet Take 0.5-1 mg by mouth nightly as needed for sleep or muscle spasms prn at prn Yes Reported, Patient     modafinil (PROVIGIL) 200 MG tablet Take 1 tablet (200 mg) by mouth every morning 12/26/2022 at am Yes Wei  Cass Hannon PA-C     RABEprazole (ACIPHEX) 20 MG EC tablet Take 1 tablet (20 mg) by mouth daily 12/26/2022 at am Yes Flo Weston MD     rosuvastatin (CRESTOR) 20 MG tablet Take 20 mg by mouth daily 12/26/2022 at am Yes Unknown, Entered By History Yes    sertraline (ZOLOFT) 100 MG tablet Take 100 mg by mouth daily Take with 50 mg tablet for total daily dose of 150 mg 12/26/2022 at am Yes Unknown, Entered By History No    sertraline (ZOLOFT) 50 MG tablet Take 50 mg by mouth daily Take with 100 mg tablet for total daily dose of 150 mg 12/26/2022 at am Yes Unknown, Entered By History No    sulfaSALAzine ER (AZULFIDINE EN) 500 MG EC tablet 2,000 mg daily  12/26/2022 at am Yes Reported, Patient     traZODone (DESYREL) 50 MG tablet Take 25-50 mg by mouth At Bedtime 12/26/2022 at hs Yes Unknown, Entered By History No    vitamin D3 (CHOLECALCIFEROL) 250 mcg (00995 units) capsule Take 1 capsule by mouth daily Takes 10,000 units daily 12/26/2022 at am Yes Unknown, Entered By History     B-D U/F 31G X 8 MM insulin pen needle    Reported, Patient     Continuous Blood Gluc Sensor (DEXCOM G6 SENSOR) MISC INSERT EVERY 10 DAYS.   Reported, Patient

## 2022-12-28 NOTE — PROGRESS NOTES
[facility]    Hospitalist Progress Note  Name: Kemi Casper    MRN: [id number]  Provider:  Aramis Schultz DO  Date of Service: 12/28/2022    Summary of Stay: Kemi Casper is a 67 year old female with a history of SHAREE with CPAP, morbid obesity, rheumatoid arthritis on Orencia monthly injections, type 2 diabetes mellitus, chronic incontinence, chronic shortness of breath, hypertension, hyperlipidemia, depression/anxiety admitted on 12/27/2022 with shortness of breath.  The patient reports chronic shortness of breath over the past year but it had worsened over the last week.  She had a repeat CT chest per pulmonary medicine for some pulmonary nodules the day of admission.  She was noted to have low oxygen levels and referred to the emergency department.  In the emergency department, the patient was found to have a temperature of 97.5  F, heart rate 79, blood pressure 180/108, respiratory rate 22, SPO2 91% on 2 L nasal cannula.  Initial lab work showed bicarb 31, CRP 19.8, glucose 220, A1c 6.9, proBNP 344, high-sensitivity troponin 18 that improved to 12 upon repeat check, hemoglobin 10.2.  The patient tested positive for COVID-19.  CT chest showed stable small solid right lower lobe pulmonary nodule and left lower lobe groundglass pulmonary nodule.  The patient was started on IV dexamethasone as she was wheezy and requiring oxygen in the emergency department.  The patient was also scheduled for DuoNeb treatments.  EKG showed sinus rhythm with T wave inversions in lateral leads V5 and V6.  Echocardiogram was ordered.    TODAY'S PLAN:  Pt seen and examined in the ED.  Pt still on oxygen.  No wheezing but taking shallow breaths.  SpO2 improved with CPAP.  CT chest showed no evidence of pna.  Suspect oxygenation issues secondary to shallow inspirations and possible reactive airway disease.  Cannot rule out some amount of Obesity Hypoventilation Syndrome.  CRP elevated, though pt  with rheumatoid arthritis so may not be very reliable in measuring inflammation due to covid.  Sounds like pt had covid 3 weeks ago as that was when she had sore throat and upper respiratory symptoms.  Upon review of EKG, pt does have T wave inversions in lateral leads V5-V6.  Given those changes and slightly elevated troponin, will check echocardiogram.  Plan is to wean off oxygen overnight if able and check echo.  If echo unremarkable, will discharge home tomorrow after home O2 eval with 10 day course of dexamethasone.  Will add incentive spirometer as well.    Problem List:   Acute Hypoxic Respiratory Failure  - Suspect multifactorial including: reactive airway disease, obestiy hypoventilation syndrome, SHAREE  - Continue Dexamethasone 6 mg daily  - Akbarbs  - IS  - Wean O2 as able    Covid 19 Infection  - Suspect this is an old infection as pt reports symptoms of sore throat, etc starting ~3 weeks ago.  She denies testing herself for covid at that time.  Regardless, will treat as active infection  - Lovenox DVT prophylactic dosing  - Dexamethasone as above  - No infiltrates on CT so will hold off on Remdesivir    Elevated Troponin  EKG Abnormality  - EKG showed T-wave inversions in lateral leads V5-V6.  Troponin elevated upon arrival at 18.  Pt reports some chest pressure over the last few weeks but reports it can happen at rest.  - Check Echocardiogram  - Repeat Troponin  - If echo negative, would recommend outpatient stress test in 1-2 weeks    Rheumatoid Arthritis  - PTA meds Orencia, sulfsalazine, leflunomide    Type 2 Diabetes Mellitus  - A1C = 6.9 on 12/27/2022  - Resume PTA lantus (sub for basaglar) 50 units at bedtime  - Pt requested resumption of PTA Novolog 24 units with meals + ISS  - Monitor for hyperglycemia on steroids    Chronic Medical Problems:  Hypertension  Hyperlipidemia  Depression/Anxiety  Morbid Obesity  SHAREE with CPAP    DVT Prophylaxis: Enoxaparin (Lovenox) SQ  Code Status: Full Code  Diet:  Moderate Consistent Carb (60 g CHO per Meal) Diet    Anna Catheter: Not present  Disposition: Expected discharge tomorrow to home. Goals prior to discharge include oxygen requirements improved, echo results unremarkable.   Family updated today: Yes      Interval History   Pt seen and examined in the ED.  Pt reports having covid fog.  Pt reports some episodes of chest pressure over the last few months, but unable to say when they occur.    -Data reviewed today: I personally reviewed all new labs and imaging results over the last 24 hours.     Physical Exam   Temp: 97.2  F (36.2  C) Temp src: Oral BP: (!) 157/88 Pulse: 72   Resp: 20 SpO2: 95 % O2 Device: Nasal cannula Oxygen Delivery: 2 LPM  Vitals:    12/27/22 1322   Weight: 98 kg (216 lb 0.8 oz)     Vital Signs with Ranges  Temp:  [97.2  F (36.2  C)-98.1  F (36.7  C)] 97.2  F (36.2  C)  Pulse:  [72-95] 72  Resp:  [18-22] 20  BP: (157-179)/(74-88) 157/88  FiO2 (%):  [30 %] 30 %  SpO2:  [86 %-97 %] 95 %  No intake/output data recorded.    GENERAL: No apparent distress. Awake, alert, and fully oriented.  HEENT: Normocephalic, atraumatic. Extraocular movements intact.  CARDIOVASCULAR: Regular rate and rhythm without murmurs or rubs. No S3.  PULMONARY: Clear bilaterally.  GASTROINTESTINAL: Soft, non-tender, non-distended. Bowel sounds normoactive.   EXTREMITIES: No cyanosis or clubbing. No edema.  NEUROLOGICAL: CN 2-12 grossly intact, no focal neurological deficits.  DERMATOLOGICAL: No rash, ulcer, bruising, nor jaundice.    Medications       amLODIPine  10 mg Oral Daily     dexamethasone  6 mg Intravenous Q24H     enoxaparin ANTICOAGULANT  40 mg Subcutaneous Q24H     insulin aspart   Subcutaneous TID w/meals     insulin aspart  1-10 Units Subcutaneous TID AC     insulin aspart  1-7 Units Subcutaneous At Bedtime     insulin glargine  50 Units Subcutaneous At Bedtime     ipratropium - albuterol 0.5 mg/2.5 mg/3 mL  3 mL Nebulization 4x daily     leflunomide  10 mg Oral  Daily     modafinil  200 mg Oral QAM     pantoprazole  40 mg Oral QAM AC     rosuvastatin  10 mg Oral Daily     sertraline  150 mg Oral Daily     sodium chloride (PF)  3 mL Intracatheter Q8H     sulfaSALAzine ER  2,000 mg Oral Daily     traZODone  50 mg Oral At Bedtime     Data     Laboratory:  Recent Labs   Lab 12/28/22  0743 12/27/22  1749   WBC 8.0 6.4   HGB 11.0* 10.2*   HCT 35.4 34.0*   MCV 99 101*    235     Recent Labs   Lab 12/28/22  1410 12/28/22  0743 12/28/22  0212 12/27/22  2315 12/27/22  1749   NA  --  137  --   --  141   POTASSIUM  --  3.9  --   --  3.7   CHLORIDE  --  99  --   --  101   CO2  --  28  --   --  31*   ANIONGAP  --  10  --   --  9   * 347* 365*   < > 220*   BUN  --  13.6  --   --  9.7   CR  --  0.61  --   --  0.81   GFRESTIMATED  --  >90  --   --  79   DEEDEE  --  9.3  --   --  8.9    < > = values in this interval not displayed.     No results for input(s): CULT in the last 168 hours.    Imaging:  No results found for this or any previous visit (from the past 24 hour(s)).      Aramis Schultz DO  [facility] Hospitalist  [address] [place] [place] [address]  12/28/2022

## 2022-12-28 NOTE — ED NOTES
[facility]  ED Nurse Handoff Report    Kemi Casper is a 67 year old female   ED Chief complaint: Shortness of Breath  . ED Diagnosis:   Final diagnoses:   Wheezing   Infection due to 2019 novel coronavirus   Acute respiratory failure with hypoxia (H)     Allergies:   Allergies   Allergen Reactions     Compazine [Prochlorperazine]      Hallucinations - was hospitalized at the time and then had mental side effects, thought that everyone had evacuated the hospital     Lisinopril Cough     Adhesive Tape Rash       Code Status: Full Code  Activity level - Baseline/Home:  Independent. Activity Level - Current:   Stand by Assist. Lift room needed: No. Bariatric: No   Needed: No   Isolation: Yes. Infection: Not Applicable  COVID and special precautions. Pt's symptom onset was 12/3/22. Did not test at the time but is likely covid recovered and still testing positive.    Vital Signs:   Vitals:    12/27/22 2126 12/27/22 2156 12/27/22 2226 12/28/22 0000   BP:       Pulse:       Resp:       Temp:       TempSrc:       SpO2: (!) 87% 97% 91% 94%   Weight:           Cardiac Rhythm:  ,      Pain level:    Patient confused: No. Patient Falls Risk: Yes.   Elimination Status: Has voided   Patient Report - Initial Complaint: Shortness of breath. Focused Assessment: 4 months of SOB, states it feels worse now and she feels she is wheezing. States SOB worse at night. Fever 4 weeks ago. NO known hx of asthma and does not have any inhalers or nebulizers. Oxygen saturation upper 80s on room air and pt reports nagging mild headache. Pt also states blurriness in both eyes intermittently for one week.   Tests Performed: labs, . Abnormal Results:   Labs Ordered and Resulted from Time of ED Arrival to Time of ED Departure   BASIC METABOLIC PANEL - Abnormal       Result Value    Sodium 141      Potassium 3.7      Chloride 101      Carbon Dioxide (CO2) 31 (*)     Anion Gap 9      Urea Nitrogen 9.7      Creatinine  0.81      Calcium 8.9      Glucose 220 (*)     GFR Estimate 79     INFLUENZA A/B & SARS-COV2 PCR MULTIPLEX - Abnormal    Influenza A PCR Negative      Influenza B PCR Negative      RSV PCR Negative      SARS CoV2 PCR Positive (*)    CBC WITH PLATELETS AND DIFFERENTIAL - Abnormal    WBC Count 6.4      RBC Count 3.38 (*)     Hemoglobin 10.2 (*)     Hematocrit 34.0 (*)      (*)     MCH 30.2      MCHC 30.0 (*)     RDW 13.2      Platelet Count 235      % Neutrophils 58      % Lymphocytes 26      % Monocytes 8      % Eosinophils 6      % Basophils 1      % Immature Granulocytes 1      NRBCs per 100 WBC 0      Absolute Neutrophils 3.8      Absolute Lymphocytes 1.7      Absolute Monocytes 0.5      Absolute Eosinophils 0.4      Absolute Basophils 0.1      Absolute Immature Granulocytes 0.0      Absolute NRBCs 0.0     TROPONIN T, HIGH SENSITIVITY - Abnormal    Troponin T, High Sensitivity 18 (*)    D DIMER QUANTITATIVE - Abnormal    D-Dimer Quantitative 0.56 (*)    CRP INFLAMMATION - Abnormal    CRP Inflammation 19.84 (*)    GLUCOSE BY METER - Abnormal    GLUCOSE BY METER POCT 347 (*)    HEMOGLOBIN A1C - Abnormal    Hemoglobin A1C 6.9 (*)    NT PROBNP INPATIENT - Normal    N terminal Pro BNP Inpatient 344     GLUCOSE MONITOR NURSING POCT   GLUCOSE MONITOR NURSING POCT   GLUCOSE MONITOR NURSING POCT   GLUCOSE MONITOR NURSING POCT   GLUCOSE MONITOR NURSING POCT     No orders to display   .   Treatments provided: see MAR  Family Comments:  was at bedside, has since left  OBS brochure/video discussed/provided to patient:  No  ED Medications:   Medications   amLODIPine (NORVASC) tablet 10 mg (has no administration in time range)   rosuvastatin (CRESTOR) tablet 10 mg (has no administration in time range)   dexamethasone PF (DECADRON) injection 6 mg (has no administration in time range)   lidocaine 1 % 0.1-1 mL (has no administration in time range)   lidocaine (LMX4) cream (has no administration in time range)   sodium  chloride (PF) 0.9% PF flush 3 mL (has no administration in time range)   sodium chloride (PF) 0.9% PF flush 3 mL (has no administration in time range)   melatonin tablet 1 mg (has no administration in time range)   enoxaparin ANTICOAGULANT (LOVENOX) injection 40 mg (has no administration in time range)   acetaminophen (TYLENOL) tablet 650 mg (has no administration in time range)     Or   acetaminophen (TYLENOL) Suppository 650 mg (has no administration in time range)   ondansetron (ZOFRAN ODT) ODT tab 4 mg (has no administration in time range)     Or   ondansetron (ZOFRAN) injection 4 mg (has no administration in time range)   ipratropium - albuterol 0.5 mg/2.5 mg/3 mL (DUONEB) neb solution 3 mL (has no administration in time range)   insulin glargine (LANTUS PEN) injection 40 Units (has no administration in time range)   glucose gel 15-30 g (has no administration in time range)     Or   dextrose 50 % injection 25-50 mL (has no administration in time range)     Or   glucagon injection 1 mg (has no administration in time range)   insulin aspart (NovoLOG) injection (RAPID ACTING) (has no administration in time range)   insulin aspart (NovoLOG) injection (RAPID ACTING) (has no administration in time range)   insulin aspart (NovoLOG) injection (RAPID ACTING) (has no administration in time range)   hydrALAZINE (APRESOLINE) injection 10 mg (has no administration in time range)   leflunomide (ARAVA) tablet 10 mg (has no administration in time range)   modafinil (PROVIGIL) tablet 200 mg (has no administration in time range)   pantoprazole (PROTONIX) EC tablet 40 mg (has no administration in time range)   Sertraline HCl CAPS 1 capsule (has no administration in time range)   sulfaSALAzine ER (AZULFIDINE EN) EC tablet 2,000 mg (has no administration in time range)   guaiFENesin (ROBITUSSIN) 20 mg/mL solution 10 mL (has no administration in time range)   benzonatate (TESSALON) capsule 100 mg (has no administration in time  range)   traZODone (DESYREL) tablet 50 mg (50 mg Oral Given 12/28/22 0005)   ipratropium - albuterol 0.5 mg/2.5 mg/3 mL (DUONEB) neb solution 3 mL (3 mLs Nebulization Given 12/27/22 1857)   dexamethasone PF (DECADRON) injection 10 mg (10 mg Intravenous Given 12/27/22 1858)   ipratropium - albuterol 0.5 mg/2.5 mg/3 mL (DUONEB) neb solution 3 mL (3 mLs Nebulization Given 12/27/22 1930)     Drips infusing:  No  For the majority of the shift, the patient's behavior Green. Interventions performed were N/a.    Sepsis treatment initiated: No     Patient tested for COVID 19 prior to admission: YES- positive    ED Nurse Name/Phone Number: Jessie Little RN on 12/28/2022 at 6:02 PM    RECEIVING UNIT ED HANDOFF REVIEW    Above ED Nurse Handoff Report was reviewed: Yes  Reviewed by: Dawna Randle RN on December 28, 2022 at 6:03 PM

## 2022-12-28 NOTE — PROGRESS NOTES
Pt on V60 CPAP +5, 30%. On home CPAP at night/nap.  1300, took pt off CPAP to 2LNC. Sat 95%.     Will use CPAP for nap and HOS

## 2022-12-28 NOTE — PLAN OF CARE
Pt blood sugar was 361. Pt was given 7 units of Novolog as correction units. pt blood sugar was rechecked which was 365. Hospitalist paged. pt is sleeping in bed and will continue to monitor and assess pt.

## 2022-12-28 NOTE — H&P
"[facility]    History and Physical  Hospitalist       Date of Admission:  12/27/2022    Assessment & Plan   Kemi Casper is a 67 year old female with a past medical history of GERD, anxiety/depression, hypertension, hyperlipidemia, fibromyalgia who presents with shortness of breath.    #Acute hypoxemic respiratory failure secondary to COVID-19 and likely reactive airway disease: Patient notes that she has had shortness of breath for \"years.\"  She notes that her shortness of breath worsened over the last few days.  She had noted sore throat and generalized weakness as well.  She has had a headache.  She has had a cough with production.  She denies any specific chest pain or discomfort.  She has baseline GERD symptoms which are unchanged.  Her  has been sick with a cold about 2 weeks ago and his symptoms lasted about 10 days.  She actually follows with pulmonary medicine for nodules and had a repeat CT chest done today.  She had been checking her oxygen levels at home and noted that they were as low as 85.  She was told to go to the ER for evaluation.  She is vaccinated for COVID but notes she is due for a booster now.  -ER, patient afebrile and nontachycardic.  Hypertensive with systolic blood pressure in the 170-190 range.  Desaturating in the 80s on room air and needed to be placed on 2 L via nasal cannula.  She had CT chest done at outside facility earlier in the day that showed stable solid right lower lobe pulmonary nodule and left lower lobe groundglass pulmonary nodule with recommendation for CT in 2 years for follow-up.  No clear infiltrate.  She was given steroids, nebulizer.  -We will treat with dexamethasone and scheduled duo nebs given wheezing on exam which I suspect may be more upper respiratory.  She notes that she has been told she has asthma but does not use maintenance inhalers.  She does not recall having PFTs.  -Lovenox for prophylaxis  -As needed " "antitussives  -Continuous pulse oximetry.  Trend inflammatory markers. Wean O2 as able.   -Maintain precautions.    #Chronic incontinence: Patient notes that she has developed incontinence over the last few months.  Sounds like she has sudden urge to urinate and cannot get to the bathroom but also has wet the bed at night.  I did recommend she follow-up with her PCP and likely follow-up with urology as an outpatient for urodynamic studies.    #HTN: Await pharmacy med rec.  Resume once verified.  As needed hydralazine available.  #HL: Continue statin  #Depression/anxiety: Await pharmacy med rec.  Resume once verified.  #Rheumatoid arthritis: She is maintained on Orencia monthly injection, sulfasalazine 2000 mg daily, leflunomide 10 mg daily.  Resume sulfasalazine and leflunomide.  Reconciled on admission  #DM2: Patient notes she recently was transitioned to the pump which malfunctioned.  She is now back on subcutaneous regimen.  She uses 50 units of Basaglar at night along with 24 units of lispro daily.  We will start with 40 units of Lantus at night along with carb count 1 unit per 10 g of carbs with high sliding scale.  Titrate as needed.  In the setting of steroids may need up titration.  #Obesity, SHAREE: CPAP.  Complicates cares    DVT Prophylaxis: Enoxaparin (Lovenox) SQ  Code Status: Full Code  Dispo: Admit to inpatient    Stuart Wallis MD    Primary Care Physician   AMY BLAND    Chief Complaint   SOB    History is obtained from the patient, patient's chart discussed with ER physician    History of Present Illness   Kemi Casper is a 67 year old female with a past medical history of GERD, anxiety/depression, hypertension, hyperlipidemia, fibromyalgia who presents with shortness of breath.    Patient notes that she has had shortness of breath for \"years.\"  She notes that her shortness of breath worsened over the last few days.  She had noted sore throat and generalized weakness as well.  She " has had a headache.  She has had a cough with production.  She denies any specific chest pain or discomfort.  She has baseline GERD symptoms which are unchanged.  Her  has been sick with a cold about 2 weeks ago and his symptoms lasted about 10 days.  She actually follows with pulmonary medicine for nodules and had a repeat CT chest done today.  She had been checking her oxygen levels at home and noted that they were as low as 85.  She was told to go to the ER for evaluation.  She is vaccinated for COVID but notes she is due for a booster now.    In the ER, patient afebrile and nontachycardic.  Hypertensive with systolic blood pressure in the 170-190 range.  Desaturating in the 80s on room air and needed to be placed on 2 L via nasal cannula.  She had CT chest done at outside facility earlier in the day that showed stable solid right lower lobe pulmonary nodule and left lower lobe groundglass pulmonary nodule with recommendation for CT in 2 years for follow-up.  No clear infiltrate.  She was given steroids, nebulizer.    Past Medical History    I have reviewed this patient's medical history and updated it with pertinent information if needed.   Past Medical History:   Diagnosis Date     Complication of anesthesia     psychological fear of waking up during surgery     Depression      Diabetes mellitus      Diverticulitis      Essential hypertension, benign 7/2/2002     Fibromyalgia      GERD (gastroesophageal reflux disease)      Hyperlipidemia      Incontinence of urine      Major depressive disorder, recurrent episode, in full remission (H) 4/20/2011     Mixed hyperlipidemia 7/22/2016     Need for prophylactic hormone replacement therapy (postmenopausal) 12/14/2005     Obesity (BMI 30.0-34.9) 7/22/2016     Osteoarthritis      Other abnormal glucose 2007     Sleep apnea 2/9/2011    cpap     Type 2 diabetes mellitus with neurological manifestations (HCC) 5/6/2015     Uncomplicated asthma        Past Surgical  History   I have reviewed this patient's surgical history and updated it with pertinent information if needed.  Past Surgical History:   Procedure Laterality Date     ARTHROPLASTY KNEE Left 4/9/2018    Procedure: ARTHROPLASTY KNEE;  Left total knee arthroplasty;  Surgeon: Tin Shetty MD;  Location: [facility] OR     ARTHROPLASTY KNEE Right 11/1/2021    Procedure: Right total knee arthroplasty;  Surgeon: Tin Shetty MD;  Location: [facility] OR     ARTHROSCOPY KNEE Left 07/26/2016     CHOLECYSTECTOMY  2002     HC DILATION/CURETTAGE DIAG/THER NON OB  1977    D & C     HYSTERECTOMY TOTAL ABDOMINAL, BILATERAL SALPINGO-OOPHORECTOMY, COMBINED  2002     REMV PILONIDAL LESION SIMPLE  age 17     TONSILLECTOMY, ADENOIDECTOMY, COMBINED  age 5     ZZC APPENDECTOMY  2002    done with hysterectomy     ZZC EYE EXAM ESTABLISHED PT  2003     ZZC GOETZ W/O FACETEC FORAMOT/DSKC 1/2 VRT SEG, LUMBAR  10/02/2000    Laminectomy, Lumbar       Prior to Admission Medications   Prior to Admission Medications   Prescriptions Last Dose Informant Patient Reported? Taking?   B-D U/F 31G X 8 MM insulin pen needle   Yes No   Continuous Blood Gluc Sensor (DEXCOM G6 SENSOR) MISC   Yes No   Sig: INSERT EVERY 10 DAYS.   LORazepam (ATIVAN) 0.5 MG tablet   Yes No   Sig: Take 0.5 mg by mouth as needed   RABEprazole (ACIPHEX) 20 MG EC tablet   No No   Sig: Take 1 tablet (20 mg) by mouth daily   Sertraline HCl 150 MG CAPS   Yes No   Sig: TAKE ONE CAPSULE BY MOUTH EVERY MORNING .   abatacept (ORENCIA) 250 MG injection   Yes No   Sig: Inject into the vein every 30 days 750 mg   acetaminophen (TYLENOL) 325 MG tablet   No No   Sig: Take 2 tablets (650 mg) by mouth every 4 hours as needed for other (mild pain)   amLODIPine (NORVASC) 10 MG tablet   Yes No   Sig: Take 10 mg by mouth   buPROPion (WELLBUTRIN XL) 300 MG 24 hr tablet   Yes No   Sig: TAKE ONE TABLET BY MOUTH EVERY DAY AS DIRECTED   cetirizine (ZYRTEC) 10 MG tablet   No No   Sig: Take 1 tablet (10  mg) by mouth daily   dorzolamide-timolol (COSOPT) 2-0.5 % ophthalmic solution   Yes No   Sig: INSTILL 1 DROP INTO THE LEFT EYE TWICE DAILY   hydrochlorothiazide (HYDRODIURIL) 25 MG tablet   Yes No   Sig: Take 25 mg by mouth daily   Patient not taking: Reported on 2022   insulin lispro (HUMALOG KWIKPEN) 100 UNIT/ML (1 unit dial) KWIKPEN   Yes No   Sig: Inject 24 Units Subcutaneous 3 times daily (before meals)    irbesartan-hydrochlorothiazide (AVALIDE) 300-12.5 MG tablet   No No   Sig: Take 1 tablet by mouth daily   latanoprost (XALATAN) 0.005 % ophthalmic solution   Yes No   Sig: Place 1 drop Into the left eye At Bedtime   leflunomide (ARAVA) 10 MG tablet   Yes No   Sig: Take 10 mg by mouth daily   modafinil (PROVIGIL) 200 MG tablet   No No   Sig: Take 1 tablet (200 mg) by mouth every morning   predniSONE (DELTASONE) 5 MG tablet   Yes No   Sig: TAKE 3 TABLETS BY MOUTH IN THE MORNING FOR 2 DAYS, THEN 2 TABELTS IN THE MORNING FOR 2 DAYS, AND THEN 1 TABLET IN MORNING FOR 2 DAYS   rosuvastatin (CRESTOR) 10 MG tablet   Yes No   Sig: Take 10 mg by mouth daily   sulfaSALAzine ER (AZULFIDINE EN) 500 MG EC tablet   Yes No   Si,000 mg daily    vitamin D3 (CHOLECALCIFEROL) 250 mcg (81010 units) capsule   Yes No   Sig: Take 1 capsule by mouth daily Takes 10,000 units daily      Facility-Administered Medications: None     Allergies   Allergies   Allergen Reactions     Compazine [Prochlorperazine]      Hallucinations - was hospitalized at the time and then had mental side effects, thought that everyone had evacuated the hospital     Lisinopril Cough     Adhesive Tape Rash       Social History   I have reviewed this patient's social history and updated it with pertinent information if needed. Kemi OCTAVIO Casper  reports that she has never smoked. She has never used smokeless tobacco. She reports that she does not drink alcohol and does not use drugs.    Family History   I have reviewed this patient's family history  and updated it with pertinent information if needed.   Family History   Problem Relation Age of Onset     Cancer Mother         skin     Hypertension Mother      Diabetes Father      Heart Disease Father         CHF     Genitourinary Problems Father         Renal failure     Thyroid Disease Sister         cyst       Review of Systems   The 10 point Review of Systems is negative other than noted in the HPI or here.     Physical Exam   Temp: 97.5  F (36.4  C) Temp src: Temporal BP: (!) 180/108 Pulse: 79   Resp: 22 SpO2: 91 % O2 Device: None (Room air) Oxygen Delivery: 2 LPM  Vital Signs with Ranges  Temp:  [97.5  F (36.4  C)] 97.5  F (36.4  C)  Pulse:  [79] 79  Resp:  [22] 22  BP: (180)/(108) 180/108  SpO2:  [87 %-97 %] 91 %  216 lbs .81 oz    Constitutional: Obese, answers appropriately.  No acute distress.  HEENT: Normocephalic, MMM, no elevation of JVD noted  Respiratory: Nl WOB, she has primarily upper respiratory wheezing noted.  No crackles or rhonchi.  Cardiovascular: Regular, no murmur  GI: Obese, BS+, NT, ND, no rebound or guarding  Lymph/Hematologic: No bruising. No cervical LAD  Skin: No rash  Musculoskeletal: Nl Tone, mild bilateral lower extremity edema noted.  Neurologic: A&Ox3, Answers appropriately. CNII-XII intact. Moves all extremities. No tremor  Psychiatric: Calm    Data   Data reviewed today:  I personally reviewed   Recent Labs   Lab 12/27/22  1749   WBC 6.4   HGB 10.2*   *         POTASSIUM 3.7   CHLORIDE 101   CO2 31*   BUN 9.7   CR 0.81   ANIONGAP 9   DEEDEE 8.9   *       No results found for this or any previous visit (from the past 24 hour(s)).    Clinically Significant Risk Factors Present on Admission                  # Hypertension: home medication list includes antihypertensive(s)   # Acute Respiratory Failure: Documented O2 saturation < 91%.  Continue supplemental oxygen as needed

## 2022-12-28 NOTE — PLAN OF CARE
Goal Outcome Evaluation:      Plan of Care Reviewed With: patient    Overall Patient Progress: improving    Patient alert and oriented, up independently to BSC. Denies and pain. VSS except BP slightly elevated. On 2L oxygen via NC. Diet changed to regular per patient request. BS elevated today, given sliding scale per MAR. Pt c/o loss of taste but appetite is still there. Receiving duonebs, decadron, and lovenox for COVID. On K+ protocol, recheck in for the morning. Echo ordered. Will continue to assess and monitor.

## 2022-12-28 NOTE — ED PROVIDER NOTES
History     Chief Complaint:    Shortness of Breath      HPI   Kemi Casper is a 67 year old female who presents with ongoing shortness of breath and cough.    Patient is an otherwise healthy 67-year-old female over the last 4 weeks has had a chronic cough progressively worse and worsening shortness of breath.  Patient had a CT scan of the lungs today there is a question of what is causing her dyspnea on exertion patient is referred to the emergency room due to ongoing and worsening shortness of breath.  No clear chest pain.  No recent surgery or immobilization.  No calf pain or swelling no chest pain with exertion.  Cough is dry nonproductive.    Review of Systems  Positive for shortness of breath with exertion history of fever all the systems negative except as above    Allergies:    Compazine [Prochlorperazine]  Lisinopril  Adhesive Tape      Medications:      abatacept (ORENCIA) 250 MG injection  acetaminophen (TYLENOL) 325 MG tablet  amLODIPine (NORVASC) 10 MG tablet  B-D U/F 31G X 8 MM insulin pen needle  buPROPion (WELLBUTRIN XL) 300 MG 24 hr tablet  cetirizine (ZYRTEC) 10 MG tablet  Continuous Blood Gluc Sensor (DEXCOM G6 SENSOR) MISC  dorzolamide-timolol (COSOPT) 2-0.5 % ophthalmic solution  hydrochlorothiazide (HYDRODIURIL) 25 MG tablet  insulin lispro (HUMALOG KWIKPEN) 100 UNIT/ML (1 unit dial) KWIKPEN  irbesartan-hydrochlorothiazide (AVALIDE) 300-12.5 MG tablet  latanoprost (XALATAN) 0.005 % ophthalmic solution  leflunomide (ARAVA) 10 MG tablet  LORazepam (ATIVAN) 0.5 MG tablet  modafinil (PROVIGIL) 200 MG tablet  predniSONE (DELTASONE) 5 MG tablet  RABEprazole (ACIPHEX) 20 MG EC tablet  rosuvastatin (CRESTOR) 10 MG tablet  Sertraline HCl 150 MG CAPS  sulfaSALAzine ER (AZULFIDINE EN) 500 MG EC tablet  vitamin D3 (CHOLECALCIFEROL) 250 mcg (84803 units) capsule        Past Medical History:      Past Medical History:   Diagnosis Date     Complication of anesthesia      Depression      Diabetes  mellitus      Diverticulitis      Essential hypertension, benign 7/2/2002     Fibromyalgia      GERD (gastroesophageal reflux disease)      Hyperlipidemia      Incontinence of urine      Major depressive disorder, recurrent episode, in full remission (H) 4/20/2011     Mixed hyperlipidemia 7/22/2016     Need for prophylactic hormone replacement therapy (postmenopausal) 12/14/2005     Obesity (BMI 30.0-34.9) 7/22/2016     Osteoarthritis      Other abnormal glucose 2007     Sleep apnea 2/9/2011     Type 2 diabetes mellitus with neurological manifestations (HCC) 5/6/2015     Uncomplicated asthma      Patient Active Problem List    Diagnosis Date Noted     Urinary incontinence, unspecified type 09/23/2022     Priority: Medium     QT prolongation 11/23/2021     Priority: Medium     Hypersomnia 12/18/2020     Priority: Medium     Rheumatoid arthritis of multiple sites without rheumatoid factor (H) 12/17/2020     Priority: Medium     [facility]       S/P total knee arthroplasty 04/09/2018     Priority: Medium     Anemia 11/30/2017     Priority: Medium     Intestinal malabsorption, unspecified type 11/30/2017     Priority: Medium     Iron adverse reaction 11/30/2017     Priority: Medium     Encounter for other procedures for purposes other than remedying health state (CODE) 11/30/2017     Priority: Medium     Mixed hyperlipidemia 07/22/2016     Priority: Medium     Morbid obesity (H) 07/22/2016     Priority: Medium     Fibromyalgia 07/22/2016     Priority: Medium     Type 2 diabetes mellitus with neurological manifestations (HCC) 05/06/2015     Priority: Medium     Health Care Home 02/20/2013     Priority: Medium     State Tier Level:  Tier 2  Status:  na  Care Coordinator:      See Letters for [facility] Care Plan           ACP (advance care planning) 05/08/2012     Priority: Medium     Advance Care Planning 9/1/2015: ACP Review and Resources Provided:  Reviewed chart for advance care plan.  Kemi Angel  Pennie has no plan or code status on file. Discussed available resources and provided with information. Confirmed code status reflects current choices pending further ACP discussions.  Confirmed/documented legally designated decision maker(s). Added by Jeniffer Nelson               Major depressive disorder, recurrent episode, in full remission (H) 04/20/2011     Priority: Medium     [facility]       Sleep apnea 02/09/2011     Priority: Medium     Periodic limb movement disorder 02/09/2011     Priority: Medium     Benign essential hypertension 07/02/2002     Priority: Medium        Past Surgical History:      Past Surgical History:   Procedure Laterality Date     ARTHROPLASTY KNEE Left 4/9/2018    Procedure: ARTHROPLASTY KNEE;  Left total knee arthroplasty;  Surgeon: Tin Shetty MD;  Location: [facility] OR     ARTHROPLASTY KNEE Right 11/1/2021    Procedure: Right total knee arthroplasty;  Surgeon: Tin Shetty MD;  Location: [facility] OR     ARTHROSCOPY KNEE Left 07/26/2016     CHOLECYSTECTOMY  2002     HC DILATION/CURETTAGE DIAG/THER NON OB  1977    D & C     HYSTERECTOMY TOTAL ABDOMINAL, BILATERAL SALPINGO-OOPHORECTOMY, COMBINED  2002     REMV PILONIDAL LESION SIMPLE  age 17     TONSILLECTOMY, ADENOIDECTOMY, COMBINED  age 5     ZZC APPENDECTOMY  2002    done with hysterectomy     ZZC EYE EXAM ESTABLISHED PT  2003     ZZC GOETZ W/O FACETEC FORAMOT/DSKC 1/2 VRT SEG, LUMBAR  10/02/2000    Laminectomy, Lumbar       Family History:      Family History   Problem Relation Age of Onset     Cancer Mother         skin     Hypertension Mother      Diabetes Father      Heart Disease Father         CHF     Genitourinary Problems Father         Renal failure     Thyroid Disease Sister         cyst       Social History:    Flo Weston  The patient presents to the ED with     Physical Exam     Patient Vitals for the past 24 hrs:   BP Temp Temp src Pulse Resp SpO2 Weight   12/27/22 1357 -- -- -- --  -- 96 % --   12/27/22 1355 -- -- -- -- -- (!) 89 % --   12/27/22 1322 (!) 180/108 97.5  F (36.4  C) Temporal 79 22 93 % 98 kg (216 lb 0.8 oz)       Physical Exam  Vitals reviewed.   HENT:      Head: Normocephalic.      Mouth/Throat:      Mouth: Mucous membranes are moist.   Cardiovascular:      Rate and Rhythm: Normal rate and regular rhythm.   Pulmonary:      Effort: Tachypnea present.      Breath sounds: Wheezing present.   Musculoskeletal:         General: Normal range of motion.      Cervical back: Normal range of motion.   Skin:     General: Skin is warm.      Capillary Refill: Capillary refill takes less than 2 seconds.   Neurological:      General: No focal deficit present.      Mental Status: She is alert.   Psychiatric:         Mood and Affect: Mood is anxious.           Emergency Department Course   ECG:  ECG taken at 1348, ECG read at 1820    Rate 81 bpm. HI interval 142 ms. QRS duration 84 ms. QT/QTc 388/450 ms. Normal sinus rythmn    ECG results from 12/27/22   EKG 12 lead     Value    Systolic Blood Pressure     Diastolic Blood Pressure     Ventricular Rate 81    Atrial Rate 81    HI Interval 142    QRS Duration 84        QTc 450    P Axis 67    R AXIS 34    T Axis 122    Interpretation ECG      Sinus rhythm  Possible Left atrial enlargement  Septal infarct , age undetermined  T wave abnormality, consider lateral ischemia  Abnormal ECG  When compared with ECG of 10-DEMARCUS-2021 14:53,  Septal infarct is now Present  Nonspecific T wave abnormality now evident in Inferior leads  T wave inversion now evident in Lateral leads  QT has shortened           Laboratory:  Labs Ordered and Resulted from Time of ED Arrival to Time of ED Departure   BASIC METABOLIC PANEL - Abnormal       Result Value    Sodium 141      Potassium 3.7      Chloride 101      Carbon Dioxide (CO2) 31 (*)     Anion Gap 9      Urea Nitrogen 9.7      Creatinine 0.81      Calcium 8.9      Glucose 220 (*)     GFR Estimate 79     CBC WITH  PLATELETS AND DIFFERENTIAL - Abnormal    WBC Count 6.4      RBC Count 3.38 (*)     Hemoglobin 10.2 (*)     Hematocrit 34.0 (*)      (*)     MCH 30.2      MCHC 30.0 (*)     RDW 13.2      Platelet Count 235      % Neutrophils 58      % Lymphocytes 26      % Monocytes 8      % Eosinophils 6      % Basophils 1      % Immature Granulocytes 1      NRBCs per 100 WBC 0      Absolute Neutrophils 3.8      Absolute Lymphocytes 1.7      Absolute Monocytes 0.5      Absolute Eosinophils 0.4      Absolute Basophils 0.1      Absolute Immature Granulocytes 0.0      Absolute NRBCs 0.0     INFLUENZA A/B & SARS-COV2 PCR MULTIPLEX   BLOOD GAS VENOUS WITH OXYHEMOGLOBIN   NT PROBNP INPATIENT   TROPONIN T, HIGH SENSITIVITY       Procedures:      Emergency Department Course:             Reviewed:    I reviewed nursing notes, vitals and Care Everywhere    Assessments:   I obtained history and examined the patient as noted above.    I rechecked the patient and explained findings.       Consults:            Interventions:    Medications   ipratropium - albuterol 0.5 mg/2.5 mg/3 mL (DUONEB) neb solution 3 mL (has no administration in time range)   dexamethasone PF (DECADRON) injection 10 mg (has no administration in time range)       Disposition:  The patient was admitted to the hospital under the care of hospitalist.     Impression & Plan             Medical Decision Making:  Patient reports emergency with ongoing dyspnea and dyspnea with exertion cause of symptoms has been unclear to date.  Extensive work-up undertaken in the ER reviewed chest CT from today that shows no clear infiltrate or pleural effusion.  No signs of congestive heart failure.  Interestingly patient's COVID test comes back positive.  Unclear if this is chronic COVID she is wheezing.  And sats are low.  Based on reactive airway disease in the setting of COVID-19 and need for oxygen patient is recommended inpatient hospitalization we will consider dexamethasone and  further treatments and inhalers to help assist in dyspnea and active wheezing and reactive airway disease secondary to likely viral pneumonia.    Covid-19  Kemi Casper was evaluated during a global COVID-19 pandemic, which necessitated consideration that the patient might be at risk for infection with the SARS-CoV-2 virus that causes COVID-19.   Applicable protocols for evaluation were followed during the patient's care.   COVID-19 was considered as part of the patient's evaluation.    Diagnosis:    ICD-10-CM    1. Wheezing  R06.2       2. Infection due to 2019 novel coronavirus  U07.1 benzonatate (TESSALON) 100 MG capsule     dexamethasone (DECADRON) 4 MG tablet     albuterol (PROAIR HFA/PROVENTIL HFA/VENTOLIN HFA) 108 (90 Base) MCG/ACT inhaler      3. Acute respiratory failure with hypoxia (H)  J96.01           Discharge Medications:  New Prescriptions    No medications on file         Scribe Disclosure:  IFlo MD, am serving as a scribe at 6:24 PM on 12/27/2022 to document services personally performed by Flo Mosley MDbased on my observations and the provider's statements to me.      Flo Mosley MD  12/31/22 2019

## 2022-12-29 ENCOUNTER — APPOINTMENT (OUTPATIENT)
Dept: CARDIOLOGY | Facility: CLINIC | Age: 67
DRG: 177 | End: 2022-12-29
Attending: INTERNAL MEDICINE
Payer: COMMERCIAL

## 2022-12-29 VITALS
SYSTOLIC BLOOD PRESSURE: 164 MMHG | OXYGEN SATURATION: 94 % | TEMPERATURE: 97.6 F | WEIGHT: 216.05 LBS | DIASTOLIC BLOOD PRESSURE: 84 MMHG | HEART RATE: 76 BPM | BODY MASS INDEX: 42.19 KG/M2 | RESPIRATION RATE: 18 BRPM

## 2022-12-29 LAB
ANION GAP SERPL CALCULATED.3IONS-SCNC: 9 MMOL/L (ref 7–15)
BUN SERPL-MCNC: 21.2 MG/DL (ref 8–23)
CALCIUM SERPL-MCNC: 9 MG/DL (ref 8.8–10.2)
CHLORIDE SERPL-SCNC: 100 MMOL/L (ref 98–107)
CREAT SERPL-MCNC: 0.66 MG/DL (ref 0.51–0.95)
CRP SERPL-MCNC: 12 MG/L
D DIMER PPP FEU-MCNC: <0.27 UG/ML FEU (ref 0–0.5)
DEPRECATED HCO3 PLAS-SCNC: 30 MMOL/L (ref 22–29)
ERYTHROCYTE [DISTWIDTH] IN BLOOD BY AUTOMATED COUNT: 13.5 % (ref 10–15)
GFR SERPL CREATININE-BSD FRML MDRD: >90 ML/MIN/1.73M2
GLUCOSE BLDC GLUCOMTR-MCNC: 272 MG/DL (ref 70–99)
GLUCOSE BLDC GLUCOMTR-MCNC: 279 MG/DL (ref 70–99)
GLUCOSE BLDC GLUCOMTR-MCNC: 286 MG/DL (ref 70–99)
GLUCOSE SERPL-MCNC: 283 MG/DL (ref 70–99)
HCT VFR BLD AUTO: 31.8 % (ref 35–47)
HGB BLD-MCNC: 9.9 G/DL (ref 11.7–15.7)
MCH RBC QN AUTO: 30.7 PG (ref 26.5–33)
MCHC RBC AUTO-ENTMCNC: 31.1 G/DL (ref 31.5–36.5)
MCV RBC AUTO: 99 FL (ref 78–100)
PLATELET # BLD AUTO: 234 10E3/UL (ref 150–450)
POTASSIUM SERPL-SCNC: 3.6 MMOL/L (ref 3.4–5.3)
RBC # BLD AUTO: 3.23 10E6/UL (ref 3.8–5.2)
SODIUM SERPL-SCNC: 139 MMOL/L (ref 136–145)
TROPONIN T SERPL HS-MCNC: 21 NG/L
WBC # BLD AUTO: 6.5 10E3/UL (ref 4–11)

## 2022-12-29 PROCEDURE — 36415 COLL VENOUS BLD VENIPUNCTURE: CPT | Performed by: INTERNAL MEDICINE

## 2022-12-29 PROCEDURE — 85379 FIBRIN DEGRADATION QUANT: CPT | Performed by: HOSPITALIST

## 2022-12-29 PROCEDURE — 93321 DOPPLER ECHO F-UP/LMTD STD: CPT | Mod: 26 | Performed by: INTERNAL MEDICINE

## 2022-12-29 PROCEDURE — 94640 AIRWAY INHALATION TREATMENT: CPT

## 2022-12-29 PROCEDURE — 94640 AIRWAY INHALATION TREATMENT: CPT | Mod: 76

## 2022-12-29 PROCEDURE — 93321 DOPPLER ECHO F-UP/LMTD STD: CPT

## 2022-12-29 PROCEDURE — 93325 DOPPLER ECHO COLOR FLOW MAPG: CPT | Mod: 26 | Performed by: INTERNAL MEDICINE

## 2022-12-29 PROCEDURE — 94660 CPAP INITIATION&MGMT: CPT

## 2022-12-29 PROCEDURE — 250N000009 HC RX 250: Performed by: HOSPITALIST

## 2022-12-29 PROCEDURE — 80048 BASIC METABOLIC PNL TOTAL CA: CPT | Performed by: INTERNAL MEDICINE

## 2022-12-29 PROCEDURE — 93308 TTE F-UP OR LMTD: CPT | Mod: 26 | Performed by: INTERNAL MEDICINE

## 2022-12-29 PROCEDURE — 250N000011 HC RX IP 250 OP 636: Performed by: HOSPITALIST

## 2022-12-29 PROCEDURE — 250N000013 HC RX MED GY IP 250 OP 250 PS 637: Performed by: HOSPITALIST

## 2022-12-29 PROCEDURE — 86140 C-REACTIVE PROTEIN: CPT | Performed by: HOSPITALIST

## 2022-12-29 PROCEDURE — 999N000157 HC STATISTIC RCP TIME EA 10 MIN

## 2022-12-29 PROCEDURE — 255N000002 HC RX 255 OP 636: Performed by: INTERNAL MEDICINE

## 2022-12-29 PROCEDURE — 84484 ASSAY OF TROPONIN QUANT: CPT | Performed by: INTERNAL MEDICINE

## 2022-12-29 PROCEDURE — 99239 HOSP IP/OBS DSCHRG MGMT >30: CPT | Performed by: HOSPITALIST

## 2022-12-29 PROCEDURE — 999N000208 ECHOCARDIOGRAM LIMITED

## 2022-12-29 PROCEDURE — 85027 COMPLETE CBC AUTOMATED: CPT | Performed by: INTERNAL MEDICINE

## 2022-12-29 RX ORDER — ALBUTEROL SULFATE 90 UG/1
2 AEROSOL, METERED RESPIRATORY (INHALATION) EVERY 6 HOURS PRN
Qty: 18 G | Refills: 0 | Status: SHIPPED | OUTPATIENT
Start: 2022-12-29 | End: 2024-07-22

## 2022-12-29 RX ORDER — BENZONATATE 100 MG/1
100 CAPSULE ORAL 3 TIMES DAILY PRN
Qty: 20 CAPSULE | Refills: 0 | Status: SHIPPED | OUTPATIENT
Start: 2022-12-29 | End: 2023-01-31

## 2022-12-29 RX ORDER — DEXAMETHASONE 4 MG/1
6 TABLET ORAL
Qty: 7 TABLET | Refills: 0 | Status: SHIPPED | OUTPATIENT
Start: 2022-12-29 | End: 2023-01-31

## 2022-12-29 RX ADMIN — ROSUVASTATIN CALCIUM 10 MG: 5 TABLET, FILM COATED ORAL at 08:38

## 2022-12-29 RX ADMIN — LEFLUNOMIDE 10 MG: 10 TABLET ORAL at 08:38

## 2022-12-29 RX ADMIN — IPRATROPIUM BROMIDE AND ALBUTEROL SULFATE 3 ML: 2.5; .5 SOLUTION RESPIRATORY (INHALATION) at 11:43

## 2022-12-29 RX ADMIN — HUMAN ALBUMIN MICROSPHERES AND PERFLUTREN 3 ML: 10; .22 INJECTION, SOLUTION INTRAVENOUS at 08:08

## 2022-12-29 RX ADMIN — DEXAMETHASONE SODIUM PHOSPHATE 6 MG: 10 INJECTION, SOLUTION INTRAMUSCULAR; INTRAVENOUS at 11:33

## 2022-12-29 RX ADMIN — AMLODIPINE BESYLATE 10 MG: 10 TABLET ORAL at 08:38

## 2022-12-29 RX ADMIN — SERTRALINE HYDROCHLORIDE 150 MG: 100 TABLET ORAL at 08:38

## 2022-12-29 RX ADMIN — IPRATROPIUM BROMIDE AND ALBUTEROL SULFATE 3 ML: 2.5; .5 SOLUTION RESPIRATORY (INHALATION) at 09:00

## 2022-12-29 RX ADMIN — MODAFINIL 200 MG: 200 TABLET ORAL at 08:38

## 2022-12-29 RX ADMIN — SULFASALAZINE 2000 MG: 500 TABLET, DELAYED RELEASE ORAL at 08:38

## 2022-12-29 RX ADMIN — PANTOPRAZOLE SODIUM 40 MG: 40 TABLET, DELAYED RELEASE ORAL at 08:38

## 2022-12-29 ASSESSMENT — ACTIVITIES OF DAILY LIVING (ADL)
ADLS_ACUITY_SCORE: 25

## 2022-12-29 NOTE — PLAN OF CARE
Patient's After Visit Summary was reviewed with patient   Patient verbalized understanding of After Visit Summary, recommended follow up and was given an opportunity to ask questions. yes  Discharge medications sent home with patient/family: YES (albuterol, benzonatate, dexamethasone)  Discharged with spouse        OBSERVATION patient END time: 2:25pm

## 2022-12-29 NOTE — PLAN OF CARE
INPATIENT NOTE: [id number]     PRIMARY PROBLEM: Covid+     Vital Signs:  BP (!) 164/84 (BP Location: Right arm)   Pulse 76   Temp 97.6  F (36.4  C) (Oral)   Resp 18   Wt 98 kg (216 lb 0.8 oz)   LMP 12/04/2003   SpO2 94%   BMI 42.19 kg/m      Orientation: AOx4  Neuro: intact  Pain status: denies  Resp: WDL RA at 93%, 88% ambulating provider aware  Cardiac: WDL. Discuss outpatient stress test with provider  GI: WDL  : WDL  Skin: scattered bruising  Diet: mod carb diet  Activity: independent in room    Discharging today back home with . Able to wean patient off of oxygen this morning, home test results were passed on to provider.

## 2022-12-29 NOTE — PROGRESS NOTES
ROOM # 231    Living Situation (if not independent, order SW consult):  Facility name:  : [facility], , [phone] cell phone    Activity level at baseline: Independent  Activity level on admit: SBA    Who will be transporting you at discharge: [facility]    Patient registered to observation; given Patient Bill of Rights; given the opportunity to ask questions about observation status and their plan of care.  Patient has been oriented to the observation room, bathroom and call light is in place.    Discussed discharge goals and expectations with patient/family.

## 2022-12-29 NOTE — PROGRESS NOTES
"Formerly [facility] RCAT     Date:12/28/2022  Admission Dx:Covid  Pulmonary History:none  Home Nebulizer/MDI Use:N  Home Oxygen:N  Acuity Level (RCAT flow sheet):5  Aerosol Therapy initiated:Pt stated that the Nebulizer helps her breathe better, therfore, Tx's are kept as ordered. QID  Current Oxygen Requirements: 2LPM NC  Current SpO2:95  Re-evaluation date:12/31/2022  Patient Education:Y      See \"RT Assessments\" flow sheet for patient assessment scoring and Acuity Level Details.             "

## 2022-12-29 NOTE — PLAN OF CARE
"PRIMARY DIAGNOSIS: \"GENERIC\" NURSING/SOB  OUTPATIENT/OBSERVATION GOALS TO BE MET BEFORE DISCHARGE:  1. ADLs back to baseline: Yes    2. Activity and level of assistance: Ambulating independently.    3. Pain status: Pain free.    4. Return to near baseline physical activity: Yes     Pt is Alert and orientdX4, denies any pain or discomfort, denies SOB, lung sound clear on 2L of oxygen via NC, on regular diet. Pt is Covid positive  receiving  Prn duoneb and decardron for COVID. Plan is to wean off O2 overnight and check Echo am.  Will continue to monitor.     Discharge Planner Nurse   Safe discharge environment identified: Yes  Barriers to discharge: No       Entered by: Opal Guerra RN 12/29/2022 5:16 AM     Please review provider order for any additional goals.   Nurse to notify provider when observation goals have been met and patient is ready for discharge.  "

## 2023-01-18 ENCOUNTER — TRANSFERRED RECORDS (OUTPATIENT)
Dept: FAMILY MEDICINE | Facility: CLINIC | Age: 68
End: 2023-01-18

## 2023-01-18 ENCOUNTER — OFFICE VISIT (OUTPATIENT)
Dept: FAMILY MEDICINE | Facility: CLINIC | Age: 68
End: 2023-01-18

## 2023-01-18 VITALS
TEMPERATURE: 98 F | HEART RATE: 82 BPM | SYSTOLIC BLOOD PRESSURE: 154 MMHG | DIASTOLIC BLOOD PRESSURE: 86 MMHG | BODY MASS INDEX: 40.82 KG/M2 | OXYGEN SATURATION: 95 % | RESPIRATION RATE: 22 BRPM | WEIGHT: 209 LBS

## 2023-01-18 DIAGNOSIS — R26.89 BALANCE PROBLEMS: ICD-10-CM

## 2023-01-18 DIAGNOSIS — R41.3 MEMORY CHANGE: Primary | ICD-10-CM

## 2023-01-18 DIAGNOSIS — I10 BENIGN ESSENTIAL HYPERTENSION: ICD-10-CM

## 2023-01-18 DIAGNOSIS — R13.10 DYSPHAGIA, UNSPECIFIED TYPE: ICD-10-CM

## 2023-01-18 DIAGNOSIS — J38.3 VOCAL CORD DYSFUNCTION: ICD-10-CM

## 2023-01-18 PROCEDURE — 99214 OFFICE O/P EST MOD 30 MIN: CPT | Performed by: STUDENT IN AN ORGANIZED HEALTH CARE EDUCATION/TRAINING PROGRAM

## 2023-01-18 RX ORDER — HYDROCHLOROTHIAZIDE 12.5 MG/1
12.5 TABLET ORAL DAILY
Qty: 90 TABLET | Refills: 1 | Status: SHIPPED | OUTPATIENT
Start: 2023-01-18 | End: 2024-07-22

## 2023-01-18 NOTE — PROGRESS NOTES
Assessment & Plan       ICD-10-CM    1. Memory change  R41.3 Adult Neurology  Referral - To a Non [facility] Location (Use POS/Location)      2. Balance problems  R26.89 Adult Neurology  Referral - To a Non [facility] Location (Use POS/Location)      3. Dysphagia, unspecified type  R13.10 Speech Therapy Referral      4. Benign essential hypertension  I10 hydrochlorothiazide (HYDRODIURIL) 12.5 MG tablet      5. Vocal cord dysfunction  J38.3          Recommend Neurology referral, continue psychiatry follow-up   Dysphagia and likely VCD, rec SLP eval  BP uncontrolled, restart thiazide and cont to monitor closely.     Patient Instructions   Restart hydrochlorothiazide     Follow-up in 2-3 weeks to recheck blood pressure and blood work    Speech referral in - schedulers will call you    Call to follow-up with [facility]     Reasons to follow-up sooner or seek emergent care reviewed.     Flo Weston MD, [facility]       Subjective     Kemi CUNNINGHAM Stanley Casper is a 68 year old female who presents to clinic today for the following health issues:    HPI   Chief Complaint   Patient presents with     Referral     Wants a referral for a swallow study, has been choking a lot and feels that something is getting stuck a lot, also wants referral for speech therapy to help concerns with vocal cords       ENT notes reviewed, agree with VCD suspicion    Hypertension Follow-up      Do you check your blood pressure regularly outside of the clinic? No     Are you following a low salt diet? No    Are your blood pressures ever more than 140 on the top number (systolic) OR more   than 90 on the bottom number (diastolic), for example 140/90? n/a    Ongoing memory changes. No recent falls but off balance a lot. Here with spouse.       Objective    BP (!) 154/86 (BP Location: Left arm, Patient Position: Sitting, Cuff Size: Adult Large)   Pulse 82   Temp 98  F (36.7  C)  (Temporal)   Resp 22   Wt 94.8 kg (209 lb)   LMP 12/04/2003   SpO2 95%   BMI 40.82 kg/m    Body mass index is 40.82 kg/m .  Alert, NAD  NC/AT  Sclerae anicteric  RRR  Resp nonlabored CTAB  Skin warm and dry  No focal neuro deficits. Speech intact.   Appropriate affect, difficulty with short term recall noted with spouse helping answer questions  Normal gait.    Labs reviewed.

## 2023-01-18 NOTE — PATIENT INSTRUCTIONS
Restart hydrochlorothiazide     Follow-up in 2-3 weeks to recheck blood pressure and blood work    Speech referral in - schedulers will call you    Call to follow-up with [facility]

## 2023-01-18 NOTE — NURSING NOTE
Chief Complaint   Patient presents with     Referral     Wants a referral for a swallow study, has been choking a lot and feels that something is getting stuck a lot, also wants referral for speech therapy to help concerns with vocal cords      Pre-visit Screening:  Immunizations:  up to date  Colonoscopy:  is due and to be scheduled by patient for later completion  Mammogram: is up to date  Asthma Action Test/Plan:  NA  PHQ9:  given today   GAD7:  Given today   Questioned patient about current smoking habits Pt. has never smoked.  Ok to leave detailed message on voice mail for today's visit only Yes, phone # [phone]

## 2023-01-30 DIAGNOSIS — I10 BENIGN ESSENTIAL HYPERTENSION: ICD-10-CM

## 2023-01-30 DIAGNOSIS — R42 DIZZINESS: ICD-10-CM

## 2023-01-30 RX ORDER — MECLIZINE HYDROCHLORIDE 25 MG/1
25 TABLET ORAL 2 TIMES DAILY PRN
Qty: 30 TABLET | Refills: 0 | Status: SHIPPED | OUTPATIENT
Start: 2023-01-30 | End: 2023-08-21

## 2023-01-30 RX ORDER — IRBESARTAN AND HYDROCHLOROTHIAZIDE 300; 12.5 MG/1; MG/1
1 TABLET, FILM COATED ORAL DAILY
Qty: 90 TABLET | Refills: 1 | Status: SHIPPED | OUTPATIENT
Start: 2023-01-30 | End: 2023-01-31

## 2023-01-30 NOTE — TELEPHONE ENCOUNTER
Kemi Casper called the clinic support line with the following:    Is wondering if you can refill her meclizine until she has her appointment with Neurology    Please advise    Kemi Casper is requesting a refill of:    Pending Prescriptions:                       Disp   Refills    meclizine (ANTIVERT) 25 MG tablet         30 tab*0            Sig: Take 1 tablet (25 mg) by mouth 2 times daily as           needed for dizziness

## 2023-01-30 NOTE — TELEPHONE ENCOUNTER
Informed patient that medication was sent but she also asked if her irbesartan was filled as well. I informed her that the pharmacy sent the request to [facility] to Dr. Hidalgo and she said they were supposed to send it to us. Routing to Dr. Bland for approval or denial of refill request.  Pending Prescriptions:                       Disp   Refills    irbesartan-hydrochlorothiazide (AVALIDE) *90 tab*1            Sig: Take 1 tablet by mouth daily    Signed Prescriptions:                        Disp   Refills    meclizine (ANTIVERT) 25 MG tablet          30 tab*0        Sig: Take 1 tablet (25 mg) by mouth 2 times daily as           needed for dizziness  Authorizing Provider: AMY BLAND

## 2023-01-31 ENCOUNTER — OFFICE VISIT (OUTPATIENT)
Dept: FAMILY MEDICINE | Facility: CLINIC | Age: 68
End: 2023-01-31

## 2023-01-31 VITALS
HEART RATE: 73 BPM | SYSTOLIC BLOOD PRESSURE: 142 MMHG | BODY MASS INDEX: 41.01 KG/M2 | TEMPERATURE: 97.6 F | DIASTOLIC BLOOD PRESSURE: 84 MMHG | WEIGHT: 210 LBS | OXYGEN SATURATION: 94 % | RESPIRATION RATE: 24 BRPM

## 2023-01-31 DIAGNOSIS — I10 BENIGN ESSENTIAL HYPERTENSION: ICD-10-CM

## 2023-01-31 DIAGNOSIS — R42 DIZZINESS: ICD-10-CM

## 2023-01-31 DIAGNOSIS — R26.89 BALANCE PROBLEMS: ICD-10-CM

## 2023-01-31 DIAGNOSIS — R41.3 MEMORY CHANGE: Primary | ICD-10-CM

## 2023-01-31 PROCEDURE — 36415 COLL VENOUS BLD VENIPUNCTURE: CPT | Performed by: STUDENT IN AN ORGANIZED HEALTH CARE EDUCATION/TRAINING PROGRAM

## 2023-01-31 PROCEDURE — 99215 OFFICE O/P EST HI 40 MIN: CPT | Performed by: STUDENT IN AN ORGANIZED HEALTH CARE EDUCATION/TRAINING PROGRAM

## 2023-01-31 RX ORDER — INSULIN PMP CART,AUT,G6/7,CNTR
EACH SUBCUTANEOUS
COMMUNITY
Start: 2023-01-21

## 2023-01-31 RX ORDER — IRBESARTAN AND HYDROCHLOROTHIAZIDE 300; 12.5 MG/1; MG/1
1 TABLET, FILM COATED ORAL DAILY
Qty: 90 TABLET | Refills: 1 | Status: SHIPPED | OUTPATIENT
Start: 2023-01-31

## 2023-01-31 RX ORDER — PROCHLORPERAZINE 25 MG/1
SUPPOSITORY RECTAL
COMMUNITY
Start: 2023-01-21

## 2023-01-31 NOTE — PROGRESS NOTES
Assessment & Plan       ICD-10-CM    1. Memory change  R41.3       2. Benign essential hypertension  I10 irbesartan-hydrochlorothiazide (AVALIDE) 300-12.5 MG tablet     FOLATE ([facility])      3. Dizziness  R42       4. Balance problems  R26.89 TSH WITH FREE T4 REFLEX ([facility])     VITAMIN B12 ([facility])     FOLATE ([facility])         Labs today  Patient Instructions   Follow-up with speech and Neurology as planned    Please use walker consistently    Call to reschedule weight loss consult: [phone]     Concern for polypharmacy and patient's ability to manage meds,  will continue to be involved, encouraged contd follow-up with [facility] pharmacist.     Reasons to follow-up sooner or seek emergent care reviewed.     >40 minutes spent on the date of the encounter doing chart review, history and exam, documentation and further activities per the note    Flo Weston MD, [facility]       Subjective     Kemi CUNNINGHAM Stanley Casper is a 68 year old female who presents to clinic today for the following health issues:    HPI   Chief Complaint   Patient presents with     Follow Up     Dizziness and balance concerns have become worse, has had a fall within the last week because of this, does not want to live like this anymore, still having some concerns with memory, feels that it is starting to become worse, wondering what to do, has tried taking meclizine which sometimes takes the edge off, will be seeing neurology soon, wants to know if there is another medication that would help more, realized that she has not taken her irbesartan for the past two months or so       Ongoing imbalance. Fell against wall last week. No injuries or LOC.   Meclizine has been partially helpful  Denies vertigo    Forgot how to thread sewing machine    SLP appt tomorrow, 3 visits scheduled  Neurology in Feb as well    Head CT and MRI/MRA brain in 2019 and 2017 ordered for memory concerns and gait changes and no acute  findings          Objective    BP (!) 142/84 (BP Location: Right arm, Patient Position: Sitting, Cuff Size: Adult Large)   Pulse 73   Temp 97.6  F (36.4  C) (Temporal)   Resp 24   Wt 95.3 kg (210 lb)   LMP 12/04/2003   SpO2 94%   BMI 41.01 kg/m    Body mass index is 41.01 kg/m .  Alert, NAD  NC/AT  Sclerae anicteric  RRR  Resp nonlabored  Skin warm and dry  Face symmetric. Speech intact.   Appropriate affect  Normal gait.    Labs reviewed.

## 2023-01-31 NOTE — NURSING NOTE
Chief Complaint   Patient presents with     Follow Up     Dizziness and balance concerns have become worse, has had a fall within the last week because of this, does not want to live like this anymore, still having some concerns with memory, feels that it is starting to become worse, wondering what to do, has tried taking meclizine which sometimes takes the edge off, will be seeing neurology soon, wants to know if there is another medication that would help more      Pre-visit Screening:  Immunizations:  up to date  Colonoscopy:  is due and to be scheduled by patient for later completion  Mammogram: is up to date  Asthma Action Test/Plan:  NA  PHQ9:  NA  GAD7:  NA  Questioned patient about current smoking habits Pt. has never smoked.  Ok to leave detailed message on voice mail for today's visit only Yes, phone #[phone]

## 2023-01-31 NOTE — PATIENT INSTRUCTIONS
Follow-up with speech and Neurology as planned    Please use walker consistently    Call to reschedule weight loss consult: [phone]

## 2023-02-01 ENCOUNTER — HOSPITAL ENCOUNTER (OUTPATIENT)
Dept: SPEECH THERAPY | Facility: CLINIC | Age: 68
Setting detail: THERAPIES SERIES
Discharge: HOME | End: 2023-02-01
Attending: STUDENT IN AN ORGANIZED HEALTH CARE EDUCATION/TRAINING PROGRAM
Payer: COMMERCIAL

## 2023-02-01 DIAGNOSIS — R13.10 DYSPHAGIA, UNSPECIFIED TYPE: ICD-10-CM

## 2023-02-01 LAB
FOLATE: 10.7 NG/ML
TSH SERPL-ACNC: 3.99 MIU/L (ref 0.4–4.5)
VIT B12 SERPL-MCNC: 868 PG/ML (ref 200–1100)

## 2023-02-01 PROCEDURE — 92610 EVALUATE SWALLOWING FUNCTION: CPT | Mod: GN | Performed by: SPEECH-LANGUAGE PATHOLOGIST

## 2023-02-01 NOTE — PROGRESS NOTES
"[facility] Outpatient Clinical Dysphagia Evaluation  Speech-Language Pathology Department  [phone]    02/01/23 1000   General Information   Type Of Visit Initial   Start Of Care Date 02/01/23   Referring Physician Flo Weston MD   Orders Evaluate And Treat   Medical Diagnosis Dysphagia   Onset Of Illness/injury Or Date Of Surgery 01/18/23  (MD order date)   Hearing WNL   Pertinent History of Current Problem/OT: Additional Occupational Profile Info Kemi Casper is a 68 year old female who presents to clinic today for a clinical dysphagia evaluation.  She reports choking on liquids and solids.  This has been occurring for several months but feels like it has gotten slightly worse since she had COVID in December 2022. She has noticed some difficulty with swallowing her pills.  She takes 16 pills at one time and now needs extra water to swallow all the pills.   Respiratory Status Room air   Prior Level Of Function Swallowing   Prior Level Of Function Comment Patient currently eating a regular diet with thin liquids   Living Environment [place]   Patient/family Goals \"I don't want to choke or cough anymore.\"   Fall Risk Screen   Fall screen completed by SLP   Have you fallen 2 or more times in the past year? Yes   Have you fallen and had an injury in the past year? Yes   Is patient a fall risk? Yes;Referral initiated   Fall screen comments Pt c/o frequent falling and dizziness. She would like a referral to PT.   Abuse Screen (yes response referral indicated)   Physical Signs of Abuse Present no   Clinical Swallow Evaluation   Oral Musculature generally intact   Structural Abnormalities none present   Dentition present and adequate   Mucosal Quality dry;adequate   Mandibular Strength and Mobility intact   Oral Labial Strength and Mobility WFL   Lingual Strength and Mobility WFL   Velar Elevation intact   Buccal Strength and Mobility intact   Laryngeal Function " Cough;Throat clear;Swallow;Voicing initiated;Dry swallow palpated   Additional Documentation Yes   Swallow Eval   Feeding Assistance no assistance needed   Clinical Swallow Eval: Thin Liquid Texture Trial   Mode of Presentation, Thin Liquids cup;self-fed   Volume of Liquid or Food Presented 4 oz water   Oral Phase of Swallow WFL   Pharyngeal Phase of Swallow intact   Diagnostic Statement No clinical s/s of aspiration.   Clinical Swallow Eval: Puree Solid Texture Trial   Mode of Presentation, Puree spoon;self-fed   Volume of Puree Presented 1/2 cup applesauce   Oral Phase, Puree WFL   Pharyngeal Phase, Puree intact   Diagnostic Statement No overt s/s of aspiration. She reports a  fullness  feeling near her chest after swallowing first bite   Clinical Swallow Eval: Soft & Bite-sized   Mode of Presentation spoon;self-fed   Volume Presented 1/4 cup soft peaches with juice   Oral Phase WFL   Pharyngeal Phase intact   Diagnostic Statement No overt s/s of aspiration. She reports a  fullness  feeling near her chest after swallowing first bite   Clinical Swallow Eval: Regular (Solid)   Mode of Presentation self-fed   Volume Presented 1 ascencion cracker square   Oral Phase WFL   Pharyngeal Phase intact   Diagnostic Statement No clinical s/s of aspiration or c/o pharyngeal residue.   Swallow Compensations   Swallow Compensations Alternate viscosity of consistencies;Reduce amounts;Multiple swallow   Results No difficulties noted   Educational Assessment   Barriers to Learning No barriers   Preferred Learning Style Listening;Reading;Demonstration;Pictures/video   Swallow Eval: Clinical Impressions   Skilled Criteria for Therapy Intervention No problems identified which require skilled intervention   Functional Assessment Scale (FAS) 7   Dysphagia Outcome Severity Scale (PABLO) Level 7 - PABLO   Treatment Diagnosis Functional oropharyngeal swallow   Diet texture recommendations Regular diet;Thin liquids (level 0)   Recommended  "Feeding/Eating Techniques alternate between small bites and sips of food/liquid;maintain upright posture during/after eating for 30 mins;small sips/bites;other (see comments)  (Consider eating smaller more frequent meals/snacks instead of 3 larger meals.)   Rehab Potential good, to achieve stated therapy goals   Patient, family and/or staff in agreement with Plan of Care Yes   Clinical Impression Comments Patient presents with functional swallow across all consistencies trialed. There is no s/s of aspiraiton and minimal c/o \"fullness\" in chest with puree and semi-solids after the swallow. 1) Take small mouthfuls and chew carefully before trying to swallow, Sit up 90 degrees when eating or drinking, If food feels like it is slow to go down, keep a glass of water nearby to help wash down the food, Consider eating smaller more frequent meals/snacks instead of 3 larger meals, If swallowing fluid is making you cough, make sure you are sitting up when drinking, don t tilt your head back, No talking when chewing/eating/swallowing. 2) If swallowing becomes worse after following these recommendations listed above then patient should complete a videoswallow study. 3) Consider referral to GI medicine to fully assess esophageal swallow function patient on presenting symptoms.   Total Session Time   SLP Eval: oral/pharyngeal swallow function, clinical minutes (02562) 45   Total Evaluation Time 45   Therapy Certification   Certification date from 02/01/23   Certification date to 02/01/23   Medical Diagnosis dysphagia     "

## 2023-02-08 ENCOUNTER — TELEPHONE (OUTPATIENT)
Dept: FAMILY MEDICINE | Facility: CLINIC | Age: 68
End: 2023-02-08

## 2023-02-08 ENCOUNTER — TRANSCRIBE ORDERS (OUTPATIENT)
Dept: FAMILY MEDICINE | Facility: CLINIC | Age: 68
End: 2023-02-08

## 2023-02-08 DIAGNOSIS — R26.89 BALANCE PROBLEMS: ICD-10-CM

## 2023-02-08 DIAGNOSIS — R42 DIZZINESS: Primary | ICD-10-CM

## 2023-02-08 DIAGNOSIS — R29.6 FALLS FREQUENTLY: ICD-10-CM

## 2023-02-08 NOTE — TELEPHONE ENCOUNTER
----- Message from Belem Cooper CMA sent at 2/7/2023  2:59 PM CST -----  Pt called asking for a referral [facility]. She stated she has appt with [facility] for 02/16 but does not want to wait that long, she has been falling. She stated she has been seen there before.    Please advise (Aminata) # [phone]

## 2023-02-08 NOTE — TELEPHONE ENCOUNTER
I talked with patient this morning regarding the note below. Patient stated that she has been to [facility] in the past and would like to go back for her falling/ balance problems. Patient will need a referral to be seen at [facility].     Patient is scheduled with [facility] in March for memory change & balance problems.     Patient is asking if you would issue the referral to [facility] prior to her seeing you 2.17.2023 at 2:30pm    Will patient need to wait for her appointment with you on 2.17.2023 to talk about the referral to [facility] OR will you issue the referral now     I did let patient know that you are out of the office 2.8.2023 returning 2.9.2023    Please advise - Thank you

## 2023-02-13 ENCOUNTER — HOSPITAL ENCOUNTER (OUTPATIENT)
Dept: PHYSICAL THERAPY | Facility: CLINIC | Age: 68
Setting detail: THERAPIES SERIES
Discharge: HOME | End: 2023-02-13
Attending: STUDENT IN AN ORGANIZED HEALTH CARE EDUCATION/TRAINING PROGRAM
Payer: COMMERCIAL

## 2023-02-13 DIAGNOSIS — R29.6 FALLS FREQUENTLY: ICD-10-CM

## 2023-02-13 DIAGNOSIS — R26.89 BALANCE PROBLEMS: ICD-10-CM

## 2023-02-13 DIAGNOSIS — R42 DIZZINESS: ICD-10-CM

## 2023-02-13 PROCEDURE — 97161 PT EVAL LOW COMPLEX 20 MIN: CPT | Mod: GP | Performed by: PHYSICAL THERAPIST

## 2023-02-13 PROCEDURE — 97112 NEUROMUSCULAR REEDUCATION: CPT | Mod: GP | Performed by: PHYSICAL THERAPIST

## 2023-02-13 NOTE — TELEPHONE ENCOUNTER
I left a message for patient that the Referral was faxed to     [facility]  [address] [address]  Ph: [phone]  Fax: [phone]
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
## 2023-02-14 NOTE — PROGRESS NOTES
[facility]                                                                                   OUTPATIENT PHYSICAL THERAPY FUNCTIONAL EVALUATION  PLAN OF TREATMENT FOR OUTPATIENT REHABILITATION  (COMPLETE FOR INITIAL CLAIMS ONLY)  Patient's Last Name, First Name, M.I.  YOB: 1955  Kemi Saul     Provider's Name   [facility]   Medical Record No.  [id number]     Start of Care Date:  02/13/23   Onset Date:  02/06/23 (Referral date used, notes worsening of symptoms following recent COVID infection)   Type:     _X__PT   ____OT  ____SLP Medical Diagnosis:  Dizziness (R42, Falls frequently (R29.6, Balance problems (R26.89     PT Diagnosis:  Dizziness, sensory selection and weighting deficit Visits from SOC:  1                              __________________________________________________________________________________  Plan of Treatment/Functional Goals:  balance training, gait training, neuromuscular re-education, ROM, strengthening, stretching (vestibular therapy, canalith repositioning maneuvers as indicated)           GOALS  HEP  Patient will demonstrate understanding and compliance to her HEP at least 3x per week for continued wellbeing upon discharge from skilled physical therapy.  05/14/23    FGA  Patient will complete the FGA with a score of 23/30 to demonstrate improved balance and decreased risk for falls.  05/14/23    DHI  Patient will complete the DHI with a score of <30/100 to demonstrate decreased perception of handicap and improved quality of life.  05/14/23    DVA  Patient will complete DVA testing with a loss of 2 lines or less between static and 2 Hz head movement with no dizziness to demonstrate improved gaze stability for return to activities without limitation.  05/14/23                                                Therapy Frequency:  1  time/week (Decreasing in frequency as indicated)   Predicted Duration of Therapy Intervention:  90 days    Hyacinth Oleary, PT                                    I CERTIFY THE NEED FOR THESE SERVICES FURNISHED UNDER        THIS PLAN OF TREATMENT AND WHILE UNDER MY CARE     (Physician co-signature of this document indicates review and certification of the therapy plan).                Certification Date From:  02/14/23   Certification Date To:  05/14/23    Referring Provider:  Flo Weston MD    Initial Assessment  See Epic Evaluation- Start of Care Date: 02/13/23

## 2023-02-17 ENCOUNTER — OFFICE VISIT (OUTPATIENT)
Dept: FAMILY MEDICINE | Facility: CLINIC | Age: 68
End: 2023-02-17

## 2023-02-17 VITALS
RESPIRATION RATE: 20 BRPM | OXYGEN SATURATION: 95 % | BODY MASS INDEX: 41.21 KG/M2 | SYSTOLIC BLOOD PRESSURE: 142 MMHG | WEIGHT: 211 LBS | TEMPERATURE: 98 F | HEART RATE: 70 BPM | DIASTOLIC BLOOD PRESSURE: 94 MMHG

## 2023-02-17 DIAGNOSIS — R26.89 BALANCE PROBLEMS: ICD-10-CM

## 2023-02-17 DIAGNOSIS — I10 BENIGN ESSENTIAL HYPERTENSION: Primary | ICD-10-CM

## 2023-02-17 DIAGNOSIS — R13.10 DYSPHAGIA, UNSPECIFIED TYPE: ICD-10-CM

## 2023-02-17 PROCEDURE — 99213 OFFICE O/P EST LOW 20 MIN: CPT | Mod: 25 | Performed by: STUDENT IN AN ORGANIZED HEALTH CARE EDUCATION/TRAINING PROGRAM

## 2023-02-17 PROCEDURE — G0439 PPPS, SUBSEQ VISIT: HCPCS | Performed by: STUDENT IN AN ORGANIZED HEALTH CARE EDUCATION/TRAINING PROGRAM

## 2023-02-17 ASSESSMENT — ANXIETY QUESTIONNAIRES
IF YOU CHECKED OFF ANY PROBLEMS ON THIS QUESTIONNAIRE, HOW DIFFICULT HAVE THESE PROBLEMS MADE IT FOR YOU TO DO YOUR WORK, TAKE CARE OF THINGS AT HOME, OR GET ALONG WITH OTHER PEOPLE: NOT DIFFICULT AT ALL
1. FEELING NERVOUS, ANXIOUS, OR ON EDGE: NOT AT ALL
GAD7 TOTAL SCORE: 2
GAD7 TOTAL SCORE: 2
3. WORRYING TOO MUCH ABOUT DIFFERENT THINGS: NOT AT ALL
2. NOT BEING ABLE TO STOP OR CONTROL WORRYING: NOT AT ALL
6. BECOMING EASILY ANNOYED OR IRRITABLE: SEVERAL DAYS
5. BEING SO RESTLESS THAT IT IS HARD TO SIT STILL: NOT AT ALL
7. FEELING AFRAID AS IF SOMETHING AWFUL MIGHT HAPPEN: SEVERAL DAYS

## 2023-02-17 ASSESSMENT — PATIENT HEALTH QUESTIONNAIRE - PHQ9
5. POOR APPETITE OR OVEREATING: NOT AT ALL
SUM OF ALL RESPONSES TO PHQ QUESTIONS 1-9: 10

## 2023-02-17 NOTE — PROGRESS NOTES
Assessment & Plan       ICD-10-CM    1. Benign essential hypertension  I10       2. Dysphagia, unspecified type  R13.10       3. Balance problems  R26.89            Patient Instructions   Restart home BP monitoring, different times of day over next 2 weeks    Continue PT     Follow-up with eye doctor     Follow-up with GI clinic    I'll reach out to speech therapist    Bring back healthcare directive after you've completed     Follow-up 3-4 weeks. Reasons to follow-up sooner or seek emergent care reviewed.     >20 minutes spent on the date of the encounter doing chart review, history and exam, documentation and further activities per the note excl AWV    Flo Weston MD, [facility]       Subjective     Kemi CUNNINGHAM Stanley Casper is a 68 year old female who presents to clinic today for the following health issues:    HPI   Chief Complaint   Patient presents with     Recheck Medication     Follow up and review from last couple of visits, review medications more in depth      Medicare Visit     Annual medicare wellness visit      SLP and PT notes reviewed  Neurology next month    Hypertension Follow-up      Do you check your blood pressure regularly outside of the clinic? No     Are you following a low salt diet? No    Are your blood pressures ever more than 140 on the top number (systolic) OR more   than 90 on the bottom number (diastolic), for example 140/90? n/a            Objective    BP (!) 142/94 (BP Location: Right arm, Patient Position: Sitting, Cuff Size: Adult Large)   Pulse 70   Temp 98  F (36.7  C) (Temporal)   Resp 20   Wt 95.7 kg (211 lb)   LMP 12/04/2003   SpO2 95%   BMI 41.21 kg/m    Body mass index is 41.21 kg/m .  Alert, NAD  NC/AT  Sclerae anicteric  RRR  Resp nonlabored  Skin warm and dry  No focal neuro deficits. Speech intact.   Flat affect  Normal gait.    Labs reviewed.

## 2023-02-17 NOTE — PROGRESS NOTES
Kemi Casper is a 68 year old female who presents for Medicare Annual Wellness Visit.    Current providers caring for this patient include:  Patient Care Team:  Flo Weston MD as PCP - General (Family Medicine)  Kira Altamirano MD as Assigned PCP  Zaina Bell RPH as Assigned MTM Pharmacist    Complete Medical and Social history reviewed with patient, outlined below.    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)     Mixed hyperlipidemia     Morbid obesity (H)     Fibromyalgia     Anemia     Intestinal malabsorption, unspecified type     Iron adverse reaction     Encounter for other procedures for purposes other than remedying health state (CODE)     S/P total knee arthroplasty     Rheumatoid arthritis of multiple sites without rheumatoid factor (H)     Hypersomnia     QT prolongation     Urinary incontinence, unspecified type     Wheezing     Acute respiratory failure with hypoxia (H)     Infection due to 2019 novel coronavirus     Shortness of breath       Past Medical History:   Diagnosis Date     Complication of anesthesia     psychological fear of waking up during surgery     Depression      Diabetes mellitus      Diverticulitis      Essential hypertension, benign 7/2/2002     Fibromyalgia      GERD (gastroesophageal reflux disease)      Hyperlipidemia      Incontinence of urine      Major depressive disorder, recurrent episode, in full remission (H) 4/20/2011     Mixed hyperlipidemia 7/22/2016     Need for prophylactic hormone replacement therapy (postmenopausal) 12/14/2005     Obesity (BMI 30.0-34.9) 7/22/2016     Osteoarthritis      Other abnormal glucose 2007     Sleep apnea 2/9/2011    cpap     Type 2  diabetes mellitus with neurological manifestations (HCC) 5/6/2015     Uncomplicated asthma        Past Surgical History:   Procedure Laterality Date     ARTHROPLASTY KNEE Left 4/9/2018    Procedure: ARTHROPLASTY KNEE;  Left total knee arthroplasty;  Surgeon: Tin Shetty MD;  Location: [facility] OR     ARTHROPLASTY KNEE Right 11/1/2021    Procedure: Right total knee arthroplasty;  Surgeon: Tin Shetty MD;  Location: [facility] OR     ARTHROSCOPY KNEE Left 07/26/2016     CHOLECYSTECTOMY  2002     HC DILATION/CURETTAGE DIAG/THER NON OB  1977    D & C     HYSTERECTOMY TOTAL ABDOMINAL, BILATERAL SALPINGO-OOPHORECTOMY, COMBINED  2002     REMV PILONIDAL LESION SIMPLE  age 17     TONSILLECTOMY, ADENOIDECTOMY, COMBINED  age 5     ZZC APPENDECTOMY  2002    done with hysterectomy     ZZC EYE EXAM ESTABLISHED PT  2003     ZZC GOETZ W/O FACETEC FORAMOT/DSKC 1/2 VRT SEG, LUMBAR  10/02/2000    Laminectomy, Lumbar       Family History   Problem Relation Age of Onset     Cancer Mother         skin     Hypertension Mother      Diabetes Father      Heart Disease Father         CHF     Genitourinary Problems Father         Renal failure     Thyroid Disease Sister         cyst       Social History     Tobacco Use     Smoking status: Never     Smokeless tobacco: Never   Substance Use Topics     Alcohol use: No     Alcohol/week: 0.0 standard drinks       Diet: excess salt, excess fats, eats lots of fruits-feels she could start to eat more vegetables-does not take a lot of dairy in and only gets this usually when eating sour cream or cheese on other foods   Physical Activity: generally inactive-does not do any exercise   Depression Screen:    Over the past 2 weeks, patient has felt down, depressed, or hopeless:  PHQ9 given today    Over the past 2 weeks, patient has felt little interest or pleasure in doing things: PHQ9 given otday    Functional ability/Safety screen:  Up and go test (able to get up and walk longer than 30 seconds):  Passed  Patient needs assistance with: nothing but  does it all   Patient's home has the following possible safety concerns: none identified  Patient has concerns about her hearing:  No  Cognitive Screen  Patient repeats three objects (ball, flag, tree)      Clock drawing test:   NORMAL  Recalls three objects after 3 minutes (ball,flag,tree):                                                                                               recalls 3 objects (3 points)    Physical Exam:  BP (!) 142/94 (BP Location: Right arm, Patient Position: Sitting, Cuff Size: Adult Large)   Pulse 70   Temp 98  F (36.7  C) (Temporal)   Resp 20   Wt 95.7 kg (211 lb)   LMP 12/04/2003   SpO2 95%   BMI 41.21 kg/m     Body mass index is 41.21 kg/m .     End of Life Planning:   Patient currently has an advanced directive: No.  I have verified the patient's ablity to prepare an advanced directive/make health care decisions.  Literature was provided to assist patient in preparing an advanced directive.    Education/Counseling:   Based on review of the above information, the following items were addressed:      Obesity - appropriate counseling on diet, exercise, etc.      Elevated blood pressure - follow-up plans made  Diabetes managed by Endocrinologist  Declines DEXA at this time    Appropriate preventive services were discussed with this patient, including applicable screening as appropriate for cardiovascular disease, diabetes, osteopenia/osteoporosis, and glaucoma.  As appropriate for age/gender, discussed screening for colorectal cancer, prostate cancer, breast cancer, and cervical cancer.   Checklist reviewing preventive services available has been given to the patient.      Flo Weston MD, [facility]

## 2023-02-17 NOTE — PATIENT INSTRUCTIONS
Restart home BP monitoring, different times of day over next 2 weeks    Continue PT     Follow-up with eye doctor     Follow-up with GI clinic    I'll reach out to speech therapist    Bring back healthcare directive after you've completed

## 2023-02-17 NOTE — NURSING NOTE
Chief Complaint   Patient presents with     Recheck Medication     Follow up and review from last couple of visits, review medications more in depth      Medicare Visit     Annual medicare wellness visit      Pre-visit Screening:  Immunizations:  up to date  Colonoscopy:  Is due but patient declined-states that she will not because the last time she did one she had to be taken to hospital by ambulance   Mammogram: is up to date  Asthma Action Test/Plan:  NA  PHQ9:  Given today   GAD7:  Given today   Questioned patient about current smoking habits Pt. has never smoked.  Ok to leave detailed message on voice mail for today's visit only Yes, phone # [phone]

## 2023-02-28 ENCOUNTER — TRANSFERRED RECORDS (OUTPATIENT)
Dept: HEALTH INFORMATION MANAGEMENT | Facility: CLINIC | Age: 68
End: 2023-02-28
Payer: COMMERCIAL

## 2023-02-28 LAB
ALT SERPL-CCNC: 13 IU/L (ref 5–35)
AST SERPL-CCNC: 16 U/L (ref 5–34)
CREATININE (EXTERNAL): 0.85 MG/DL (ref 0.5–1.3)
GFR ESTIMATED (EXTERNAL): 70.7 ML/MIN/1.73M2

## 2023-03-01 ENCOUNTER — HOSPITAL ENCOUNTER (OUTPATIENT)
Dept: PHYSICAL THERAPY | Facility: CLINIC | Age: 68
Setting detail: THERAPIES SERIES
Discharge: HOME | End: 2023-03-01
Attending: STUDENT IN AN ORGANIZED HEALTH CARE EDUCATION/TRAINING PROGRAM
Payer: COMMERCIAL

## 2023-03-01 PROCEDURE — 97112 NEUROMUSCULAR REEDUCATION: CPT | Mod: GP | Performed by: PHYSICAL THERAPIST

## 2023-03-01 PROCEDURE — 97535 SELF CARE MNGMENT TRAINING: CPT | Mod: GP | Performed by: PHYSICAL THERAPIST

## 2023-03-07 ENCOUNTER — HOSPITAL ENCOUNTER (OUTPATIENT)
Dept: PHYSICAL THERAPY | Facility: CLINIC | Age: 68
Setting detail: THERAPIES SERIES
Discharge: HOME | End: 2023-03-07
Attending: STUDENT IN AN ORGANIZED HEALTH CARE EDUCATION/TRAINING PROGRAM
Payer: COMMERCIAL

## 2023-03-07 DIAGNOSIS — H81.12 BENIGN PAROXYSMAL POSITIONAL VERTIGO, LEFT: Primary | ICD-10-CM

## 2023-03-07 PROCEDURE — 95992 CANALITH REPOSITIONING PROC: CPT | Mod: GP | Performed by: PHYSICAL THERAPIST

## 2023-03-07 PROCEDURE — 97110 THERAPEUTIC EXERCISES: CPT | Mod: GP,59 | Performed by: PHYSICAL THERAPIST

## 2023-03-21 ENCOUNTER — HOSPITAL ENCOUNTER (OUTPATIENT)
Dept: PHYSICAL THERAPY | Facility: CLINIC | Age: 68
Setting detail: THERAPIES SERIES
Discharge: HOME | End: 2023-03-21
Attending: STUDENT IN AN ORGANIZED HEALTH CARE EDUCATION/TRAINING PROGRAM
Payer: COMMERCIAL

## 2023-03-21 PROCEDURE — 97110 THERAPEUTIC EXERCISES: CPT | Mod: GP,59 | Performed by: PHYSICAL THERAPIST

## 2023-03-21 PROCEDURE — 95992 CANALITH REPOSITIONING PROC: CPT | Mod: GP | Performed by: PHYSICAL THERAPIST

## 2023-03-21 PROCEDURE — 97116 GAIT TRAINING THERAPY: CPT | Mod: GP | Performed by: PHYSICAL THERAPIST

## 2023-04-04 ENCOUNTER — HOSPITAL ENCOUNTER (OUTPATIENT)
Dept: PHYSICAL THERAPY | Facility: CLINIC | Age: 68
Setting detail: THERAPIES SERIES
Discharge: HOME | End: 2023-04-04
Attending: STUDENT IN AN ORGANIZED HEALTH CARE EDUCATION/TRAINING PROGRAM
Payer: COMMERCIAL

## 2023-04-04 PROCEDURE — 95992 CANALITH REPOSITIONING PROC: CPT | Mod: GP | Performed by: PHYSICAL THERAPIST

## 2023-04-04 PROCEDURE — 97110 THERAPEUTIC EXERCISES: CPT | Mod: GP,59 | Performed by: PHYSICAL THERAPIST

## 2023-04-11 ENCOUNTER — HOSPITAL ENCOUNTER (OUTPATIENT)
Dept: PHYSICAL THERAPY | Facility: CLINIC | Age: 68
Setting detail: THERAPIES SERIES
Discharge: HOME | End: 2023-04-11
Attending: STUDENT IN AN ORGANIZED HEALTH CARE EDUCATION/TRAINING PROGRAM
Payer: COMMERCIAL

## 2023-04-11 PROCEDURE — 95992 CANALITH REPOSITIONING PROC: CPT | Mod: GP | Performed by: PHYSICAL THERAPIST

## 2023-04-11 PROCEDURE — 97116 GAIT TRAINING THERAPY: CPT | Mod: GP | Performed by: PHYSICAL THERAPIST

## 2023-04-18 ENCOUNTER — HOSPITAL ENCOUNTER (OUTPATIENT)
Dept: PHYSICAL THERAPY | Facility: CLINIC | Age: 68
Setting detail: THERAPIES SERIES
Discharge: HOME | End: 2023-04-18
Attending: STUDENT IN AN ORGANIZED HEALTH CARE EDUCATION/TRAINING PROGRAM
Payer: COMMERCIAL

## 2023-04-18 PROCEDURE — 97164 PT RE-EVAL EST PLAN CARE: CPT | Mod: GP | Performed by: PHYSICAL THERAPIST

## 2023-04-18 PROCEDURE — 97110 THERAPEUTIC EXERCISES: CPT | Mod: GP | Performed by: PHYSICAL THERAPIST

## 2023-04-18 NOTE — PROGRESS NOTES
04/18/23 1300   Signing Clinician's Name / Credentials   Signing clinician's name / credentials Micah Trammell, SPT; BRITTNEY DamianT   Functional Gait Assessment (DEION Farias, Jessie GZachariah FZachariah, et al. (2004))   1. GAIT LEVEL SURFACE 1  (7.06 sec)   2. CHANGE IN GAIT SPEED 3   3. GAIT WITH HORIZONTAL HEAD TURNS 3   4. GAIT WITH VERTICAL HEAD TURNS 3   5. GAIT AND PIVOT TURN 3   6. STEP OVER OBSTACLE 3   7. GAIT WITH NARROW BASE OF SUPPORT 1  (5 steps)   8. GAIT WITH EYES CLOSED 2  (8.31 sec)   9. AMBULATING BACKWARDS 2   10. STEPS 3   Total Functional Gait Assessment Score   TOTAL SCORE: (MAXIMUM SCORE 30) 24     Functional Gait Assessment (FGA): The FGA assesses postural stability during various walking tasks.   Gait assistive device used: None    Scores of <22 /30 have been correlated with predicting falls in community-dwelling older adults according to iDnora & Marty 2010.   Scores of <18 /30 have been correlated with increased risk for falls in patients with Parkinsons Disease according to aMciel Rey, Foote et al 2014.  Minimal Detectable Change for patients with acute/chronic stroke = 4.2 according to Thikota & Ritschrayna 2009  Minimal Detectable Change for patients with vestibular disorder = 8 according to Dinora & Marty 2010    Assessment (rationale for performing, application to patient s function & care plan): Performed FGA to determine pt's current balance function and risk for falls. Pt not currently at any increased risk for falls evidenced by score of 24/30 on FGA. Performed for goal assessment with pt meeting balance goal.  (Minutes billed as physical performance test):  15

## 2023-04-18 NOTE — PROGRESS NOTES
[facility] Rehabilitation Service    Outpatient Physical Therapy Discharge Note  Patient: Kemi Casper  : 1955    Beginning/End Dates of Reporting Period:  23 to 23    Referring Provider: Flo Weston MD    Therapy Diagnosis: Dizziness, sensory selection and weighting deficit     Client Self Report: Pt reports everything has been going well. Feels like she is ready to d/c with HEP. Has not had dizziness lately, but still feels something off in head that doctors haven't figured out. Every one in awhile feels off balance but able to correct.  Still having some difficulty with breathing and full exhalations. Haven't been doing much for HEP exercises lately as she has been stressed lately due to family situation. Has been doing some walking with nicer weather and plans to do more with neighbor here. Report arch pain with walking lately.      Goals:  Goal Identifier HEP   Goal Description Patient will demonstrate understanding and compliance to her HEP at least 3x per week for continued wellbeing upon discharge from skilled physical therapy.   Target Date 23   Date Met      Progress (detail required for progress note): 23: Notes compliance to her HEP 3x per week. 23: pt notes hasn't been compliant to HEP lately due to stress. Has started walking with neighbor occasionally and plans to start doing this daily with nicer weather. Goal not met do to current non-compliance wit HEP.     Goal Identifier FGA   Goal Description Patient will complete the FGA with a score of 23/30 to demonstrate improved balance and decreased risk for falls.   Target Date 23   Date Met  23   Progress (detail required for progress note): Eval: 10/12, 4 Item DGI. 23: 24/30. Pt demonstrates continued slow walking speed, but overall significantly improved balance. goal met.     Goal Identifier DHI    Goal Description Patient will complete the DHI with a score of <30/100 to demonstrate decreased perception of handicap and improved quality of life.   Target Date 05/14/23   Date Met  04/18/23   Progress (detail required for progress note): Eval: 78/100. 4/18/23: 12/100; significant improvement with pt reporting no subjective symptoms of dizziness since last visit. Goal met.     Goal Identifier DVA   Goal Description Patient will complete DVA testing with a loss of 2 lines or less between static and 2 Hz head movement with no dizziness to demonstrate improved gaze stability for return to activities without limitation.   Target Date 05/14/23   Date Met  04/18/23   Progress (detail required for progress note): Eval: 2 line loss but increased dizziness. 4/18/23: 0 line loss without dizziness. Improvement in visual stability and in symptoms of dizziness, goal met.     Goal Identifier Gait speed   Goal Description Patient will ambulate a speed of 1.0 m/s to demonstrate improved safety and decreased risk for falls with functional mobility and ADLs.   Target Date 05/14/23   Date Met      Progress (detail required for progress note): 20 foot distance with no AD for all assessments; Eval: Normal: 0.88 m/s; 4/4/23: PROGRESSING Normal 0.97 m/s, fast: 1.29 m/s. 4/18/23:: normal speed over 20' distance: 0.86 m/s. Pt continues to demo slow self-selected gait speed, but able to increase speed as needed. Goal not met.       Plan:  Discharge from therapy.    Discharge:    Reason for Discharge: Patient chooses to discontinue therapy due to progress with balance and dizziness. Ready to discharge home with updated HEP.    Equipment Issued: none    Discharge Plan: Patient to continue home program.

## 2023-04-28 DIAGNOSIS — R26.89 BALANCE PROBLEMS: ICD-10-CM

## 2023-04-28 DIAGNOSIS — R42 DIZZINESS: ICD-10-CM

## 2023-06-07 ENCOUNTER — TRANSCRIBE ORDERS (OUTPATIENT)
Dept: OTHER | Age: 68
End: 2023-06-07

## 2023-06-07 DIAGNOSIS — G31.84 MILD COGNITIVE IMPAIRMENT: ICD-10-CM

## 2023-06-07 DIAGNOSIS — R26.89 IMBALANCE: ICD-10-CM

## 2023-06-07 DIAGNOSIS — E83.19 OTHER DISORDERS OF IRON METABOLISM: ICD-10-CM

## 2023-06-07 DIAGNOSIS — G25.81 RESTLESS LEG SYNDROME: ICD-10-CM

## 2023-06-07 DIAGNOSIS — M25.569 KNEE PAIN: ICD-10-CM

## 2023-06-07 DIAGNOSIS — F32.A DEPRESSION: ICD-10-CM

## 2023-06-07 DIAGNOSIS — R42 VERTIGO: ICD-10-CM

## 2023-06-07 DIAGNOSIS — G47.10 HYPERSOMNIA: ICD-10-CM

## 2023-06-07 DIAGNOSIS — G62.9 POLYNEUROPATHY: ICD-10-CM

## 2023-06-07 DIAGNOSIS — R29.6 FALLS FREQUENTLY: Primary | ICD-10-CM

## 2023-06-07 DIAGNOSIS — G60.3 IDIOPATHIC PROGRESSIVE POLYNEUROPATHY: ICD-10-CM

## 2023-06-07 DIAGNOSIS — M79.606 LEG PAIN: ICD-10-CM

## 2023-06-07 DIAGNOSIS — R41.3 MEMORY PROBLEM: ICD-10-CM

## 2023-07-11 NOTE — PROGRESS NOTES
[facility]  [address] [place] [address]  [address]  [facility] [address]  Phone: [phone]  Fax: [phone]  Primary Provider: Amy Bland  Pre-op Performing Provider: AMY BLAND      PREOPERATIVE EVALUATION:  Today's date: 7/12/2023    Kemi Casper is a 68 year old female who presents for a preoperative evaluation.    Surgical Information:  Surgery/Procedure: right thumb trigger release   Surgery Location: [facility]   Surgeon: Dr. Cox  Surgery Date: 7/19/23  Time of Surgery: TBD  Where patient plans to recover: At home with family  Fax number for surgical facility:     Assessment & Plan     The proposed surgical procedure is considered low-intermediate risk.      ICD-10-CM    1. Pre-operative general physical examination  Z01.818       2. Trigger thumb of right hand  M65.311       3. Type 2 diabetes mellitus with diabetic polyneuropathy, with long-term current use of insulin (H)  E11.42 EKG 12-lead complete w/read - Clinics    Z79.4 Basic Metabolic Panel (BFP)     HEMOGRAM PLATELET DIFF (BFP)      4. Benign essential hypertension  I10 EKG 12-lead complete w/read - Clinics     Basic Metabolic Panel (BFP)     HEMOGRAM PLATELET DIFF (BFP)      5. Rheumatoid arthritis of multiple sites without rheumatoid factor (H)  M06.09       6. Obstructive sleep apnea syndrome  G47.33       7. Morbid obesity (H)  E66.01       8. Major depressive disorder, recurrent episode, in full remission (H)  F33.42       9. Vocal cord dysfunction  J38.3       10. Fibromyalgia  M79.7             - No identified additional risk factors other than previously addressed    Antiplatelet or Anticoagulation Medication Instructions:   - Patient is on no antiplatelet or anticoagulation medications.    Additional Medication Instructions:  Patient Instructions   Labs and blood draw today    How to Take Your Medication Before Surgery  Stop ozempic one week before surgery  Don't  take hydrochlorothiazide on morning of surgery  Insulin pump recommendations per Endocrinologist Dr. Ayala  Please bring CGM and pump supplies to reconnect after surgery  OK to take other prescriptions with water on morning of surgery    Instructions About Your Continuous Glucose Monitor  You should be prepared to remove the Continuous Glucose Monitor prior to the operation in order to avoid damage to the equipment during the procedure. Your Continuous Glucose Monitor will not be the source of glucose monitoring during the operation.    How to Manage Your Diabetes Before Surgery  If you use insulin for your diabetes, follow these steps to keep your blood sugar in a safe range before surgery, when you ve been told not to eat or drink:     Check your blood sugar every 4 hours     If your blood sugar is high, take a corrective dose (not a meal dose) of sliding-scale insulin, if that is what you re used to doing    If your blood sugar is below 100, or you have symptoms of hypoglycemia, follow these steps:   1) Have 1 item from this list:  - 4 oz (1/2 cup) of fruit juice without pulp    - 4 oz (1/2 cup) of regular soda (not diet soda)    - 3 glucose gels    - 5 sugar cubes or sugar packets   2) Check your blood sugar again after 15 minutes  3) Repeat steps 1 and 2 again until your blood sugar is greater than 100        RECOMMENDATION:  Labs and EKG stable. APPROVAL GIVEN to proceed with proposed procedure, without further diagnostic evaluation.    >40 minutes spent on the date of the encounter doing chart review, history and exam, documentation, coordination of care and further activities per the note.    Flo Weston MD, [facility]          Subjective       HPI related to upcoming procedure: painful triggering of R thumb, otherwise in her usual state of health    1. No - Have you ever had a heart attack or stroke?  2. No - Have you ever had surgery on your heart or blood vessels, such as a  stent, coronary (heart) bypass, or surgery on an artery in the head, neck, heart, or legs?  3. No - Do you have chest pain when you are physically active?  4. No - Do you have a history of heart failure?  5. No - Do you currently have a cold, bronchitis, or symptoms of other respiratory (head and chest) infections?  6. Yes - Do you have a cough, shortness of breath, or wheezing? Chronic cough, stable, extensive previous pulmonary workup  7. No - Do you or anyone in your family have a history of blood clots?  8. No - Do you or anyone in your family have a serious bleeding problem, such as long-lasting bleeding after surgeries or cuts?  9. No - Have you ever had anemia or been told to take iron pills?  10. No - Have you had any abnormal blood loss such as black, tarry or bloody stools, or abnormal vaginal bleeding?  11. No - Have you ever had a blood transfusion?  12. Yes - Are you willing to have a blood transfusion if it is medically needed before, during, or after your surgery?  13. No - Have you or anyone in your family ever had problems with anesthesia (sedation for surgery)? Hx of anxiety related to anesthesia, no complications  14. Yes - Do you have sleep apnea, excessive snoring, or daytime drowsiness? Yes Do you have a CPAP machine? Yes  15. No - Do you have any artifical heart valves or other implanted medical devices, such as a pacemaker, defibrillator, or continuous glucose monitor?  16. Yes - Do you have any artifical joints? Bilateral TKAs  17. No - Are you allergic to latex?  18. No - Is there any chance that you may be pregnant?    Health Care Directive:  Patient does not have a Health Care Directive or Living Will: Discussed advance care planning with patient; however, patient declined at this time.      Status of Chronic Conditions:  See problem list for active medical problems.  Problems all longstanding and stable, except as noted/documented.  See ROS for pertinent symptoms related to these  conditions.      Patient Active Problem List    Diagnosis Date Noted     Wheezing 12/27/2022     Priority: Medium     Acute respiratory failure with hypoxia (H) 12/27/2022     Priority: Medium     Infection due to 2019 novel coronavirus 12/27/2022     Priority: Medium     Shortness of breath 12/27/2022     Priority: Medium     Urinary incontinence, unspecified type 09/23/2022     Priority: Medium     QT prolongation 11/23/2021     Priority: Medium     Hypersomnia 12/18/2020     Priority: Medium     Rheumatoid arthritis of multiple sites without rheumatoid factor (H) 12/17/2020     Priority: Medium     [facility]       S/P total knee arthroplasty 04/09/2018     Priority: Medium     Anemia 11/30/2017     Priority: Medium     Intestinal malabsorption, unspecified type 11/30/2017     Priority: Medium     Iron adverse reaction 11/30/2017     Priority: Medium     Encounter for other procedures for purposes other than remedying health state (CODE) 11/30/2017     Priority: Medium     Mixed hyperlipidemia 07/22/2016     Priority: Medium     Morbid obesity (H) 07/22/2016     Priority: Medium     Fibromyalgia 07/22/2016     Priority: Medium     Type 2 diabetes mellitus with neurological manifestations (HCC) 05/06/2015     Priority: Medium     Health Care Home 02/20/2013     Priority: Medium     State Tier Level:  Tier 2  Status:  na  Care Coordinator:      See Letters for [facility] Care Plan           ACP (advance care planning) 05/08/2012     Priority: Medium     Advance Care Planning 9/1/2015: ACP Review and Resources Provided:  Reviewed chart for advance care plan.  Kemi Casper has no plan or code status on file. Discussed available resources and provided with information. Confirmed code status reflects current choices pending further ACP discussions.  Confirmed/documented legally designated decision maker(s). Added by Jeniffer Nelson               Major depressive disorder, recurrent episode,  in full remission (H) 04/20/2011     Priority: Medium     [facility]       Sleep apnea 02/09/2011     Priority: Medium     Periodic limb movement disorder 02/09/2011     Priority: Medium     Benign essential hypertension 07/02/2002     Priority: Medium      Past Medical History:   Diagnosis Date     Complication of anesthesia     psychological fear of waking up during surgery     Depression      Diabetes mellitus      Diverticulitis      Essential hypertension, benign 7/2/2002     Fibromyalgia      GERD (gastroesophageal reflux disease)      Hyperlipidemia      Incontinence of urine      Major depressive disorder, recurrent episode, in full remission (H) 4/20/2011     Mixed hyperlipidemia 7/22/2016     Need for prophylactic hormone replacement therapy (postmenopausal) 12/14/2005     Obesity (BMI 30.0-34.9) 7/22/2016     Osteoarthritis      Other abnormal glucose 2007     Sleep apnea 2/9/2011    cpap     Type 2 diabetes mellitus with neurological manifestations (HCC) 5/6/2015     Uncomplicated asthma      Past Surgical History:   Procedure Laterality Date     ARTHROPLASTY KNEE Left 4/9/2018    Procedure: ARTHROPLASTY KNEE;  Left total knee arthroplasty;  Surgeon: Tin Shetty MD;  Location: [facility] OR     ARTHROPLASTY KNEE Right 11/1/2021    Procedure: Right total knee arthroplasty;  Surgeon: Tin Shetty MD;  Location: [facility] OR     ARTHROSCOPY KNEE Left 07/26/2016     CHOLECYSTECTOMY  2002     HC DILATION/CURETTAGE DIAG/THER NON OB  1977    D & C     HYSTERECTOMY TOTAL ABDOMINAL, BILATERAL SALPINGO-OOPHORECTOMY, COMBINED  2002     REMV PILONIDAL LESION SIMPLE  age 17     TONSILLECTOMY, ADENOIDECTOMY, COMBINED  age 5     ZZC APPENDECTOMY  2002    done with hysterectomy     ZC EYE EXAM ESTABLISHED PT  2003     ZZC GOETZ W/O FACETEC FORAMOT/DSKC 1/2 VRT SEG, LUMBAR  10/02/2000    Laminectomy, Lumbar       Review of Systems  12 point ROS performed and negative for new concerns except as mentioned  above       Current Outpatient Medications   Medication Sig Dispense Refill     abatacept (ORENCIA) 250 MG injection Inject into the vein every 30 days 750 mg       acetaminophen (TYLENOL) 325 MG tablet Take 2 tablets (650 mg) by mouth every 4 hours as needed for other (mild pain) 100 tablet 0     albuterol (PROAIR HFA/PROVENTIL HFA/VENTOLIN HFA) 108 (90 Base) MCG/ACT inhaler Inhale 2 puffs into the lungs every 6 hours as needed for shortness of breath, wheezing or cough 18 g 0     buPROPion (WELLBUTRIN XL) 300 MG 24 hr tablet TAKE ONE TABLET BY MOUTH EVERY DAY AS DIRECTED       carvedilol (COREG) 12.5 MG tablet Take 1.5 tablets by mouth 2 times daily (with meals)       Continuous Blood Gluc Sensor (DEXCOM G6 SENSOR) MISC INSERT EVERY 10 DAYS.       Continuous Blood Gluc Transmit (DEXCOM G6 TRANSMITTER) MISC CHANGE TRANSMITTER EVERY 90 DAYS.       cyanocobalamin (VITAMIN B-12) 1000 MCG tablet Take 1,000 mcg by mouth daily       dorzolamide-timolol (COSOPT) 2-0.5 % ophthalmic solution INSTILL 1 DROP INTO THE LEFT EYE TWICE DAILY       hydrochlorothiazide (HYDRODIURIL) 12.5 MG tablet Take 1 tablet (12.5 mg) by mouth daily 90 tablet 1     Insulin Disposable Pump (OMNIPOD 5 G6 POD, GEN 5,) MISC CHANGE POD EVERY TWO DAYS       INSULIN PUMP - OUTPATIENT Date Last Updated: Pt's pump malfunctioned 2 days PTA 12/27 - pt does not have pump with, unable to verify settings, settings unknown to pt  Omnipod  Pump Basal Rates-Times:    CARB RATIO:  CF / Sensitivity Times:  TARGET BG:  Active Insulin Time:  Basal to Bolus Ratio:  Sensor:       irbesartan-hydrochlorothiazide (AVALIDE) 300-12.5 MG tablet Take 1 tablet by mouth daily 90 tablet 1     latanoprost (XALATAN) 0.005 % ophthalmic solution Place 1 drop Into the left eye At Bedtime       leflunomide (ARAVA) 10 MG tablet Take 10 mg by mouth daily       LORazepam (ATIVAN) 0.5 MG tablet Take 0.5-1 mg by mouth nightly as needed for sleep or muscle spasms       meclizine (ANTIVERT)  25 MG tablet Take 1 tablet (25 mg) by mouth 2 times daily as needed for dizziness 30 tablet 0     modafinil (PROVIGIL) 200 MG tablet Take 1 tablet (200 mg) by mouth every morning 90 tablet 0     RABEprazole (ACIPHEX) 20 MG EC tablet Take 1 tablet (20 mg) by mouth daily 90 tablet 1     rosuvastatin (CRESTOR) 20 MG tablet Take 20 mg by mouth daily       semaglutide (OZEMPIC, 0.25 OR 0.5 MG/DOSE,) 2 MG/1.5ML SOPN pen Inject 0.25 mg Subcutaneous every 7 days       sertraline (ZOLOFT) 100 MG tablet Take 200 mg by mouth daily Take with 50 mg tablet for total daily dose of 150 mg       sulfaSALAzine ER (AZULFIDINE EN) 500 MG EC tablet 2,000 mg daily        traZODone (DESYREL) 50 MG tablet Take 25-50 mg by mouth At Bedtime       vitamin D3 (CHOLECALCIFEROL) 250 mcg (32277 units) capsule Take 1 capsule by mouth daily Takes 10,000 units daily         Allergies   Allergen Reactions     Compazine [Prochlorperazine]      Hallucinations - was hospitalized at the time and then had mental side effects, thought that everyone had evacuated the hospital     Lisinopril Cough     Adhesive Tape Rash        Social History     Tobacco Use     Smoking status: Never     Smokeless tobacco: Never   Substance Use Topics     Alcohol use: No     Alcohol/week: 0.0 standard drinks of alcohol     Family History   Problem Relation Age of Onset     Cancer Mother         skin     Hypertension Mother      Diabetes Father      Heart Disease Father         CHF     Genitourinary Problems Father         Renal failure     Thyroid Disease Sister         cyst     History   Drug Use No         Objective        LMP 12/04/2003     Physical Exam  GENERAL APPEARANCE: healthy, alert and no distress  EYES: Eyes grossly normal to inspection, PERRL and conjunctivae and sclerae normal  HENT: ear canals and TM's normal and nose and mouth without ulcers or lesions  NECK: no adenopathy, no asymmetry, masses, or scars and thyroid normal to palpation  RESP: lungs clear to  auscultation - no rales, rhonchi or wheezes  CV: regular rate and rhythm, normal S1 S2, no S3 or S4 and no murmur, click or rub   ABDOMEN: soft, nontender, no HSM or masses and bowel sounds normal  NEURO: Normal strength and tone, sensory exam grossly normal, mentation intact and speech normal  PSYCH: mentation appears normal and affect normal/bright    Recent Labs   Lab Test 12/29/22  0822 12/28/22  0743 12/27/22  1749 12/17/21  0000 12/02/21  1654   HGB 9.9* 11.0* 10.2*   < > 11.4*    255 235   < > 452*    137 141   < >  --    POTASSIUM 3.6 3.9 3.7   < >  --    CR 0.66 0.61 0.81   < >  --    A1C  --   --  6.9*  --  6.3    < > = values in this interval not displayed.        Diagnostics:  Results for orders placed or performed in visit on 07/12/23   Basic Metabolic Panel (BFP)     Status: Abnormal   Result Value Ref Range    Carbon Dioxide 33.2 (A) 20 - 32 mmol/L    Creatinine 0.96 0.60 - 1.30 mg/dL    Glucose 105 (A) 60 - 99 mg/dL    Sodium 139.0 135 - 146 mmol/L    Potassium 3.65 3.5 - 5.3 mmol/L    Chloride 97.5 (A) 98 - 110 mmol/L    Urea Nitrogen 15 7 - 25 mg/dL    Calcium 9.3 8.6 - 10.3 mg/dL    BUN/Creatinine Ratio 15.6 6 - 32   HEMOGRAM PLATELET DIFF (BFP)     Status: Abnormal   Result Value Ref Range    WBC 6.6 4.0 - 11 10*9/L    RBC Count 3.60 (A) 3.8 - 5.2 10*12/L    Hemoglobin 11.5 (A) 11.7 - 15.7 g/dL    Hematocrit 37.1 35.0 - 47.0 %    .1 (A) 78 - 100 fL    MCH 31.9 26 - 33 pg    MCHC 31.0 31 - 36 g/dL    Platelet Count 234 150 - 375 10^9/L    % Granulocytes 60.6 %    % Lymphocytes 28.4 %    % Monocytes 11.0 %       EKG: sinus rhythm, stable lateral TWI noted on prior EKG with no acute changes c/w ischemia, QTc 427    Revised Cardiac Risk Index (RCRI):  The patient has the following serious cardiovascular risks for perioperative complications:   - Diabetes Mellitus (on Insulin) = 1 point     RCRI Interpretation: 1 point: Class II (low risk - 0.9% complication rate)           Signed  Electronically by: AMY BLAND MD  Copy of this evaluation report is provided to requesting physician.

## 2023-07-12 ENCOUNTER — OFFICE VISIT (OUTPATIENT)
Dept: FAMILY MEDICINE | Facility: CLINIC | Age: 68
End: 2023-07-12

## 2023-07-12 DIAGNOSIS — M65.311 TRIGGER THUMB OF RIGHT HAND: ICD-10-CM

## 2023-07-12 DIAGNOSIS — F33.42 MAJOR DEPRESSIVE DISORDER, RECURRENT EPISODE, IN FULL REMISSION (H): ICD-10-CM

## 2023-07-12 DIAGNOSIS — Z01.818 PRE-OPERATIVE GENERAL PHYSICAL EXAMINATION: Primary | ICD-10-CM

## 2023-07-12 DIAGNOSIS — E66.01 MORBID OBESITY (H): ICD-10-CM

## 2023-07-12 DIAGNOSIS — I10 BENIGN ESSENTIAL HYPERTENSION: ICD-10-CM

## 2023-07-12 DIAGNOSIS — E11.42 TYPE 2 DIABETES MELLITUS WITH DIABETIC POLYNEUROPATHY, WITH LONG-TERM CURRENT USE OF INSULIN (H): ICD-10-CM

## 2023-07-12 DIAGNOSIS — M79.7 FIBROMYALGIA: ICD-10-CM

## 2023-07-12 DIAGNOSIS — J38.3 VOCAL CORD DYSFUNCTION: ICD-10-CM

## 2023-07-12 DIAGNOSIS — G47.33 OBSTRUCTIVE SLEEP APNEA SYNDROME: ICD-10-CM

## 2023-07-12 DIAGNOSIS — Z79.4 TYPE 2 DIABETES MELLITUS WITH DIABETIC POLYNEUROPATHY, WITH LONG-TERM CURRENT USE OF INSULIN (H): ICD-10-CM

## 2023-07-12 DIAGNOSIS — M06.09 RHEUMATOID ARTHRITIS OF MULTIPLE SITES WITHOUT RHEUMATOID FACTOR (H): ICD-10-CM

## 2023-07-12 LAB
% GRANULOCYTES: 60.6 %
BUN SERPL-MCNC: 15 MG/DL (ref 7–25)
BUN/CREATININE RATIO: 15.6 (ref 6–32)
CALCIUM SERPL-MCNC: 9.3 MG/DL (ref 8.6–10.3)
CHLORIDE SERPLBLD-SCNC: 97.5 MMOL/L (ref 98–110)
CO2 SERPL-SCNC: 33.2 MMOL/L (ref 20–32)
CREAT SERPL-MCNC: 0.96 MG/DL (ref 0.6–1.3)
GLUCOSE SERPL-MCNC: 105 MG/DL (ref 60–99)
HCT VFR BLD AUTO: 37.1 % (ref 35–47)
HEMOGLOBIN: 11.5 G/DL (ref 11.7–15.7)
LYMPHOCYTES NFR BLD AUTO: 28.4 %
MCH RBC QN AUTO: 31.9 PG (ref 26–33)
MCHC RBC AUTO-ENTMCNC: 31 G/DL (ref 31–36)
MCV RBC AUTO: 103.1 FL (ref 78–100)
MONOCYTES NFR BLD AUTO: 11 %
PLATELET COUNT - QUEST: 234 10^9/L (ref 150–375)
POTASSIUM SERPL-SCNC: 3.65 MMOL/L (ref 3.5–5.3)
RBC # BLD AUTO: 3.6 10*12/L (ref 3.8–5.2)
SODIUM SERPL-SCNC: 139 MMOL/L (ref 135–146)
WBC # BLD AUTO: 6.6 10*9/L (ref 4–11)

## 2023-07-12 PROCEDURE — 93000 ELECTROCARDIOGRAM COMPLETE: CPT | Performed by: STUDENT IN AN ORGANIZED HEALTH CARE EDUCATION/TRAINING PROGRAM

## 2023-07-12 PROCEDURE — 85025 COMPLETE CBC W/AUTO DIFF WBC: CPT | Performed by: STUDENT IN AN ORGANIZED HEALTH CARE EDUCATION/TRAINING PROGRAM

## 2023-07-12 PROCEDURE — 36415 COLL VENOUS BLD VENIPUNCTURE: CPT | Performed by: STUDENT IN AN ORGANIZED HEALTH CARE EDUCATION/TRAINING PROGRAM

## 2023-07-12 PROCEDURE — 99215 OFFICE O/P EST HI 40 MIN: CPT | Performed by: STUDENT IN AN ORGANIZED HEALTH CARE EDUCATION/TRAINING PROGRAM

## 2023-07-12 PROCEDURE — 80048 BASIC METABOLIC PNL TOTAL CA: CPT | Performed by: STUDENT IN AN ORGANIZED HEALTH CARE EDUCATION/TRAINING PROGRAM

## 2023-07-12 RX ORDER — LANOLIN ALCOHOL/MO/W.PET/CERES
1000 CREAM (GRAM) TOPICAL DAILY
COMMUNITY
End: 2024-07-22

## 2023-07-12 RX ORDER — SEMAGLUTIDE 1.34 MG/ML
0.25 INJECTION, SOLUTION SUBCUTANEOUS
COMMUNITY
End: 2024-07-22

## 2023-07-12 NOTE — PATIENT INSTRUCTIONS
Labs and blood draw today    How to Take Your Medication Before Surgery  Stop ozempic one week before surgery  Don't take hydrochlorothiazide on morning of surgery  Insulin pump recommendations per Endocrinologist Dr. Ayala  Please bring CGM and pump supplies to reconnect after surgery  OK to take other prescriptions with water on morning of surgery    Instructions About Your Continuous Glucose Monitor  You should be prepared to remove the Continuous Glucose Monitor prior to the operation in order to avoid damage to the equipment during the procedure. Your Continuous Glucose Monitor will not be the source of glucose monitoring during the operation.    How to Manage Your Diabetes Before Surgery  If you use insulin for your diabetes, follow these steps to keep your blood sugar in a safe range before surgery, when you ve been told not to eat or drink:   Check your blood sugar every 4 hours   If your blood sugar is high, take a corrective dose (not a meal dose) of sliding-scale insulin, if that is what you re used to doing  If your blood sugar is below 100, or you have symptoms of hypoglycemia, follow these steps:   Have 1 item from this list:  4 oz (1/2 cup) of fruit juice without pulp    4 oz (1/2 cup) of regular soda (not diet soda)    3 glucose gels    5 sugar cubes or sugar packets   Check your blood sugar again after 15 minutes  Repeat steps 1 and 2 again until your blood sugar is greater than 100

## 2023-07-12 NOTE — NURSING NOTE
Chief Complaint   Patient presents with     Pre-Op Exam     DOS 7/19, release of trigger thumb on right hand, surgery being done by Dr. Cox with [facility], surgery being held at [facility]

## 2023-07-26 ENCOUNTER — THERAPY VISIT (OUTPATIENT)
Dept: PHYSICAL THERAPY | Facility: CLINIC | Age: 68
End: 2023-07-26
Attending: PSYCHIATRY & NEUROLOGY
Payer: COMMERCIAL

## 2023-07-26 DIAGNOSIS — G25.81 RESTLESS LEG SYNDROME: ICD-10-CM

## 2023-07-26 DIAGNOSIS — M25.569 KNEE PAIN: ICD-10-CM

## 2023-07-26 DIAGNOSIS — G47.10 HYPERSOMNIA: ICD-10-CM

## 2023-07-26 DIAGNOSIS — M79.606 LEG PAIN: ICD-10-CM

## 2023-07-26 DIAGNOSIS — F32.A DEPRESSION: ICD-10-CM

## 2023-07-26 DIAGNOSIS — G31.84 MILD COGNITIVE IMPAIRMENT: ICD-10-CM

## 2023-07-26 DIAGNOSIS — R42 VERTIGO: ICD-10-CM

## 2023-07-26 DIAGNOSIS — R41.3 MEMORY PROBLEM: ICD-10-CM

## 2023-07-26 DIAGNOSIS — R29.6 FALLS FREQUENTLY: ICD-10-CM

## 2023-07-26 DIAGNOSIS — R26.89 IMBALANCE: ICD-10-CM

## 2023-07-26 DIAGNOSIS — G62.9 POLYNEUROPATHY: ICD-10-CM

## 2023-07-26 DIAGNOSIS — E83.19 OTHER DISORDERS OF IRON METABOLISM: ICD-10-CM

## 2023-07-26 PROCEDURE — 97162 PT EVAL MOD COMPLEX 30 MIN: CPT | Mod: GP

## 2023-07-26 PROCEDURE — 97110 THERAPEUTIC EXERCISES: CPT | Mod: GP

## 2023-07-26 NOTE — PROGRESS NOTES
"PHYSICAL THERAPY EVALUATION  Type of Visit: Evaluation    See electronic medical record for Abuse and Falls Screening details.    Subjective   Pt is a 67 yo F who shares that she has had some issues with balance and SOB recently. Stating that she has had SOB since having COVID earlier this year, along with brain fog and increased fatigue. She shares that she is slowly doing a little better, is now able to walk 5 blocks with her friend, where she could not have done this in the past. She has had 5 falls recently and shares that she is noticing that when going down hills on her walk with her friend, her feet seem to be moving faster than she is able to manage- her friend was there to catch her but she feels that she would have fallen if her friend hadn't grabbed her. Aminata shares that she recently got results back on an MRI and was told that she has severe narrowing of the spinal canal at L5-S1- shares that her legs feel like \"rubber\" in standing and has been having some discomfort related to it. Is now unable to stand at the sink and do dishes due to low back pain.     Uses a 4WW for longer walks, noting pain in the upper trap region when using the 4WW. Having some dizziness with turning over in bed, head seems to be spinning.       Presenting condition or subjective complaint:    Date of onset: 06/07/23    Relevant medical history:   DM II, HTN, RA, fibromyyalgia  Dates & types of surgery:      Prior diagnostic imaging/testing results:       Prior therapy history for the same diagnosis, illness or injury:        Prior Level of Function  Transfers: Independent  Ambulation: Independent  ADL: Independent  IADL: Driving    Living Environment  Social support:   Lives with spouse   Type of home:   [facility]   Stairs to enter the home:        Ramp:   No   Stairs inside the home:       8 steps to kitchen, 8 more to bedroom    Help at home:    Equipment owned:   4WW, SPC    Employment:      Hobbies/Interests:   Hector barcenasling with " friends, sewing, wants to be able to play yard games    Patient goals for therapy:   Walk longer distances without SOB, have the balance to play yard games     Pain assessment: Pain present   Little pain in neck 1/10 in sitting, legs sometimes hurt   Objective   Cognitive Status Examination  Orientation: Oriented to person, place and time   Level of Consciousness: Alert  Follows Commands and Answers Questions: 100% of the time, Follows multi step instructions  Personal Safety and Judgement: Intact  Memory: Intact    POSTURE: Standing Posture: Rounded shoulders, Forward head, Thoracic kyphosis increased  Sitting Posture: Rounded shoulders, Forward head, Thoracic kyphosis increased   RANGE OF MOTION: LE ROM WFL  STRENGTH: LE Strength WFL    TRANSFERS: Independent    GAIT:  Ambulates at decreased speed with limited foot clearance and arm swing, cautious stepping    BALANCE: Impaired balance as noted on FGA    SPECIAL TESTS  Functional Gait Assessment (FGA) TOTAL SCORE: (MAXIMUM SCORE 30): 18  (<22/30 indicates fall risk)   10 Meter Walk Test (Comfortable)   0.91m/s without AD   5 Times Sit-to-Stand (5TSTS)  10.08     SENSATION: NT, will assess further as needed     REFLEXES: NT, will assess further as needed   COORDINATION: NT, will assess further as needed   MUSCLE TONE: NT, will assess further as needed       Assessment & Plan   CLINICAL IMPRESSIONS  Medical Diagnosis: Falls frequently (R29.6), Mild cognitive impairment (G31.84), Polyneuropathy (G62.9), Depression (F32.A), Imbalance (R26.89), Vertigo (R42), Memory problem (R41.3), 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  The following significant findings have been identified:  Decreased strength, Impaired balance, Decreased proprioception, Impaired gait, Decreased activity tolerance, Impaired posture, and Instability. These impairments interfere with their ability to perform recreational activities, household chores, household mobility, and community mobility as compared to previous level of function.     Clinical Decision Making (Complexity):  Clinical Presentation: Evolving/Changing  Clinical Presentation Rationale: based on medical and personal factors listed in PT evaluation  Clinical Decision Making (Complexity): Moderate complexity    PLAN OF CARE  Treatment Interventions:  Interventions: Gait Training, Manual Therapy, Neuromuscular Re-education, Therapeutic Activity, Therapeutic Exercise, Self-Care/Home Management, Standardized Testing    Long Term Goals     PT Goal 1  Goal Identifier: HEP  Goal Description: Pt will be able to demonstrate HEP activities without need for cues from provider to demonstrate understanding and independence with HEP.  Rationale: to maximize safety and independence with performance of ADLs and functional tasks  Target Date: 10/11/23  PT Goal 2  Goal Identifier: Functional mobility  Goal Description: Pt will demonstrate a 4 point improvement on the FGA to demonstrate minimum clinically significant difference in score to demonstrate improved safety with functional mobility and decrease risk of falls.  Rationale: to maximize safety and independence with performance of ADLs and functional tasks  Goal Progress: Eval: 18/30  Target Date: 10/11/23  PT Goal 3  Goal Identifier: Gait speed  Goal Description: Pt demonstrate a 0.12 second improvement in gait speed to demonstrate minimum clinically significant difference in score to demonstrate improved gait velocity.  Rationale: to maximize safety and independence with performance of ADLs and functional tasks  Goal Progress: Eval: 0.91  Target Date: 10/11/23  PT Goal 4  Goal Identifier: Floor transfer  Goal  Description: Pt will be able to demonstrate ability to perform transition from tall kneeling to standing through half kneel independently without UE support on stable surface to demonstrate ability to recover after a fall outside.  Rationale: to maximize safety and independence with performance of ADLs and functional tasks  Target Date: 10/11/23      Frequency of Treatment: 1-2x/week  Duration of Treatment: 12 weeks    Recommended Referrals to Other Professionals:  None at this time   Education Assessment:   Learner/Method: Patient;Demonstration;Pictures/Video;Listening    Risks and benefits of evaluation/treatment have been explained.   Patient/Family/caregiver agrees with Plan of Care.     Evaluation Time:     PT Eval, Moderate Complexity Minutes (42594): 32  Signing Clinician: Sherie Toledo, PT, DPT      [facility]                                                                                   OUTPATIENT PHYSICAL THERAPY      PLAN OF TREATMENT FOR OUTPATIENT REHABILITATION   Patient's Last Name, First Name, BETHELZachariahZULEMAKemi Khan YOB: 1955   Provider's Name   [facility]   Medical Record No.  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             Physician Signature               Date    X_____________________________________________________                  Referring Provider:Franklin Rider    Initial Assessment See Epic Evaluation- Start of Care Date: 07/26/23

## 2023-07-27 DIAGNOSIS — I10 BENIGN ESSENTIAL HYPERTENSION: ICD-10-CM

## 2023-07-27 RX ORDER — IRBESARTAN AND HYDROCHLOROTHIAZIDE 300; 12.5 MG/1; MG/1
1 TABLET, FILM COATED ORAL DAILY
Qty: 90 TABLET | Refills: 1 | COMMUNITY
Start: 2023-07-27

## 2023-07-27 NOTE — TELEPHONE ENCOUNTER
Kemi Casper is requesting a refill of:    Refused Prescriptions:                       Disp   Refills    irbesartan-hydrochlorothiazide (AVALIDE) 3*90 tab*1        Sig: TAKE 1 TABLET BY MOUTH DAILY.  Refused By: PERRY CAMPBELL  Reason for Refusal: Patient needs appointment    Last med recheck was 02/17/23 advised to return in 6 months. Pt due for OV

## 2023-07-28 DIAGNOSIS — R42 DIZZINESS: Primary | ICD-10-CM

## 2023-07-28 DIAGNOSIS — Z86.16 HISTORY OF 2019 NOVEL CORONAVIRUS DISEASE (COVID-19): ICD-10-CM

## 2023-08-02 ENCOUNTER — TRANSFERRED RECORDS (OUTPATIENT)
Dept: FAMILY MEDICINE | Facility: CLINIC | Age: 68
End: 2023-08-02

## 2023-08-07 ENCOUNTER — THERAPY VISIT (OUTPATIENT)
Dept: PHYSICAL THERAPY | Facility: CLINIC | Age: 68
End: 2023-08-07
Attending: PSYCHIATRY & NEUROLOGY
Payer: COMMERCIAL

## 2023-08-07 DIAGNOSIS — R29.6 FALLS FREQUENTLY: Primary | ICD-10-CM

## 2023-08-07 PROCEDURE — 97140 MANUAL THERAPY 1/> REGIONS: CPT | Mod: GP | Performed by: PHYSICAL THERAPIST

## 2023-08-07 PROCEDURE — 97110 THERAPEUTIC EXERCISES: CPT | Mod: GP | Performed by: PHYSICAL THERAPIST

## 2023-08-09 ENCOUNTER — TRANSFERRED RECORDS (OUTPATIENT)
Dept: HEALTH INFORMATION MANAGEMENT | Facility: CLINIC | Age: 68
End: 2023-08-09
Payer: COMMERCIAL

## 2023-08-21 ENCOUNTER — OFFICE VISIT (OUTPATIENT)
Dept: FAMILY MEDICINE | Facility: CLINIC | Age: 68
End: 2023-08-21

## 2023-08-21 ENCOUNTER — THERAPY VISIT (OUTPATIENT)
Dept: PHYSICAL THERAPY | Facility: CLINIC | Age: 68
End: 2023-08-21
Attending: PSYCHIATRY & NEUROLOGY
Payer: COMMERCIAL

## 2023-08-21 VITALS
HEART RATE: 86 BPM | BODY MASS INDEX: 39.06 KG/M2 | DIASTOLIC BLOOD PRESSURE: 86 MMHG | RESPIRATION RATE: 22 BRPM | OXYGEN SATURATION: 93 % | SYSTOLIC BLOOD PRESSURE: 130 MMHG | WEIGHT: 200 LBS | TEMPERATURE: 97.9 F

## 2023-08-21 DIAGNOSIS — Z02.89 ENCOUNTER FOR COMPLETION OF FORM WITH PATIENT: ICD-10-CM

## 2023-08-21 DIAGNOSIS — R29.6 RECURRENT FALLS: ICD-10-CM

## 2023-08-21 DIAGNOSIS — R21 RASH: ICD-10-CM

## 2023-08-21 DIAGNOSIS — H81.10 BENIGN PAROXYSMAL POSITIONAL VERTIGO, UNSPECIFIED LATERALITY: Primary | ICD-10-CM

## 2023-08-21 DIAGNOSIS — R94.6 THYROID FUNCTION TEST ABNORMAL: Primary | ICD-10-CM

## 2023-08-21 DIAGNOSIS — R26.89 IMBALANCE: ICD-10-CM

## 2023-08-21 PROCEDURE — 84443 ASSAY THYROID STIM HORMONE: CPT | Mod: 90 | Performed by: STUDENT IN AN ORGANIZED HEALTH CARE EDUCATION/TRAINING PROGRAM

## 2023-08-21 PROCEDURE — 97535 SELF CARE MNGMENT TRAINING: CPT | Mod: GP | Performed by: PHYSICAL THERAPIST

## 2023-08-21 PROCEDURE — 97164 PT RE-EVAL EST PLAN CARE: CPT | Mod: GP,59 | Performed by: PHYSICAL THERAPIST

## 2023-08-21 PROCEDURE — 99214 OFFICE O/P EST MOD 30 MIN: CPT | Performed by: STUDENT IN AN ORGANIZED HEALTH CARE EDUCATION/TRAINING PROGRAM

## 2023-08-21 PROCEDURE — 84439 ASSAY OF FREE THYROXINE: CPT | Mod: 90 | Performed by: STUDENT IN AN ORGANIZED HEALTH CARE EDUCATION/TRAINING PROGRAM

## 2023-08-21 RX ORDER — INSULIN LISPRO 100 [IU]/ML
INJECTION, SOLUTION INTRAVENOUS; SUBCUTANEOUS
COMMUNITY
Start: 2023-08-04

## 2023-08-21 NOTE — PATIENT INSTRUCTIONS
Causes of stress: (Things you have less control over)    Selling camper  Getting rid of stuff  Relationship with grandson  Relationship with other son and lack of contact with granddaughter          Things you can do to help with stress: (Focus on these)    Staci with counseling  Creation of activity book   Sewing  Walking with neighbor  Going to the pool with your   Puzzles  Medication  Spend time with friends  Try some activities at the gym or the community center or senior center  Think or journal about 1-3 good things

## 2023-08-21 NOTE — PROGRESS NOTES
Assessment & Plan       ICD-10-CM    1. Thyroid function test abnormal  R94.6 TSH ([facility])     T4 FREE ([facility])      2. Rash  R21 Adult Dermatology Referral      3. Encounter for completion of form with patient  Z02.89       4. Recurrent falls  R29.6       5. Imbalance  R26.89          Patient has ongoing workup for cognitive changes and multiple somatic sx, difficult to ascertain if any new sx that could relate to thyroid. Recheck labs, then will discuss with patient's Endocrinologist.  Parking form completed  Derm referral in    Patient Instructions   We will recheck thyroid labs today    Handicap parking form completed    Dermatology referral    PT as planned          Reasons to follow-up sooner or seek emergent care reviewed.     >30 minutes spent on the date of the encounter doing chart review, history and exam, documentation and further activities per the note    Flo Weston MD, [facility]       Subjective     Kemi CUNNINGHAM Stanley Casper is a 68 year old female who presents to clinic today for the following health issues:    HPI   Chief Complaint   Patient presents with    Consult For     Review abnormal thyroid results from the [facility] clinic, TSH 6.67, no other thyroid testing. Has had several prior TSH levels wnl, last January 2023.     Form Request     Needs disability parking permit renewed today     Consult     Rash on extremities, denies itching, no new contacts or potential irritants recalled          Objective    /86 (BP Location: Right arm, Patient Position: Sitting, Cuff Size: Adult Large)   Pulse 86   Temp 97.9  F (36.6  C) (Temporal)   Resp 22   Wt 90.7 kg (200 lb)   LMP 12/04/2003   SpO2 93%   BMI 39.06 kg/m    Body mass index is 39.06 kg/m .  Alert, NAD  NC/AT  Sclerae anicteric  Regular  Resp nonlabored  Skin warm and dry, patchy rash over extremities only  No focal neuro deficits. Speech intact.   Appropriate affect  Normal gait.      Labs  reviewed.

## 2023-08-21 NOTE — PATIENT INSTRUCTIONS
We will recheck thyroid labs today    Handicap parking form completed    Dermatology referral    PT as planned

## 2023-08-21 NOTE — LETTER
August 29, 2023      Aminata Casper  [address]  [place] [address]        Dear Ms.Kulak Casper,    Thyroid function is completely normal. Good news! If you have any questions or concerns, please call the clinic at the number listed above.     Resulted Orders   TSH ([facility])   Result Value Ref Range    TSH 3.02 0.40 - 4.50 mIU/L   T4 FREE ([facility])   Result Value Ref Range    T4 Free, Non-Dialysis 1.1 0.8 - 1.8 ng/dL           Sincerely,      Flo Weston MD

## 2023-08-21 NOTE — NURSING NOTE
Chief Complaint   Patient presents with    Consult For     Review abnormal thyroid results from the endocrinology clinic, wants to review this information more with Dr. Weston to get his thoughts on this more     Form Request     Needs disability parking permit renewed today      Pre-visit Screening:  Immunizations:  up to date  Colonoscopy:  is up to date  Mammogram: is up to date  Asthma Action Test/Plan:  na  PHQ9:  na  GAD7:  na  Questioned patient about current smoking habits Pt. has never smoked.  Ok to leave detailed message on voice mail for today's visit only yes, phone # [phone] (home)

## 2023-08-21 NOTE — PROGRESS NOTES
PHYSICAL THERAPY EVALUATION  Type of Visit: Re-evaluation    See electronic medical record for Abuse and Falls Screening details.    VESTIBULAR RE-EVALUATION  Subjective Report: Patient reports that she feels like her dizziness never lef. She feels off balance, not dizzy. But several times when she lies on her left side, she feels unstable (not dizzy). She also notes some dizziness with tilting her head back in supine to get something off a shelf at the top of her bed.  She endorses instability lasts for 3 seconds in bed. She does not feel like things are moving around her, but feels like something is happening in her head. 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 She notes increased anxiety surrounding selling the trailer and agetting rid of good stuff.  DHI:  62/100 (Increased from 12/100 at end of last POC in 4/2023)    Neck ROM: WFL for positional testing  VOR: Abnormal  VOR Comments: Some retinal slip observed with left head movements, asymptomatic  Vestibular Suppressant in Last 24 Hours?: No  Exam completed with: Infrared Goggles  Spontaneous Nystagmus: Negative  Remy-Hallpike (Right): Negative  Langeloth-Hallpike (Left): Upbeating  Remy-Hallpike (Left) Comments: Mild upbeating nystagmus with no exacerbation of dizziness with repeated attempts  [facility] Supine Roll Test (Right): Negative  [facility] Supine Roll Test (Left): Negative  Other Infrared Goggle Exam or Frenzel Lense Exam Comments: Questionable recurrence of L BPPV, unable to reproduce symptoms today with positional testing, side lying, or patient reproduction of aggrivating positions at home       Assessment & Plan   CLINICAL IMPRESSIONS  Medical Diagnosis: Falls frequently (R29.6), Mild cognitive impairment (G31.84), Polyneuropathy (G62.9), Depression (F32.A), Imbalance (R26.89), Vertigo (R42), Memory problem (R41.3), 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 Of note, patient demonstrated a significant improvement in symptoms with last PT POC, with DHI improving from 72/100 to 12/100, and positional testing showing resolution of BPPV. Unable to reproduce symptoms today, however subjective symptoms are suspicious for recurrent BPPV, will reassess in the future as indicated.     PLAN OF CARE  Treatment Interventions:  Interventions added to existing POC: Self-Care/Home Management, Canalith Repositioning    Long Term Goals     PT Goal 1  Goal Identifier: HEP  Goal Description: Pt will be able to demonstrate HEP activities without need for cues from provider to demonstrate understanding and independence with HEP.  Rationale: to maximize safety and independence with performance of ADLs and functional tasks  Target Date: 10/11/23  PT Goal 2  Goal Identifier: Functional mobility  Goal Description: Pt will demonstrate a 4 point improvement on the FGA to demonstrate minimum clinically significant difference in score to demonstrate improved safety with functional mobility and decrease risk of falls.  Rationale: to maximize safety and independence with performance of ADLs and functional tasks  Goal Progress: Eval: 18/30  Target Date: 10/11/23  PT Goal 3  Goal Identifier: Gait speed  Goal Description: Pt demonstrate a 0.12 second improvement in gait speed to demonstrate minimum clinically significant difference in score to demonstrate improved gait velocity.  Rationale: to maximize safety and independence with performance of ADLs and functional tasks  Goal Progress: Eval: 0.91  Target Date: 10/11/23  PT Goal 4  Goal Identifier: Floor transfer  Goal Description: Pt will be able to demonstrate  ability to perform transition from tall kneeling to standing through half kneel independently without UE support on stable surface to demonstrate ability to recover after a fall outside.  Rationale: to maximize safety and independence with performance of ADLs and functional tasks  Target Date: 10/11/23      Frequency of Treatment: 1-2x/week  Duration of Treatment: 12 weeks    Recommended Referrals to Other Professionals:   Education Assessment:   Learner/Method: Patient;Pictures/Video;Demonstration  Education Comments: See above, patient instructions    Risks and benefits of evaluation/treatment have been explained.   Patient/Family/caregiver agrees with Plan of Care.     Evaluation Time:    Re-eval 25 minutes          Signing Clinician: Hyacinth Oleary, PT      [facility]                                                                                   OUTPATIENT PHYSICAL THERAPY      PLAN OF TREATMENT FOR OUTPATIENT REHABILITATION   Patient's Last Name, First Name, BETHELKemi Jones YOB: 1955   Provider's Name   [facility]   Medical Record No.  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), Restless leg syndrome (G25.81), Hypersomnia (G47.10), Leg pain (M79.606), Knee pain (M25.569) BPPV      PT Treatment Diagnosis:  Force production deficit, sensory detection deficit Plan of Treatment  Frequency/Duration: 1-2x/week/ 12 weeks    Certification date from 08/21/23 to 11/13/23         See note for plan of treatment details and functional goals     Hyacinth Oleary, PT                         I CERTIFY THE NEED FOR THESE SERVICES FURNISHED UNDER        THIS PLAN OF TREATMENT AND WHILE UNDER  MY CARE     (Physician attestation of this document indicates review and certification of the therapy plan).                Referring Provider:  Franklin Rider  PCP:   Flo Weston MD      Initial Assessment  See Epic Evaluation- Start of Care Date: 07/26/23

## 2023-08-22 LAB
T4, FREE, NON-DIALYSIS - QUEST: 1.1 NG/DL (ref 0.8–1.8)
TSH SERPL-ACNC: 3.02 MIU/L (ref 0.4–4.5)

## 2023-08-29 ENCOUNTER — THERAPY VISIT (OUTPATIENT)
Dept: PHYSICAL THERAPY | Facility: CLINIC | Age: 68
End: 2023-08-29
Attending: PSYCHIATRY & NEUROLOGY
Payer: COMMERCIAL

## 2023-08-29 DIAGNOSIS — R29.6 FALLS FREQUENTLY: Primary | ICD-10-CM

## 2023-08-29 PROCEDURE — 97110 THERAPEUTIC EXERCISES: CPT | Mod: GP | Performed by: PHYSICAL THERAPIST

## 2023-09-08 ENCOUNTER — THERAPY VISIT (OUTPATIENT)
Dept: PHYSICAL THERAPY | Facility: CLINIC | Age: 68
End: 2023-09-08
Attending: PSYCHIATRY & NEUROLOGY
Payer: COMMERCIAL

## 2023-09-08 DIAGNOSIS — R29.6 FALLS FREQUENTLY: Primary | ICD-10-CM

## 2023-09-08 PROCEDURE — 97112 NEUROMUSCULAR REEDUCATION: CPT | Mod: GP | Performed by: PHYSICAL THERAPIST

## 2023-09-08 PROCEDURE — 97535 SELF CARE MNGMENT TRAINING: CPT | Mod: GP | Performed by: PHYSICAL THERAPIST

## 2023-09-08 PROCEDURE — 97110 THERAPEUTIC EXERCISES: CPT | Mod: GP | Performed by: PHYSICAL THERAPIST

## 2023-09-08 NOTE — PATIENT INSTRUCTIONS
PT homework 9/8/23:    Do 5 minutes of exercise at a time, then rest, then come back and down another 5 minutes. Try to do 5 minutes of exercise every 2 hours.    Example: 11:00 exercise, 1:00 exercise, 3:00 exercise, 5:00 exercise   The goal is to increase this to every hour rather than every two hours      Go back to the pool but only stay and exercise for 30-45 minutes, then leave, even if you're feeling good. Monitor how you feel for the next day or two. Avoid going to the pool for longer than 45 minutes at a time.

## 2023-10-31 ENCOUNTER — TRANSFERRED RECORDS (OUTPATIENT)
Dept: HEALTH INFORMATION MANAGEMENT | Facility: CLINIC | Age: 68
End: 2023-10-31
Payer: COMMERCIAL

## 2023-10-31 LAB
ALT SERPL-CCNC: 10 IU/L (ref 5–35)
AST SERPL-CCNC: 16 U/L (ref 5–34)
CREATININE (EXTERNAL): 1 MG/DL (ref 0.5–1.3)
GFR ESTIMATED (EXTERNAL): 58.6 ML/MIN/1.73M2

## 2023-11-10 ENCOUNTER — TRANSFERRED RECORDS (OUTPATIENT)
Dept: FAMILY MEDICINE | Facility: CLINIC | Age: 68
End: 2023-11-10

## 2024-01-09 ENCOUNTER — TRANSFERRED RECORDS (OUTPATIENT)
Dept: HEALTH INFORMATION MANAGEMENT | Facility: CLINIC | Age: 69
End: 2024-01-09
Payer: COMMERCIAL

## 2024-01-22 ENCOUNTER — TRANSFERRED RECORDS (OUTPATIENT)
Dept: HEALTH INFORMATION MANAGEMENT | Facility: CLINIC | Age: 69
End: 2024-01-22
Payer: COMMERCIAL

## 2024-01-22 LAB
CREATININE (EXTERNAL): 0.73 MG/DL (ref 0.52–1.04)
GFR ESTIMATED (EXTERNAL): >60 ML/MIN/1.73M2
GLUCOSE (EXTERNAL): 240 MG/DL (ref 70–99)
HBA1C MFR BLD: 7 %
POTASSIUM (EXTERNAL): 3.5 MMOL/L (ref 3.5–5.1)

## 2024-01-30 DIAGNOSIS — I10 BENIGN ESSENTIAL HYPERTENSION: ICD-10-CM

## 2024-01-30 RX ORDER — IRBESARTAN AND HYDROCHLOROTHIAZIDE 300; 12.5 MG/1; MG/1
1 TABLET, FILM COATED ORAL DAILY
COMMUNITY
Start: 2024-01-30

## 2024-01-30 RX ORDER — HYDROCHLOROTHIAZIDE 12.5 MG/1
12.5 TABLET ORAL DAILY
COMMUNITY
Start: 2024-01-30

## 2024-01-30 NOTE — TELEPHONE ENCOUNTER
Kemi Casper is requesting a refill of:    Refused Prescriptions:                       Disp   Refills    irbesartan-hydrochlorothiazide (AVALIDE) 3*                Sig: TAKE ONE TABLET BY MOUTH EVERY DAY  Refused By: PERRY CAMPBELL  Reason for Refusal: Patient needs appointment    hydrochlorothiazide (HYDRODIURIL) 12.5 MG *                Sig: TAKE 1 TABLET BY MOUTH DAILY.  Refused By: PERRY CAMPBELL  Reason for Refusal: Patient needs appointment    Last med recheck was 02/17/23, pt due for OV

## 2024-01-31 ENCOUNTER — TRANSFERRED RECORDS (OUTPATIENT)
Dept: FAMILY MEDICINE | Facility: CLINIC | Age: 69
End: 2024-01-31

## 2024-02-08 NOTE — PROGRESS NOTES
DISCHARGE  Reason for Discharge: Patient has failed to schedule further appointments.    Equipment Issued: None    Discharge Plan: Patient to continue home program.    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), Restless leg syndrome (G25.81), Hypersomnia (G47.10), Leg pain (M79.606), Knee pain (M25.569) BPPV   PT Tx Diagnosis Force production deficit, sensory detection deficit   Quick Adds Certification   Progress Note/Certification   Start of Care Date 07/26/23   Onset of illness/injury or Date of Surgery 06/07/23   Therapy Frequency 1-2x/week   Predicted Duration 12 weeks   Certification date from 08/21/23   Certification date to 11/13/23   GOALS   PT Goals 2;3;4;5   PT Goal 1   Goal Identifier HEP   Goal Description Pt will be able to demonstrate HEP activities without need for cues from provider to demonstrate understanding and independence with HEP.   Rationale to maximize safety and independence with performance of ADLs and functional tasks   Goal Progress 9/10/23: Notes partial compliance to her HEP   Target Date 10/11/23   PT Goal 2   Goal Identifier Functional mobility   Goal Description Pt will demonstrate a 4 point improvement on the FGA to demonstrate minimum clinically significant difference in score to demonstrate improved safety with functional mobility and decrease risk of falls.   Rationale to maximize safety and independence with performance of ADLs and functional tasks   Goal Progress Eval: 18/30   Target Date 10/11/23   PT Goal 3   Goal Identifier Gait speed   Goal Description Pt demonstrate a 0.12 second improvement in gait speed to demonstrate minimum  clinically significant difference in score to demonstrate improved gait velocity.   Rationale to maximize safety and independence with performance of ADLs and functional tasks   Goal Progress Eval: 0.91   Target Date 10/11/23   PT Goal 4   Goal Identifier Floor transfer   Goal Description Pt will be able to demonstrate ability to perform transition from tall kneeling to standing through half kneel independently without UE support on stable surface to demonstrate ability to recover after a fall outside.   Rationale to maximize safety and independence with performance of ADLs and functional tasks   Target Date 10/11/23   Subjective Report   Subjective Report Patient went to the pool for 1-1.5 hours since her last session and did the exercises recommended, then didn't feel good for a couple of days. She felt sore, tired, nauseated. She went to her camper and tried the pool again over the weekend, felt the same way. After a couple of days she feels better. It is hard to explain how she feels, but she would say generally not good, maybe worn out. Nausea has been a bigger problem and she has not been able to eat much. She is careful with carrying things down the stairs, has lost her balance a few times but has not fallen.   Treatment Interventions (PT)   Interventions Neuromuscular Re-education   Therapeutic Procedure/Exercise   Therapeutic Procedures: strength, endurance, ROM, flexibillity minutes (52665) 8   Ther Proc 1 - Details R bike performed on RL 6 for 2 minutes, then RL 3 for 2 minutes, then RL 1 for 1 minute secondary to complaint of catching, fatigue, and discomfort in her right leg.   Skilled Intervention Assist with set up, modifications, cues   Patient Response/Progress Catching and discomfort in her knee, denies pain   Neuromuscular Re-education   Neuromuscular re-ed of mvmt, balance, coord, kinesthetic sense, posture, proprioception minutes (37520) 16   Neuro Re-ed 1 - Details Foam balance with feet apart  progressed to narrow PATIENCE, appropriate ankle strategy but apprehension observed without UE support. Lateral foam step on/off with light progressing to no UE support with SBA for safety, about 15x each direction. Cues for foot placement and safety awareness occasionally.   Skilled Intervention Foam negotiation, modifications and progressions, cues   Patient Response/Progress Able to progress to no UE support with repetition   Self Care/home Management   ADL/Home Mgmt Training (71709) 15   Self Care 1 - Details Patient participated in review of symptoms. Instruction and education on modification of pool exercises to perform for half the time, then monitor tolerance. Reinforced importance of hydration and diet with nausea management strategies.   Skilled Intervention Instruction, HEP development   Patient Response/Progress Verbalized understanding   Education   Learner/Method Patient;Pictures/Video;Demonstration   Education Comments Perform 30-45min max of pool exercises and monitor tolerance   Plan   Home program Continue: HOME Set a timer for exercises. 2x8 minute (AM/PM) = 16 minutes total/day. / Break 1-2 min between each exercise. -  Neck stretches. 30-60 seconds holds each neck stretch, 1-2 times in a row, 3-4x throughout the day - Refer to picture for technique. / 8 minutes total: 1) Sit to stands - TIP: reaching forward/lean forward when transitioning to standing, and sitting. When standing arms are at your sides, shoulderblade squeeze. 2 minutes  2) Elevator squats: Reaching forward/leaning forward with buttock a few inches from the chair behind you, up <> down a few inches from the chair with 3 second holds in each position. 1 minute. 3) Floor transfers - Set-up near the couch/use couch for support transitioning from the floor to standing. Lowering yourself down holding the couch > on hands and knees > side sitting > hands and knees > half kneeling >  transition to standing (3-5x). 3 minutes. 4) Stairs: 2  minute intervals // AQUATIC exercises (pool at gym every other day): Reference laminated sheet provided. Walking: Forwards, Sideways, Backwards. Stretching: Standing glute stretch. Standing HS stretch. Standing calf stretch. Strengthening: Hip flex<>ext. Hip abd<>add. SL bicycles. SL circles. SL press down noodle under foot. Glute taps to wall. // CONTINUE: walking with walker outside with friend. // HOMEWORK: explore tieless shoes - reference resource provided, Andre as preferred.   Plan for next session pt able to manage neck pain with levator scap stretch between each home exercise? did she buy new shoes she can slip on/off - does she want to improve bending/reaching to tie shoes? f/u on participation in HEP: floor transfers, stairs, sit <> stand, squats, address any questions for aquatic exs provided. progress/modify HEP prn. cont to address pt's goals towards improved balance confidence and mobility with: getting on/off ground (arlin to sit edge of pool vs using steps to enter and for gardening), stairs, car transfers. / car transfers - consider modified technique prn, practice 1/4 turns sitting at corner edge of mat. Reassess for BPPV as indicated, HIGT, dynamic balance challenges, eyes closed tasks   Total Session Time   Timed Code Treatment Minutes 39   Total Treatment Time (sum of timed and untimed services) 39

## 2024-03-05 ENCOUNTER — TRANSFERRED RECORDS (OUTPATIENT)
Dept: HEALTH INFORMATION MANAGEMENT | Facility: CLINIC | Age: 69
End: 2024-03-05
Payer: COMMERCIAL

## 2024-03-11 ENCOUNTER — OFFICE VISIT (OUTPATIENT)
Dept: FAMILY MEDICINE | Facility: CLINIC | Age: 69
End: 2024-03-11

## 2024-03-11 VITALS
BODY MASS INDEX: 39.08 KG/M2 | RESPIRATION RATE: 20 BRPM | SYSTOLIC BLOOD PRESSURE: 126 MMHG | TEMPERATURE: 97.9 F | HEART RATE: 82 BPM | HEIGHT: 61 IN | WEIGHT: 207 LBS | DIASTOLIC BLOOD PRESSURE: 84 MMHG | OXYGEN SATURATION: 93 %

## 2024-03-11 DIAGNOSIS — M06.09 RHEUMATOID ARTHRITIS OF MULTIPLE SITES WITHOUT RHEUMATOID FACTOR (H): ICD-10-CM

## 2024-03-11 DIAGNOSIS — E66.01 MORBID OBESITY (H): ICD-10-CM

## 2024-03-11 DIAGNOSIS — E11.42 TYPE 2 DIABETES MELLITUS WITH DIABETIC POLYNEUROPATHY, WITH LONG-TERM CURRENT USE OF INSULIN (H): ICD-10-CM

## 2024-03-11 DIAGNOSIS — L30.4 INTERTRIGO: Primary | ICD-10-CM

## 2024-03-11 DIAGNOSIS — F33.42 MAJOR DEPRESSIVE DISORDER, RECURRENT EPISODE, IN FULL REMISSION (H): ICD-10-CM

## 2024-03-11 DIAGNOSIS — Z00.00 ENCOUNTER FOR MEDICARE ANNUAL WELLNESS EXAM: ICD-10-CM

## 2024-03-11 DIAGNOSIS — Z79.4 TYPE 2 DIABETES MELLITUS WITH DIABETIC POLYNEUROPATHY, WITH LONG-TERM CURRENT USE OF INSULIN (H): ICD-10-CM

## 2024-03-11 PROBLEM — J96.01 ACUTE RESPIRATORY FAILURE WITH HYPOXIA (H): Status: RESOLVED | Noted: 2022-12-27 | Resolved: 2024-03-11

## 2024-03-11 PROCEDURE — G0439 PPPS, SUBSEQ VISIT: HCPCS | Performed by: STUDENT IN AN ORGANIZED HEALTH CARE EDUCATION/TRAINING PROGRAM

## 2024-03-11 PROCEDURE — 99214 OFFICE O/P EST MOD 30 MIN: CPT | Performed by: STUDENT IN AN ORGANIZED HEALTH CARE EDUCATION/TRAINING PROGRAM

## 2024-03-11 RX ORDER — CLOTRIMAZOLE AND BETAMETHASONE DIPROPIONATE 10; .64 MG/G; MG/G
CREAM TOPICAL 2 TIMES DAILY
Qty: 45 G | Refills: 0 | Status: SHIPPED | OUTPATIENT
Start: 2024-03-11 | End: 2024-03-25

## 2024-03-11 RX ORDER — TOLTERODINE TARTRATE 2 MG/1
TABLET, EXTENDED RELEASE ORAL
COMMUNITY
Start: 2024-01-31 | End: 2024-07-22 | Stop reason: DRUGHIGH

## 2024-03-11 NOTE — PROGRESS NOTES
Assessment & Plan       ICD-10-CM    1. Intertrigo  L30.4 clotrimazole-betamethasone (LOTRISONE) 1-0.05 % external cream      2. Type 2 diabetes mellitus with diabetic polyneuropathy, with long-term current use of insulin (H)  E11.42 Nutrition Referral    Z79.4       3. Morbid obesity (H)  E66.01 Nutrition Referral      4. Encounter for Medicare annual wellness exam  Z00.00       5. Rheumatoid arthritis of multiple sites without rheumatoid factor (H)  M06.09       6. Major depressive disorder, recurrent episode, in full remission (H24)  F33.42            Patient Instructions   Start cream twice daily for 2 weeks    Dietitian referral sent to [facility]    Continue to increase ozempic per Endocrinology     Follow-up with [facility]  [address]  [place] [address] [phone] -- appt line    Follow-up with eye doctor as planned   [facility]  [facility]  [address]  [address]  [facility] [address]  [phone] -- appt line    Let me know when you're ready to get DEXA scan     Reasons to follow-up sooner or seek emergent care reviewed.     >30 minutes spent on the date of the encounter doing chart review, history and exam, documentation and further activities per the note excl AWV      Flo Weston MD, [facility]      Subjective     Kemi CUNNINGHAM Stanley Casper is a 69 year old female who presents to clinic today for the following health issues:    HPI   Chief Complaint   Patient presents with    Rash     Rash on stomach and breasts that she first noticed last week, very itchy, has tried topical monistat on it because she thought it was a yeast infection but this did not help, feels some burning as well, was using lume whole body deodorant and feels that this caused the rash because she noticed it after using this, stopped using the lume and is just using regular baby powder now      Medicare Visit     Annual  "medicare wellness visit       Using lume body deodorant for 1 month  Rash under both breasts and waistline  Has tried monistat cream and baby powder over the weekend   Endocrine notes reviewed, on and off ozempic   Recent EGD with dilation per patient, records not yet available   Most recent Derm note reviewed  Most recent Neuro office visit reviewed    Only getting 400-800 steps daily, doesn't feel motivated to be more active but does have a friend she will occasionally go for walks with         Objective    /84 (BP Location: Right arm, Patient Position: Sitting, Cuff Size: Adult Large)   Pulse 82   Temp 97.9  F (36.6  C) (Temporal)   Resp 20   Ht 1.549 m (5' 1\")   Wt 93.9 kg (207 lb)   LMP 12/04/2003   SpO2 93%   BMI 39.11 kg/m    Body mass index is 39.11 kg/m .  Alert, NAD  NC/AT  Sclerae anicteric  RRR  Resp nonlabored  Skin warm and dry, well demarcated rash under bilateral breasts and pannus   Speech intact. Normal gait.  flat affect        Labs reviewed.        "

## 2024-03-11 NOTE — PATIENT INSTRUCTIONS
Start cream twice daily for 2 weeks    Dietitian referral sent to [place]    Continue to increase ozempic per Endocrinology     Follow-up with [facility]  [address]  [place] [address] [phone] -- appt line    Follow-up with eye doctor as planned   [facility]  [facility]  [address]  [address]  [facility] [address]  [phone] -- appt line    Let me know when you're ready to get DEXA scan

## 2024-03-11 NOTE — NURSING NOTE
Chief Complaint   Patient presents with    Rash     Rash on stomach and breasts that she first noticed last week, very itchy, has tried topical monistat on it because she thought it was a yeast infection but this did not help, feels some burning as well, was using lume whole body deodorant and feels that this caused the rash because she noticed it after using this, stopped using the lume and is just using regular baby powder now      Medicare Visit     Annual medicare wellness visit      Pre-visit Screening:  Immunizations:  up to date-told to go to pharmacy for vaccines that are due   Colonoscopy:  just recently completed-removed a polyp-has to go back in 6 months-will get records  Mammogram: is due but patient wants to do every 2 years rather then one   Asthma Action Test/Plan:  na  PHQ9:  PHQ2 done today   GAD7:  na  Questioned patient about current smoking habits Pt. has never smoked.  Ok to leave detailed message on voice mail for today's visit only yes, phone # [phone] (home)

## 2024-03-11 NOTE — PROGRESS NOTES
Kemi Casper is a 69 year old female who presents for Medicare Annual Wellness Visit.    Current providers caring for this patient include:  Patient Care Team:  Flo Weston MD as PCP - General (Family Medicine)  Zaina Bell RPH as Assigned MTM Pharmacist  Kira Altamirano MD as Assigned PCP    Complete Medical and Social history reviewed with patient, outlined below.    Patient Active Problem List   Diagnosis    Benign essential hypertension    Sleep apnea    Periodic limb movement disorder    Major depressive disorder, recurrent episode, in full remission (H24)    ACP (advance care planning)    Health Care Home    Type 2 diabetes mellitus with neurological manifestations (HCC)    Mixed hyperlipidemia    Morbid obesity (H)    Fibromyalgia    Anemia    Intestinal malabsorption, unspecified type    Iron adverse reaction    Encounter for other procedures for purposes other than remedying health state (CODE)    S/P total knee arthroplasty    Rheumatoid arthritis of multiple sites without rheumatoid factor (H)    Hypersomnia    QT prolongation    Urinary incontinence, unspecified type    Wheezing    Acute respiratory failure with hypoxia (H)    Infection due to 2019 novel coronavirus    Shortness of breath       Past Medical History:   Diagnosis Date    Complication of anesthesia     psychological fear of waking up during surgery    Depression     Diabetes mellitus     Diverticulitis     Essential hypertension, benign 7/2/2002    Fibromyalgia     GERD (gastroesophageal reflux disease)     Hyperlipidemia     Incontinence of urine     Major depressive disorder, recurrent episode, in full remission (H24) 4/20/2011    Mixed hyperlipidemia 7/22/2016    Need for prophylactic hormone replacement therapy (postmenopausal) 12/14/2005    Obesity (BMI 30.0-34.9) 7/22/2016    Osteoarthritis     Other abnormal glucose 2007    Sleep apnea 2/9/2011    cpap    Type 2 diabetes mellitus with neurological  manifestations (HCC) 5/6/2015    Uncomplicated asthma        Past Surgical History:   Procedure Laterality Date    ARTHROPLASTY KNEE Left 4/9/2018    Procedure: ARTHROPLASTY KNEE;  Left total knee arthroplasty;  Surgeon: Tin Shetty MD;  Location: [facility] OR    ARTHROPLASTY KNEE Right 11/1/2021    Procedure: Right total knee arthroplasty;  Surgeon: Tin Shetty MD;  Location: [facility] OR    ARTHROSCOPY KNEE Left 07/26/2016    CHOLECYSTECTOMY  2002    HC DILATION/CURETTAGE DIAG/THER NON OB  1977    D & C    HYSTERECTOMY TOTAL ABDOMINAL, BILATERAL SALPINGO-OOPHORECTOMY, COMBINED  2002    REMV PILONIDAL LESION SIMPLE  age 17    TONSILLECTOMY, ADENOIDECTOMY, COMBINED  age 5    ZZC APPENDECTOMY  2002    done with hysterectomy    ZZC EYE EXAM ESTABLISHED PT  2003    ZZC GOETZ W/O FACETEC FORAMOT/DSKC 1/2 VRT SEG, LUMBAR  10/02/2000    Laminectomy, Lumbar       Family History   Problem Relation Age of Onset    Cancer Mother         skin    Hypertension Mother     Diabetes Father     Heart Disease Father         CHF    Genitourinary Problems Father         Renal failure    Thyroid Disease Sister         cyst       Social History     Tobacco Use    Smoking status: Never    Smokeless tobacco: Never   Substance Use Topics    Alcohol use: No     Alcohol/week: 0.0 standard drinks of alcohol       Diet: excess salt, excess fats-feels that her diet is not very good and can be worked on-has gained some weight recently   Physical Activity: generally inactive-knows that she needs to start exercising more   Depression Screen:    Over the past 2 weeks, patient has felt down, depressed, or hopeless:  No    Over the past 2 weeks, patient has felt little interest or pleasure in doing things: No    Functional ability/Safety screen:  Up and go test (able to get up and walk longer than 30 seconds): Passed  Patient needs assistance with: nothing-fully independent  Patient's home has the following possible safety concerns: none  "identified  Patient has concerns about her hearing:  Yes-does have some hearing loss-saw audiologist years ago-will be going back soon to have this reevaluated   Cognitive Screen  Patient repeats three objects (ball, flag, tree)      Clock drawing test:   NORMAL  Recalls three objects after 3 minutes (ball,flag,tree):                                                                                               recalls 3 objects (3 points)    Physical Exam:  /84 (BP Location: Right arm, Patient Position: Sitting, Cuff Size: Adult Large)   Pulse 82   Temp 97.9  F (36.6  C) (Temporal)   Resp 20   Ht 1.549 m (5' 1\")   Wt 93.9 kg (207 lb)   LMP 12/04/2003   SpO2 93%   BMI 39.11 kg/m     Body mass index is 39.11 kg/m .         End of Life Planning:   Patient currently has an advanced directive: No.  I have verified the patient's ablity to prepare an advanced directive/make health care decisions.  Literature was provided to assist patient in preparing an advanced directive.    Education/Counseling:   Based on review of the above information, the following items were addressed:  Appropriate preventive services were discussed with this patient, including applicable screening as appropriate for cardiovascular disease, diabetes, osteopenia/osteoporosis, and glaucoma.  As appropriate for age/gender, discussed screening for colorectal cancer, prostate cancer, breast cancer, and cervical cancer.   Checklist reviewing preventive services available has been given to the patient.  Diabetes managed by Endocrinologist  Declines mammogram and DEXA at this time  Vaccines reviewed, will get at pharmacy      Flo Weston MD, [facility]      "

## 2024-03-13 ENCOUNTER — TRANSFERRED RECORDS (OUTPATIENT)
Dept: FAMILY MEDICINE | Facility: CLINIC | Age: 69
End: 2024-03-13

## 2024-03-28 ENCOUNTER — TRANSFERRED RECORDS (OUTPATIENT)
Dept: FAMILY MEDICINE | Facility: CLINIC | Age: 69
End: 2024-03-28

## 2024-04-06 DIAGNOSIS — I10 BENIGN ESSENTIAL HYPERTENSION: ICD-10-CM

## 2024-04-11 RX ORDER — IRBESARTAN AND HYDROCHLOROTHIAZIDE 300; 12.5 MG/1; MG/1
1 TABLET, FILM COATED ORAL DAILY
COMMUNITY
Start: 2024-04-11

## 2024-04-11 NOTE — TELEPHONE ENCOUNTER
Kemi Casper is requesting a refill of:    Refused Prescriptions:                       Disp   Refills    irbesartan-hydrochlorothiazide (AVALIDE) 3*                Sig: TAKE ONE TABLET BY MOUTH EVERY DAY  Refused By: PERYR CAMPBELL  Reason for Refusal: Should already have refills on file  Reason for Refusal Comment: Refill sent on 01/31/23 for 90 tab with 1 refill    Pt due for FASTING OV  Can you review her chart, I dont see when she last had med check or any labs??

## 2024-05-13 ENCOUNTER — TRANSFERRED RECORDS (OUTPATIENT)
Dept: HEALTH INFORMATION MANAGEMENT | Facility: CLINIC | Age: 69
End: 2024-05-13
Payer: COMMERCIAL

## 2024-05-21 ENCOUNTER — TRANSFERRED RECORDS (OUTPATIENT)
Dept: FAMILY MEDICINE | Facility: CLINIC | Age: 69
End: 2024-05-21

## 2024-06-13 ENCOUNTER — TELEPHONE (OUTPATIENT)
Dept: FAMILY MEDICINE | Facility: CLINIC | Age: 69
End: 2024-06-13

## 2024-06-13 NOTE — TELEPHONE ENCOUNTER
Caro PT called stating she has now been seeing patient for 10 weeks. Patient is making improvements, however she stated everytime she has seen Aminata she seems to be confused. She stated this confusion is on and off. Her resting O2 is 91-92% and Caro thinks this is affecting her. She gets easily exerted and has fatigue.  She just wanted you to be aware.    Is this low oxygen something your aware of? It looks like she sees neurology for multiple concerns. Would you like to see patient to discuss?    Please advise (Caro) # [phone]    Thanks, Belem MILNER    ROUTE TO MARIYA I will be out of office

## 2024-06-19 ENCOUNTER — TRANSFERRED RECORDS (OUTPATIENT)
Dept: FAMILY MEDICINE | Facility: CLINIC | Age: 69
End: 2024-06-19

## 2024-06-24 NOTE — TELEPHONE ENCOUNTER
Spoke with patient and she stated that she is going to call and get scheduled with her pulmonologist as she has not seen them since March of 2023. She does not feel that things are any worse then they have been but still wants to check in with her pulmonologist.

## 2024-07-08 ENCOUNTER — TRANSFERRED RECORDS (OUTPATIENT)
Dept: HEALTH INFORMATION MANAGEMENT | Facility: CLINIC | Age: 69
End: 2024-07-08
Payer: COMMERCIAL

## 2024-07-08 LAB
CHOLESTEROL (EXTERNAL): 285 MG/DL (ref 50–199)
GFR ESTIMATED (EXTERNAL): >60 ML/MIN/1.7
HBA1C MFR BLD: 6.1 %
HDLC SERPL-MCNC: 32 MG/DL
LDL CHOLESTEROL CALCULATED (EXTERNAL): 224 MG/DL
TRIGLYCERIDES (EXTERNAL): 145 MG/DL (ref 10–150)

## 2024-07-16 ENCOUNTER — OFFICE VISIT (OUTPATIENT)
Dept: SURGERY | Facility: CLINIC | Age: 69
End: 2024-07-16
Payer: COMMERCIAL

## 2024-07-16 ENCOUNTER — TRANSFERRED RECORDS (OUTPATIENT)
Dept: FAMILY MEDICINE | Facility: CLINIC | Age: 69
End: 2024-07-16

## 2024-07-16 VITALS
HEIGHT: 61 IN | OXYGEN SATURATION: 91 % | WEIGHT: 205 LBS | SYSTOLIC BLOOD PRESSURE: 139 MMHG | HEART RATE: 86 BPM | DIASTOLIC BLOOD PRESSURE: 80 MMHG | BODY MASS INDEX: 38.71 KG/M2

## 2024-07-16 DIAGNOSIS — E66.01 MORBID OBESITY (H): Primary | ICD-10-CM

## 2024-07-16 DIAGNOSIS — E11.49 TYPE 2 DIABETES MELLITUS WITH NEUROLOGICAL MANIFESTATIONS (H): ICD-10-CM

## 2024-07-16 PROCEDURE — 99203 OFFICE O/P NEW LOW 30 MIN: CPT | Performed by: PHYSICIAN ASSISTANT

## 2024-07-16 RX ORDER — TIRZEPATIDE 2.5 MG/.5ML
2.5 INJECTION, SOLUTION SUBCUTANEOUS
COMMUNITY

## 2024-07-16 NOTE — PATIENT INSTRUCTIONS
"Nice to talk with you today! Thank you for allowing me the privilege of caring for you.   We hope we provided you with the excellent service you deserve.     To ensure the quality of our services you may receive a patient satisfaction survey from an independent monitoring company.  The greatest compliment you can give is \"Likely to Recommend\"      Below is our plan we discussed.-  AMA [employer]      PATIENT INSTRUCTIONS:   Continue mounjaro per guidelines of endocrinology   Increase water to 50-60 oz - can start with 20 oz water in the am    Have 3 meals with 20 grams of protein per meal    Have a veggie with at least 1 meal daily       Please schedule a follow up with Rayshawn Mcdermott PA-C in 3 months.  If you need to reach me sooner you can do so by calling [phone].    Have a great day!         GLP TIPS    Tips to help with some of those side effects associated with the GLP -1 agonists:    - small meals (either 3 or even 5 smaller meals)  - [place] foods   - Don't skip meals   - low fat meals -  avoiding greasy and fried foods  - Make sure to eat breakfast soon after getting up  - get in plenty of water    If you are struggling with eating regular meals please feel free to reach out to the dietitian    Remember some mild nausea is common. If you have severe nausea, vomiting, abdominal pain or anything else concerning please reach out to your care team at the weight loss clinic!                  "

## 2024-07-16 NOTE — PROGRESS NOTES
"New Medical Weight Management Consult      PATIENT:  Kmei Casper   MRN:         [id number]   :         1955  OLGA:         2024        Dear AMY BLAND MD,    I had the pleasure of seeing your patient, Kemi Casper. Full intake/assessment was done to determine barriers to weight loss success and develop a treatment plan. Kemi Casper is a 69 year old female interested in treatment of medical problems associated with excess weight. She has a height of 5' 1\", a weight of 205 lbs 0 oz, and the calculated Body mass index is 38.73 kg/m .    Works with [facility] [place]. Was just started on mounjaro yesterday. Was on ozempic prior.  Most recent hemoglobin A1c = 6. Meets with a diabetic educator monthly    Having memory issues and will be having a neuropsych testing don  Here with  [facility] today    Has less of an appetite     Has post covid syndrome and has breathing issues. This limits activity      Assessment & Plan   Problem List Items Addressed This Visit       Type 2 diabetes mellitus with neurological manifestations (HCC)    Relevant Medications    tirzepatide (MOUNJARO) 2.5 MG/0.5ML pen    Morbid obesity (H) - Primary    Relevant Medications    tirzepatide (MOUNJARO) 2.5 MG/0.5ML pen        PROGRAM OVERVIEW  Reviewed options at [facility] including provider visits, dietician, 24 week healthy lifestyle program, health coaching, food supplements, Get Moving program, and psychological support.  All questions about weight loss program were answered.    SURGICAL WEIGHT LOSS   Option presented given pt BMI and current comorbid conditions. No current interest.       MEDICATIONS:  We discussed healthy habits to assist with weight loss. We reviewed medications associated with weight gain. We discussed the role of pharmacological agents in the treatment of obesity and the \"off-label\" use of medications in this practice. We reviewed " medication that may assist with weight loss. Indications, contraindications, risks/benefits, and potential side effects were discussed. Patient will continue mounjaro that is being prescribed by Endocrinologist. Discussed that medications must always be used together with lifestyle changes such as improvements in diet choices, portion control and establishing and maintaining a regular exercise program.     AOM Considerations:  Phentermine: not best option   Topiramate: not best option with memory concerns   GLP-1: currently taking  Mounjaro   Naltrexone:    Wellbutrin: currently taking  Metformin: option               PATIENT INSTRUCTIONS:   Continue mounjaro per guidelines of endocrinology   Increase water to 50-60 oz - can start with 20 oz water in the am    Have 3 meals with 20 grams of protein per meal   Have a veggie with at least 1 meal daily       COUNSELING:   Reviewed obesity as a chronic disease and comprehensive management stratagies.      We discussed Bariatric Basics including:  -eating 3 meals daily  -eating protein first  -eating slowly, chewing food well  -avoiding/limiting calorie containing beverages  -limiting carbohydrates and changing to whole grains  -limiting restaurant or cafeteria eating to twice a week or less    We discussed the importance of restorative sleep and stress management in maintaining a healthy weight.  We discussed insulin resistance and glycemic index as it relates to appetite and weight control.   We discussed the importance of physical activity including cardiovascular and strength training in maintaining a healthier weight and explored viable options.  Patient education of above written in AVS.        FOLLOW-UP:  November. Will continue with goals and lifestyle. Will continue working with diabetic educator.      42 minutes spent on the date of the encounter doing chart review, review of test results, patient visit and documentation        Weight Related Co-morbidities:           I have the following health issues associated with obesity: DMT2   I have the following symptoms associated with obesity:      Patient Active Problem List   Diagnosis    Benign essential hypertension    Sleep apnea    Periodic limb movement disorder    Major depressive disorder, recurrent episode, in full remission (H24)    ACP (advance care planning)    Type 2 diabetes mellitus with neurological manifestations (HCC)    Mixed hyperlipidemia    Morbid obesity (H)    Fibromyalgia    Anemia    Intestinal malabsorption, unspecified type    Iron adverse reaction    Encounter for other procedures for purposes other than remedying health state (CODE)    S/P total knee arthroplasty    Rheumatoid arthritis of multiple sites without rheumatoid factor (H)    Hypersomnia    QT prolongation    Urinary incontinence, unspecified type    Wheezing    Infection due to 2019 novel coronavirus    Shortness of breath         Weight History    Previously had weight loss surgery:    Age pt became overweight:   8th Grade   The following factors contributed to weight gain:       I have tried the following methods to lose weight:      My lowest weight since age 18 was:    My highest weight since age 18 was:    The most weight I have ever lost was: (lbs)    Family hx of obesity:    Do you know anyone who has had weight loss surgery?    How has your weight changed over the last year?         Diet Recall     Wake Up: 9:00 am   Breakfast Frosted flakes with cherries - frappacino   Lunch  1-3 pm have lunch. Had a couple chips and cheese   Supper; D: chicken wings - got a bit nauseated  Cherries   No water   Bedtime:   Snack between meals:   Snack in evening:                Typical snacks:    Caloric beverages per day. What type: Drinks 1 can of tea daily    Water consumed daily: Minimal to no water.    Low ravin/diet drinks:   Alcohol:   Foods you crave:                  Sleep History    How many hours do you sleep at night?    Do you think you  have sleep apnea?     Do you snore so loudly some one can hear you through a closed door?    Has anyone seen or heard you stop breathing during your sleep?     Do you often feel tired, fatigued, or sleepy during the day?    Do you have a TV/Computer in your bedroom?          Eating Habits (Yes/No) (Never, Daily, Several Days, Weekly,Monthly)   Healthy Eater No      Generally, eat a lot of Simple Carbs/processed boxed foods No   Generally, eat a lot of fatty foods No   Eat fast food  Never      Eat Take out/sit-down restaurant.    Eat most meals in front of screens    Skip meals Yes   Grazes all day    Snacks between meals.    Eat most food at end of day. No   Eat in middle of night     Eat  to prevent or correct low blood sugar.    Eat to prevent GERD    Worry about not having enough food to eat.    Constant Dieter    I emotionally eat. (stressed, depressed, anxious, bored)    I feel hungry all the time-even if I just have eaten.    Feeling full is important to me.    I finish all the food on my plate-even if I am already full.    I eat/snack without noticing that I am eating.    I eat when I am preparing the meal.    I have trouble not eating junk if they are around the house.    I think about food all day.    Who does the grocery shopping?    Who does the cooking?        Eating Disorder Screen    I binge eat No      I make myself vomit what I have eaten or use laxatives to get rid of food.    I eat a large amount of food, like a loaf of bread, a box of cookies, a pint/quart of ice cream, all at once.    I eat a large amount of food even when I am not hungry.    I eat rapidly.    I eat alone because I feel embarrassed and do not want others to see how much I have eaten.    I eat until I am uncomfortably full.    I feel bad, disgusted, or guilty after I overeat.        Activity/Exercise History    How many hours of screen time do you have daily?    How many times a week are you active for the purpose of exercise?  Has difficulty    What do you do for exercise? Walks twice weekly   For how long to your exercise for?    What keeps you from being more active?        Past Medical History:   Diagnosis Date    Complication of anesthesia     psychological fear of waking up during surgery    Depression     Diabetes mellitus     Diverticulitis     Essential hypertension, benign 7/2/2002    Fibromyalgia     GERD (gastroesophageal reflux disease)     Hyperlipidemia     Incontinence of urine     Major depressive disorder, recurrent episode, in full remission (H24) 4/20/2011    Mixed hyperlipidemia 7/22/2016    Need for prophylactic hormone replacement therapy (postmenopausal) 12/14/2005    Obesity (BMI 30.0-34.9) 7/22/2016    Osteoarthritis     Other abnormal glucose 2007    Sleep apnea 2/9/2011    cpap    Type 2 diabetes mellitus with neurological manifestations (HCC) 5/6/2015    Uncomplicated asthma           Work/Social History Reviewed With Patient    My employment status is: retired   My job is:    How much of your job is spent on the computer or phone?    How many hours do you spend commuting to work daily?     What is your marital status?    If in a relationship, is your significant other overweight?    Do you have children?    If you have children, are they overweight?    Who do you live with?     Are they supportive of your health goals?    Who does the food shopping?       Social History     Tobacco Use    Smoking status: Never    Smokeless tobacco: Never   Substance Use Topics    Alcohol use: No     Alcohol/week: 0.0 standard drinks of alcohol    Drug use: No          MEDICATIONS:   Current Outpatient Medications   Medication Sig Dispense Refill    buPROPion (WELLBUTRIN XL) 300 MG 24 hr tablet TAKE ONE TABLET BY MOUTH EVERY DAY AS DIRECTED      carvedilol (COREG) 12.5 MG tablet Take 2 tablets by mouth 2 times daily (with meals)      Continuous Blood Gluc Sensor (DEXCOM G6 SENSOR) MISC INSERT EVERY 10 DAYS.       Continuous Blood Gluc Transmit (DEXCOM G6 TRANSMITTER) MISC CHANGE TRANSMITTER EVERY 90 DAYS.      dorzolamide-timolol (COSOPT) 2-0.5 % ophthalmic solution INSTILL 1 DROP INTO THE LEFT EYE TWICE DAILY      HUMALOG 100 UNIT/ML injection USE UP TO 70 UNITS DAILY IN INSULIN PUMP      Insulin Disposable Pump (OMNIPOD 5 G6 POD, GEN 5,) MISC CHANGE POD EVERY TWO DAYS      INSULIN PUMP - OUTPATIENT Date Last Updated: Pt's pump malfunctioned 2 days PTA 12/27 - pt does not have pump with, unable to verify settings, settings unknown to pt  Omnipod  Pump Basal Rates-Times:    CARB RATIO:  CF / Sensitivity Times:  TARGET BG:  Active Insulin Time:  Basal to Bolus Ratio:  Sensor:      latanoprost (XALATAN) 0.005 % ophthalmic solution Place 1 drop Into the left eye At Bedtime      leflunomide (ARAVA) 10 MG tablet Take 10 mg by mouth daily      modafinil (PROVIGIL) 200 MG tablet Take 1 tablet (200 mg) by mouth every morning 90 tablet 0    RABEprazole (ACIPHEX) 20 MG EC tablet Take 1 tablet (20 mg) by mouth daily 90 tablet 1    rosuvastatin (CRESTOR) 20 MG tablet Take 20 mg by mouth daily      sertraline (ZOLOFT) 100 MG tablet Take 200 mg by mouth daily Take with 50 mg tablet for total daily dose of 150 mg      sulfaSALAzine ER (AZULFIDINE EN) 500 MG EC tablet 2,000 mg daily       tirzepatide (MOUNJARO) 2.5 MG/0.5ML pen Inject 2.5 mg subcutaneously every 7 days      tolterodine (DETROL) 2 MG tablet Take 1 tablet twice a day by oral route as directed for 30 days.      traZODone (DESYREL) 50 MG tablet Take 25-50 mg by mouth At Bedtime      vitamin D3 (CHOLECALCIFEROL) 250 mcg (04519 units) capsule Take 1 capsule by mouth daily Takes 10,000 units daily      abatacept (ORENCIA) 250 MG injection Inject into the vein every 30 days 750 mg (Patient not taking: Reported on 7/16/2024)      acetaminophen (TYLENOL) 325 MG tablet Take 2 tablets (650 mg) by mouth every 4 hours as needed for other (mild pain) (Patient not taking: Reported on  7/16/2024) 100 tablet 0    albuterol (PROAIR HFA/PROVENTIL HFA/VENTOLIN HFA) 108 (90 Base) MCG/ACT inhaler Inhale 2 puffs into the lungs every 6 hours as needed for shortness of breath, wheezing or cough (Patient not taking: Reported on 7/16/2024) 18 g 0    cyanocobalamin (VITAMIN B-12) 1000 MCG tablet Take 1,000 mcg by mouth daily (Patient not taking: Reported on 7/16/2024)      hydrochlorothiazide (HYDRODIURIL) 12.5 MG tablet Take 1 tablet (12.5 mg) by mouth daily (Patient not taking: Reported on 7/16/2024) 90 tablet 1    irbesartan-hydrochlorothiazide (AVALIDE) 300-12.5 MG tablet Take 1 tablet by mouth daily 90 tablet 1    LORazepam (ATIVAN) 0.5 MG tablet Take 0.5-1 mg by mouth nightly as needed for sleep or muscle spasms (Patient not taking: Reported on 7/16/2024)      semaglutide (OZEMPIC, 0.25 OR 0.5 MG/DOSE,) 2 MG/1.5ML SOPN pen Inject 0.25 mg Subcutaneous every 7 days (Patient not taking: Reported on 7/16/2024)       No current facility-administered medications for this visit.        ALLERGIES:      Allergies   Allergen Reactions    Compazine [Prochlorperazine]      Hallucinations - was hospitalized at the time and then had mental side effects, thought that everyone had evacuated the hospital    Lisinopril Cough    Adhesive Tape Rash        ROS:  HEENT  H/O glaucoma:  no  Cardiovascular  CAD:   no  Palpitations:   no  HTN:    Yes - on meds  Gastrointestinal  GERD:   Yes - on daily medication   Constipation:   no  Gastroparesis:  no  Liver Dz:   no  H/O Pancreatitis:  no  H/O Gallbladder Dz: Removed  Psychiatric  Moods Stable:  yes  Anxiety:   no  Depression:  Yes   Bipolar:  no  H/O ETOH/Drug Use no  H/O eating disorder: no  Endocrine  PMH/FMH of MTC or MEN2:  Daughter has thyroid cancer. Not sure of kind.   Neurologic:  H/O seizures:   no  Headaches:  no  Memory Impairment:  Yes - will be having neuropsych testing    H/O kidney stones:  no  Kidney disease:  no  Current birth control:  " postmenopausal      Labs/Records Reviewed:  No results found for: A1C, VITDT, TSH, NA, POTASSIUM, CHLORIDE, CO2, ANIONGAP, GLC, BUN, CR, GFRESTIMATED, DEEDEE, BILITOTAL, ALKPHOS, ALT, AST, CHOL, HDL, LDL, TRIG, WBC, HGB, HCT, MCV, PLT      BP Readings from Last 6 Encounters:   07/16/24 139/80   03/11/24 126/84   08/21/23 130/86   02/17/23 (!) 142/94   01/31/23 (!) 142/84   01/18/23 (!) 154/86       Pulse Readings from Last 6 Encounters:   07/16/24 86   03/11/24 82   08/21/23 86   02/17/23 70   01/31/23 73   01/18/23 82          PHYSICAL EXAM:  Ht 5' 9\" (1.753 m)   Wt 195 lb (88.5 kg)   BMI 28.80 kg/m    GENERAL: Healthy, alert and no distress  EYES: Eyes grossly normal to inspection.  No discharge or erythema, or obvious scleral/conjunctival abnormalities.  RESP: No audible wheeze, cough, or visible cyanosis.  No visible retractions or increased work of breathing.    SKIN: Visible skin clear. No significant rash, abnormal pigmentation or lesions.  NEURO: Cranial nerves grossly intact.  Mentation and speech appropriate for age.  PSYCH: Mentation appears normal, affect normal/bright, judgement and insight intact, normal speech and appearance well-groomed.      Sincerely,      Rayshawn Mcdermott PA-C      "

## 2024-07-22 ENCOUNTER — OFFICE VISIT (OUTPATIENT)
Dept: FAMILY MEDICINE | Facility: CLINIC | Age: 69
End: 2024-07-22

## 2024-07-22 VITALS
OXYGEN SATURATION: 97 % | DIASTOLIC BLOOD PRESSURE: 81 MMHG | HEIGHT: 61 IN | TEMPERATURE: 97.3 F | SYSTOLIC BLOOD PRESSURE: 101 MMHG | HEART RATE: 63 BPM | WEIGHT: 203 LBS | BODY MASS INDEX: 38.33 KG/M2 | RESPIRATION RATE: 20 BRPM

## 2024-07-22 DIAGNOSIS — R05.3 CHRONIC COUGH: ICD-10-CM

## 2024-07-22 DIAGNOSIS — R06.02 SHORTNESS OF BREATH: Primary | ICD-10-CM

## 2024-07-22 PROCEDURE — 99214 OFFICE O/P EST MOD 30 MIN: CPT | Performed by: PHYSICIAN ASSISTANT

## 2024-07-22 RX ORDER — FLUTICASONE PROPIONATE 110 UG/1
1 AEROSOL, METERED RESPIRATORY (INHALATION) 2 TIMES DAILY
Qty: 12 G | Refills: 1 | Status: SHIPPED | OUTPATIENT
Start: 2024-07-22

## 2024-07-22 RX ORDER — TOLTERODINE 4 MG/1
4 CAPSULE, EXTENDED RELEASE ORAL DAILY
COMMUNITY
Start: 2024-05-17

## 2024-07-22 NOTE — NURSING NOTE
Kemi Kramernery is here for a cough for the past couple months. Keeps her up at night, chokes on her food.    Questioned patient about current smoking habits.  Pt. has never smoked.  PULSE regular  My Chart:   CLASSIFICATION OF OVERWEIGHT AND OBESITY BY BMI                        Obesity Class           BMI(kg/m2)  Underweight                                    < 18.5  Normal                                         18.5-24.9  Overweight                                     25.0-29.9  OBESITY                     I                  30.0-34.9                             II                 35.0-39.9  EXTREME OBESITY             III                >40                            Patient's  BMI Body mass index is 38.36 kg/m .  http://hin.nhlbi.nih.gov/menuplanner/menu.cgi  Pre-visit planning  Immunizations - UTD  Colonoscopy - is up to date  Mammogram - is up to date  Asthma -   PHQ9 -    LUIS-7 -      The patient has verbalized that it is ok to leave a detailed voice message on the patient's cell phone with results/recommendations from this visit.

## 2024-07-22 NOTE — PROGRESS NOTES
"  Assessment & Plan     Shortness of breath-appears multifactorial, likely some allergy component along with long COVID symptoms. Will try interventions outlined below  Sample given of saline rinse-use BID  Allegra daily for 30 days to see if improvement  - fluticasone (FLOVENT HFA) 110 MCG/ACT inhaler  Dispense: 12 g; Refill: 1    Chronic cough  See above              Follow up 1 month OV    No follow-ups on file.    Subjective   Aminata is a 69 year old, presenting for the following health issues:  Cough    HPI     Pt presents with ongoing cough for last 6 months. Feels like phlegm stuck in throat-causes her to cough. Coughing all day. Does wake up with coughing 3x a night. Feels drainage down the back of her throat-thick with some choking. Has tried Nyquil/Dayquil, fluids to help. SOB/fatigue with any amount of activity.     Concerns for worsening depression-seeing Fernando tomorrow.     Started after a COVID infection 3 years ago-suspect post-COVID syndrome. Poor memory, brain fog-seeing neuropsych 10/24. Poor balance since this. Saw PT for balance. Feels very weak-EMG normal.       Review of Systems  Constitutional, neuro, ENT, endocrine, pulmonary, cardiac, gastrointestinal, genitourinary, musculoskeletal, integument and psychiatric systems are negative, except as otherwise noted.      Objective    /81 (BP Location: Left arm, Patient Position: Chair, Cuff Size: Adult Regular)   Pulse 63   Temp 97.3  F (36.3  C) (Temporal)   Resp 20   Ht 1.549 m (5' 1\")   Wt 92.1 kg (203 lb)   LMP 12/04/2003   SpO2 97%   BMI 38.36 kg/m    Body mass index is 38.36 kg/m .  Physical Exam   GENERAL: alert and no distress  NECK: no adenopathy, no asymmetry, masses, or scars  RESP: lungs clear to auscultation - no rales, rhonchi or wheezes  CV: regular rate and rhythm, normal S1 S2, no S3 or S4, no murmur, click or rub, no peripheral edema  ABDOMEN: soft, nontender, no hepatosplenomegaly, no masses and bowel sounds " normal  MS: no gross musculoskeletal defects noted, no edema  NEURO: Normal strength and tone, mentation intact and speech normal  PSYCH: mentation appears normal, affect normal/bright            Signed Electronically by: Coleman Milner PA-C

## 2024-08-22 ENCOUNTER — OFFICE VISIT (OUTPATIENT)
Dept: FAMILY MEDICINE | Facility: CLINIC | Age: 69
End: 2024-08-22

## 2024-08-22 VITALS
TEMPERATURE: 97.5 F | HEART RATE: 75 BPM | BODY MASS INDEX: 38.66 KG/M2 | WEIGHT: 204.6 LBS | DIASTOLIC BLOOD PRESSURE: 80 MMHG | OXYGEN SATURATION: 91 % | SYSTOLIC BLOOD PRESSURE: 126 MMHG

## 2024-08-22 DIAGNOSIS — R29.6 RECURRENT FALLS: ICD-10-CM

## 2024-08-22 DIAGNOSIS — R06.09 CHRONIC DYSPNEA: Primary | ICD-10-CM

## 2024-08-22 PROCEDURE — 99214 OFFICE O/P EST MOD 30 MIN: CPT | Performed by: PHYSICIAN ASSISTANT

## 2024-08-22 ASSESSMENT — ANXIETY QUESTIONNAIRES
1. FEELING NERVOUS, ANXIOUS, OR ON EDGE: SEVERAL DAYS
6. BECOMING EASILY ANNOYED OR IRRITABLE: SEVERAL DAYS
GAD7 TOTAL SCORE: 2
7. FEELING AFRAID AS IF SOMETHING AWFUL MIGHT HAPPEN: NOT AT ALL
2. NOT BEING ABLE TO STOP OR CONTROL WORRYING: NOT AT ALL
3. WORRYING TOO MUCH ABOUT DIFFERENT THINGS: NOT AT ALL
GAD7 TOTAL SCORE: 2
IF YOU CHECKED OFF ANY PROBLEMS ON THIS QUESTIONNAIRE, HOW DIFFICULT HAVE THESE PROBLEMS MADE IT FOR YOU TO DO YOUR WORK, TAKE CARE OF THINGS AT HOME, OR GET ALONG WITH OTHER PEOPLE: NOT DIFFICULT AT ALL
5. BEING SO RESTLESS THAT IT IS HARD TO SIT STILL: NOT AT ALL

## 2024-08-22 ASSESSMENT — ASTHMA QUESTIONNAIRES: ACT_TOTALSCORE: 20

## 2024-08-22 ASSESSMENT — PATIENT HEALTH QUESTIONNAIRE - PHQ9
5. POOR APPETITE OR OVEREATING: NOT AT ALL
SUM OF ALL RESPONSES TO PHQ QUESTIONS 1-9: 15

## 2024-08-22 NOTE — NURSING NOTE
Chief Complaint   Patient presents with    Cough     Pt is here for f/u cough that is better but still having weakness in both legs and pt states that she has had many near misses with falls  Breathing is still an issue but pt states that it has gotten better     Pre-visit Screening:  Immunizations:  not up to date - Tdap at pharmacy  Colonoscopy:  is due and to be scheduled by patient for later completion  Mammogram: is up to date  Asthma Action Test/Plan:  given today   PHQ9:  given today   GAD7:  given today   Questioned patient about current smoking habits Pt. has never smoked.  Ok to leave detailed message on voice mail for today's visit only yes , phone #  [phone]

## 2024-08-22 NOTE — PROGRESS NOTES
Assessment & Plan     Chronic dyspnea-has largely resolved per patient, didn't use the Flovent, no follow up needed at this point  Call with worsening-pulmonology referral    Recurrent falls-ongoing weakness, unclear origin, hopefully neuropsych testing in October will reveal cause of multiple issues including memory loss  Follow up with Patricia Allen  Reminded to use walker/cane for balance issues    31 min spent with patient in pre-charting, chart review, face to face interview, chart completion    Follow up as needed    No follow-ups on file.    Subjective   Aminata is a 69 year old, presenting for the following health issues:  Cough (Pt is here for f/u cough that is better but still having weakness in both legs and pt states that she has had many near misses with falls/Breathing is still an issue but pt states that it has gotten better)    HPI   Pt presents with .     No symptoms of dyspnea right now-only took Flovent 5 times, no real improvmenet in symptoms. Coughng at night has improved. SOB is better overall. Sporadically takes allergy medications.     Ongoing balance issues-poor depth perception and generalized weakness. Has cane, 4 wheel walker, doesn't use either enough, per . Followed by Patricia Allen for multiple issues, seeing neuropsych testing in October.      Review of Systems  Constitutional, neuro, ENT, endocrine, pulmonary, cardiac, gastrointestinal, genitourinary, musculoskeletal, integument and psychiatric systems are negative, except as otherwise noted.      Objective    /80 (BP Location: Other (Comment), Patient Position: Sitting, Cuff Size: Adult Regular)   Pulse 75   Temp 97.5  F (36.4  C) (Oral)   Wt 92.8 kg (204 lb 9.6 oz)   LMP 12/04/2003   SpO2 91%   BMI 38.66 kg/m    Body mass index is 38.66 kg/m .  Physical Exam   GENERAL: alert and no distress  NECK: no adenopathy, no asymmetry, masses, or scars  RESP: lungs clear to auscultation - no rales, rhonchi or wheezes  CV:  regular rate and rhythm, normal S1 S2, no S3 or S4, no murmur, click or rub, no peripheral edema  ABDOMEN: soft, nontender, no hepatosplenomegaly, no masses and bowel sounds normal  MS: no gross musculoskeletal defects noted, no edema  PSYCH: mentation appears normal, affect normal/bright            Signed Electronically by: Coleman Milner PA-C

## 2024-09-24 ENCOUNTER — TRANSFERRED RECORDS (OUTPATIENT)
Dept: FAMILY MEDICINE | Facility: CLINIC | Age: 69
End: 2024-09-24

## 2024-10-09 ENCOUNTER — TRANSFERRED RECORDS (OUTPATIENT)
Dept: FAMILY MEDICINE | Facility: CLINIC | Age: 69
End: 2024-10-09

## 2024-10-31 ENCOUNTER — OFFICE VISIT (OUTPATIENT)
Dept: FAMILY MEDICINE | Facility: CLINIC | Age: 69
End: 2024-10-31

## 2024-10-31 DIAGNOSIS — R26.89 IMBALANCE: Primary | ICD-10-CM

## 2024-10-31 NOTE — PROGRESS NOTES
SUBJECTIVE/OBJECTIVE:                Kemi Casper is a 69 year old female seen for a follow-up visit for Medication Management Services.  She was referred to me from Dr. Flo Weston.     Chief Complaint: Follow up from [facility] visit on 2022.     Current Medications:  Current Outpatient Medications   Medication Sig Dispense Refill    buPROPion (WELLBUTRIN XL) 300 MG 24 hr tablet TAKE ONE TABLET BY MOUTH EVERY DAY AS DIRECTED      carvedilol (COREG) 12.5 MG tablet Take 2 tablets by mouth 2 times daily (with meals)      Continuous Blood Gluc Sensor (DEXCOM G6 SENSOR) MISC INSERT EVERY 10 DAYS.      Continuous Blood Gluc Transmit (DEXCOM G6 TRANSMITTER) MISC CHANGE TRANSMITTER EVERY 90 DAYS.      dorzolamide-timolol (COSOPT) 2-0.5 % ophthalmic solution INSTILL 1 DROP INTO THE LEFT EYE TWICE DAILY      fluticasone (FLOVENT HFA) 110 MCG/ACT inhaler Inhale 1 puff into the lungs 2 times daily 12 g 1    HUMALOG 100 UNIT/ML injection USE UP TO 70 UNITS DAILY IN INSULIN PUMP      Insulin Disposable Pump (OMNIPOD 5 G6 POD, GEN 5,) MISC CHANGE POD EVERY TWO DAYS      INSULIN PUMP - OUTPATIENT Date Last Updated: Pt's pump malfunctioned 2 days PTA 12/27 - pt does not have pump with, unable to verify settings, settings unknown to pt  Omnipod  Pump Basal Rates-Times:    CARB RATIO:  CF / Sensitivity Times:  TARGET BG:  Active Insulin Time:  Basal to Bolus Ratio:  Sensor:      irbesartan-hydrochlorothiazide (AVALIDE) 300-12.5 MG tablet Take 1 tablet by mouth daily 90 tablet 1    latanoprost (XALATAN) 0.005 % ophthalmic solution Place 1 drop Into the left eye At Bedtime      leflunomide (ARAVA) 10 MG tablet Take 10 mg by mouth daily      modafinil (PROVIGIL) 200 MG tablet Take 1 tablet (200 mg) by mouth every morning 90 tablet 0    RABEprazole (ACIPHEX) 20 MG EC tablet Take 1 tablet (20 mg) by mouth daily 90 tablet 1    rosuvastatin (CRESTOR) 20 MG tablet Take 20 mg by mouth daily      sertraline (ZOLOFT) 100 MG tablet Take  200 mg by mouth daily Take with 50 mg tablet for total daily dose of 150 mg      sulfaSALAzine ER (AZULFIDINE EN) 500 MG EC tablet 2,000 mg daily       tirzepatide (MOUNJARO) 2.5 MG/0.5ML pen Inject 2.5 mg subcutaneously every 7 days      tolterodine ER (DETROL LA) 4 MG 24 hr capsule Take 4 mg by mouth daily      traZODone (DESYREL) 50 MG tablet Take 25-50 mg by mouth At Bedtime      vitamin D3 (CHOLECALCIFEROL) 250 mcg (81023 units) capsule Take 1 capsule by mouth daily Takes 10,000 units daily       No current facility-administered medications for this visit.       We discussed the benefits and risks of each medication.  Patient Questions/Concerns:  Fatigue    ASSESSMENT/PLAN:                Polypharmacy:  Assessment: Patient reports today to look at medications to determine appropriateness.    Each medication was discussed with dosing, frequency, and indication. Patient had multiple open bottles of many medications. Consolidated medications and updated med list while in clinic today.    Patient discussed fatigue. She has stopped taking Modafanil consistently which could be contributing to this, but also stated she has had more falls recently. Discussed talking with cardiology to see if they would be in agreement with decreasing carvedilol dose to see if helps increase energy level.    States that she has not been taking trazodone recently.    She has not scheduled an Orencia infusion due to cost. Sent patient home with paperwork for patient assistance program.    Cost seems to be significant for patients meds. Offered if patient would get printout from pharmacy, I would be happy to discuss where we could find cost savings.    Drug Therapy Problems:  1) Drug cost  2) Fatigue  Plan:  1) Offered to help patient look at where we could find drug savings.  2) Recommend patient discuss fatigue with cardiology.  3) Patient brought up tremor, imbalance, and slow speech. Recommended she discuss with neurologist at  appointment next week.      I spent 1 hour with this patient today.  All changes were made via collaborative practice agreement with Flo Weston. A copy of the visit note was provided to the patient's primary care provider.    Ladan Bell, PharmD  Clinical Pharmacist  [facility] Phone: [phone]  Direct Office Phone: [phone]

## 2024-11-11 ENCOUNTER — TRANSFERRED RECORDS (OUTPATIENT)
Dept: FAMILY MEDICINE | Facility: CLINIC | Age: 69
End: 2024-11-11

## 2024-11-19 ENCOUNTER — TRANSFERRED RECORDS (OUTPATIENT)
Dept: FAMILY MEDICINE | Facility: CLINIC | Age: 69
End: 2024-11-19

## 2024-11-20 ENCOUNTER — TRANSFERRED RECORDS (OUTPATIENT)
Dept: FAMILY MEDICINE | Facility: CLINIC | Age: 69
End: 2024-11-20

## 2025-01-29 ENCOUNTER — TRANSFERRED RECORDS (OUTPATIENT)
Dept: FAMILY MEDICINE | Facility: CLINIC | Age: 70
End: 2025-01-29

## 2025-02-04 ENCOUNTER — TRANSFERRED RECORDS (OUTPATIENT)
Dept: HEALTH INFORMATION MANAGEMENT | Facility: CLINIC | Age: 70
End: 2025-02-04
Payer: COMMERCIAL

## 2025-02-26 ENCOUNTER — OFFICE VISIT (OUTPATIENT)
Dept: FAMILY MEDICINE | Facility: CLINIC | Age: 70
End: 2025-02-26

## 2025-02-26 ENCOUNTER — HOSPITAL ENCOUNTER (EMERGENCY)
Facility: CLINIC | Age: 70
Discharge: HOME | End: 2025-02-26
Attending: EMERGENCY MEDICINE | Admitting: EMERGENCY MEDICINE
Payer: COMMERCIAL

## 2025-02-26 VITALS
OXYGEN SATURATION: 92 % | DIASTOLIC BLOOD PRESSURE: 84 MMHG | WEIGHT: 208 LBS | HEIGHT: 61 IN | TEMPERATURE: 97.6 F | SYSTOLIC BLOOD PRESSURE: 176 MMHG | HEART RATE: 84 BPM | BODY MASS INDEX: 39.27 KG/M2 | RESPIRATION RATE: 16 BRPM

## 2025-02-26 VITALS
WEIGHT: 208 LBS | BODY MASS INDEX: 39.3 KG/M2 | TEMPERATURE: 97.9 F | SYSTOLIC BLOOD PRESSURE: 126 MMHG | DIASTOLIC BLOOD PRESSURE: 76 MMHG | OXYGEN SATURATION: 96 % | HEART RATE: 70 BPM

## 2025-02-26 DIAGNOSIS — S99.922A INJURY OF LEFT FOOT, INITIAL ENCOUNTER: ICD-10-CM

## 2025-02-26 DIAGNOSIS — R29.6 FALLS FREQUENTLY: ICD-10-CM

## 2025-02-26 DIAGNOSIS — R94.31 QT PROLONGATION: ICD-10-CM

## 2025-02-26 DIAGNOSIS — R91.1 NODULE OF RIGHT LUNG: ICD-10-CM

## 2025-02-26 DIAGNOSIS — R29.6 MULTIPLE FALLS: ICD-10-CM

## 2025-02-26 DIAGNOSIS — M62.81 GENERALIZED MUSCLE WEAKNESS: ICD-10-CM

## 2025-02-26 DIAGNOSIS — S90.122A CONTUSION OF LEFT LESSER TOE(S) W/O DAMAGE TO NAIL, INIT: ICD-10-CM

## 2025-02-26 DIAGNOSIS — S09.90XA HEAD INJURY, INITIAL ENCOUNTER: ICD-10-CM

## 2025-02-26 DIAGNOSIS — R07.81 RIB PAIN: ICD-10-CM

## 2025-02-26 DIAGNOSIS — S09.90XA INJURY OF HEAD, INITIAL ENCOUNTER: Primary | ICD-10-CM

## 2025-02-26 DIAGNOSIS — S06.0X0A CONCUSSION WITHOUT LOSS OF CONSCIOUSNESS, INITIAL ENCOUNTER: ICD-10-CM

## 2025-02-26 DIAGNOSIS — K63.9 COLON WALL THICKENING: ICD-10-CM

## 2025-02-26 LAB
ALBUMIN UR-MCNC: NEGATIVE MG/DL
ANION GAP SERPL CALCULATED.3IONS-SCNC: 8 MMOL/L (ref 7–15)
APPEARANCE UR: CLEAR
BASOPHILS # BLD AUTO: 0.1 10E3/UL (ref 0–0.2)
BASOPHILS NFR BLD AUTO: 1 %
BILIRUB UR QL STRIP: NEGATIVE
BUN SERPL-MCNC: 12.3 MG/DL (ref 8–23)
CALCIUM SERPL-MCNC: 9 MG/DL (ref 8.8–10.4)
CHLORIDE SERPL-SCNC: 99 MMOL/L (ref 98–107)
COLOR UR AUTO: ABNORMAL
CREAT SERPL-MCNC: 0.73 MG/DL (ref 0.51–0.95)
EGFRCR SERPLBLD CKD-EPI 2021: 88 ML/MIN/1.73M2
EOSINOPHIL # BLD AUTO: 0.2 10E3/UL (ref 0–0.7)
EOSINOPHIL NFR BLD AUTO: 3 %
ERYTHROCYTE [DISTWIDTH] IN BLOOD BY AUTOMATED COUNT: 14.2 % (ref 10–15)
GLUCOSE SERPL-MCNC: 132 MG/DL (ref 70–99)
GLUCOSE UR STRIP-MCNC: NEGATIVE MG/DL
HCO3 SERPL-SCNC: 29 MMOL/L (ref 22–29)
HCT VFR BLD AUTO: 32.6 % (ref 35–47)
HGB BLD-MCNC: 10.2 G/DL (ref 11.7–15.7)
HGB UR QL STRIP: NEGATIVE
IMM GRANULOCYTES # BLD: 0 10E3/UL
IMM GRANULOCYTES NFR BLD: 0 %
KETONES UR STRIP-MCNC: NEGATIVE MG/DL
LEUKOCYTE ESTERASE UR QL STRIP: NEGATIVE
LYMPHOCYTES # BLD AUTO: 1.3 10E3/UL (ref 0.8–5.3)
LYMPHOCYTES NFR BLD AUTO: 21 %
MCH RBC QN AUTO: 31.2 PG (ref 26.5–33)
MCHC RBC AUTO-ENTMCNC: 31.3 G/DL (ref 31.5–36.5)
MCV RBC AUTO: 100 FL (ref 78–100)
MONOCYTES # BLD AUTO: 0.7 10E3/UL (ref 0–1.3)
MONOCYTES NFR BLD AUTO: 11 %
MUCOUS THREADS #/AREA URNS LPF: PRESENT /LPF
NEUTROPHILS # BLD AUTO: 4.1 10E3/UL (ref 1.6–8.3)
NEUTROPHILS NFR BLD AUTO: 65 %
NITRATE UR QL: NEGATIVE
NRBC # BLD AUTO: 0 10E3/UL
NRBC BLD AUTO-RTO: 0 /100
PH UR STRIP: 7.5 [PH] (ref 5–7)
PLATELET # BLD AUTO: 278 10E3/UL (ref 150–450)
POTASSIUM SERPL-SCNC: 3.5 MMOL/L (ref 3.4–5.3)
RBC # BLD AUTO: 3.27 10E6/UL (ref 3.8–5.2)
RBC URINE: 1 /HPF
SODIUM SERPL-SCNC: 136 MMOL/L (ref 135–145)
SP GR UR STRIP: 1.01 (ref 1–1.03)
SQUAMOUS EPITHELIAL: 3 /HPF
TROPONIN T SERPL HS-MCNC: 11 NG/L
TROPONIN T SERPL HS-MCNC: 11 NG/L
UROBILINOGEN UR STRIP-MCNC: NORMAL MG/DL
WBC # BLD AUTO: 6.3 10E3/UL (ref 4–11)
WBC URINE: <1 /HPF

## 2025-02-26 PROCEDURE — 82435 ASSAY OF BLOOD CHLORIDE: CPT | Performed by: EMERGENCY MEDICINE

## 2025-02-26 PROCEDURE — 36415 COLL VENOUS BLD VENIPUNCTURE: CPT | Performed by: EMERGENCY MEDICINE

## 2025-02-26 PROCEDURE — 80048 BASIC METABOLIC PNL TOTAL CA: CPT | Performed by: EMERGENCY MEDICINE

## 2025-02-26 PROCEDURE — 81003 URINALYSIS AUTO W/O SCOPE: CPT | Performed by: EMERGENCY MEDICINE

## 2025-02-26 PROCEDURE — 82565 ASSAY OF CREATININE: CPT | Performed by: EMERGENCY MEDICINE

## 2025-02-26 PROCEDURE — 250N000011 HC RX IP 250 OP 636: Performed by: EMERGENCY MEDICINE

## 2025-02-26 PROCEDURE — 84484 ASSAY OF TROPONIN QUANT: CPT | Performed by: EMERGENCY MEDICINE

## 2025-02-26 PROCEDURE — 99285 EMERGENCY DEPT VISIT HI MDM: CPT | Mod: 25

## 2025-02-26 PROCEDURE — 93005 ELECTROCARDIOGRAM TRACING: CPT

## 2025-02-26 PROCEDURE — 85025 COMPLETE CBC W/AUTO DIFF WBC: CPT | Performed by: EMERGENCY MEDICINE

## 2025-02-26 PROCEDURE — 250N000013 HC RX MED GY IP 250 OP 250 PS 637: Performed by: EMERGENCY MEDICINE

## 2025-02-26 PROCEDURE — 250N000009 HC RX 250: Performed by: EMERGENCY MEDICINE

## 2025-02-26 RX ORDER — ACETAMINOPHEN 500 MG
1000 TABLET ORAL ONCE
Status: COMPLETED | OUTPATIENT
Start: 2025-02-26 | End: 2025-02-26

## 2025-02-26 RX ORDER — LIDOCAINE 4 G/G
1 PATCH TOPICAL ONCE
Status: DISCONTINUED | OUTPATIENT
Start: 2025-02-26 | End: 2025-02-26 | Stop reason: HOSPADM

## 2025-02-26 RX ORDER — IOPAMIDOL 755 MG/ML
120 INJECTION, SOLUTION INTRAVASCULAR ONCE
Status: COMPLETED | OUTPATIENT
Start: 2025-02-26 | End: 2025-02-26

## 2025-02-26 RX ADMIN — ACETAMINOPHEN 1000 MG: 500 TABLET ORAL at 14:42

## 2025-02-26 RX ADMIN — SODIUM CHLORIDE 65 ML: 9 INJECTION, SOLUTION INTRAVENOUS at 16:03

## 2025-02-26 RX ADMIN — IOPAMIDOL 100 ML: 755 INJECTION, SOLUTION INTRAVENOUS at 16:02

## 2025-02-26 RX ADMIN — LIDOCAINE 1 PATCH: 4 PATCH TOPICAL at 14:41

## 2025-02-26 ASSESSMENT — ACTIVITIES OF DAILY LIVING (ADL)
ADLS_ACUITY_SCORE: 46

## 2025-02-26 ASSESSMENT — COLUMBIA-SUICIDE SEVERITY RATING SCALE - C-SSRS
6. HAVE YOU EVER DONE ANYTHING, STARTED TO DO ANYTHING, OR PREPARED TO DO ANYTHING TO END YOUR LIFE?: NO
2. HAVE YOU ACTUALLY HAD ANY THOUGHTS OF KILLING YOURSELF IN THE PAST MONTH?: NO
1. IN THE PAST MONTH, HAVE YOU WISHED YOU WERE DEAD OR WISHED YOU COULD GO TO SLEEP AND NOT WAKE UP?: NO

## 2025-02-26 NOTE — PROGRESS NOTES
"Assessment & Plan   Problem List Items Addressed This Visit       QT prolongation     Other Visit Diagnoses       Injury of head, initial encounter    -  Primary    Falls frequently        Generalized muscle weakness        Injury of left foot, initial encounter        Rib pain                 1. Injury of head, initial encounter (Primary)  She may need head ct.    2. Falls frequently  Will need additional workup, consider zio patch.    3. Generalized muscle weakness      4. Injury of left foot, initial encounter  Will need xrays.    5. Rib pain  Will need additional evaluation.    6. QT prolongation  I sent her to the ER for additional evaluation. Frequent falls, pain in left rib area, bruising and pain in left foot, syncope, head injury.          BMI  Estimated body mass index is 39.3 kg/m  as calculated from the following:    Height as of 12/4/24: 1.549 m (5' 1\").    Weight as of this encounter: 94.3 kg (208 lb).         FUTURE APPOINTMENTS:       - Follow-up visit in ER now. Her  agreed and will directly take her in to the ER. We otherwise manage her chronic medical care.    No follow-ups on file.    Kira Altamirano MD  [facility]    Subjective     Nursing Notes:   Belem Cooper [facility]  2/26/2025 12:02 PM  Signed  Chief Complaint   Patient presents with    Fall     Fell on Monday and again on Tuesday, she was having balance issues, landed on her right side, now having left sided rib pain, pain is worse when breathing or bending/twisting, she tried Oxycodone at home and this did not help with pain, she hit the back of her head and is now feeling extremely tired      Pre-visit Screening:  Immunizations:  up to date  Colonoscopy:  is up to date  Mammogram: is up to date  Asthma Action Test/Plan: NA  PHQ9:  NA  GAD7:  NA  Questioned patient about current smoking habits Pt. has never smoked.  Ok to leave detailed message on voice mail for today's visit only Yes, phone # [phone]   " "    Kemi Casper is a 70 year old female who presents to clinic today for the following health issues   HPI     Here with . This all started after covid. Since 2020 has fallen over 30 times and no one can figure this out. Hasn't had a falling spell for awhile. Has been fine for the past 2 months.  Suddenly it came on again. Fell 3x on Monday, 2 days ago. Is also having trouble breathing. Has a weird sensation in her body. Is severely weak and just wants to sleep hit her head pretty hard on Monday. No headache. Every once inawhile gets a little pain, but it's not sharp pain  Thinks she is more confused. Had to look at the tV remote and her phone to figure out how to use them. Is feeling terrible weak right now. Doesn't feel as off balance. Is feeling very weak and tired. No cough or cold but has shortness of breath, always has this.  Was just in [place] last week, came back on Friday.   She fell on Monday because she was off balance. Her perception is off. Went to sit on the toilet but misses and then falls. Seems to lose her balance when switches from standing to sitting.  said she just plops down. Fell between the tub and the toilet. Has rib pain on the whole left side of the rib and chest area. \"This is like no pain I've ever had before\". Thinks she broke her toe also, it's all bruised, left foot , beside the little toe.  No bruising on the ribs, no calf or leg swelling.  He said he's been trying to get her to drink water and it will take her all day to drink one glass of water.  Pain in left rib area left lower rib.  She said she is trying to lose weight and is doing this by just cutting back.   She said she had an indent on the left side of her head from hitting it. Now the swelling is gone. But its still sore  She said she had \"QT wave\". After a colonoscopy prep.  She is diabetic, blood sugar wasn't low earlier, right now is 196, is on insulin, followed by endocrinology. She doesn't " remember the last time she saw cardiology.        Review of Systems   Constitutional, HEENT, cardiovascular, pulmonary, gi and gu systems are negative, except as otherwise noted.      Objective    /76 (BP Location: Right arm, Patient Position: Sitting, Cuff Size: Adult Large)   Pulse 70   Temp 97.9  F (36.6  C) (Temporal)   Wt 94.3 kg (208 lb)   LMP 12/04/2003   SpO2 96%   BMI 39.30 kg/m    Body mass index is 39.3 kg/m .  Physical Exam   GENERAL: alert and no distress  RESP: lungs clear to auscultation - no rales, rhonchi or wheezes  CV: regular rate and rhythm, normal S1 S2, no S3 or S4, no murmur, click or rub, no peripheral edema  MS: no gross musculoskeletal defects noted, no edema  No bruising  on left rib area, there is point tenderness in left lower flank area  Left 4th toe and metatarsal bruising and swelling

## 2025-02-26 NOTE — ED TRIAGE NOTES
Two days ago fell three times. States she looses her balance. Denies being dizzy or having syncope. Has been having falls for the past 5 years. Fell thirty times in the past two years. Was seen at [facility] today, and sent here for imaging. Treatment in the past has consisted of PT.    Pt is alert and oriented. Notes left side of back discomfort from recent falls. Symptoms are similar to other falls over this extended fall. Last fall was Monday. Not on blood thinners.

## 2025-02-26 NOTE — NURSING NOTE
Chief Complaint   Patient presents with    Fall     Fell on Monday and again on Tuesday, she was having balance issues, landed on her right side, now having left sided rib pain, pain is worse when breathing or bending/twisting, she tried Oxycodone at home and this did not help with pain, she hit the back of her head and is now feeling extremely tired      Pre-visit Screening:  Immunizations:  up to date  Colonoscopy:  is up to date  Mammogram: is up to date  Asthma Action Test/Plan: NA  PHQ9:  NA  GAD7:  NA  Questioned patient about current smoking habits Pt. has never smoked.  Ok to leave detailed message on voice mail for today's visit only Yes, phone # [phone]

## 2025-02-26 NOTE — ED PROVIDER NOTES
"  Emergency Department Note      History of Present Illness     Chief Complaint   Fall     HPI   Kemi Casper is a 70 year old female past medical history significant for type 2 diabetes, morbid obesity, hyperlipidemia and hypertension here with her  for evaluation after a fall. Patient states two days on 2/24 ago she fell three times. States she fell between the toilet and bathtub onto the left side of her back. States after another fall she hit her head on the nightstand. She states her falls are caused by losing her balance. No dizziness, lightheadedness, chest pain or shortness of breath prior to falls. No loss of consciousness. She states for the past few years her balance has been worse, reports \"more than 30 falls\" over the past few years and reports no significant pain or injuries from falling until today. She reports pain over her left ribcage, left abdomen and left side of her back, pain exacerbated by breathing in. Reports she was screaming this morning when getting up due to the pain and rates the pain as a 10/10. She took two oxycodone last night without relief. Patient saw her doctor before coming in today, was sent here due to head strike for imaging. She reports some nausea this morning. Patient is not on blood thinners.     Patient denies vomiting, UTI symptoms, fever, runny nose. No vision changes since her falls, unilateral weakness, slurred speech. No shooting pains down her extremities. No new neck pain. No groin numbness, urinary or bowel incontinence.  expresses concern that she does not drink enough  water. She reports foul smelling urine recently. She states that she has some dysphagia, mild shortness of breath, cough and short term memory problems at baseline since COVID. Patient states she has an appointment with neurology next week.     Independent Historian    None    Review of External Notes   None    Past Medical History     Medical History and Problem List " "  Major depressive disorder   Type 2 diabetes  Diverticulitis   Hypertension   Fibromyalgia   Hyperlipidemia   Osteoarthritis   Sleep apnea   Asthma   Morbid obesity   RA  Generalized anxiety disorder   Chronic fatigue   Peripheral neuropathy   GERD  Anemia     Medications   Provigil   Aciphex   Bupropion   Carvedilol   Trazodone  Tolterodine ER   Sertraline   Crestor   Arava     Surgical History   Total abdominal hysterectomy   Bilateral salping-oophorectomy   Dilation and curretage   Tonsillectomy and adenoidectomy   Pilonidal lesion removal  Cholecystectomy   Appendectomy   Lumbar laminectomy   Total knee arthroplasty, bilateral       Physical Exam     Patient Vitals for the past 24 hrs:   BP Temp Temp src Pulse Resp SpO2 Height Weight   02/26/25 1800 (!) 176/84 -- -- 84 -- 92 % -- --   02/26/25 1238 (!) 167/93 97.6  F (36.4  C) Temporal 74 16 99 % 1.549 m (5' 1\") 94.3 kg (208 lb)     Physical Exam  GEN:  Alert, does follow commands, GCS:  Eye 4, Motor 6, Verbal 5 = 15  HEAD:  Small posterior scalp hematoma, no lacerations.  no hematoma, no palpable step-off  EYES:  Pupils 3 mm on R and 3 mm on L, EOM are intact, raccoon eyes absent  NECK:  No midline cervical spinal tenderness, no palpable stepoffs, deformities, or open wounds  RESP:  Symmetric respiratory effort, lungs CTAB, no chest wall deformities, left lateral chest wall tenderness, no palpable crepitus  CV:  RRR, S1 and S2 present, no m/r/g, radial pulses 2+  ABDOMEN:  Soft, overlying skin changes/bruising absent, mild LUQ tenderness, no guarding or rebound, no palpable masses  MSK:  Obvious deformities to extremities are absent, midline thoracic/lumbar spine tenderness is absent; mild bruising left 4th toe.  No pain with midfoot squeeze. No ankle pain/swelling, no tenderness to base of 5th metatarsal. SILT to left foot, toe capillary refill < 5 seconds.   SKIN:  Warm, dry, no open lesions  NEURO:  CN II-XII grossly intact, non-focal, SILT to " BUE/BLE  PSYCH:  Mood and affect appropriate     Diagnostics     Lab Results   Labs Ordered and Resulted from Time of ED Arrival to Time of ED Departure   BASIC METABOLIC PANEL - Abnormal       Result Value    Sodium 136      Potassium 3.5      Chloride 99      Carbon Dioxide (CO2) 29      Anion Gap 8      Urea Nitrogen 12.3      Creatinine 0.73      GFR Estimate 88      Calcium 9.0      Glucose 132 (*)    ROUTINE UA WITH MICROSCOPIC REFLEX TO CULTURE - Abnormal    Color Urine Light Yellow      Appearance Urine Clear      Glucose Urine Negative      Bilirubin Urine Negative      Ketones Urine Negative      Specific Gravity Urine 1.010      Blood Urine Negative      pH Urine 7.5 (*)     Protein Albumin Urine Negative      Urobilinogen Urine Normal      Nitrite Urine Negative      Leukocyte Esterase Urine Negative      Mucus Urine Present (*)     RBC Urine 1      WBC Urine <1      Squamous Epithelials Urine 3 (*)    CBC WITH PLATELETS AND DIFFERENTIAL - Abnormal    WBC Count 6.3      RBC Count 3.27 (*)     Hemoglobin 10.2 (*)     Hematocrit 32.6 (*)           MCH 31.2      MCHC 31.3 (*)     RDW 14.2      Platelet Count 278      % Neutrophils 65      % Lymphocytes 21      % Monocytes 11      % Eosinophils 3      % Basophils 1      % Immature Granulocytes 0      NRBCs per 100 WBC 0      Absolute Neutrophils 4.1      Absolute Lymphocytes 1.3      Absolute Monocytes 0.7      Absolute Eosinophils 0.2      Absolute Basophils 0.1      Absolute Immature Granulocytes 0.0      Absolute NRBCs 0.0     TROPONIN T, HIGH SENSITIVITY - Normal    Troponin T, High Sensitivity 11     TROPONIN T, HIGH SENSITIVITY - Normal    Troponin T, High Sensitivity 11         Imaging   XR Toe Left G/E 2 Views   Final Result   IMPRESSION: Normal joint spaces and alignment. No fracture.      CT Chest/Abdomen/Pelvis w Contrast   Final Result   IMPRESSION:   1.  No acute traumatic abnormality identified. No visible displaced fracture. No  pneumothorax or effusion.       2.  New scarlike opacity at the right upper lobe since the prior CT.      3.  Recommend follow-up CT chest in 3 months to ensure stability.      4.  Fatty liver.       5.  Mild wall prominence at the splenic flexure of the colon. This could just relate to incomplete distention. Correlate with colonoscopy to assess for any underlying colonic mass.      Head CT w/o contrast   Final Result   IMPRESSION:   1.  No acute intracranial process.             EKG   ECG results from 02/26/25   EKG 12 lead     Value    Systolic Blood Pressure     Diastolic Blood Pressure     Ventricular Rate 72    Atrial Rate 72    MD Interval 150    QRS Duration 86        QTc 455    P Axis 37    R AXIS 33    T Axis 120    Interpretation ECG      Normal sinus rhythm  Septal infarct, age undetermined   T wave abnormality, consider lateral ischemia  When compared with ECG from, 7/12/23 , No significant change was found  Read by me at 1423         Independent Interpretation   CT Head: No intracranial hemorrhage or midline shift.    ED Course      Medications Administered   Medications   Lidocaine (LIDOCARE) 4 % Patch 1 patch (1 patch Transdermal $Patch/Med Applied 2/26/25 1441)   acetaminophen (TYLENOL) tablet 1,000 mg (1,000 mg Oral $Given 2/26/25 1442)   iopamidol (ISOVUE-370) solution 120 mL (100 mLs Intravenous $Given 2/26/25 1602)   Saline CT scan flush (65 mLs Intravenous $Given 2/26/25 1603)       Procedures   Procedures     Discussion of Management   None    ED Course   ED Course as of 02/26/25 1950 Wed Feb 26, 2025   1344 I obtained history and examined the patient as noted above.    1732 I rechecked and updated the patient. Talked her to her about pulmonary nodule. Patient has a history of colonoscopy. We discussed plan for discharge and the patient is comfortable with this.        Additional Documentation  None    Medical Decision Making / Diagnosis     CMS Diagnoses: None    MIPS       None    MDM    Kemi Casper is a 70 year old female with history of DM2, hyperlipidemia, hypertension presenting to the ER with  for evaluation of headache and the left side pain after multiple falls 2 days prior.  Reports history of multiple falls since 2020.  She is afebrile, slightly hypertensive but otherwise vitally stable.  She is not hypoxic or in respiratory distress.  She is not anticoagulated and GCS 15 on arrival.  However, given multiple falls with head injury and her age, we did obtain head CT fortunately negative for acute pathology.  She denies any neck pain and cervical spine is cleared clinically.  We did obtain CT imaging of the chest/abdomen fortunately negative for signs of acute traumatic pathology or other acute findings.  Discussed incidental findings of fatty liver, mild wall prominence at the flexure of the colon as well as scarlike opacity in the right upper lobe with the patient and  at bedside and they report recently seeing pulmonology for pulmonary nodule in the right upper lobe which was otherwise benign as well as recently having colonoscopy.  Recommend close follow-up with PCP regarding these findings to ensure stability and no need for further workup.  Her toe radiograph was negative for fracture/dislocation.  Given her falls, we did obtain screening EKG which showed no acute ischemic appearing changes or signs of malignant dysrhythmia.  We also obtain blood studies which were largely unremarkable including normal troponin x 2.  No evidence of severe anemia or severe electrolyte/metabolic derangement or signs of UTI on UA.  She remains well-appearing here and is ambulatory without symptoms.  I am concerned for mild concussion given complaint of headache as she states she is slower processing things since the head injury.  Discussed concussion precautions and supportive cares and will refer to concussion clinic.  Discussed second impact syndrome.  With reasonable  clinical certainty given her reassuring workup here, I do feel that she is safe to discharge home.  Patient and  are comfortable with follow-up with PCP regarding her ER visit and she reports having neurology appointment next week.  Discussed the signs/symptoms that should prompt urgent return to the emergency department.  All questions were answered prior to discharge.    Disposition   The patient was discharged.     Diagnosis     ICD-10-CM    1. Head injury, initial encounter  S09.90XA       2. Concussion without loss of consciousness, initial encounter  S06.0X0A Concussion  Referral      3. Multiple falls  R29.6       4. Nodule of right lung  R91.1       5. Colon wall thickening  K63.9       6. Contusion of left lesser toe(s) w/o damage to nail, init  S90.122A            Discharge Medications   Discharge Medication List as of 2/26/2025  5:54 PM            Scribe Disclosure:  I, Oly Davi, am serving as a scribe at 1:51 PM on 2/26/2025 to document services personally performed by Balta Norris MD based on my observations and the provider's statements to me.        Balta Norris MD  02/1955

## 2025-02-26 NOTE — DISCHARGE INSTRUCTIONS
Fortunately, your head CT and trauma imaging of the chest/abdomen and pelvis are negative for acute abnormality including bleeding or broken bones.  Please follow-up with your primary care doctor for repeat CT chest in 3 months to evaluate stability of the nodule seen on your CT today.  Also, follow-up with GI for colonoscopy to evaluate the mild thickening of your colon seen on your CT.    Alternate Tylenol and ibuprofen every 4-6 hours as needed for pain.  You can use lidocaine 4% patches (e.g. Salon Pas, Lidoderm, Icy-Hot, etc) as directed to aid in pain relief.    Discharge Instructions  Concussion    You were seen today for signs of a concussion.  The symptoms will vary, depending on the nature of your injury and your health. You may have: headache, confusion, nausea (feel sick to your stomach), vomiting (throwing up) and problems with memory, concentrating, or sleep. You may feel dizzy, irritable, and tired. Children and teens may need help from their parents, teachers, and coaches to watch for symptoms as they recover.    Generally, every Emergency Department visit should have a follow-up clinic visit with either a primary or a specialty clinic/provider. Please follow-up as instructed by your emergency provider today.     Return to the Emergency Department if:  Your headache gets worse or you start to have a really bad headache even with the recommended treatment plan.   You feel drowsier, have growing confusion, or slurred speech.   You keep repeating yourself.   You have strange behavior or are feeling more irritable.   You have a seizure.   You vomit (throw up) more than once.   You have trouble walking.   You have weakness or numbness.  Your neck pain gets worse.   You have a loss of consciousness.   You have blood for fluid coming from your ears or nose.   You have new symptoms or anything that worries you.     Home Care:  Get lots of rest and get enough sleep at night. Take daytime naps or rest if you  feel tired.   Limit physical activity and  thinking  activities. These can make symptoms worse.   Physical activities include gym, sports, weight training, running, exercise, and heavy lifting.   Thinking activities include homework, class work, job-related work, and screen time (phone, computer, tablet, TV, and video games).   Stick to a healthy diet and drink lots of fluids. Avoid alcohol.  As symptoms improve, you may slowly return to your daily activities. If symptoms get worse or return, reduce your activity.   Know that it is normal to feel sad or frustrated when you do not feel right and are less active.     Going Back to Work:  Your care team will tell you when you are ready to return to work.    Limit the amount of work you do soon after your injury. This may speed healing. Take breaks if your symptoms get worse. You should also reduce your physical activity as well as activities that require a lot of thinking until you see your doctor. You may need shorter work days and a lighter workload.  Avoid heavy lifting, working with machinery, driving and working at heights until your symptoms are gone or you are cleared by a provider.    Going Back to School:  If you are still having symptoms, you may need extra help at school.  Tell your teachers and school nurse about your injury and symptoms. Ask them to watch for problems with learning, memory, and concentrating. Symptoms may get worse when you do schoolwork, and you may become more irritable. You may need shorter school days, a reduced workload, and to postpone testing.  Do not drive or take gym class (physical activity) until cleared by a provider.    Returning to Sports:  Never return to play if you have any symptoms. A full recovery will reduce the chances of getting hurt again. Remember, it is better to miss one or two games than a whole season.  You should rest from all physical activity until you see your provider. Generally, if all symptoms have  completely cleared, your provider can help guide you to slowly return to sports. If symptoms return or worsen, stop the activity and see your provider.  Important: If you are in an organized sport and under age 18, you will need written consent from a healthcare provider before you return to sports. Typically, this will be your primary care or sports medicine provider. Please make an appointment.    If you were given a prescription for medicine here today, be sure to read all of the information (including the package insert) that comes with your prescription.  This will include important information about the medicine, its side effects, and any warnings that you need to know about.  The pharmacist who fills the prescription can provide more information and answer questions you may have about the medicine.  If you have questions or concerns that the pharmacist cannot address, please call or return to the Emergency Department.     Remember that you can always come back to the Emergency Department if you are not able to see your regular provider in the amount of time listed above, if you get any new symptoms, or if there is anything that worries you.

## 2025-02-27 LAB
ATRIAL RATE - MUSE: 72 BPM
DIASTOLIC BLOOD PRESSURE - MUSE: NORMAL MMHG
INTERPRETATION ECG - MUSE: NORMAL
P AXIS - MUSE: 37 DEGREES
PR INTERVAL - MUSE: 150 MS
QRS DURATION - MUSE: 86 MS
QT - MUSE: 416 MS
QTC - MUSE: 455 MS
R AXIS - MUSE: 33 DEGREES
SYSTOLIC BLOOD PRESSURE - MUSE: NORMAL MMHG
T AXIS - MUSE: 120 DEGREES
VENTRICULAR RATE- MUSE: 72 BPM

## 2025-03-27 ENCOUNTER — OFFICE VISIT (OUTPATIENT)
Dept: FAMILY MEDICINE | Facility: CLINIC | Age: 70
End: 2025-03-27

## 2025-03-27 VITALS
SYSTOLIC BLOOD PRESSURE: 165 MMHG | TEMPERATURE: 97.4 F | DIASTOLIC BLOOD PRESSURE: 89 MMHG | HEART RATE: 76 BPM | WEIGHT: 203 LBS | OXYGEN SATURATION: 93 % | BODY MASS INDEX: 38.36 KG/M2

## 2025-03-27 DIAGNOSIS — R10.84 ABDOMINAL PAIN, GENERALIZED: Primary | ICD-10-CM

## 2025-03-27 DIAGNOSIS — R11.0 NAUSEA: ICD-10-CM

## 2025-03-27 DIAGNOSIS — I10 BENIGN ESSENTIAL HYPERTENSION: ICD-10-CM

## 2025-03-27 LAB
ALBUMIN SERPL-MCNC: 4.1 G/DL (ref 3.6–5.1)
ALP SERPL-CCNC: 92 U/L (ref 33–130)
ALT 1742-6: 6 U/L (ref 0–32)
AST 1920-8: 15 U/L (ref 0–35)
BILIRUB SERPL-MCNC: 0.5 MG/DL (ref 0.2–1.2)
BUN SERPL-MCNC: 16 MG/DL (ref 7–25)
BUN/CREATININE RATIO: 16 (ref 6–32)
CALCIUM SERPL-MCNC: 9.5 MG/DL (ref 8.6–10.3)
CHLORIDE SERPLBLD-SCNC: 104.8 MMOL/L (ref 98–110)
CO2 SERPL-SCNC: 32.3 MMOL/L (ref 20–32)
CREAT SERPL-MCNC: 0.97 MG/DL (ref 0.6–1.3)
ERYTHROCYTE [DISTWIDTH] IN BLOOD BY AUTOMATED COUNT: 12.2 %
GLUCOSE SERPL-MCNC: 188 MG/DL (ref 60–99)
HCT VFR BLD AUTO: 36.5 % (ref 35–47)
HEMOGLOBIN: 11.2 G/DL (ref 11.7–15.7)
MCH RBC QN AUTO: 31.3 PG (ref 26–33)
MCHC RBC AUTO-ENTMCNC: 30.7 G/DL (ref 31–36)
MCV RBC AUTO: 102 FL (ref 78–100)
PLATELET COUNT - QUEST: 288 10^9/L (ref 150–375)
POTASSIUM SERPL-SCNC: 3.49 MMOL/L (ref 3.5–5.3)
PROT SERPL-MCNC: 6.5 G/DL (ref 6.1–8.1)
RBC # BLD AUTO: 3.58 10*12/L (ref 3.8–5.2)
SODIUM SERPL-SCNC: 140.1 MMOL/L (ref 135–146)
WBC # BLD AUTO: 5.4 10*9/L (ref 4–11)

## 2025-03-27 RX ORDER — LORAZEPAM 0.5 MG/1
0.5 TABLET ORAL DAILY PRN
COMMUNITY

## 2025-03-27 RX ORDER — TOLTERODINE 4 MG/1
4 CAPSULE, EXTENDED RELEASE ORAL DAILY
Status: CANCELLED | OUTPATIENT
Start: 2025-03-27

## 2025-03-27 NOTE — NURSING NOTE
Chief Complaint   Patient presents with    Consult     Diarrhea, bloating, nausea, exhaustion. 4 months been bad. Does not feel like eating much. Does not feel that it changes with what she eats.      Pre-visit Screening:  Immunizations:  up to date  Colonoscopy:  is up to date  Mammogram: is due  Asthma Action Test/Plan:  na  PHQ9:  na  GAD7:  na  Questioned patient about current smoking habits Pt. has never smoked.  Ok to leave detailed message on voice mail for today's visit only yes, phone # [phone] (home)

## 2025-03-27 NOTE — PROGRESS NOTES
Assessment & Plan     1. Abdominal pain, generalized (Primary)  - Discussed with Aminata recommend she see [facility] in [place] for further evaluation and management of her current abdominal symptoms which she has been having for the last 4 months, referral placed to [facility]. Will obtain updated lab work for CBC, CMP, lipase to ensure no significant abnormalities and send letter in the mail with results. In meantime encouraged Aminata to follow a BRAT (banana, rice, applesauce, toast) diet and drink plenty of water. Strict return to clinic and ER precautions discussed.   - VENOUS COLLECTION  - Hemogram Platelet ([facility])  - Lipase ([facility])  - Comprehensive Metobolic Panel ([facility])  - Adult GI  Referral - Consult Only - To a [facility] Location (Use POS/Location)    2. Nausea  - See above.   - Adult GI  Referral - Consult Only - To a [facility] Location (Use POS/Location)    3. Benign essential hypertension  - Discussed with patient BP is high today and not at goal of being <130/80, she states she has not been recently checking her BP at home but that it is lower at home, encouraged her to do check BP at home 2-3 times a week, keep a log of her values, and schedule a follow up appointment if not <130/80.     Follow up with [facility]. Reasons to follow-up sooner or seek emergent care reviewed.     Marium Naik PA-C  [facility]       Subjective     Kemi CUNNINGHAM Stanley Casper is a 70 year old female who presents to clinic today for the following health issues:    HPI   Chief Complaint   Patient presents with    Consult     Diarrhea, bloating, nausea, exhaustion. 4 months been bad. Does not feel like eating much. Does not feel that it changes with what she eats.       Aminata presents today with stomach concerns. She states her symptoms started 4 months with generalized abdominal pain (primarily lower abdominal pain), stool urgency, and loose stools. Notes her stools have foam on the top of  Drysol Counseling:  I discussed with the patient the risks of drysol/aluminum chloride including but not limited to skin rash, itching, irritation, burning. them, and they are always a bright yellow and gold color. Notes she has about 4-5 bowel movements per day, where she normally only has one bowel movement per day. Also notes she is constantly nauseous, not eating much. She denies any symptoms of fevers/chills, urinary symptoms, vomiting, blood in the stools, etc. Also states she is not having full on diarrhea, just that her stools are loose, still have form to them. Denies any new foods, medications, recent illnesses, etc. Has tried OTC Pepcid for her symptoms. Abdominal surgeries include cholecystectomy, hysterectomy, and appendectomy. Last colonoscopy was in 10/2024, found polyps and diverticulosis, due in 5 years. Of note, patient went to the ER for a fall on 02/26/25 and had an essentially normal CT chest/abdomen/pelvis completed.     Medical history and daily medications reviewed.        Objective    BP (!) 165/89 (BP Location: Right arm, Patient Position: Chair, Cuff Size: Adult Large)   Pulse 76   Temp 97.4  F (36.3  C) (Temporal)   Wt 92.1 kg (203 lb)   LMP 12/04/2003   SpO2 93%   BMI 38.36 kg/m    Body mass index is 38.36 kg/m .    Physical Examination:  GENERAL: alert, orientated, and no distress  EYES: Eyes grossly normal to inspection, PERRL and conjunctivae and sclerae normal  HENT: mouth without ulcers or lesions  NECK: no adenopathy, no asymmetry, masses, or scars and thyroid normal to palpation  RESP: lungs clear to auscultation - no rales, rhonchi or wheezes  CV: regular rate and rhythm, normal S1 S2, no S3 or S4, no murmur, click or rub, no peripheral edema   ABDOMEN: bowel sounds normal, abdomen is soft and non-tender, no hepatosplenomegaly or masses  MS: no gross musculoskeletal defects noted, no edema  SKIN: no suspicious lesions or rashes  PSYCH: mentation appears normal, affect normal/bright    Lab work pending.    Humira Counseling:  I discussed with the patient the risks of adalimumab including but not limited to myelosuppression, immunosuppression, autoimmune hepatitis, demyelinating diseases, lymphoma, and serious infections.  The patient understands that monitoring is required including a PPD at baseline and must alert us or the primary physician if symptoms of infection or other concerning signs are noted. Eucrisa Counseling: Patient may experience a mild burning sensation during topical application. Eucrisa is not approved in children less than 2 years of age. Solaraze Counseling:  I discussed with the patient the risks of Solaraze including but not limited to erythema, scaling, itching, weeping, crusting, and pain. Cyclosporine Pregnancy And Lactation Text: This medication is Pregnancy Category C and it isn't know if it is safe during pregnancy. This medication is excreted in breast milk. Azathioprine Counseling:  I discussed with the patient the risks of azathioprine including but not limited to myelosuppression, immunosuppression, hepatotoxicity, lymphoma, and infections.  The patient understands that monitoring is required including baseline LFTs, Creatinine, possible TPMP genotyping and weekly CBCs for the first month and then every 2 weeks thereafter.  The patient verbalized understanding of the proper use and possible adverse effects of azathioprine.  All of the patient's questions and concerns were addressed. Doxepin Counseling:  Patient advised that the medication is sedating and not to drive a car after taking this medication. Patient informed of potential adverse effects including but not limited to dry mouth, urinary retention, and blurry vision.  The patient verbalized understanding of the proper use and possible adverse effects of doxepin.  All of the patient's questions and concerns were addressed. Erythromycin Pregnancy And Lactation Text: This medication is Pregnancy Category B and is considered safe during pregnancy. It is also excreted in breast milk. Metronidazole Counseling:  I discussed with the patient the risks of metronidazole including but not limited to seizures, nausea/vomiting, a metallic taste in the mouth, nausea/vomiting and severe allergy. Odomzo Counseling- I discussed with the patient the risks of Odomzo including but not limited to nausea, vomiting, diarrhea, constipation, weight loss, changes in the sense of taste, decreased appetite, muscle spasms, and hair loss.  The patient verbalized understanding of the proper use and possible adverse effects of Odomzo.  All of the patient's questions and concerns were addressed. Tremfya Counseling: I discussed with the patient the risks of guselkumab including but not limited to immunosuppression, serious infections, worsening of inflammatory bowel disease and drug reactions.  The patient understands that monitoring is required including a PPD at baseline and must alert us or the primary physician if symptoms of infection or other concerning signs are noted. Hydroxyzine Pregnancy And Lactation Text: This medication is not safe during pregnancy and should not be taken. It is also excreted in breast milk and breast feeding isn't recommended. Elidel Counseling: Patient may experience a mild burning sensation during topical application. Elidel is not approved in children less than 2 years of age. There have been case reports of hematologic and skin malignancies in patients using topical calcineurin inhibitors although causality is questionable. Xolair Pregnancy And Lactation Text: This medication is Pregnancy Category B and is considered safe during pregnancy. This medication is excreted in breast milk. SSKI Counseling:  I discussed with the patient the risks of SSKI including but not limited to thyroid abnormalities, metallic taste, GI upset, fever, headache, acne, arthralgias, paraesthesias, lymphadenopathy, easy bleeding, arrhythmias, and allergic reaction. Protopic Counseling: Patient may experience a mild burning sensation during topical application. Protopic is not approved in children less than 2 years of age. There have been case reports of hematologic and skin malignancies in patients using topical calcineurin inhibitors although causality is questionable. Enbrel Counseling:  I discussed with the patient the risks of etanercept including but not limited to myelosuppression, immunosuppression, autoimmune hepatitis, demyelinating diseases, lymphoma, and infections.  The patient understands that monitoring is required including a PPD at baseline and must alert us or the primary physician if symptoms of infection or other concerning signs are noted. Tazorac Counseling:  Patient advised that medication is irritating and drying.  Patient may need to apply sparingly and wash off after an hour before eventually leaving it on overnight.  The patient verbalized understanding of the proper use and possible adverse effects of tazorac.  All of the patient's questions and concerns were addressed. Topical Retinoid counseling:  Patient advised to apply a pea-sized amount only at bedtime and wait 30 minutes after washing their face before applying.  If too drying, patient may add a non-comedogenic moisturizer. The patient verbalized understanding of the proper use and possible adverse effects of retinoids.  All of the patient's questions and concerns were addressed. Tazorac Pregnancy And Lactation Text: This medication is not safe during pregnancy. It is unknown if this medication is excreted in breast milk. Azithromycin Counseling:  I discussed with the patient the risks of azithromycin including but not limited to GI upset, allergic reaction, drug rash, diarrhea, and yeast infections. Cephalexin Pregnancy And Lactation Text: This medication is Pregnancy Category B and considered safe during pregnancy.  It is also excreted in breast milk but can be used safely for shorter doses. Colchicine Counseling:  Patient counseled regarding adverse effects including but not limited to stomach upset (nausea, vomiting, stomach pain, or diarrhea).  Patient instructed to limit alcohol consumption while taking this medication.  Colchicine may reduce blood counts especially with prolonged use.  The patient understands that monitoring of kidney function and blood counts may be required, especially at baseline. The patient verbalized understanding of the proper use and possible adverse effects of colchicine.  All of the patient's questions and concerns were addressed. Minoxidil Counseling: Minoxidil is a topical medication which can increase blood flow where it is applied. It is uncertain how this medication increases hair growth. Side effects are uncommon and include stinging and allergic reactions. Tetracycline Pregnancy And Lactation Text: This medication is Pregnancy Category D and not consider safe during pregnancy. It is also excreted in breast milk. 5-Fu Pregnancy And Lactation Text: This medication is Pregnancy Category X and contraindicated in pregnancy and in women who may become pregnant. It is unknown if this medication is excreted in breast milk. Valtrex Counseling: I discussed with the patient the risks of valacyclovir including but not limited to kidney damage, nausea, vomiting and severe allergy.  The patient understands that if the infection seems to be worsening or is not improving, they are to call. Acitretin Pregnancy And Lactation Text: This medication is Pregnancy Category X and should not be given to women who are pregnant or may become pregnant in the future. This medication is excreted in breast milk. Bexarotene Counseling:  I discussed with the patient the risks of bexarotene including but not limited to hair loss, dry lips/skin/eyes, liver abnormalities, hyperlipidemia, pancreatitis, depression/suicidal ideation, photosensitivity, drug rash/allergic reactions, hypothyroidism, anemia, leukopenia, infection, cataracts, and teratogenicity.  Patient understands that they will need regular blood tests to check lipid profile, liver function tests, white blood cell count, thyroid function tests and pregnancy test if applicable. Stelara Pregnancy And Lactation Text: This medication is Pregnancy Category B and is considered safe during pregnancy. It is unknown if this medication is excreted in breast milk. Infliximab Counseling:  I discussed with the patient the risks of infliximab including but not limited to myelosuppression, immunosuppression, autoimmune hepatitis, demyelinating diseases, lymphoma, and serious infections.  The patient understands that monitoring is required including a PPD at baseline and must alert us or the primary physician if symptoms of infection or other concerning signs are noted. High Dose Vitamin A Counseling: Side effects reviewed, pt to contact office should one occur. Metronidazole Pregnancy And Lactation Text: This medication is Pregnancy Category B and considered safe during pregnancy.  It is also excreted in breast milk. Rituxan Pregnancy And Lactation Text: This medication is Pregnancy Category C and it isn't know if it is safe during pregnancy. It is unknown if this medication is excreted in breast milk but similar antibodies are known to be excreted. Doxycycline Counseling:  Patient counseled regarding possible photosensitivity and increased risk for sunburn.  Patient instructed to avoid sunlight, if possible.  When exposed to sunlight, patients should wear protective clothing, sunglasses, and sunscreen.  The patient was instructed to call the office immediately if the following severe adverse effects occur:  hearing changes, easy bruising/bleeding, severe headache, or vision changes.  The patient verbalized understanding of the proper use and possible adverse effects of doxycycline.  All of the patient's questions and concerns were addressed. Azathioprine Pregnancy And Lactation Text: This medication is Pregnancy Category D and isn't considered safe during pregnancy. It is unknown if this medication is excreted in breast milk. Topical Clindamycin Counseling: Patient counseled that this medication may cause skin irritation or allergic reactions.  In the event of skin irritation, the patient was advised to reduce the amount of the drug applied or use it less frequently.   The patient verbalized understanding of the proper use and possible adverse effects of clindamycin.  All of the patient's questions and concerns were addressed. Ketoconazole Pregnancy And Lactation Text: This medication is Pregnancy Category C and it isn't know if it is safe during pregnancy. It is also excreted in breast milk and breast feeding isn't recommended. Minocycline Counseling: Patient advised regarding possible photosensitivity and discoloration of the teeth, skin, lips, tongue and gums.  Patient instructed to avoid sunlight, if possible.  When exposed to sunlight, patients should wear protective clothing, sunglasses, and sunscreen.  The patient was instructed to call the office immediately if the following severe adverse effects occur:  hearing changes, easy bruising/bleeding, severe headache, or vision changes.  The patient verbalized understanding of the proper use and possible adverse effects of minocycline.  All of the patient's questions and concerns were addressed. Minoxidil Pregnancy And Lactation Text: This medication has not been assigned a Pregnancy Risk Category but animal studies failed to show danger with the topical medication. It is unknown if the medication is excreted in breast milk. Methotrexate Pregnancy And Lactation Text: This medication is Pregnancy Category X and is known to cause fetal harm. This medication is excreted in breast milk. Fluconazole Counseling:  Patient counseled regarding adverse effects of fluconazole including but not limited to headache, diarrhea, nausea, upset stomach, liver function test abnormalities, taste disturbance, and stomach pain.  There is a rare possibility of liver failure that can occur when taking fluconazole.  The patient understands that monitoring of LFTs and kidney function test may be required, especially at baseline. The patient verbalized understanding of the proper use and possible adverse effects of fluconazole.  All of the patient's questions and concerns were addressed. Gabapentin Pregnancy And Lactation Text: This medication is Pregnancy Category C and isn't considered safe during pregnancy. It is excreted in breast milk. Acitretin Counseling:  I discussed with the patient the risks of acitretin including but not limited to hair loss, dry lips/skin/eyes, liver damage, hyperlipidemia, depression/suicidal ideation, photosensitivity.  Serious rare side effects can include but are not limited to pancreatitis, pseudotumor cerebri, bony changes, clot formation/stroke/heart attack.  Patient understands that alcohol is contraindicated since it can result in liver toxicity and significantly prolong the elimination of the drug by many years. Taltz Counseling: I discussed with the patient the risks of ixekizumab including but not limited to immunosuppression, serious infections, worsening of inflammatory bowel disease and drug reactions.  The patient understands that monitoring is required including a PPD at baseline and must alert us or the primary physician if symptoms of infection or other concerning signs are noted. Itraconazole Counseling:  I discussed with the patient the risks of itraconazole including but not limited to liver damage, nausea/vomiting, neuropathy, and severe allergy.  The patient understands that this medication is best absorbed when taken with acidic beverages such as non-diet cola or ginger ale.  The patient understands that monitoring is required including baseline LFTs and repeat LFTs at intervals.  The patient understands that they are to contact us or the primary physician if concerning signs are noted. Birth Control Pills Pregnancy And Lactation Text: This medication should be avoided if pregnant and for the first 30 days post-partum. Include Pregnancy/Lactation Warning?: No Dupixent Counseling: I discussed with the patient the risks of dupilumab including but not limited to eye infection and irritation, cold sores, injection site reactions, worsening of asthma, allergic reactions and increased risk of parasitic infection.  Live vaccines should be avoided while taking dupilumab. Dupilumab will also interact with certain medications such as warfarin and cyclosporine. The patient understands that monitoring is required and they must alert us or the primary physician if symptoms of infection or other concerning signs are noted. Prednisone Counseling:  I discussed with the patient the risks of prolonged use of prednisone including but not limited to weight gain, insomnia, osteoporosis, mood changes, diabetes, susceptibility to infection, glaucoma and high blood pressure.  In cases where prednisone use is prolonged, patients should be monitored with blood pressure checks, serum glucose levels and an eye exam.  Additionally, the patient may need to be placed on GI prophylaxis, PCP prophylaxis, and calcium and vitamin D supplementation and/or a bisphosphonate.  The patient verbalized understanding of the proper use and the possible adverse effects of prednisone.  All of the patient's questions and concerns were addressed. Zyclara Counseling:  I discussed with the patient the risks of imiquimod including but not limited to erythema, scaling, itching, weeping, crusting, and pain.  Patient understands that the inflammatory response to imiquimod is variable from person to person and was educated regarded proper titration schedule.  If flu-like symptoms develop, patient knows to discontinue the medication and contact us. Zyclara Pregnancy And Lactation Text: This medication is Pregnancy Category C. It is unknown if this medication is excreted in breast milk. Carac Counseling:  I discussed with the patient the risks of Carac including but not limited to erythema, scaling, itching, weeping, crusting, and pain. Taltz Pregnancy And Lactation Text: The risk during pregnancy and breastfeeding is uncertain with this medication. Fluconazole Pregnancy And Lactation Text: This medication is Pregnancy Category C and it isn't know if it is safe during pregnancy. It is also excreted in breast milk. Benzoyl Peroxide Pregnancy And Lactation Text: This medication is Pregnancy Category C. It is unknown if benzoyl peroxide is excreted in breast milk. Cyclosporine Counseling:  I discussed with the patient the risks of cyclosporine including but not limited to hypertension, gingival hyperplasia,myelosuppression, immunosuppression, liver damage, kidney damage, neurotoxicity, lymphoma, and serious infections. The patient understands that monitoring is required including baseline blood pressure, CBC, CMP, lipid panel and uric acid, and then 1-2 times monthly CMP and blood pressure. Topical Sulfur Applications Counseling: Topical Sulfur Counseling: Patient counseled that this medication may cause skin irritation or allergic reactions.  In the event of skin irritation, the patient was advised to reduce the amount of the drug applied or use it less frequently.   The patient verbalized understanding of the proper use and possible adverse effects of topical sulfur application.  All of the patient's questions and concerns were addressed. Dupixent Pregnancy And Lactation Text: This medication likely crosses the placenta but the risk for the fetus is uncertain. This medication is excreted in breast milk. Cimetidine Pregnancy And Lactation Text: This medication is Pregnancy Category B and is considered safe during pregnancy. It is also excreted in breast milk and breast feeding isn't recommended. High Dose Vitamin A Pregnancy And Lactation Text: High dose vitamin A therapy is contraindicated during pregnancy and breast feeding. Imiquimod Counseling:  I discussed with the patient the risks of imiquimod including but not limited to erythema, scaling, itching, weeping, crusting, and pain.  Patient understands that the inflammatory response to imiquimod is variable from person to person and was educated regarded proper titration schedule.  If flu-like symptoms develop, patient knows to discontinue the medication and contact us. Hydroxychloroquine Pregnancy And Lactation Text: This medication has been shown to cause fetal harm but it isn't assigned a Pregnancy Risk Category. There are small amounts excreted in breast milk. Quinolones Counseling:  I discussed with the patient the risks of fluoroquinolones including but not limited to GI upset, allergic reaction, drug rash, diarrhea, dizziness, photosensitivity, yeast infections, liver function test abnormalities, tendonitis/tendon rupture. Rituxan Counseling:  I discussed with the patient the risks of Rituxan infusions. Side effects can include infusion reactions, severe drug rashes including mucocutaneous reactions, reactivation of latent hepatitis and other infections and rarely progressive multifocal leukoencephalopathy.  All of the patient's questions and concerns were addressed. Valtrex Pregnancy And Lactation Text: this medication is Pregnancy Category B and is considered safe during pregnancy. This medication is not directly found in breast milk but it's metabolite acyclovir is present. Albendazole Pregnancy And Lactation Text: This medication is Pregnancy Category C and it isn't known if it is safe during pregnancy. It is also excreted in breast milk. 5-Fu Counseling: 5-Fluorouracil Counseling:  I discussed with the patient the risks of 5-fluorouracil including but not limited to erythema, scaling, itching, weeping, crusting, and pain. Cellcept Counseling:  I discussed with the patient the risks of mycophenolate mofetil including but not limited to infection/immunosuppression, GI upset, hypokalemia, hypercholesterolemia, bone marrow suppression, lymphoproliferative disorders, malignancy, GI ulceration/bleed/perforation, colitis, interstitial lung disease, kidney failure, progressive multifocal leukoencephalopathy, and birth defects.  The patient understands that monitoring is required including a baseline creatinine and regular CBC testing. In addition, patient must alert us immediately if symptoms of infection or other concerning signs are noted. Benzoyl Peroxide Counseling: Patient counseled that medicine may cause skin irritation and bleach clothing.  In the event of skin irritation, the patient was advised to reduce the amount of the drug applied or use it less frequently.   The patient verbalized understanding of the proper use and possible adverse effects of benzoyl peroxide.  All of the patient's questions and concerns were addressed. Cimetidine Counseling:  I discussed with the patient the risks of Cimetidine including but not limited to gynecomastia, headache, diarrhea, nausea, drowsiness, arrhythmias, pancreatitis, skin rashes, psychosis, bone marrow suppression and kidney toxicity. Ivermectin Counseling:  Patient instructed to take medication on an empty stomach with a full glass of water.  Patient informed of potential adverse effects including but not limited to nausea, diarrhea, dizziness, itching, and swelling of the extremities or lymph nodes.  The patient verbalized understanding of the proper use and possible adverse effects of ivermectin.  All of the patient's questions and concerns were addressed. Rifampin Pregnancy And Lactation Text: This medication is Pregnancy Category C and it isn't know if it is safe during pregnancy. It is also excreted in breast milk and should not be used if you are breast feeding. Arava Pregnancy And Lactation Text: This medication is Pregnancy Category X and is absolutely contraindicated during pregnancy. It is unknown if it is excreted in breast milk. Griseofulvin Pregnancy And Lactation Text: This medication is Pregnancy Category X and is known to cause serious birth defects. It is unknown if this medication is excreted in breast milk but breast feeding should be avoided. Ketoconazole Counseling:   Patient counseled regarding improving absorption with orange juice.  Adverse effects include but are not limited to breast enlargement, headache, diarrhea, nausea, upset stomach, liver function test abnormalities, taste disturbance, and stomach pain.  There is a rare possibility of liver failure that can occur when taking ketoconazole. The patient understands that monitoring of LFTs may be required, especially at baseline. The patient verbalized understanding of the proper use and possible adverse effects of ketoconazole.  All of the patient's questions and concerns were addressed. Albendazole Counseling:  I discussed with the patient the risks of albendazole including but not limited to cytopenia, kidney damage, nausea/vomiting and severe allergy.  The patient understands that this medication is being used in an off-label manner. Bactrim Counseling:  I discussed with the patient the risks of sulfa antibiotics including but not limited to GI upset, allergic reaction, drug rash, diarrhea, dizziness, photosensitivity, and yeast infections.  Rarely, more serious reactions can occur including but not limited to aplastic anemia, agranulocytosis, methemoglobinemia, blood dyscrasias, liver or kidney failure, lung infiltrates or desquamative/blistering drug rashes. Isotretinoin Counseling: Patient should get monthly blood tests, not donate blood, not drive at night if vision affected, not share medication, and not undergo elective surgery for 6 months after tx completed. Side effects reviewed, pt to contact office should one occur. Nsaids Pregnancy And Lactation Text: These medications are considered safe up to 30 weeks gestation. It is excreted in breast milk. Azithromycin Pregnancy And Lactation Text: This medication is considered safe during pregnancy and is also secreted in breast milk. Methotrexate Counseling:  Patient counseled regarding adverse effects of methotrexate including but not limited to nausea, vomiting, abnormalities in liver function tests. Patients may develop mouth sores, rash, diarrhea, and abnormalities in blood counts. The patient understands that monitoring is required including LFT's and blood counts.  There is a rare possibility of scarring of the liver and lung problems that can occur when taking methotrexate. Persistent nausea, loss of appetite, pale stools, dark urine, cough, and shortness of breath should be reported immediately. Patient advised to discontinue methotrexate treatment at least three months before attempting to become pregnant.  I discussed the need for folate supplements while taking methotrexate.  These supplements can decrease side effects during methotrexate treatment. The patient verbalized understanding of the proper use and possible adverse effects of methotrexate.  All of the patient's questions and concerns were addressed. Spironolactone Counseling: Patient advised regarding risks of diarrhea, abdominal pain, hyperkalemia, birth defects (for female patients), liver toxicity and renal toxicity. The patient may need blood work to monitor liver and kidney function and potassium levels while on therapy. The patient verbalized understanding of the proper use and possible adverse effects of spironolactone.  All of the patient's questions and concerns were addressed. Stelara Counseling:  I discussed with the patient the risks of ustekinumab including but not limited to immunosuppression, malignancy, posterior leukoencephalopathy syndrome, and serious infections.  The patient understands that monitoring is required including a PPD at baseline and must alert us or the primary physician if symptoms of infection or other concerning signs are noted. Tetracycline Counseling: Patient counseled regarding possible photosensitivity and increased risk for sunburn.  Patient instructed to avoid sunlight, if possible.  When exposed to sunlight, patients should wear protective clothing, sunglasses, and sunscreen.  The patient was instructed to call the office immediately if the following severe adverse effects occur:  hearing changes, easy bruising/bleeding, severe headache, or vision changes.  The patient verbalized understanding of the proper use and possible adverse effects of tetracycline.  All of the patient's questions and concerns were addressed. Patient understands to avoid pregnancy while on therapy due to potential birth defects. Topical Clindamycin Pregnancy And Lactation Text: This medication is Pregnancy Category B and is considered safe during pregnancy. It is unknown if it is excreted in breast milk. Arava Counseling:  Patient counseled regarding adverse effects of Arava including but not limited to nausea, vomiting, abnormalities in liver function tests. Patients may develop mouth sores, rash, diarrhea, and abnormalities in blood counts. The patient understands that monitoring is required including LFTs and blood counts.  There is a rare possibility of scarring of the liver and lung problems that can occur when taking methotrexate. Persistent nausea, loss of appetite, pale stools, dark urine, cough, and shortness of breath should be reported immediately. Patient advised to discontinue Arava treatment and consult with a physician prior to attempting conception. The patient will have to undergo a treatment to eliminate Arava from the body prior to conception. Spironolactone Pregnancy And Lactation Text: This medication can cause feminization of the male fetus and should be avoided during pregnancy. The active metabolite is also found in breast milk. Terbinafine Counseling: Patient counseling regarding adverse effects of terbinafine including but not limited to headache, diarrhea, rash, upset stomach, liver function test abnormalities, itching, taste/smell disturbance, nausea, abdominal pain, and flatulence.  There is a rare possibility of liver failure that can occur when taking terbinafine.  The patient understands that a baseline LFT and kidney function test may be required. The patient verbalized understanding of the proper use and possible adverse effects of terbinafine.  All of the patient's questions and concerns were addressed. Erythromycin Counseling:  I discussed with the patient the risks of erythromycin including but not limited to GI upset, allergic reaction, drug rash, diarrhea, increase in liver enzymes, and yeast infections. Dapsone Counseling: I discussed with the patient the risks of dapsone including but not limited to hemolytic anemia, agranulocytosis, rashes, methemoglobinemia, kidney failure, peripheral neuropathy, headaches, GI upset, and liver toxicity.  Patients who start dapsone require monitoring including baseline LFTs and weekly CBCs for the first month, then every month thereafter.  The patient verbalized understanding of the proper use and possible adverse effects of dapsone.  All of the patient's questions and concerns were addressed. Doxycycline Pregnancy And Lactation Text: This medication is Pregnancy Category D and not consider safe during pregnancy. It is also excreted in breast milk but is considered safe for shorter treatment courses. Cosentyx Counseling:  I discussed with the patient the risks of Cosentyx including but not limited to worsening of Crohn's disease, immunosuppression, allergic reactions and infections.  The patient understands that monitoring is required including a PPD at baseline and must alert us or the primary physician if symptoms of infection or other concerning signs are noted. Protopic Pregnancy And Lactation Text: This medication is Pregnancy Category C. It is unknown if this medication is excreted in breast milk when applied topically. Erivedge Counseling- I discussed with the patient the risks of Erivedge including but not limited to nausea, vomiting, diarrhea, constipation, weight loss, changes in the sense of taste, decreased appetite, muscle spasms, and hair loss.  The patient verbalized understanding of the proper use and possible adverse effects of Erivedge.  All of the patient's questions and concerns were addressed. Picato Counseling:  I discussed with the patient the risks of Picato including but not limited to erythema, scaling, itching, weeping, crusting, and pain. Hydroxyzine Counseling: Patient advised that the medication is sedating and not to drive a car after taking this medication.  Patient informed of potential adverse effects including but not limited to dry mouth, urinary retention, and blurry vision.  The patient verbalized understanding of the proper use and possible adverse effects of hydroxyzine.  All of the patient's questions and concerns were addressed. Griseofulvin Counseling:  I discussed with the patient the risks of griseofulvin including but not limited to photosensitivity, cytopenia, liver damage, nausea/vomiting and severe allergy.  The patient understands that this medication is best absorbed when taken with a fatty meal (e.g., ice cream or french fries). Cephalexin Counseling: I counseled the patient regarding use of cephalexin as an antibiotic for prophylactic and/or therapeutic purposes. Cephalexin (commonly prescribed under brand name Keflex) is a cephalosporin antibiotic which is active against numerous classes of bacteria, including most skin bacteria. Side effects may include nausea, diarrhea, gastrointestinal upset, rash, hives, yeast infections, and in rare cases, hepatitis, kidney disease, seizures, fever, confusion, neurologic symptoms, and others. Patients with severe allergies to penicillin medications are cautioned that there is about a 10% incidence of cross-reactivity with cephalosporins. When possible, patients with penicillin allergies should use alternatives to cephalosporins for antibiotic therapy. Birth Control Pills Counseling: Birth Control Pill Counseling: I discussed with the patient the potential side effects of OCPs including but not limited to increased risk of stroke, heart attack, thrombophlebitis, deep venous thrombosis, hepatic adenomas, breast changes, GI upset, headaches, and depression.  The patient verbalized understanding of the proper use and possible adverse effects of OCPs. All of the patient's questions and concerns were addressed. Nsaids Counseling: NSAID Counseling: I discussed with the patient that NSAIDs should be taken with food. Prolonged use of NSAIDs can result in the development of stomach ulcers.  Patient advised to stop taking NSAIDs if abdominal pain occurs.  The patient verbalized understanding of the proper use and possible adverse effects of NSAIDs.  All of the patient's questions and concerns were addressed. Topical Sulfur Applications Pregnancy And Lactation Text: This medication is Pregnancy Category C and has an unknown safety profile during pregnancy. It is unknown if this topical medication is excreted in breast milk. Sski Pregnancy And Lactation Text: This medication is Pregnancy Category D and isn't considered safe during pregnancy. It is excreted in breast milk. Xolair Counseling:  Patient informed of potential adverse effects including but not limited to fever, muscle aches, rash and allergic reactions.  The patient verbalized understanding of the proper use and possible adverse effects of Xolair.  All of the patient's questions and concerns were addressed. Xelkyz Pregnancy And Lactation Text: This medication is Pregnancy Category D and is not considered safe during pregnancy.  The risk during breast feeding is also uncertain. Dapsone Pregnancy And Lactation Text: This medication is Pregnancy Category C and is not considered safe during pregnancy or breast feeding. Hydroquinone Counseling:  Patient advised that medication may result in skin irritation, lightening (hypopigmentation), dryness, and burning.  In the event of skin irritation, the patient was advised to reduce the amount of the drug applied or use it less frequently.  Rarely, spots that are treated with hydroquinone can become darker (pseudoochronosis).  Should this occur, patient instructed to stop medication and call the office. The patient verbalized understanding of the proper use and possible adverse effects of hydroquinone.  All of the patient's questions and concerns were addressed. Rifampin Counseling: I discussed with the patient the risks of rifampin including but not limited to liver damage, kidney damage, red-orange body fluids, nausea/vomiting and severe allergy. Solaraze Pregnancy And Lactation Text: This medication is Pregnancy Category B and is considered safe. There is some data to suggest avoiding during the third trimester. It is unknown if this medication is excreted in breast milk. Clofazimine Counseling:  I discussed with the patient the risks of clofazimine including but not limited to skin and eye pigmentation, liver damage, nausea/vomiting, gastrointestinal bleeding and allergy. Bexarotene Pregnancy And Lactation Text: This medication is Pregnancy Category X and should not be given to women who are pregnant or may become pregnant. This medication should not be used if you are breast feeding. Doxepin Pregnancy And Lactation Text: This medication is Pregnancy Category C and it isn't known if it is safe during pregnancy. It is also excreted in breast milk and breast feeding isn't recommended. Hydroxychloroquine Counseling:  I discussed with the patient that a baseline ophthalmologic exam is needed at the start of therapy and every year thereafter while on therapy. A CBC may also be warranted for monitoring.  The side effects of this medication were discussed with the patient, including but not limited to agranulocytosis, aplastic anemia, seizures, rashes, retinopathy, and liver toxicity. Patient instructed to call the office should any adverse effect occur.  The patient verbalized understanding of the proper use and possible adverse effects of Plaquenil.  All the patient's questions and concerns were addressed. Detail Level: Zone Drysol Pregnancy And Lactation Text: This medication is considered safe during pregnancy and breast feeding. Simponi Counseling:  I discussed with the patient the risks of golimumab including but not limited to myelosuppression, immunosuppression, autoimmune hepatitis, demyelinating diseases, lymphoma, and serious infections.  The patient understands that monitoring is required including a PPD at baseline and must alert us or the primary physician if symptoms of infection or other concerning signs are noted. Thalidomide Counseling: I discussed with the patient the risks of thalidomide including but not limited to birth defects, anxiety, weakness, chest pain, dizziness, cough and severe allergy. Gabapentin Counseling: I discussed with the patient the risks of gabapentin including but not limited to dizziness, somnolence, fatigue and ataxia. Xeljanz Counseling: I discussed with the patient the risks of Xeljanz therapy including increased risk of infection, liver issues, headache, diarrhea, or cold symptoms. Live vaccines should be avoided. They were instructed to call if they have any problems. Isotretinoin Pregnancy And Lactation Text: This medication is Pregnancy Category X and is considered extremely dangerous during pregnancy. It is unknown if it is excreted in breast milk. Bactrim Pregnancy And Lactation Text: This medication is Pregnancy Category D and is known to cause fetal risk.  It is also excreted in breast milk.

## 2025-03-27 NOTE — PATIENT INSTRUCTIONS
Lab work today, I will send you a my chart message with your lab results.     Please call [facility] in [place] to schedule an appointment in the next week to discuss having likely colonoscopy and further work up done.     Please let me know if you have any questions or concerns.

## 2025-03-31 ENCOUNTER — MEDICAL CORRESPONDENCE (OUTPATIENT)
Dept: HEALTH INFORMATION MANAGEMENT | Facility: CLINIC | Age: 70
End: 2025-03-31
Payer: COMMERCIAL

## 2025-03-31 ENCOUNTER — TELEPHONE (OUTPATIENT)
Dept: FAMILY MEDICINE | Facility: CLINIC | Age: 70
End: 2025-03-31

## 2025-03-31 NOTE — TELEPHONE ENCOUNTER
Called patient back and reviewed lab results. Will also send letter in the mail with her results. She had her appointment with [facility] today, will request records.     Marium Naik PA-C  [facility]

## 2025-03-31 NOTE — TELEPHONE ENCOUNTER
patient called the lab phone line and left the following request.    Pt called regarding her recent lab work on Thursday  Abnormal labs  Please call pt   Thank you  Lana    Number to call pt     [phone] (home)

## 2025-04-02 ENCOUNTER — TRANSFERRED RECORDS (OUTPATIENT)
Dept: FAMILY MEDICINE | Facility: CLINIC | Age: 70
End: 2025-04-02

## 2025-04-03 ENCOUNTER — TRANSFERRED RECORDS (OUTPATIENT)
Dept: FAMILY MEDICINE | Facility: CLINIC | Age: 70
End: 2025-04-03

## 2025-04-14 DIAGNOSIS — G47.10 HYPERSOMNIA: ICD-10-CM

## 2025-04-14 NOTE — TELEPHONE ENCOUNTER
Patient called into the CS line stating that she is no longer seeing [facility] and will now be seeing [facility] but needs a refill of  Pending Prescriptions:                       Disp   Refills    modafinil (PROVIGIL) 200 MG tablet        90 tab*0            Sig: Take 1 tablet (200 mg) by mouth every morning.    So that she does not have to go without it before she is able to be seen by her new neurologist.      Routing to Dr. Weston for approval.

## 2025-04-18 NOTE — TELEPHONE ENCOUNTER
Please call patient to schedule an OV so that we can temporarily take over this medication until she sees her new neurologist.

## 2025-04-22 ENCOUNTER — TRANSFERRED RECORDS (OUTPATIENT)
Dept: FAMILY MEDICINE | Facility: CLINIC | Age: 70
End: 2025-04-22

## 2025-04-24 ENCOUNTER — TRANSFERRED RECORDS (OUTPATIENT)
Dept: FAMILY MEDICINE | Facility: CLINIC | Age: 70
End: 2025-04-24

## 2025-06-06 PROBLEM — F33.1 MODERATE RECURRENT MAJOR DEPRESSION (H): Status: ACTIVE | Noted: 2025-06-06

## 2025-06-09 ENCOUNTER — TELEPHONE (OUTPATIENT)
Dept: FAMILY MEDICINE | Facility: CLINIC | Age: 70
End: 2025-06-09

## 2025-06-09 ENCOUNTER — RESULTS FOLLOW-UP (OUTPATIENT)
Dept: FAMILY MEDICINE | Facility: CLINIC | Age: 70
End: 2025-06-09

## 2025-06-09 RX ORDER — MODAFINIL 200 MG/1
200 TABLET ORAL EVERY MORNING
COMMUNITY
Start: 2025-06-09

## 2025-06-09 NOTE — TELEPHONE ENCOUNTER
Gave message to Nancy at  to give patient that if things are getting worse with her breathing she needs to go to the ER as there is nothing else we are able to do here to help with this concern or she can reach out to her pulmonologist.

## 2025-06-09 NOTE — TELEPHONE ENCOUNTER
Patient called the lab wondering about per lab results. She said she is not feeling well and would like to discuss next steps. Patient phone # [phone] (M)

## 2025-06-09 NOTE — TELEPHONE ENCOUNTER
Patient called back into the CS line stating that adelaide is not going to be seen at the ER and will not call her pulmonologist and wants a visit here. I explained to the patient that we have seen her for this concern and there is nothing more that we are able to do and that she does have a pulmonologist following her for this so she should reach out to them to find out what can be done next. Patient stated that there is something that we can do and we should do a chest xray because if she has pneumonia we need to prescribe her an antibiotic. I asked her does she have a cough, fevers, any other symptoms besides shortness of breath because this is a symptoms that she has had for years. Patient declined that she had any other symptoms and again I tried to triage to make sure there is no other symptoms she has that would be appropriate for us to see her for but she stated that she would figure something else out and will follow up later on.

## 2025-06-16 ENCOUNTER — TRANSFERRED RECORDS (OUTPATIENT)
Dept: FAMILY MEDICINE | Facility: CLINIC | Age: 70
End: 2025-06-16

## 2025-07-15 ENCOUNTER — THERAPY VISIT (OUTPATIENT)
Dept: SPEECH THERAPY | Facility: CLINIC | Age: 70
End: 2025-07-15
Attending: STUDENT IN AN ORGANIZED HEALTH CARE EDUCATION/TRAINING PROGRAM
Payer: COMMERCIAL

## 2025-07-15 DIAGNOSIS — J38.3 VOCAL CORD DYSFUNCTION: Primary | ICD-10-CM

## 2025-07-15 PROCEDURE — 92610 EVALUATE SWALLOWING FUNCTION: CPT | Mod: GN | Performed by: SPEECH-LANGUAGE PATHOLOGIST

## 2025-07-15 NOTE — PROGRESS NOTES
"SPEECH LANGUAGE PATHOLOGY EVALUATION       Fall Risk Screen:  Have you fallen 2 or more times in the past year?: Yes  Have you fallen and had an injury in the past year?: Yes  Is patient receiving Physical Therapy Services?: No  Fall screen comments: Pt does not wish to pursue PT at this time    Subjective        Presenting condition or subjective complaint:  Pt has two different concerns today, one is trouble swallowing and the other is concerns with her voice and how her voice sounds.  Today she would first like to address her swallow issues.     Pt reports a long standing history of \"coughing all the time.\" 1-2x/week she will choke on meats like chicken or steaks. Pt reports she has had her throat stretched x2 and that has helped somewhat. No history of recent pneumonia or weight loss.     Date of onset: 06/10/25 (MD order date)    Relevant medical history: Bladder or bowel problems; Concussions; Depression; Diabetes; Dizziness; Fibromyalgia; Hearing problems; High blood pressure; Incontinence; Mental Illness; Neck injury; Overweight; Rheumatoid arthritis; Significant weakness; Sleep disorder like apnea   Past Medical History:   Diagnosis Date    Complication of anesthesia     psychological fear of waking up during surgery    Depression     Diabetes mellitus     Diverticulitis     Essential hypertension, benign 7/2/2002    Fibromyalgia     GERD (gastroesophageal reflux disease)     Hyperlipidemia     Incontinence of urine     Major depressive disorder, recurrent episode, in full remission 4/20/2011    Mixed hyperlipidemia 7/22/2016    Need for prophylactic hormone replacement therapy (postmenopausal) 12/14/2005    Obesity (BMI 30.0-34.9) 7/22/2016    Osteoarthritis     Other abnormal glucose 2007    Sleep apnea 2/9/2011    cpap    Type 2 diabetes mellitus with neurological manifestations (HCC) 5/6/2015    Uncomplicated asthma        Dates & types of surgery: tonsilectomy back  Past Surgical History:   Procedure " "Laterality Date    ARTHROPLASTY KNEE Left 4/9/2018    Procedure: ARTHROPLASTY KNEE;  Left total knee arthroplasty;  Surgeon: Tin Shetty MD;  Location: [facility] OR    ARTHROPLASTY KNEE Right 11/1/2021    Procedure: Right total knee arthroplasty;  Surgeon: Tin Shetty MD;  Location: [facility] OR    ARTHROSCOPY KNEE Left 07/26/2016    CHOLECYSTECTOMY  2002    HC DILATION/CURETTAGE DIAG/THER NON OB  1977    D & C    HYSTERECTOMY TOTAL ABDOMINAL, BILATERAL SALPINGO-OOPHORECTOMY, COMBINED  2002    REMV PILONIDAL LESION SIMPLE  age 17    TONSILLECTOMY, ADENOIDECTOMY, COMBINED  age 5    ZZC APPENDECTOMY  2002    done with hysterectomy    [facility] EYE EXAM ESTABLISHED PT  2003    ZZC GOETZ W/O FACETEC FORAMOT/DSKC 1/2 VRT SEG, LUMBAR  10/02/2000    Laminectomy, Lumbar       Prior therapy history for the same diagnosis, illness or injury: Yes, in 2023, she completed a clinical swallow assessment with this provider at this location, with \"functional\" results.  Living Environment  Social support: With a significant other or spouse     Equipment owned: Straight Cane; Walker with wheels; Grab bars; Commode; Bath bench     Employment: No    Hobbies/Interests: sewing painting [employer]    Patient goals for therapy: Pt would like to assess her swallow function.     Pain assessment: Pain denied     Objective     SWALLOW EVALUTION  Current Diet/Method of Nutritional Intake: thin liquids (level 0), regular diet        CLINICAL SWALLOW EVALUATION  Oral Motor Function: generally intact  Dentition: natural dentition, adequate dentition  Secretion management: WFL  Mucosal quality: good  Mandibular function: intact  Oral labial function: WFL  Lingual function: WFL  Velar function: intact   Buccal function: intact  Laryngeal function: cough, throat clear, volitional swallow, voicing WFL, pt reporting changes with her voice.     Level of assist required for feeding: no assistance needed   Textures Trialed:   Clinical Swallow Eval: Thin " Liquids  Mode of presentation: cup, self-fed   Volume presented: 4 oz water  Preparatory Phase: WFL  Oral Phase: WFL  Pharyngeal phase of swallow: intact   Strategies trialed during procedure: JOCELYNE SLP CLINICAL EVAL STRATEGIES: NA   Diagnostic statement: No clinical s/s of aspiration    Clinical Swallow Eval: Soft & Bite Sized  Mode of presentation: self-fed   Volume presented: 1/4 cup mixed fruit cocktail  Preparatory Phase: WFL  Oral Phase: WFL  Pharyngeal phase of swallow: intact   Strategies trialed during procedure: JOCELYNE SLP CLINICAL EVAL STRATEGIES: chew carefully, swallow 1 bite at a time   Diagnostic statement: No clinical s/s of aspiration. Pt tends to eat fast or talk with mouth full when chewing.     Clinical Swallow Eval: Solids  Mode of presentation: self-fed   Volume presented: 1 ascencion cracker square  Preparatory Phase: WFL  Oral Phase: WFL  Pharyngeal phase of swallow: intact   Strategies trialed during procedure: JOCELYNE SLP CLINICAL EVAL STRATEGIES: chew carefully, swallow 1 bite at a time     Diagnostic statement: No clinical s/s of aspiration. Pt tends to eat fast or talk with mouth full when chewing.            ESOPHAGEAL PHASE OF SWALLOW  patient reports symptoms of esophageal dysphagia     SWALLOW ASSESSMENT CLINICAL IMPRESSIONS AND RATIONALE  Diet Consistency Recommendations: thin liquids (level 0), easy to chew (level 7), regular diet    Recommended Feeding/Eating Techniques: small bolus size, slow rate of intake, alternate food and liquid intake, double swallow, monitor for cough or change in vocal quality with intake, maintain upright sitting position for eating, maintain upright posture during/after eating for 30 minutes, minimize distractions during oral intake   Medication Administration Recommendations: with water, puree or crushed as needed and one at a time as needed.   Instrumental Assessment Recommendations: VFSS (videofluoroscopic swallowing study)     Assessment & Plan   CLINICAL  "IMPRESSIONS   Medical Diagnosis: Vocal cord dysfunction (J38.3)    Treatment Diagnosis: Vocal cord dysfunction (J38.3), dysphagia   Impression/Assessment: Based on results of today's clinica assessment patient presents with s/s indicative of swallowing difficulties at the esophageal level. In addition, she a long history of reported difficulty with swallowing solids and \"coughing.\" It is recommended that she complete a video swallow study to fully assess her oropharyngeal swallow, r/o aspiration and determine etiology of oropharyngeal dysphagia. She will then return to this location for follow up and assessment of her voice concerns.     PLAN OF CARE  Treatment Interventions: voice eval and swallow as needed pending results of VFSS    Prognosis to achieve stated therapy goals is good   Rehab potential is impacted by: comorbidities    Long Term Goals:   SLP Goal 1  Goal Identifier: STG 1: Education  Goal Description: Patient will verbalize understanding of results of evaluation (clinical swallow evalaution and videoswallow study), diet recommendations, safe swallow strategies and swallow function.  Target Date: 10/12/25  SLP Goal 2  Goal Identifier: More goals to will be added as needed pending results of video swallow study.      Frequency of Treatment: 1x follow up pending results of videoswallow study in 1 month  Duration of Treatment: 1-2 additional sessions pending results of videswallow study within 90 days     Recommended Referrals to Other Professionals: Speech Language Pathology, GI medicine, ENT  Education Assessment:   Learner/Method: Patient;Significant Other;Listening;No Barriers to Learning    Risks and benefits of evaluation/treatment have been explained.   Patient/Family/caregiver agrees with Plan of Care.     Evaluation Time:    SLP Eval: oral/pharyngeal swallow function, clinical minutes (19302): 45         Signing Clinician: KEVIN Archer      [facility]          "                                                                          OUTPATIENT SPEECH LANGUAGE PATHOLOGY      PLAN OF TREATMENT FOR OUTPATIENT REHABILITATION   Patient's Last Name, First Name, Kemi Dickens YOB: 1955   Provider's Name   [facility]   Medical Record No.  [id number]     Onset Date: 06/10/25 (MD order date) Start of Care Date: 07/15/25     Medical Diagnosis:  Vocal cord dysfunction (J38.3)      SLP Treatment Diagnosis: Vocal cord dysfunction (J38.3), dysphagia  Plan of Treatment  Frequency/Duration: 1x follow up pending results of videoswallow study in 1 month  / 1-2 additional sessions pending results of videswallow study within 90 days     Certification date from 07/15/25   To 10/12/25          See note for plan of treatment details and functional goals     Basilia Us, SLP                         I CERTIFY THE NEED FOR THESE SERVICES FURNISHED UNDER        THIS PLAN OF TREATMENT AND WHILE UNDER MY CARE     (Physician attestation of this document indicates review and certification of the therapy plan).              Referring Provider:  Flo Weston    Initial Assessment  See Epic Evaluation- 07/15/25

## 2025-07-16 ENCOUNTER — TRANSCRIBE ORDERS (OUTPATIENT)
Dept: OTHER | Age: 70
End: 2025-07-16

## 2025-07-16 DIAGNOSIS — R13.10 DYSPHAGIA: Primary | ICD-10-CM

## 2025-07-22 ENCOUNTER — TRANSFERRED RECORDS (OUTPATIENT)
Dept: FAMILY MEDICINE | Facility: CLINIC | Age: 70
End: 2025-07-22

## 2025-08-05 ENCOUNTER — HOSPITAL ENCOUNTER (OUTPATIENT)
Dept: NUCLEAR MEDICINE | Facility: CLINIC | Age: 70
Setting detail: NUCLEAR MEDICINE
Discharge: HOME | End: 2025-08-05
Attending: INTERNAL MEDICINE
Payer: COMMERCIAL

## 2025-08-05 DIAGNOSIS — R14.0 BLOATING: ICD-10-CM

## 2025-08-05 PROCEDURE — A9541 TC99M SULFUR COLLOID: HCPCS | Performed by: INTERNAL MEDICINE

## 2025-08-05 PROCEDURE — 343N000001 HC RX 343 MED OP 636: Performed by: INTERNAL MEDICINE

## 2025-08-05 PROCEDURE — 78264 GASTRIC EMPTYING IMG STUDY: CPT

## 2025-08-05 RX ADMIN — TECHNETIUM TC 99M SULFUR COLLOID 2 MILLICURIE: KIT at 08:12

## 2025-08-07 ENCOUNTER — LAB (OUTPATIENT)
Dept: LAB | Facility: CLINIC | Age: 70
End: 2025-08-07
Payer: COMMERCIAL

## 2025-08-07 ENCOUNTER — TRANSFERRED RECORDS (OUTPATIENT)
Dept: FAMILY MEDICINE | Facility: CLINIC | Age: 70
End: 2025-08-07

## 2025-08-07 DIAGNOSIS — E03.9 HYPOTHYROIDISM, ADULT: ICD-10-CM

## 2025-08-07 DIAGNOSIS — R41.89 AKINETIC MUTISM: Primary | ICD-10-CM

## 2025-08-07 LAB
CRP SERPL-MCNC: 15.04 MG/L
ERYTHROCYTE [SEDIMENTATION RATE] IN BLOOD BY WESTERGREN METHOD: 70 MM/HR (ref 0–30)
HCYS SERPL-SCNC: 6.3 UMOL/L (ref 0–15)
TSH SERPL DL<=0.005 MIU/L-ACNC: 2.37 UIU/ML (ref 0.3–4.2)
VIT B12 SERPL-MCNC: 925 PG/ML (ref 232–1245)

## 2025-08-07 PROCEDURE — 83090 ASSAY OF HOMOCYSTEINE: CPT

## 2025-08-07 PROCEDURE — 36415 COLL VENOUS BLD VENIPUNCTURE: CPT

## 2025-08-07 PROCEDURE — 83921 ORGANIC ACID SINGLE QUANT: CPT

## 2025-08-07 PROCEDURE — 84443 ASSAY THYROID STIM HORMONE: CPT

## 2025-08-07 PROCEDURE — 81401 MOPATH PROCEDURE LEVEL 2: CPT

## 2025-08-07 PROCEDURE — 85652 RBC SED RATE AUTOMATED: CPT

## 2025-08-07 PROCEDURE — 82607 VITAMIN B-12: CPT

## 2025-08-07 PROCEDURE — 84393 TAU PHOSPHORYLATED EA: CPT

## 2025-08-07 PROCEDURE — 86140 C-REACTIVE PROTEIN: CPT

## 2025-08-12 LAB
APOE ALLELE E2+E3+E4 BLD/T: NORMAL
SPECIMEN SOURCE: NORMAL

## 2025-08-13 LAB — METHYLMALONATE SERPL-SCNC: 0.39 UMOL/L (ref 0–0.4)

## 2025-08-27 ENCOUNTER — OFFICE VISIT (OUTPATIENT)
Dept: FAMILY MEDICINE | Facility: CLINIC | Age: 70
End: 2025-08-27

## 2025-08-27 VITALS
SYSTOLIC BLOOD PRESSURE: 128 MMHG | HEART RATE: 74 BPM | DIASTOLIC BLOOD PRESSURE: 76 MMHG | WEIGHT: 205 LBS | BODY MASS INDEX: 38.73 KG/M2 | TEMPERATURE: 97.2 F | OXYGEN SATURATION: 87 %

## 2025-08-27 DIAGNOSIS — G31.84 MILD COGNITIVE IMPAIRMENT: ICD-10-CM

## 2025-08-27 DIAGNOSIS — R29.898 WEAKNESS OF BOTH LOWER EXTREMITIES: Primary | ICD-10-CM

## 2025-08-27 DIAGNOSIS — M54.50 CHRONIC BILATERAL LOW BACK PAIN WITHOUT SCIATICA: ICD-10-CM

## 2025-08-27 DIAGNOSIS — R29.6 FALLS FREQUENTLY: ICD-10-CM

## 2025-08-27 DIAGNOSIS — G89.29 CHRONIC BILATERAL LOW BACK PAIN WITHOUT SCIATICA: ICD-10-CM

## 2025-08-27 ASSESSMENT — ANXIETY QUESTIONNAIRES
7. FEELING AFRAID AS IF SOMETHING AWFUL MIGHT HAPPEN: NOT AT ALL
GAD7 TOTAL SCORE: 3
5. BEING SO RESTLESS THAT IT IS HARD TO SIT STILL: NOT AT ALL
6. BECOMING EASILY ANNOYED OR IRRITABLE: SEVERAL DAYS
1. FEELING NERVOUS, ANXIOUS, OR ON EDGE: NOT AT ALL
2. NOT BEING ABLE TO STOP OR CONTROL WORRYING: SEVERAL DAYS
IF YOU CHECKED OFF ANY PROBLEMS ON THIS QUESTIONNAIRE, HOW DIFFICULT HAVE THESE PROBLEMS MADE IT FOR YOU TO DO YOUR WORK, TAKE CARE OF THINGS AT HOME, OR GET ALONG WITH OTHER PEOPLE: NOT DIFFICULT AT ALL
GAD7 TOTAL SCORE: 3
3. WORRYING TOO MUCH ABOUT DIFFERENT THINGS: SEVERAL DAYS

## 2025-08-27 ASSESSMENT — PATIENT HEALTH QUESTIONNAIRE - PHQ9
SUM OF ALL RESPONSES TO PHQ QUESTIONS 1-9: 7
5. POOR APPETITE OR OVEREATING: NOT AT ALL

## (undated) DEVICE — GLOVE PROTEXIS W/NEU-THERA 7.0  2D73TE70

## (undated) DEVICE — SU ETHIBOND 0 CT-1 CR 8X18" CX21D

## (undated) DEVICE — LINEN FULL SHEET 5511

## (undated) DEVICE — TOURNIQUET CUFF 34" REPRO BROWN 60-7070-106

## (undated) DEVICE — NDL BLUNT 18GA 1" W/O FILTER 305181

## (undated) DEVICE — GLOVE PROTEXIS POWDER FREE 8.5 ORTHOPEDIC 2D73ET85

## (undated) DEVICE — TOURNIQUET CUFF 30" REPRO BLUE 60-7070-105

## (undated) DEVICE — PREP CHLORAPREP 26ML TINTED ORANGE  260815

## (undated) DEVICE — SUCTION MANIFOLD NEPTUNE 2 SYS 4 PORT 0702-020-000

## (undated) DEVICE — GLOVE PROTEXIS BLUE W/NEU-THERA 8.5  2D73EB85

## (undated) DEVICE — PREP CHLORAPREP 26ML TINTED HI-LITE ORANGE 930815

## (undated) DEVICE — CATH TRAY FOLEY SURESTEP 16FR DRAIN BAG STATOCK A899916

## (undated) DEVICE — BONE CEMENT MIXEVAC III HI VAC KIT  0206-015-000

## (undated) DEVICE — PACK TOTAL KNEE BOXED LATEX FREE PO15TKFCT

## (undated) DEVICE — DRSG AQUACEL AG 3.5X9.75" HYDROFIBER 412011

## (undated) DEVICE — GLOVE ESTEEM POWDER FREE SMT 8.5 2D72PT85

## (undated) DEVICE — SU VICRYL 2-0 CT-1 27" UND J259H

## (undated) DEVICE — SU VICRYL 1 CT-1 27" J341H

## (undated) DEVICE — GLOVE PROTEXIS POWDER FREE 7.0 ORTHOPEDIC 2D73ET70

## (undated) DEVICE — BLADE SAW SAGITTAL STRK 25X90X1.27MM HD SYS 6 6125-127-090

## (undated) DEVICE — SYR 03ML LL W/O NDL 309657

## (undated) DEVICE — SOL NACL 0.9% IRRIG 1000ML BOTTLE 2F7124

## (undated) DEVICE — BLADE SAW SAGITTAL STRK 13X90X1.27MM HD SYS 6 6113-127-090

## (undated) DEVICE — BAG CLEAR TRASH 1.3M 39X33" P4040C

## (undated) DEVICE — GOWN LG DISP 9515

## (undated) DEVICE — LINEN ORTHO ACL PACK 5447

## (undated) DEVICE — SOL WATER IRRIG 1000ML BOTTLE 2F7114

## (undated) DEVICE — GLOVE PROTEXIS W/NEU-THERA 8.5  2D73TE85

## (undated) DEVICE — DECANTER VIAL 2006S

## (undated) DEVICE — SET HANDPIECE INTERPULSE W/COAXIAL FAN SPRAY TIP 0210118000

## (undated) DEVICE — SYR 10ML LL W/O NDL 302995

## (undated) DEVICE — GLOVE PROTEXIS BLUE W/NEU-THERA 7.0  2D73EB70

## (undated) DEVICE — GLOVE PROTEXIS BLUE W/NEU-THERA 8.0  2D73EB80

## (undated) DEVICE — DRSG AQUACEL AG HYDROFIBER  3.5X10" 422605

## (undated) DEVICE — GOWN XXL 9575

## (undated) DEVICE — CAST PADDING 6" STERILE 9046S

## (undated) DEVICE — NDL 18GA 1.5" 305196

## (undated) DEVICE — SYR 30ML LL W/O NDL 302832

## (undated) DEVICE — DRAIN HEMOVAC RESERVOIR KIT 10FR 1/8" MED 00-2550-002-10

## (undated) DEVICE — GLOVE PROTEXIS POWDER FREE 8.0 ORTHOPEDIC 2D73ET80

## (undated) DEVICE — NDL SPINAL 18GA 3.5" 405184

## (undated) DEVICE — SOL NACL 0.9% IRRIG 3000ML BAG 2B7477

## (undated) RX ORDER — OXYCODONE HCL 10 MG/1
TABLET, FILM COATED, EXTENDED RELEASE ORAL
Status: DISPENSED
Start: 2018-04-09

## (undated) RX ORDER — LIDOCAINE HYDROCHLORIDE 10 MG/ML
INJECTION, SOLUTION EPIDURAL; INFILTRATION; INTRACAUDAL; PERINEURAL
Status: DISPENSED
Start: 2021-11-01

## (undated) RX ORDER — TRANEXAMIC ACID 650 MG/1
TABLET ORAL
Status: DISPENSED
Start: 2021-11-01

## (undated) RX ORDER — PROPOFOL 10 MG/ML
INJECTION, EMULSION INTRAVENOUS
Status: DISPENSED
Start: 2018-04-09

## (undated) RX ORDER — FENTANYL CITRATE 50 UG/ML
INJECTION, SOLUTION INTRAMUSCULAR; INTRAVENOUS
Status: DISPENSED
Start: 2021-11-01

## (undated) RX ORDER — ACETAMINOPHEN 325 MG/1
TABLET ORAL
Status: DISPENSED
Start: 2021-11-01

## (undated) RX ORDER — HYDROMORPHONE HCL IN WATER/PF 6 MG/30 ML
PATIENT CONTROLLED ANALGESIA SYRINGE INTRAVENOUS
Status: DISPENSED
Start: 2021-11-01

## (undated) RX ORDER — NEOSTIGMINE METHYLSULFATE 1 MG/ML
VIAL (ML) INJECTION
Status: DISPENSED
Start: 2021-11-01

## (undated) RX ORDER — LABETALOL HYDROCHLORIDE 5 MG/ML
INJECTION, SOLUTION INTRAVENOUS
Status: DISPENSED
Start: 2021-11-01

## (undated) RX ORDER — CEFAZOLIN SODIUM 2 G/100ML
INJECTION, SOLUTION INTRAVENOUS
Status: DISPENSED
Start: 2018-04-09

## (undated) RX ORDER — OXYCODONE HCL 10 MG/1
TABLET, FILM COATED, EXTENDED RELEASE ORAL
Status: DISPENSED
Start: 2017-12-18

## (undated) RX ORDER — KETOROLAC TROMETHAMINE 30 MG/ML
INJECTION, SOLUTION INTRAMUSCULAR; INTRAVENOUS
Status: DISPENSED
Start: 2021-11-01

## (undated) RX ORDER — GABAPENTIN 300 MG/1
CAPSULE ORAL
Status: DISPENSED
Start: 2017-12-18

## (undated) RX ORDER — DEXAMETHASONE SODIUM PHOSPHATE 4 MG/ML
INJECTION, SOLUTION INTRA-ARTICULAR; INTRALESIONAL; INTRAMUSCULAR; INTRAVENOUS; SOFT TISSUE
Status: DISPENSED
Start: 2021-11-01

## (undated) RX ORDER — FENTANYL CITRATE 50 UG/ML
INJECTION, SOLUTION INTRAMUSCULAR; INTRAVENOUS
Status: DISPENSED
Start: 2018-04-09

## (undated) RX ORDER — CEFAZOLIN SODIUM/WATER 2 G/20 ML
SYRINGE (ML) INTRAVENOUS
Status: DISPENSED
Start: 2021-11-01

## (undated) RX ORDER — ONDANSETRON 2 MG/ML
INJECTION INTRAMUSCULAR; INTRAVENOUS
Status: DISPENSED
Start: 2021-11-01

## (undated) RX ORDER — CELECOXIB 200 MG/1
CAPSULE ORAL
Status: DISPENSED
Start: 2018-04-09

## (undated) RX ORDER — EPHEDRINE SULFATE 50 MG/ML
INJECTION, SOLUTION INTRAMUSCULAR; INTRAVENOUS; SUBCUTANEOUS
Status: DISPENSED
Start: 2018-04-09

## (undated) RX ORDER — CEFAZOLIN SODIUM 2 G/100ML
INJECTION, SOLUTION INTRAVENOUS
Status: DISPENSED
Start: 2017-12-18

## (undated) RX ORDER — METHOCARBAMOL 500 MG/1
TABLET, FILM COATED ORAL
Status: DISPENSED
Start: 2021-11-01

## (undated) RX ORDER — LIDOCAINE HYDROCHLORIDE 10 MG/ML
INJECTION, SOLUTION EPIDURAL; INFILTRATION; INTRACAUDAL; PERINEURAL
Status: DISPENSED
Start: 2018-04-09

## (undated) RX ORDER — CELECOXIB 200 MG/1
CAPSULE ORAL
Status: DISPENSED
Start: 2017-12-18

## (undated) RX ORDER — PROPOFOL 10 MG/ML
INJECTION, EMULSION INTRAVENOUS
Status: DISPENSED
Start: 2021-11-01

## (undated) RX ORDER — KETOROLAC TROMETHAMINE 30 MG/ML
INJECTION, SOLUTION INTRAMUSCULAR; INTRAVENOUS
Status: DISPENSED
Start: 2018-04-09

## (undated) RX ORDER — OXYCODONE HYDROCHLORIDE 5 MG/1
TABLET ORAL
Status: DISPENSED
Start: 2021-11-01

## (undated) RX ORDER — ONDANSETRON 2 MG/ML
INJECTION INTRAMUSCULAR; INTRAVENOUS
Status: DISPENSED
Start: 2018-04-09

## (undated) RX ORDER — GLYCOPYRROLATE 0.2 MG/ML
INJECTION INTRAMUSCULAR; INTRAVENOUS
Status: DISPENSED
Start: 2021-11-01

## (undated) RX ORDER — HYDROXYZINE HYDROCHLORIDE 10 MG/1
TABLET, FILM COATED ORAL
Status: DISPENSED
Start: 2021-11-01

## (undated) RX ORDER — HYDRALAZINE HYDROCHLORIDE 20 MG/ML
INJECTION INTRAMUSCULAR; INTRAVENOUS
Status: DISPENSED
Start: 2018-04-09